# Patient Record
Sex: MALE | Race: ASIAN | NOT HISPANIC OR LATINO
[De-identification: names, ages, dates, MRNs, and addresses within clinical notes are randomized per-mention and may not be internally consistent; named-entity substitution may affect disease eponyms.]

---

## 2017-04-21 ENCOUNTER — APPOINTMENT (OUTPATIENT)
Dept: INTERNAL MEDICINE | Facility: CLINIC | Age: 57
End: 2017-04-21

## 2017-05-09 ENCOUNTER — INPATIENT (INPATIENT)
Facility: HOSPITAL | Age: 57
LOS: 3 days | Discharge: HOME | DRG: 824 | End: 2017-05-13
Attending: INTERNAL MEDICINE | Admitting: INTERNAL MEDICINE
Payer: MEDICARE

## 2017-05-09 VITALS
RESPIRATION RATE: 20 BRPM | HEART RATE: 95 BPM | DIASTOLIC BLOOD PRESSURE: 91 MMHG | SYSTOLIC BLOOD PRESSURE: 148 MMHG | TEMPERATURE: 98 F

## 2017-05-09 DIAGNOSIS — R59.0 LOCALIZED ENLARGED LYMPH NODES: ICD-10-CM

## 2017-05-09 LAB
ALBUMIN SERPL ELPH-MCNC: 4.2 G/DL — SIGNIFICANT CHANGE UP (ref 3.3–5)
ALP SERPL-CCNC: 89 U/L — SIGNIFICANT CHANGE UP (ref 40–120)
ALT FLD-CCNC: 28 U/L RC — SIGNIFICANT CHANGE UP (ref 10–45)
ANION GAP SERPL CALC-SCNC: 14 MMOL/L — SIGNIFICANT CHANGE UP (ref 5–17)
APPEARANCE UR: CLEAR — SIGNIFICANT CHANGE UP
AST SERPL-CCNC: 26 U/L — SIGNIFICANT CHANGE UP (ref 10–40)
BASOPHILS # BLD AUTO: 0 K/UL — SIGNIFICANT CHANGE UP (ref 0–0.2)
BASOPHILS NFR BLD AUTO: 0.5 % — SIGNIFICANT CHANGE UP (ref 0–2)
BILIRUB SERPL-MCNC: 0.5 MG/DL — SIGNIFICANT CHANGE UP (ref 0.2–1.2)
BILIRUB UR-MCNC: NEGATIVE — SIGNIFICANT CHANGE UP
BUN SERPL-MCNC: 13 MG/DL — SIGNIFICANT CHANGE UP (ref 7–23)
CALCIUM SERPL-MCNC: 9.3 MG/DL — SIGNIFICANT CHANGE UP (ref 8.4–10.5)
CHLORIDE SERPL-SCNC: 104 MMOL/L — SIGNIFICANT CHANGE UP (ref 96–108)
CO2 SERPL-SCNC: 24 MMOL/L — SIGNIFICANT CHANGE UP (ref 22–31)
COLOR SPEC: SIGNIFICANT CHANGE UP
CREAT SERPL-MCNC: 0.8 MG/DL — SIGNIFICANT CHANGE UP (ref 0.5–1.3)
DIFF PNL FLD: NEGATIVE — SIGNIFICANT CHANGE UP
EOSINOPHIL # BLD AUTO: 0.1 K/UL — SIGNIFICANT CHANGE UP (ref 0–0.5)
EOSINOPHIL NFR BLD AUTO: 1.5 % — SIGNIFICANT CHANGE UP (ref 0–6)
GLUCOSE SERPL-MCNC: 111 MG/DL — HIGH (ref 70–99)
GLUCOSE UR QL: NEGATIVE — SIGNIFICANT CHANGE UP
HCT VFR BLD CALC: 43.5 % — SIGNIFICANT CHANGE UP (ref 39–50)
HGB BLD-MCNC: 15.3 G/DL — SIGNIFICANT CHANGE UP (ref 13–17)
KETONES UR-MCNC: NEGATIVE — SIGNIFICANT CHANGE UP
LEUKOCYTE ESTERASE UR-ACNC: NEGATIVE — SIGNIFICANT CHANGE UP
LYMPHOCYTES # BLD AUTO: 1.8 K/UL — SIGNIFICANT CHANGE UP (ref 1–3.3)
LYMPHOCYTES # BLD AUTO: 32.1 % — SIGNIFICANT CHANGE UP (ref 13–44)
MCHC RBC-ENTMCNC: 31.1 PG — SIGNIFICANT CHANGE UP (ref 27–34)
MCHC RBC-ENTMCNC: 35.1 GM/DL — SIGNIFICANT CHANGE UP (ref 32–36)
MCV RBC AUTO: 88.5 FL — SIGNIFICANT CHANGE UP (ref 80–100)
MONOCYTES # BLD AUTO: 0.6 K/UL — SIGNIFICANT CHANGE UP (ref 0–0.9)
MONOCYTES NFR BLD AUTO: 10.1 % — SIGNIFICANT CHANGE UP (ref 2–14)
NEUTROPHILS # BLD AUTO: 3.2 K/UL — SIGNIFICANT CHANGE UP (ref 1.8–7.4)
NEUTROPHILS NFR BLD AUTO: 55.9 % — SIGNIFICANT CHANGE UP (ref 43–77)
NITRITE UR-MCNC: NEGATIVE — SIGNIFICANT CHANGE UP
PH UR: 7.5 — SIGNIFICANT CHANGE UP (ref 5–8)
PLATELET # BLD AUTO: 211 K/UL — SIGNIFICANT CHANGE UP (ref 150–400)
POTASSIUM SERPL-MCNC: 4.1 MMOL/L — SIGNIFICANT CHANGE UP (ref 3.5–5.3)
POTASSIUM SERPL-SCNC: 4.1 MMOL/L — SIGNIFICANT CHANGE UP (ref 3.5–5.3)
PROT SERPL-MCNC: 7.7 G/DL — SIGNIFICANT CHANGE UP (ref 6–8.3)
PROT UR-MCNC: NEGATIVE — SIGNIFICANT CHANGE UP
RBC # BLD: 4.91 M/UL — SIGNIFICANT CHANGE UP (ref 4.2–5.8)
RBC # FLD: 11.8 % — SIGNIFICANT CHANGE UP (ref 10.3–14.5)
SODIUM SERPL-SCNC: 142 MMOL/L — SIGNIFICANT CHANGE UP (ref 135–145)
SP GR SPEC: 1.02 — SIGNIFICANT CHANGE UP (ref 1.01–1.02)
UROBILINOGEN FLD QL: NEGATIVE — SIGNIFICANT CHANGE UP
WBC # BLD: 5.8 K/UL — SIGNIFICANT CHANGE UP (ref 3.8–10.5)
WBC # FLD AUTO: 5.8 K/UL — SIGNIFICANT CHANGE UP (ref 3.8–10.5)
WBC UR QL: SIGNIFICANT CHANGE UP /HPF (ref 0–5)

## 2017-05-09 PROCEDURE — 71020: CPT | Mod: 26

## 2017-05-09 PROCEDURE — 70491 CT SOFT TISSUE NECK W/DYE: CPT | Mod: 26

## 2017-05-09 PROCEDURE — 93010 ELECTROCARDIOGRAM REPORT: CPT

## 2017-05-09 PROCEDURE — 99223 1ST HOSP IP/OBS HIGH 75: CPT

## 2017-05-09 PROCEDURE — 99285 EMERGENCY DEPT VISIT HI MDM: CPT | Mod: 25

## 2017-05-09 RX ORDER — VANCOMYCIN HCL 1 G
1000 VIAL (EA) INTRAVENOUS ONCE
Qty: 0 | Refills: 0 | Status: COMPLETED | OUTPATIENT
Start: 2017-05-09 | End: 2017-05-09

## 2017-05-09 RX ORDER — PIPERACILLIN AND TAZOBACTAM 4; .5 G/20ML; G/20ML
3.38 INJECTION, POWDER, LYOPHILIZED, FOR SOLUTION INTRAVENOUS ONCE
Qty: 0 | Refills: 0 | Status: COMPLETED | OUTPATIENT
Start: 2017-05-09 | End: 2017-05-09

## 2017-05-09 RX ADMIN — PIPERACILLIN AND TAZOBACTAM 200 GRAM(S): 4; .5 INJECTION, POWDER, LYOPHILIZED, FOR SOLUTION INTRAVENOUS at 11:46

## 2017-05-09 RX ADMIN — Medication 250 MILLIGRAM(S): at 12:30

## 2017-05-09 NOTE — ED PROVIDER NOTE - ATTENDING CONTRIBUTION TO CARE
patient presenting with several days of progressive swelling to R side of neck - may be lymphadenopathy. No dysphagia, change in phonation shortness of breath.  will further eval with CT, blood work. concern for lymphoma although patient denies systemic symptoms.

## 2017-05-09 NOTE — ED PROVIDER NOTE - OBJECTIVE STATEMENT
56M PMH bipolar disorder p/w neck swelling.  He recently started going to the gym and developed neck pain after playing a basketball game.  he attributed it to muscle pain and went to get a massage, and the masseur noticed significant soft tissue swelling and told him to see is PMD.  He did not but his spouse noticed that he had obvious asymmetry this morning and told him to come to the ED.  He denies difficulty swallowing, fever, vomiting, shortness of breath.  The swelling and pain is on the R side and from the mid neck to upper chest and back, does not radiate to arm or left chest/back.  It has not changed since starting a week ago, is worse with movement, and he has not taken NSAIDs or applied ice.  He has a history in the EMR of afib with mesenteric ischemia however he denies any history of this and does not recall ever being admitted to the hospital except for bipolar decades ago.

## 2017-05-09 NOTE — ED PROVIDER NOTE - PROGRESS NOTE DETAILS
ZR ct scan necrotising rt lymph node probably malignancy will admit to the hospital with surgical cons and iv antibiotics

## 2017-05-09 NOTE — H&P ADULT. - HISTORY OF PRESENT ILLNESS
Patient in excellent health. Two weeks ago with onset of neck swelling and tenderness. Wife noted the mass and brought him to the emergency room. CTT done today with a large tumor mass necrotizing and invasive into the face. Admitted for extent of disease and biopsy to establish diagnosis, prior to the onset of treatment.

## 2017-05-10 ENCOUNTER — TRANSCRIPTION ENCOUNTER (OUTPATIENT)
Age: 57
End: 2017-05-10

## 2017-05-10 LAB
HCT VFR BLD CALC: 43.6 % — SIGNIFICANT CHANGE UP (ref 39–50)
HGB BLD-MCNC: 15.2 G/DL — SIGNIFICANT CHANGE UP (ref 13–17)
MCHC RBC-ENTMCNC: 30.9 PG — SIGNIFICANT CHANGE UP (ref 27–34)
MCHC RBC-ENTMCNC: 34.9 GM/DL — SIGNIFICANT CHANGE UP (ref 32–36)
MCV RBC AUTO: 88.6 FL — SIGNIFICANT CHANGE UP (ref 80–100)
PLATELET # BLD AUTO: 219 K/UL — SIGNIFICANT CHANGE UP (ref 150–400)
RBC # BLD: 4.92 M/UL — SIGNIFICANT CHANGE UP (ref 4.2–5.8)
RBC # FLD: 12.8 % — SIGNIFICANT CHANGE UP (ref 10.3–14.5)
WBC # BLD: 6.85 K/UL — SIGNIFICANT CHANGE UP (ref 3.8–10.5)
WBC # FLD AUTO: 6.85 K/UL — SIGNIFICANT CHANGE UP (ref 3.8–10.5)

## 2017-05-10 PROCEDURE — 74177 CT ABD & PELVIS W/CONTRAST: CPT | Mod: 26

## 2017-05-10 PROCEDURE — 71260 CT THORAX DX C+: CPT | Mod: 26

## 2017-05-10 PROCEDURE — 99232 SBSQ HOSP IP/OBS MODERATE 35: CPT

## 2017-05-10 NOTE — DISCHARGE NOTE ADULT - CARE PROVIDER_API CALL
Mayank Cordon), Internal Medicine  [address] [place] [address]  Phone: [phone]  Fax: [phone]    Josef Stevenson), Internal Medicine; Medical Oncology  [address] [facility] [address]  [place] [address]  Phone: [phone]  Fax: [phone]

## 2017-05-10 NOTE — DISCHARGE NOTE ADULT - HOSPITAL COURSE
***To Be Completed By Attending*** Metastatic Lung cancer to neck obtained on biopsy by Dr Macdonald. Final path pending. Referred to dr Josef Stevenson, oncologist. Chemotherapy and radiation therapy to follow as an outpatient.

## 2017-05-10 NOTE — DISCHARGE NOTE ADULT - CARE PLAN
Principal Discharge DX:	Lymphadenopathy of right cervical region  Goal:	Follow up with your oncologist as an outpatient  Instructions for follow-up, activity and diet:	You will need to follow up with your oncologist, Dr. Josef Stevenson [phone] as an outpatient - please call to make an appointment.  Secondary Diagnosis:	Neck mass  Secondary Diagnosis:	Hypertension  Instructions for follow-up, activity and diet:	You will need to follow up with your primary medical doctor, Dr. Cordon [phone], within 10 days of discharge - please call to make an appointment.  Low salt diet  Activity as tolerated.  Follow up with your medical doctor for routine blood pressure monitoring at your next visit.  Notify your doctor if you have any of the following symptoms:   Dizziness, Lightheadedness, Blurry vision, Headache, Chest pain, Shortness of breath Principal Discharge DX:	Lymphadenopathy of right cervical region  Goal:	Follow up with your oncologist as an outpatient  Instructions for follow-up, activity and diet:	You will need to follow up with your oncologist, Dr. Josef Stevenson [phone] within one week - please call to make an appointment.  At that time, you will discuss your pathology results.  Secondary Diagnosis:	Neck mass  Instructions for follow-up, activity and diet:	You will need to follow up with your oncologist, Dr. Josef Stevenson [phone] within one week - please call to make an appointment.  At that time, you will discuss your pathology results.  Secondary Diagnosis:	Hypertension  Instructions for follow-up, activity and diet:	You will need to follow up with your primary medical doctor, Dr. Cordon [phone], within 10 days of discharge - please call to make an appointment.  Low salt diet  Activity as tolerated.  Follow up with your medical doctor for routine blood pressure monitoring at your next visit.  Notify your doctor if you have any of the following symptoms:   Dizziness, Lightheadedness, Blurry vision, Headache, Chest pain, Shortness of breath Principal Discharge DX:	Lymphadenopathy of right cervical region  Goal:	Follow up with your oncologist as an outpatient  Instructions for follow-up, activity and diet:	You will need to follow up with your oncologist, Dr. Josef Stevenson [phone] within one week - please call to make an appointment.  At that time, you will discuss your pathology results.  Secondary Diagnosis:	Neck mass  Instructions for follow-up, activity and diet:	You will need to follow up with your oncologist, Dr. Josef Stevenson [phone] within one week - please call to make an appointment.  At that time, you will discuss your pathology results.  Secondary Diagnosis:	Hypertension  Instructions for follow-up, activity and diet:	You will need to follow up with your primary medical doctor, Dr. Cordon [phone], within 10 days of discharge - please call to make an appointment.  Low salt diet  Activity as tolerated.  Follow up with your medical doctor for routine blood pressure monitoring at your next visit.  Notify your doctor if you have any of the following symptoms:   Dizziness, Lightheadedness, Blurry vision, Headache, Chest pain, Shortness of breath Principal Discharge DX:	Lymphadenopathy of right cervical region  Goal:	Follow up with your oncologist as an outpatient  Instructions for follow-up, activity and diet:	You will need to follow up with your oncologist, Dr. Josef Stevenson [phone] within one week - please call to make an appointment.  At that time, you will discuss your pathology results.  Secondary Diagnosis:	Neck mass  Instructions for follow-up, activity and diet:	You will need to follow up with your oncologist, Dr. Josef Stevenson [phone] within one week - please call to make an appointment.  At that time, you will discuss your pathology results.  Secondary Diagnosis:	Hypertension  Instructions for follow-up, activity and diet:	You will need to follow up with your primary medical doctor, Dr. Cordon [phone], within 10 days of discharge - please call to make an appointment.  Low salt diet  Activity as tolerated.  Follow up with your medical doctor for routine blood pressure monitoring at your next visit.  Notify your doctor if you have any of the following symptoms:   Dizziness, Lightheadedness, Blurry vision, Headache, Chest pain, Shortness of breath

## 2017-05-10 NOTE — DISCHARGE NOTE ADULT - PLAN OF CARE
Follow up with your oncologist as an outpatient You will need to follow up with your oncologist, Dr. Josef Stevenson [phone] as an outpatient - please call to make an appointment. You will need to follow up with your primary medical doctor, Dr. Cordon [phone], within 10 days of discharge - please call to make an appointment.  Low salt diet  Activity as tolerated.  Follow up with your medical doctor for routine blood pressure monitoring at your next visit.  Notify your doctor if you have any of the following symptoms:   Dizziness, Lightheadedness, Blurry vision, Headache, Chest pain, Shortness of breath You will need to follow up with your oncologist, Dr. Josef Stevenson [phone] within one week - please call to make an appointment.  At that time, you will discuss your pathology results. You will need to follow up with your oncologist, Dr. Josef Stevenson [phone] within one week - please call to make an appointment.  At that time, you will discuss your pathology results.

## 2017-05-10 NOTE — DISCHARGE NOTE ADULT - ADDITIONAL INSTRUCTIONS
You will need to follow up with your primary medical doctor, Dr. Cordon [phone], within 10 days of discharge - please call to make an appointment. You will need to follow up with your oncologist, Dr. Josef Stevenson [phone] within one week - please call to make an appointment.  At that time, you will discuss your pathology results.    You will need to follow up with your primary medical doctor, Dr. Cordon [phone], within 10 days of discharge - please call to make an appointment.

## 2017-05-10 NOTE — DISCHARGE NOTE ADULT - PATIENT PORTAL LINK FT
“You can access the [facility] Patient Portal, offered by [facility], by registering with the following website: [email or web address]”

## 2017-05-11 ENCOUNTER — RESULT REVIEW (OUTPATIENT)
Age: 57
End: 2017-05-11

## 2017-05-11 LAB
ANION GAP SERPL CALC-SCNC: 15 MMOL/L — SIGNIFICANT CHANGE UP (ref 5–17)
APTT BLD: 31.6 SEC — SIGNIFICANT CHANGE UP (ref 27.5–37.4)
BLD GP AB SCN SERPL QL: NEGATIVE — SIGNIFICANT CHANGE UP
BUN SERPL-MCNC: 12 MG/DL — SIGNIFICANT CHANGE UP (ref 7–23)
CALCIUM SERPL-MCNC: 8.9 MG/DL — SIGNIFICANT CHANGE UP (ref 8.4–10.5)
CHLORIDE SERPL-SCNC: 101 MMOL/L — SIGNIFICANT CHANGE UP (ref 96–108)
CO2 SERPL-SCNC: 23 MMOL/L — SIGNIFICANT CHANGE UP (ref 22–31)
CREAT SERPL-MCNC: 0.71 MG/DL — SIGNIFICANT CHANGE UP (ref 0.5–1.3)
GLUCOSE SERPL-MCNC: 108 MG/DL — HIGH (ref 70–99)
GRAM STN FLD: SIGNIFICANT CHANGE UP
HCT VFR BLD CALC: 41.8 % — SIGNIFICANT CHANGE UP (ref 39–50)
HGB BLD-MCNC: 14.6 G/DL — SIGNIFICANT CHANGE UP (ref 13–17)
INR BLD: 1 RATIO — SIGNIFICANT CHANGE UP (ref 0.88–1.16)
MCHC RBC-ENTMCNC: 30.8 PG — SIGNIFICANT CHANGE UP (ref 27–34)
MCHC RBC-ENTMCNC: 34.9 GM/DL — SIGNIFICANT CHANGE UP (ref 32–36)
MCV RBC AUTO: 88.2 FL — SIGNIFICANT CHANGE UP (ref 80–100)
NIGHT BLUE STAIN TISS: SIGNIFICANT CHANGE UP
PLATELET # BLD AUTO: 221 K/UL — SIGNIFICANT CHANGE UP (ref 150–400)
POTASSIUM SERPL-MCNC: 3.9 MMOL/L — SIGNIFICANT CHANGE UP (ref 3.5–5.3)
POTASSIUM SERPL-SCNC: 3.9 MMOL/L — SIGNIFICANT CHANGE UP (ref 3.5–5.3)
PROTHROM AB SERPL-ACNC: 11.3 SEC — SIGNIFICANT CHANGE UP (ref 10–13.1)
RBC # BLD: 4.74 M/UL — SIGNIFICANT CHANGE UP (ref 4.2–5.8)
RBC # FLD: 12.8 % — SIGNIFICANT CHANGE UP (ref 10.3–14.5)
RH IG SCN BLD-IMP: POSITIVE — SIGNIFICANT CHANGE UP
SODIUM SERPL-SCNC: 139 MMOL/L — SIGNIFICANT CHANGE UP (ref 135–145)
SPECIMEN SOURCE: SIGNIFICANT CHANGE UP
SPECIMEN SOURCE: SIGNIFICANT CHANGE UP
WBC # BLD: 6.76 K/UL — SIGNIFICANT CHANGE UP (ref 3.8–10.5)
WBC # FLD AUTO: 6.76 K/UL — SIGNIFICANT CHANGE UP (ref 3.8–10.5)

## 2017-05-11 PROCEDURE — 38510 BIOPSY/REMOVAL LYMPH NODES: CPT

## 2017-05-11 PROCEDURE — 88305 TISSUE EXAM BY PATHOLOGIST: CPT | Mod: 26

## 2017-05-11 PROCEDURE — 76998 US GUIDE INTRAOP: CPT | Mod: 26,59

## 2017-05-11 PROCEDURE — 88342 IMHCHEM/IMCYTCHM 1ST ANTB: CPT | Mod: 26

## 2017-05-11 PROCEDURE — 88341 IMHCHEM/IMCYTCHM EA ADD ANTB: CPT | Mod: 26

## 2017-05-11 PROCEDURE — 88331 PATH CONSLTJ SURG 1 BLK 1SPC: CPT | Mod: 26

## 2017-05-11 PROCEDURE — 70551 MRI BRAIN STEM W/O DYE: CPT | Mod: 26

## 2017-05-11 RX ORDER — METOCLOPRAMIDE HCL 10 MG
10 TABLET ORAL ONCE
Qty: 0 | Refills: 0 | Status: DISCONTINUED | OUTPATIENT
Start: 2017-05-11 | End: 2017-05-11

## 2017-05-11 RX ORDER — HYDROMORPHONE HYDROCHLORIDE 2 MG/ML
0.5 INJECTION INTRAMUSCULAR; INTRAVENOUS; SUBCUTANEOUS
Qty: 0 | Refills: 0 | Status: DISCONTINUED | OUTPATIENT
Start: 2017-05-11 | End: 2017-05-11

## 2017-05-11 RX ORDER — ONDANSETRON 8 MG/1
4 TABLET, FILM COATED ORAL ONCE
Qty: 0 | Refills: 0 | Status: DISCONTINUED | OUTPATIENT
Start: 2017-05-11 | End: 2017-05-11

## 2017-05-11 RX ORDER — ENOXAPARIN SODIUM 100 MG/ML
40 INJECTION SUBCUTANEOUS EVERY 24 HOURS
Qty: 0 | Refills: 0 | Status: DISCONTINUED | OUTPATIENT
Start: 2017-05-11 | End: 2017-05-13

## 2017-05-11 RX ORDER — DEXTROSE MONOHYDRATE, SODIUM CHLORIDE, AND POTASSIUM CHLORIDE 50; .745; 4.5 G/1000ML; G/1000ML; G/1000ML
1000 INJECTION, SOLUTION INTRAVENOUS
Qty: 0 | Refills: 0 | Status: DISCONTINUED | OUTPATIENT
Start: 2017-05-11 | End: 2017-05-11

## 2017-05-11 RX ADMIN — ENOXAPARIN SODIUM 40 MILLIGRAM(S): 100 INJECTION SUBCUTANEOUS at 15:45

## 2017-05-11 RX ADMIN — DEXTROSE MONOHYDRATE, SODIUM CHLORIDE, AND POTASSIUM CHLORIDE 50 MILLILITER(S): 50; .745; 4.5 INJECTION, SOLUTION INTRAVENOUS at 13:25

## 2017-05-11 NOTE — BRIEF OPERATIVE NOTE - OPERATION/FINDINGS
right cervical lymph node, sent for cultures, AFB, pathology and frozen.  Read as poorly differentiated adenocarcinoma

## 2017-05-12 ENCOUNTER — TRANSCRIPTION ENCOUNTER (OUTPATIENT)
Age: 57
End: 2017-05-12

## 2017-05-12 LAB
ANION GAP SERPL CALC-SCNC: 16 MMOL/L — SIGNIFICANT CHANGE UP (ref 5–17)
BASOPHILS # BLD AUTO: 0.02 K/UL — SIGNIFICANT CHANGE UP (ref 0–0.2)
BASOPHILS NFR BLD AUTO: 0.2 % — SIGNIFICANT CHANGE UP (ref 0–2)
BUN SERPL-MCNC: 13 MG/DL — SIGNIFICANT CHANGE UP (ref 7–23)
CALCIUM SERPL-MCNC: 9.2 MG/DL — SIGNIFICANT CHANGE UP (ref 8.4–10.5)
CHLORIDE SERPL-SCNC: 102 MMOL/L — SIGNIFICANT CHANGE UP (ref 96–108)
CO2 SERPL-SCNC: 22 MMOL/L — SIGNIFICANT CHANGE UP (ref 22–31)
CREAT SERPL-MCNC: 0.79 MG/DL — SIGNIFICANT CHANGE UP (ref 0.5–1.3)
EOSINOPHIL # BLD AUTO: 0.01 K/UL — SIGNIFICANT CHANGE UP (ref 0–0.5)
EOSINOPHIL NFR BLD AUTO: 0.1 % — SIGNIFICANT CHANGE UP (ref 0–6)
GLUCOSE SERPL-MCNC: 129 MG/DL — HIGH (ref 70–99)
HCT VFR BLD CALC: 41.6 % — SIGNIFICANT CHANGE UP (ref 39–50)
HGB BLD-MCNC: 13.7 G/DL — SIGNIFICANT CHANGE UP (ref 13–17)
IMM GRANULOCYTES NFR BLD AUTO: 0.2 % — SIGNIFICANT CHANGE UP (ref 0–1.5)
LYMPHOCYTES # BLD AUTO: 1.71 K/UL — SIGNIFICANT CHANGE UP (ref 1–3.3)
LYMPHOCYTES # BLD AUTO: 18.5 % — SIGNIFICANT CHANGE UP (ref 13–44)
MCHC RBC-ENTMCNC: 29.3 PG — SIGNIFICANT CHANGE UP (ref 27–34)
MCHC RBC-ENTMCNC: 32.9 GM/DL — SIGNIFICANT CHANGE UP (ref 32–36)
MCV RBC AUTO: 89.1 FL — SIGNIFICANT CHANGE UP (ref 80–100)
MONOCYTES # BLD AUTO: 0.87 K/UL — SIGNIFICANT CHANGE UP (ref 0–0.9)
MONOCYTES NFR BLD AUTO: 9.4 % — SIGNIFICANT CHANGE UP (ref 2–14)
NEUTROPHILS # BLD AUTO: 6.62 K/UL — SIGNIFICANT CHANGE UP (ref 1.8–7.4)
NEUTROPHILS NFR BLD AUTO: 71.6 % — SIGNIFICANT CHANGE UP (ref 43–77)
PLATELET # BLD AUTO: 227 K/UL — SIGNIFICANT CHANGE UP (ref 150–400)
POTASSIUM SERPL-MCNC: 4.1 MMOL/L — SIGNIFICANT CHANGE UP (ref 3.5–5.3)
POTASSIUM SERPL-SCNC: 4.1 MMOL/L — SIGNIFICANT CHANGE UP (ref 3.5–5.3)
RBC # BLD: 4.67 M/UL — SIGNIFICANT CHANGE UP (ref 4.2–5.8)
RBC # FLD: 12.8 % — SIGNIFICANT CHANGE UP (ref 10.3–14.5)
SODIUM SERPL-SCNC: 140 MMOL/L — SIGNIFICANT CHANGE UP (ref 135–145)
WBC # BLD: 9.25 K/UL — SIGNIFICANT CHANGE UP (ref 3.8–10.5)
WBC # FLD AUTO: 9.25 K/UL — SIGNIFICANT CHANGE UP (ref 3.8–10.5)

## 2017-05-13 VITALS
SYSTOLIC BLOOD PRESSURE: 121 MMHG | RESPIRATION RATE: 18 BRPM | DIASTOLIC BLOOD PRESSURE: 72 MMHG | TEMPERATURE: 99 F | OXYGEN SATURATION: 97 % | HEART RATE: 61 BPM

## 2017-05-13 PROCEDURE — 85610 PROTHROMBIN TIME: CPT

## 2017-05-13 PROCEDURE — 87102 FUNGUS ISOLATION CULTURE: CPT

## 2017-05-13 PROCEDURE — 99285 EMERGENCY DEPT VISIT HI MDM: CPT | Mod: 25

## 2017-05-13 PROCEDURE — 86900 BLOOD TYPING SEROLOGIC ABO: CPT

## 2017-05-13 PROCEDURE — 80053 COMPREHEN METABOLIC PANEL: CPT

## 2017-05-13 PROCEDURE — 96374 THER/PROPH/DIAG INJ IV PUSH: CPT | Mod: XU

## 2017-05-13 PROCEDURE — 88341 IMHCHEM/IMCYTCHM EA ADD ANTB: CPT

## 2017-05-13 PROCEDURE — 81001 URINALYSIS AUTO W/SCOPE: CPT

## 2017-05-13 PROCEDURE — 88305 TISSUE EXAM BY PATHOLOGIST: CPT

## 2017-05-13 PROCEDURE — 85730 THROMBOPLASTIN TIME PARTIAL: CPT

## 2017-05-13 PROCEDURE — 86901 BLOOD TYPING SEROLOGIC RH(D): CPT

## 2017-05-13 PROCEDURE — 71046 X-RAY EXAM CHEST 2 VIEWS: CPT

## 2017-05-13 PROCEDURE — 88331 PATH CONSLTJ SURG 1 BLK 1SPC: CPT

## 2017-05-13 PROCEDURE — C1889: CPT

## 2017-05-13 PROCEDURE — 87116 MYCOBACTERIA CULTURE: CPT

## 2017-05-13 PROCEDURE — 88342 IMHCHEM/IMCYTCHM 1ST ANTB: CPT

## 2017-05-13 PROCEDURE — 87206 SMEAR FLUORESCENT/ACID STAI: CPT

## 2017-05-13 PROCEDURE — 87015 SPECIMEN INFECT AGNT CONCNTJ: CPT

## 2017-05-13 PROCEDURE — 85027 COMPLETE CBC AUTOMATED: CPT

## 2017-05-13 PROCEDURE — 71260 CT THORAX DX C+: CPT

## 2017-05-13 PROCEDURE — 70551 MRI BRAIN STEM W/O DYE: CPT

## 2017-05-13 PROCEDURE — 74177 CT ABD & PELVIS W/CONTRAST: CPT

## 2017-05-13 PROCEDURE — 80048 BASIC METABOLIC PNL TOTAL CA: CPT

## 2017-05-13 PROCEDURE — 85652 RBC SED RATE AUTOMATED: CPT

## 2017-05-13 PROCEDURE — 87070 CULTURE OTHR SPECIMN AEROBIC: CPT

## 2017-05-13 PROCEDURE — 86850 RBC ANTIBODY SCREEN: CPT

## 2017-05-13 PROCEDURE — 96375 TX/PRO/DX INJ NEW DRUG ADDON: CPT | Mod: XU

## 2017-05-13 PROCEDURE — 93005 ELECTROCARDIOGRAM TRACING: CPT

## 2017-05-13 PROCEDURE — 70491 CT SOFT TISSUE NECK W/DYE: CPT

## 2017-05-16 LAB
CULTURE RESULTS: SIGNIFICANT CHANGE UP
SPECIMEN SOURCE: SIGNIFICANT CHANGE UP
SURGICAL PATHOLOGY STUDY: SIGNIFICANT CHANGE UP

## 2017-05-25 ENCOUNTER — APPOINTMENT (OUTPATIENT)
Dept: INTERNAL MEDICINE | Facility: CLINIC | Age: 57
End: 2017-05-25

## 2017-06-09 ENCOUNTER — APPOINTMENT (OUTPATIENT)
Dept: INTERNAL MEDICINE | Facility: CLINIC | Age: 57
End: 2017-06-09

## 2017-06-09 VITALS
HEART RATE: 93 BPM | OXYGEN SATURATION: 95 % | DIASTOLIC BLOOD PRESSURE: 86 MMHG | BODY MASS INDEX: 27.62 KG/M2 | WEIGHT: 176 LBS | TEMPERATURE: 99.4 F | RESPIRATION RATE: 12 BRPM | HEIGHT: 67 IN | SYSTOLIC BLOOD PRESSURE: 122 MMHG

## 2017-06-09 DIAGNOSIS — R21 RASH AND OTHER NONSPECIFIC SKIN ERUPTION: ICD-10-CM

## 2017-06-09 DIAGNOSIS — C34.91 MALIGNANT NEOPLASM OF UNSPECIFIED PART OF RIGHT BRONCHUS OR LUNG: ICD-10-CM

## 2017-06-10 LAB
CULTURE RESULTS: SIGNIFICANT CHANGE UP
SPECIMEN SOURCE: SIGNIFICANT CHANGE UP

## 2017-06-11 PROBLEM — C34.91 PRIMARY MALIGNANT NEOPLASM OF RIGHT LUNG METASTATIC TO OTHER SITE: Status: ACTIVE | Noted: 2017-06-11

## 2017-06-11 PROBLEM — R21 RASH OF NECK: Status: ACTIVE | Noted: 2017-06-11

## 2017-06-24 LAB
CULTURE RESULTS: SIGNIFICANT CHANGE UP
SPECIMEN SOURCE: SIGNIFICANT CHANGE UP

## 2017-12-14 ENCOUNTER — OUTPATIENT (OUTPATIENT)
Dept: OUTPATIENT SERVICES | Facility: HOSPITAL | Age: 57
LOS: 1 days | End: 2017-12-14

## 2017-12-14 ENCOUNTER — APPOINTMENT (OUTPATIENT)
Dept: CT IMAGING | Facility: IMAGING CENTER | Age: 57
End: 2017-12-14

## 2017-12-28 ENCOUNTER — APPOINTMENT (OUTPATIENT)
Dept: CT IMAGING | Facility: IMAGING CENTER | Age: 57
End: 2017-12-28
Payer: MEDICARE

## 2017-12-28 ENCOUNTER — OUTPATIENT (OUTPATIENT)
Dept: OUTPATIENT SERVICES | Facility: HOSPITAL | Age: 57
LOS: 1 days | End: 2017-12-28
Payer: COMMERCIAL

## 2017-12-28 DIAGNOSIS — Z00.8 ENCOUNTER FOR OTHER GENERAL EXAMINATION: ICD-10-CM

## 2017-12-28 PROCEDURE — 71250 CT THORAX DX C-: CPT | Mod: 26

## 2017-12-28 PROCEDURE — 71250 CT THORAX DX C-: CPT

## 2017-12-28 PROCEDURE — 70490 CT SOFT TISSUE NECK W/O DYE: CPT | Mod: 26

## 2017-12-28 PROCEDURE — 70490 CT SOFT TISSUE NECK W/O DYE: CPT

## 2018-01-08 DIAGNOSIS — J35.1 HYPERTROPHY OF TONSILS: ICD-10-CM

## 2018-01-08 DIAGNOSIS — C34.90 MALIGNANT NEOPLASM OF UNSPECIFIED PART OF UNSPECIFIED BRONCHUS OR LUNG: ICD-10-CM

## 2018-01-08 DIAGNOSIS — Z85.118 PERSONAL HISTORY OF OTHER MALIGNANT NEOPLASM OF BRONCHUS AND LUNG: ICD-10-CM

## 2018-01-26 ENCOUNTER — APPOINTMENT (OUTPATIENT)
Dept: INTERNAL MEDICINE | Facility: CLINIC | Age: 58
End: 2018-01-26
Payer: MEDICAID

## 2018-01-26 VITALS
DIASTOLIC BLOOD PRESSURE: 82 MMHG | HEART RATE: 75 BPM | SYSTOLIC BLOOD PRESSURE: 149 MMHG | BODY MASS INDEX: 27.78 KG/M2 | WEIGHT: 177 LBS | TEMPERATURE: 98.1 F | OXYGEN SATURATION: 95 % | RESPIRATION RATE: 12 BRPM | HEIGHT: 67 IN

## 2018-01-26 DIAGNOSIS — M54.5 LOW BACK PAIN: ICD-10-CM

## 2018-01-26 PROCEDURE — 99214 OFFICE O/P EST MOD 30 MIN: CPT

## 2018-05-04 ENCOUNTER — APPOINTMENT (OUTPATIENT)
Dept: INTERNAL MEDICINE | Facility: CLINIC | Age: 58
End: 2018-05-04
Payer: MEDICARE

## 2018-05-04 VITALS
TEMPERATURE: 98.3 F | DIASTOLIC BLOOD PRESSURE: 81 MMHG | RESPIRATION RATE: 12 BRPM | HEIGHT: 67 IN | WEIGHT: 178 LBS | HEART RATE: 65 BPM | SYSTOLIC BLOOD PRESSURE: 127 MMHG | BODY MASS INDEX: 27.94 KG/M2 | OXYGEN SATURATION: 94 %

## 2018-05-04 DIAGNOSIS — M25.511 PAIN IN RIGHT SHOULDER: ICD-10-CM

## 2018-05-04 DIAGNOSIS — L98.9 DISORDER OF THE SKIN AND SUBCUTANEOUS TISSUE, UNSPECIFIED: ICD-10-CM

## 2018-05-04 DIAGNOSIS — B35.1 TINEA UNGUIUM: ICD-10-CM

## 2018-05-04 PROCEDURE — 99214 OFFICE O/P EST MOD 30 MIN: CPT

## 2018-05-04 RX ORDER — ERLOTINIB HYDROCHLORIDE 150 MG/1
TABLET ORAL
Refills: 0 | Status: ACTIVE | COMMUNITY

## 2018-05-26 ENCOUNTER — APPOINTMENT (OUTPATIENT)
Dept: NUCLEAR MEDICINE | Facility: IMAGING CENTER | Age: 58
End: 2018-05-26
Payer: MEDICARE

## 2018-05-26 ENCOUNTER — OUTPATIENT (OUTPATIENT)
Dept: OUTPATIENT SERVICES | Facility: HOSPITAL | Age: 58
LOS: 1 days | End: 2018-05-26
Payer: COMMERCIAL

## 2018-05-26 DIAGNOSIS — C34.11 MALIGNANT NEOPLASM OF UPPER LOBE, RIGHT BRONCHUS OR LUNG: ICD-10-CM

## 2018-05-26 PROCEDURE — 78815 PET IMAGE W/CT SKULL-THIGH: CPT

## 2018-05-26 PROCEDURE — A9552: CPT

## 2018-05-26 PROCEDURE — 78815 PET IMAGE W/CT SKULL-THIGH: CPT | Mod: 26,PS

## 2018-06-05 ENCOUNTER — RESULT REVIEW (OUTPATIENT)
Age: 58
End: 2018-06-05

## 2018-06-05 ENCOUNTER — OUTPATIENT (OUTPATIENT)
Dept: OUTPATIENT SERVICES | Facility: HOSPITAL | Age: 58
LOS: 1 days | End: 2018-06-05
Payer: COMMERCIAL

## 2018-06-05 DIAGNOSIS — C49.0 MALIGNANT NEOPLASM OF CONNECTIVE AND SOFT TISSUE OF HEAD, FACE AND NECK: ICD-10-CM

## 2018-06-05 PROCEDURE — 88363 XM ARCHIVE TISSUE MOLEC ANAL: CPT

## 2018-06-15 LAB — SURGICAL PATHOLOGY STUDY: SIGNIFICANT CHANGE UP

## 2018-06-28 ENCOUNTER — APPOINTMENT (OUTPATIENT)
Dept: SURGERY | Facility: CLINIC | Age: 58
End: 2018-06-28
Payer: COMMERCIAL

## 2018-06-28 VITALS
BODY MASS INDEX: 27.15 KG/M2 | OXYGEN SATURATION: 99 % | TEMPERATURE: 98.3 F | WEIGHT: 173 LBS | HEART RATE: 65 BPM | SYSTOLIC BLOOD PRESSURE: 129 MMHG | HEIGHT: 67 IN | RESPIRATION RATE: 14 BRPM | DIASTOLIC BLOOD PRESSURE: 77 MMHG

## 2018-06-28 DIAGNOSIS — Z87.09 PERSONAL HISTORY OF OTHER DISEASES OF THE RESPIRATORY SYSTEM: ICD-10-CM

## 2018-06-28 DIAGNOSIS — L72.9 FOLLICULAR CYST OF THE SKIN AND SUBCUTANEOUS TISSUE, UNSPECIFIED: ICD-10-CM

## 2018-06-28 DIAGNOSIS — Z80.41 FAMILY HISTORY OF MALIGNANT NEOPLASM OF OVARY: ICD-10-CM

## 2018-06-28 DIAGNOSIS — R22.1 LOCALIZED SWELLING, MASS AND LUMP, NECK: ICD-10-CM

## 2018-06-28 DIAGNOSIS — Z83.3 FAMILY HISTORY OF DIABETES MELLITUS: ICD-10-CM

## 2018-06-28 PROCEDURE — 11100 BX SKIN SUBCUTANEOUS&/MUCOUS MEMBRANE 1 LESION: CPT

## 2018-06-28 PROCEDURE — 99203 OFFICE O/P NEW LOW 30 MIN: CPT | Mod: 25

## 2018-06-28 RX ORDER — DOXYCYCLINE HYCLATE 100 MG/1
100 TABLET ORAL
Qty: 28 | Refills: 0 | Status: DISCONTINUED | COMMUNITY
Start: 2018-06-12

## 2018-06-28 RX ORDER — HYDROCORTISONE 25 MG/G
2.5 CREAM TOPICAL
Qty: 28 | Refills: 0 | Status: ACTIVE | COMMUNITY
Start: 2018-06-12

## 2018-07-07 ENCOUNTER — OUTPATIENT (OUTPATIENT)
Dept: OUTPATIENT SERVICES | Facility: HOSPITAL | Age: 58
LOS: 1 days | End: 2018-07-07
Payer: COMMERCIAL

## 2018-07-07 ENCOUNTER — APPOINTMENT (OUTPATIENT)
Dept: MRI IMAGING | Facility: IMAGING CENTER | Age: 58
End: 2018-07-07
Payer: MEDICARE

## 2018-07-07 DIAGNOSIS — C78.01 SECONDARY MALIGNANT NEOPLASM OF RIGHT LUNG: ICD-10-CM

## 2018-07-07 PROCEDURE — 74183 MRI ABD W/O CNTR FLWD CNTR: CPT

## 2018-07-07 PROCEDURE — 74183 MRI ABD W/O CNTR FLWD CNTR: CPT | Mod: 26

## 2018-07-07 PROCEDURE — A9585: CPT

## 2018-07-13 LAB — CORE LAB BIOPSY: NORMAL

## 2018-11-14 ENCOUNTER — TRANSCRIPTION ENCOUNTER (OUTPATIENT)
Age: 58
End: 2018-11-14

## 2018-12-13 ENCOUNTER — APPOINTMENT (OUTPATIENT)
Dept: INTERNAL MEDICINE | Facility: CLINIC | Age: 58
End: 2018-12-13

## 2019-01-24 ENCOUNTER — APPOINTMENT (OUTPATIENT)
Dept: INTERNAL MEDICINE | Facility: CLINIC | Age: 59
End: 2019-01-24
Payer: COMMERCIAL

## 2019-01-24 VITALS
DIASTOLIC BLOOD PRESSURE: 59 MMHG | WEIGHT: 172 LBS | OXYGEN SATURATION: 98 % | SYSTOLIC BLOOD PRESSURE: 120 MMHG | HEIGHT: 67 IN | RESPIRATION RATE: 14 BRPM | TEMPERATURE: 98.6 F | HEART RATE: 85 BPM | BODY MASS INDEX: 27 KG/M2

## 2019-01-24 DIAGNOSIS — Z00.00 ENCOUNTER FOR GENERAL ADULT MEDICAL EXAMINATION W/OUT ABNORMAL FINDINGS: ICD-10-CM

## 2019-01-24 DIAGNOSIS — C34.90 MALIGNANT NEOPLASM OF UNSPECIFIED PART OF UNSPECIFIED BRONCHUS OR LUNG: ICD-10-CM

## 2019-01-24 PROCEDURE — 99396 PREV VISIT EST AGE 40-64: CPT

## 2019-01-24 NOTE — PLAN
[FreeTextEntry1] : 1. Annual physical\par pt refused blood work today\par 2. Lung cancer\par following with oncologist

## 2019-01-24 NOTE — HISTORY OF PRESENT ILLNESS
[de-identified] : 58 year old male with h/o metastatic  NSCC lung cancer  presents for annual physical exam. \par He is following with oncologist Dr. Stevenson on the regular basis > was started on Tegrisso for metastatic lung cancer ( he failed Tarceva ) \par He is otherwise doing well, denies CP/SOB, dizziness, N, V, abdominal pain. \par Last colonoscopy  20 years ago > refusing colonoscopy at present \par He tried to go to the [facility] at least once a week \par Pt refused blood work today

## 2019-01-24 NOTE — HISTORY OF PRESENT ILLNESS
[de-identified] : 58 year old male with h/o metastatic  NSCC lung cancer  presents for annual physical exam. \par He is following with oncologist Dr. Stevenson on the regular basis > was started on Tegrisso for metastatic lung cancer ( he failed Tarceva ) \par He is otherwise doing well, denies CP/SOB, dizziness, N, V, abdominal pain. \par Last colonoscopy  20 years ago > refusing colonoscopy at present \par He tried to go to the [facility] at least once a week \par Pt refused blood work today

## 2019-01-24 NOTE — PHYSICAL EXAM
[No Acute Distress] : no acute distress [Well Nourished] : well nourished [Well Developed] : well developed [Normal Sclera/Conjunctiva] : normal sclera/conjunctiva [EOMI] : extraocular movements intact [Normal Outer Ear/Nose] : the outer ears and nose were normal in appearance [Normal Oropharynx] : the oropharynx was normal [Normal TMs] : both tympanic membranes were normal [No JVD] : no jugular venous distention [Supple] : supple [No Respiratory Distress] : no respiratory distress  [Clear to Auscultation] : lungs were clear to auscultation bilaterally [No Accessory Muscle Use] : no accessory muscle use [Normal Rate] : normal rate  [Regular Rhythm] : with a regular rhythm [Normal S1, S2] : normal S1 and S2 [No Carotid Bruits] : no carotid bruits [Pedal Pulses Present] : the pedal pulses are present [No Edema] : there was no peripheral edema [Soft] : abdomen soft [Non Tender] : non-tender [Non-distended] : non-distended [No Masses] : no abdominal mass palpated [No HSM] : no HSM [Normal Bowel Sounds] : normal bowel sounds [Normal Posterior Cervical Nodes] : no posterior cervical lymphadenopathy [Normal Anterior Cervical Nodes] : no anterior cervical lymphadenopathy [No CVA Tenderness] : no CVA  tenderness [No Spinal Tenderness] : no spinal tenderness [No Joint Swelling] : no joint swelling [Grossly Normal Strength/Tone] : grossly normal strength/tone [No Rash] : no rash [Normal Gait] : normal gait [No Focal Deficits] : no focal deficits [Deep Tendon Reflexes (DTR)] : deep tendon reflexes were 2+ and symmetric [Normal Affect] : the affect was normal [Normal Insight/Judgement] : insight and judgment were intact [de-identified] : + few nodules Rt anterior neck line

## 2019-01-24 NOTE — PHYSICAL EXAM
[No Acute Distress] : no acute distress [Well Nourished] : well nourished [Well Developed] : well developed [Normal Sclera/Conjunctiva] : normal sclera/conjunctiva [EOMI] : extraocular movements intact [Normal Outer Ear/Nose] : the outer ears and nose were normal in appearance [Normal Oropharynx] : the oropharynx was normal [Normal TMs] : both tympanic membranes were normal [No JVD] : no jugular venous distention [Supple] : supple [No Respiratory Distress] : no respiratory distress  [Clear to Auscultation] : lungs were clear to auscultation bilaterally [No Accessory Muscle Use] : no accessory muscle use [Normal Rate] : normal rate  [Regular Rhythm] : with a regular rhythm [Normal S1, S2] : normal S1 and S2 [No Carotid Bruits] : no carotid bruits [Pedal Pulses Present] : the pedal pulses are present [No Edema] : there was no peripheral edema [Soft] : abdomen soft [Non Tender] : non-tender [Non-distended] : non-distended [No Masses] : no abdominal mass palpated [No HSM] : no HSM [Normal Bowel Sounds] : normal bowel sounds [Normal Posterior Cervical Nodes] : no posterior cervical lymphadenopathy [Normal Anterior Cervical Nodes] : no anterior cervical lymphadenopathy [No CVA Tenderness] : no CVA  tenderness [No Spinal Tenderness] : no spinal tenderness [No Joint Swelling] : no joint swelling [Grossly Normal Strength/Tone] : grossly normal strength/tone [No Rash] : no rash [Normal Gait] : normal gait [No Focal Deficits] : no focal deficits [Deep Tendon Reflexes (DTR)] : deep tendon reflexes were 2+ and symmetric [Normal Affect] : the affect was normal [Normal Insight/Judgement] : insight and judgment were intact [de-identified] : + few nodules Rt anterior neck line

## 2019-10-14 NOTE — H&P ADULT. - VASCULAR
Regular rate and rhythm, Heart sounds S1 S2 present, no murmurs, rubs or gallops Equal and normal pulses (carotid, femoral, dorsalis pedis)

## 2019-10-23 ENCOUNTER — APPOINTMENT (OUTPATIENT)
Dept: INTERNAL MEDICINE | Facility: CLINIC | Age: 59
End: 2019-10-23
Payer: COMMERCIAL

## 2019-10-23 VITALS
WEIGHT: 155 LBS | SYSTOLIC BLOOD PRESSURE: 115 MMHG | HEIGHT: 67 IN | BODY MASS INDEX: 24.33 KG/M2 | TEMPERATURE: 98.9 F | DIASTOLIC BLOOD PRESSURE: 69 MMHG

## 2019-10-23 VITALS — HEART RATE: 121 BPM | OXYGEN SATURATION: 87 %

## 2019-10-23 DIAGNOSIS — J18.9 PNEUMONIA, UNSPECIFIED ORGANISM: ICD-10-CM

## 2019-10-23 DIAGNOSIS — R06.02 SHORTNESS OF BREATH: ICD-10-CM

## 2019-10-23 DIAGNOSIS — R09.02 HYPOXEMIA: ICD-10-CM

## 2019-10-23 PROCEDURE — 99215 OFFICE O/P EST HI 40 MIN: CPT

## 2019-10-25 ENCOUNTER — INPATIENT (INPATIENT)
Facility: HOSPITAL | Age: 59
LOS: 7 days | Discharge: HOME | DRG: 871 | End: 2019-11-02
Attending: INTERNAL MEDICINE | Admitting: HOSPITALIST
Payer: COMMERCIAL

## 2019-10-25 VITALS
SYSTOLIC BLOOD PRESSURE: 128 MMHG | TEMPERATURE: 98 F | OXYGEN SATURATION: 84 % | DIASTOLIC BLOOD PRESSURE: 72 MMHG | HEART RATE: 109 BPM | RESPIRATION RATE: 18 BRPM

## 2019-10-25 LAB
ALBUMIN SERPL ELPH-MCNC: 3.5 G/DL — SIGNIFICANT CHANGE UP (ref 3.3–5)
ALP SERPL-CCNC: 93 U/L — SIGNIFICANT CHANGE UP (ref 40–120)
ALT FLD-CCNC: 12 U/L — SIGNIFICANT CHANGE UP (ref 10–45)
ANION GAP SERPL CALC-SCNC: 14 MMOL/L — SIGNIFICANT CHANGE UP (ref 5–17)
AST SERPL-CCNC: 33 U/L — SIGNIFICANT CHANGE UP (ref 10–40)
BILIRUB SERPL-MCNC: 0.6 MG/DL — SIGNIFICANT CHANGE UP (ref 0.2–1.2)
BUN SERPL-MCNC: 22 MG/DL — SIGNIFICANT CHANGE UP (ref 7–23)
CALCIUM SERPL-MCNC: 8.8 MG/DL — SIGNIFICANT CHANGE UP (ref 8.4–10.5)
CHLORIDE SERPL-SCNC: 103 MMOL/L — SIGNIFICANT CHANGE UP (ref 96–108)
CO2 SERPL-SCNC: 25 MMOL/L — SIGNIFICANT CHANGE UP (ref 22–31)
CREAT SERPL-MCNC: 0.79 MG/DL — SIGNIFICANT CHANGE UP (ref 0.5–1.3)
GAS PNL BLDV: SIGNIFICANT CHANGE UP
GLUCOSE SERPL-MCNC: 101 MG/DL — HIGH (ref 70–99)
HCT VFR BLD CALC: 38.1 % — LOW (ref 39–50)
HGB BLD-MCNC: 12.5 G/DL — LOW (ref 13–17)
MCHC RBC-ENTMCNC: 29.3 PG — SIGNIFICANT CHANGE UP (ref 27–34)
MCHC RBC-ENTMCNC: 32.8 GM/DL — SIGNIFICANT CHANGE UP (ref 32–36)
MCV RBC AUTO: 89.4 FL — SIGNIFICANT CHANGE UP (ref 80–100)
POTASSIUM SERPL-MCNC: 3.9 MMOL/L — SIGNIFICANT CHANGE UP (ref 3.5–5.3)
POTASSIUM SERPL-SCNC: 3.9 MMOL/L — SIGNIFICANT CHANGE UP (ref 3.5–5.3)
PROT SERPL-MCNC: 7.2 G/DL — SIGNIFICANT CHANGE UP (ref 6–8.3)
RBC # BLD: 4.26 M/UL — SIGNIFICANT CHANGE UP (ref 4.2–5.8)
RBC # FLD: 14.4 % — SIGNIFICANT CHANGE UP (ref 10.3–14.5)
SODIUM SERPL-SCNC: 142 MMOL/L — SIGNIFICANT CHANGE UP (ref 135–145)
WBC # BLD: 4.64 K/UL — SIGNIFICANT CHANGE UP (ref 3.8–10.5)
WBC # FLD AUTO: 4.64 K/UL — SIGNIFICANT CHANGE UP (ref 3.8–10.5)

## 2019-10-25 PROCEDURE — 99285 EMERGENCY DEPT VISIT HI MDM: CPT

## 2019-10-25 PROCEDURE — 71275 CT ANGIOGRAPHY CHEST: CPT | Mod: 26

## 2019-10-25 RX ORDER — SODIUM CHLORIDE 9 MG/ML
1000 INJECTION INTRAMUSCULAR; INTRAVENOUS; SUBCUTANEOUS ONCE
Refills: 0 | Status: COMPLETED | OUTPATIENT
Start: 2019-10-25 | End: 2019-10-25

## 2019-10-25 RX ORDER — PIPERACILLIN AND TAZOBACTAM 4; .5 G/20ML; G/20ML
3.38 INJECTION, POWDER, LYOPHILIZED, FOR SOLUTION INTRAVENOUS ONCE
Refills: 0 | Status: COMPLETED | OUTPATIENT
Start: 2019-10-25 | End: 2019-10-25

## 2019-10-25 RX ADMIN — PIPERACILLIN AND TAZOBACTAM 200 GRAM(S): 4; .5 INJECTION, POWDER, LYOPHILIZED, FOR SOLUTION INTRAVENOUS at 21:52

## 2019-10-25 RX ADMIN — SODIUM CHLORIDE 1000 MILLILITER(S): 9 INJECTION INTRAMUSCULAR; INTRAVENOUS; SUBCUTANEOUS at 21:52

## 2019-10-25 NOTE — ED ADULT NURSE NOTE - OBJECTIVE STATEMENT
59y Male A&Ox3 came to ED c/o cough.  PMH of HTN, Non-small cell carcinoma of R lung.  PT states he has been having coughing since August, saw MD in September and was prescribed Levofloxacin and Methylprednisolone, Pt hasn't gotten better, Pt saw MD on Wed, CT was done and dx of Pneumonia.  Pt presents today with SOB, cough, 82% SPO2 on RA, bilateral crackles on lungs, Pt put on 3L O2 NC and continuous pulse O2.  denies chest pain, ha, n/v/d, abdominal pain, f/c, urinary symptoms, hematuria

## 2019-10-25 NOTE — ED PROVIDER NOTE - PROGRESS NOTE DETAILS
Johnna Mancilla, PGY-2: Patient hemodynamically stable on nasal cannula. CT Angio negative for a PE, but positive for progression of disease as well as a post-obstructive pneumonia. Will admit to medicine

## 2019-10-25 NOTE — ED PROVIDER NOTE - PHYSICAL EXAMINATION
PHYSICAL EXAM:  GENERAL: coughing, mild respiratory distress  HEAD:  Atraumatic, Normocephalic  EYES: EOMI, PERRLA, conjunctiva and sclera clear  NECK: Supple, No JVD  CHEST/LUNG: Clear to auscultation bilaterally; No wheeze  HEART: Regular rate and rhythm; No murmurs, rubs, or gallops  ABDOMEN: Soft, Nontender, Nondistended; Bowel sounds present  EXTREMITIES:  2+ Peripheral Pulses, No clubbing, cyanosis, or edema  PSYCH: AAOx3  NEUROLOGY: non-focal  SKIN: No rashes or lesions

## 2019-10-25 NOTE — ED PROVIDER NOTE - ATTENDING CONTRIBUTION TO CARE
60 y/o male with h/o lung ca, followed by [facility] s/o radiotx, on oral chemo but hasn't started with cough since august, sob recently had ct chest showed pna a few weeks ago on levoquin but sts didn't improve, hypoxic, tachy, but no fever, doesn't meet SS crtieria at this time, but sepsis work up started, ct chest, l base diminished.

## 2019-10-25 NOTE — ED PROVIDER NOTE - OBJECTIVE STATEMENT
Johnna Mancilla MD PGY-2: Patient is sa 60 yo male with a hx of non-small cell lung cancer (on radiation therapy and chemo) who presents to the ED with a chronic cough. Pt states cough started in august, has gone to his PMD x2 and was treated with a 7d course of Levofloxacin, Medrol Dose Pack, and Tessalon Pearls. Patient's cough worsened over the past week, which prompted him to come into the ED>

## 2019-10-26 DIAGNOSIS — C34.90 MALIGNANT NEOPLASM OF UNSPECIFIED PART OF UNSPECIFIED BRONCHUS OR LUNG: ICD-10-CM

## 2019-10-26 DIAGNOSIS — J96.01 ACUTE RESPIRATORY FAILURE WITH HYPOXIA: ICD-10-CM

## 2019-10-26 DIAGNOSIS — D69.6 THROMBOCYTOPENIA, UNSPECIFIED: ICD-10-CM

## 2019-10-26 DIAGNOSIS — J90 PLEURAL EFFUSION, NOT ELSEWHERE CLASSIFIED: ICD-10-CM

## 2019-10-26 DIAGNOSIS — J18.9 PNEUMONIA, UNSPECIFIED ORGANISM: ICD-10-CM

## 2019-10-26 DIAGNOSIS — Z29.9 ENCOUNTER FOR PROPHYLACTIC MEASURES, UNSPECIFIED: ICD-10-CM

## 2019-10-26 DIAGNOSIS — B34.1 ENTEROVIRUS INFECTION, UNSPECIFIED: ICD-10-CM

## 2019-10-26 DIAGNOSIS — A41.9 SEPSIS, UNSPECIFIED ORGANISM: ICD-10-CM

## 2019-10-26 LAB
ANION GAP SERPL CALC-SCNC: 11 MMOL/L — SIGNIFICANT CHANGE UP (ref 5–17)
APTT BLD: 44.4 SEC — HIGH (ref 27.5–36.3)
BASOPHILS # BLD AUTO: 0 K/UL — SIGNIFICANT CHANGE UP (ref 0–0.2)
BASOPHILS NFR BLD AUTO: 0 % — SIGNIFICANT CHANGE UP (ref 0–2)
BUN SERPL-MCNC: 18 MG/DL — SIGNIFICANT CHANGE UP (ref 7–23)
CALCIUM SERPL-MCNC: 8.4 MG/DL — SIGNIFICANT CHANGE UP (ref 8.4–10.5)
CHLORIDE SERPL-SCNC: 106 MMOL/L — SIGNIFICANT CHANGE UP (ref 96–108)
CO2 SERPL-SCNC: 25 MMOL/L — SIGNIFICANT CHANGE UP (ref 22–31)
CREAT SERPL-MCNC: 0.74 MG/DL — SIGNIFICANT CHANGE UP (ref 0.5–1.3)
EOSINOPHIL # BLD AUTO: 0 K/UL — SIGNIFICANT CHANGE UP (ref 0–0.5)
EOSINOPHIL NFR BLD AUTO: 0 % — SIGNIFICANT CHANGE UP (ref 0–6)
GLUCOSE SERPL-MCNC: 100 MG/DL — HIGH (ref 70–99)
HCT VFR BLD CALC: 37.2 % — LOW (ref 39–50)
HGB BLD-MCNC: 12 G/DL — LOW (ref 13–17)
INR BLD: 1.13 RATIO — SIGNIFICANT CHANGE UP (ref 0.88–1.16)
LYMPHOCYTES # BLD AUTO: 1.21 K/UL — SIGNIFICANT CHANGE UP (ref 1–3.3)
LYMPHOCYTES # BLD AUTO: 26 % — SIGNIFICANT CHANGE UP (ref 13–44)
MAGNESIUM SERPL-MCNC: 2.2 MG/DL — SIGNIFICANT CHANGE UP (ref 1.6–2.6)
MCHC RBC-ENTMCNC: 28.9 PG — SIGNIFICANT CHANGE UP (ref 27–34)
MCHC RBC-ENTMCNC: 32.3 GM/DL — SIGNIFICANT CHANGE UP (ref 32–36)
MCV RBC AUTO: 89.6 FL — SIGNIFICANT CHANGE UP (ref 80–100)
MONOCYTES # BLD AUTO: 0.65 K/UL — SIGNIFICANT CHANGE UP (ref 0–0.9)
MONOCYTES NFR BLD AUTO: 14 % — SIGNIFICANT CHANGE UP (ref 2–14)
NEUTROPHILS # BLD AUTO: 2.78 K/UL — SIGNIFICANT CHANGE UP (ref 1.8–7.4)
NEUTROPHILS NFR BLD AUTO: 60 % — SIGNIFICANT CHANGE UP (ref 43–77)
NRBC # BLD: 0 /100 WBCS — SIGNIFICANT CHANGE UP (ref 0–0)
PHOSPHATE SERPL-MCNC: 3.3 MG/DL — SIGNIFICANT CHANGE UP (ref 2.5–4.5)
PLATELET # BLD AUTO: 104 K/UL — LOW (ref 150–400)
PLATELET # BLD AUTO: 87 K/UL — LOW (ref 150–400)
POTASSIUM SERPL-MCNC: 3.9 MMOL/L — SIGNIFICANT CHANGE UP (ref 3.5–5.3)
POTASSIUM SERPL-SCNC: 3.9 MMOL/L — SIGNIFICANT CHANGE UP (ref 3.5–5.3)
PROCALCITONIN SERPL-MCNC: 0.06 NG/ML — SIGNIFICANT CHANGE UP (ref 0.02–0.1)
PROTHROM AB SERPL-ACNC: 13.1 SEC — HIGH (ref 10–12.9)
RAPID RVP RESULT: DETECTED
RBC # BLD: 4.15 M/UL — LOW (ref 4.2–5.8)
RBC # FLD: 14.3 % — SIGNIFICANT CHANGE UP (ref 10.3–14.5)
RV+EV RNA SPEC QL NAA+PROBE: DETECTED
SODIUM SERPL-SCNC: 142 MMOL/L — SIGNIFICANT CHANGE UP (ref 135–145)
WBC # BLD: 4.26 K/UL — SIGNIFICANT CHANGE UP (ref 3.8–10.5)
WBC # FLD AUTO: 4.26 K/UL — SIGNIFICANT CHANGE UP (ref 3.8–10.5)

## 2019-10-26 PROCEDURE — 99233 SBSQ HOSP IP/OBS HIGH 50: CPT

## 2019-10-26 PROCEDURE — 12345: CPT | Mod: NC,GC

## 2019-10-26 PROCEDURE — 99233 SBSQ HOSP IP/OBS HIGH 50: CPT | Mod: GC

## 2019-10-26 PROCEDURE — 99223 1ST HOSP IP/OBS HIGH 75: CPT | Mod: GC

## 2019-10-26 RX ORDER — ENOXAPARIN SODIUM 100 MG/ML
40 INJECTION SUBCUTANEOUS DAILY
Refills: 0 | Status: DISCONTINUED | OUTPATIENT
Start: 2019-10-26 | End: 2019-11-02

## 2019-10-26 RX ORDER — ACETAMINOPHEN 500 MG
650 TABLET ORAL EVERY 6 HOURS
Refills: 0 | Status: DISCONTINUED | OUTPATIENT
Start: 2019-10-26 | End: 2019-11-02

## 2019-10-26 RX ORDER — IPRATROPIUM/ALBUTEROL SULFATE 18-103MCG
3 AEROSOL WITH ADAPTER (GRAM) INHALATION ONCE
Refills: 0 | Status: COMPLETED | OUTPATIENT
Start: 2019-10-26 | End: 2019-10-26

## 2019-10-26 RX ORDER — VANCOMYCIN HCL 1 G
1000 VIAL (EA) INTRAVENOUS EVERY 12 HOURS
Refills: 0 | Status: DISCONTINUED | OUTPATIENT
Start: 2019-10-26 | End: 2019-10-28

## 2019-10-26 RX ORDER — FLUTICASONE PROPIONATE 50 MCG
1 SPRAY, SUSPENSION NASAL
Refills: 0 | Status: DISCONTINUED | OUTPATIENT
Start: 2019-10-26 | End: 2019-11-02

## 2019-10-26 RX ORDER — PIPERACILLIN AND TAZOBACTAM 4; .5 G/20ML; G/20ML
3.38 INJECTION, POWDER, LYOPHILIZED, FOR SOLUTION INTRAVENOUS EVERY 8 HOURS
Refills: 0 | Status: DISCONTINUED | OUTPATIENT
Start: 2019-10-26 | End: 2019-11-02

## 2019-10-26 RX ORDER — ACETAMINOPHEN 500 MG
650 TABLET ORAL EVERY 6 HOURS
Refills: 0 | Status: DISCONTINUED | OUTPATIENT
Start: 2019-10-26 | End: 2019-10-26

## 2019-10-26 RX ORDER — OSIMERTINIB 80 1/1
0 TABLET, FILM COATED ORAL
Qty: 0 | Refills: 0 | DISCHARGE

## 2019-10-26 RX ORDER — IPRATROPIUM/ALBUTEROL SULFATE 18-103MCG
3 AEROSOL WITH ADAPTER (GRAM) INHALATION EVERY 4 HOURS
Refills: 0 | Status: DISCONTINUED | OUTPATIENT
Start: 2019-10-26 | End: 2019-11-02

## 2019-10-26 RX ADMIN — ENOXAPARIN SODIUM 40 MILLIGRAM(S): 100 INJECTION SUBCUTANEOUS at 08:47

## 2019-10-26 RX ADMIN — PIPERACILLIN AND TAZOBACTAM 25 GRAM(S): 4; .5 INJECTION, POWDER, LYOPHILIZED, FOR SOLUTION INTRAVENOUS at 12:30

## 2019-10-26 RX ADMIN — Medication 100 MILLIGRAM(S): at 08:46

## 2019-10-26 RX ADMIN — Medication 100 MILLIGRAM(S): at 21:46

## 2019-10-26 RX ADMIN — Medication 40 MILLIGRAM(S): at 16:36

## 2019-10-26 RX ADMIN — Medication 3 MILLILITER(S): at 00:32

## 2019-10-26 RX ADMIN — Medication 100 MILLIGRAM(S): at 12:29

## 2019-10-26 RX ADMIN — PIPERACILLIN AND TAZOBACTAM 25 GRAM(S): 4; .5 INJECTION, POWDER, LYOPHILIZED, FOR SOLUTION INTRAVENOUS at 21:46

## 2019-10-26 RX ADMIN — Medication 3 MILLILITER(S): at 09:51

## 2019-10-26 RX ADMIN — Medication 250 MILLIGRAM(S): at 16:55

## 2019-10-26 RX ADMIN — Medication 1 SPRAY(S): at 16:55

## 2019-10-26 NOTE — H&P ADULT - PROBLEM SELECTOR PLAN 2
CT chest with superimposed post-obstructive RLL pneumonia  - patient requiring supplemental oxygen; wean as tolerated  - continue with zosyn  - trend CBC daily The patient was hypoxic to 88% in the ED. He reports 1.5 months of shortness of breath.  - likely 2/2 pneumonia vs malignancy vs pleural effusions  - patient is requiring 4L nasal cannula currently; if patient still requires this amount of oxygen, should switch to Hi Neil. The patient was hypoxic to 88% in the ED. He reports 1.5 months of shortness of breath. CTA chest negative for pulmonary embolism.  - likely 2/2 pneumonia vs malignancy vs pleural effusions  - patient is requiring 4L nasal cannula currently; if patient still requires this amount of oxygen, should switch to Hi Neil.

## 2019-10-26 NOTE — CONSULT NOTE ADULT - SUBJECTIVE AND OBJECTIVE BOX
CHIEF COMPLAINT:  Chronic cough     HPI:  59 M with history of NSCLC s/p RT (he states to his right neck for metastatic spread) with last session 9 weeks ago) on Osimertinib for approximately 10 months who presents with persistent cough that began August 2019. He did not mention his cough to anyone until early October 2019 during which time a chest CT was performed which demonstrated a consolidation for which he was treated with levofloxacin and steroids for presumed pneumonia. His cough is associated with dyspnea, worse with exertion and yellow phlegm. Has some chest pain with coughing. Denies any fevers, syncope, or GI upset. Chest CT shows pleural effusion and consolidation of RLL. Pulmonary consulted for pneumonia and pleural effusion management.    PAST MEDICAL & SURGICAL HISTORY:  Non-small cell carcinoma of lung  No significant past surgical history    FAMILY HISTORY:  Family history of cervical cancer    SOCIAL HISTORY:  Smoking: Denies  Substance Use: [x] Never Used [ ] Used ____  EtOH Use: Denies  Marital Status: [ ] Single [ ]  [ ]  [ ]   Sexual History:   Occupation:  Recent Travel:  Country of Birth:  Advance Directives:    Allergies    No Known Allergies    Intolerances        HOME MEDICATIONS:    REVIEW OF SYSTEMS:  Constitutional: [ ] negative [-] fevers [-] chills [ ] weight loss [ ] weight gain  HEENT: [ ] negative [ ] dry eyes [ ] eye irritation [ ] postnasal drip [ ] nasal congestion  CV: [ ] negative  [-] chest pain [-] orthopnea [-] palpitations [ ] murmur  Resp: [ ] negative [+] cough [+] shortness of breath [-] wheezing [+] sputum [-] hemoptysis  GI: [ ] negative [-] nausea [-] vomiting [-] diarrhea [-] constipation [-] abd pain [ ] dysphagia   : [ ] negative [-] dysuria [ ] nocturia [ ] hematuria [ ] increased urinary frequency  Musculoskeletal: [ ] negative [ ] back pain [-] myalgias [-] arthralgias [ ] fracture  Skin: [ ] negative [-] rash [ ] itch  Neurological: [ ] negative [-] headache [-] dizziness [ ] syncope [ ] weakness [ ] numbness  Psychiatric: [ ] negative [ ] anxiety [ ] depression  Endocrine: [ ] negative [ ] diabetes [ ] thyroid problem  Hematologic/Lymphatic: [ ] negative [ ] anemia [ ] bleeding problem  Allergic/Immunologic: [ ] negative [ ] itchy eyes [ ] nasal discharge [ ] hives [ ] angioedema  [ ] All other systems negative  [ ] Unable to assess ROS because ________    OBJECTIVE:  ICU Vital Signs Last 24 Hrs  T(C): 37 (26 Oct 2019 11:30), Max: 37.3 (25 Oct 2019 21:33)  T(F): 98.6 (26 Oct 2019 11:30), Max: 99.2 (25 Oct 2019 21:33)  HR: 96 (26 Oct 2019 11:30) (94 - 109)  BP: 118/76 (26 Oct 2019 11:30) (115/77 - 128/72)  BP(mean): --  ABP: --  ABP(mean): --  RR: 18 (26 Oct 2019 11:30) (18 - 24)  SpO2: 92% (26 Oct 2019 11:30) (84% - 94%)        10-25 @ 07:01  -  10-26 @ 07:00  --------------------------------------------------------  IN: 240 mL / OUT: 0 mL / NET: 240 mL    10-26 @ 07:01  -  10-26 @ 14:41  --------------------------------------------------------  IN: 580 mL / OUT: 0 mL / NET: 580 mL      CAPILLARY BLOOD GLUCOSE          PHYSICAL EXAM:  General: No apparent distress  HEENT: NC/AT  Neck: Supple  Respiratory: Decreased breath sounds right lower half of posterior lung field. Otherwise clear.  Cardiovascular: RRR, no murmur, no LE edema  Abdomen: Soft, nontender, nondistended  Extremities: Warm  Skin: Intact  Neurological: A&Ox3, no focal deficits  Psychiatry: Normal mood and affect    HOSPITAL MEDICATIONS:  enoxaparin Injectable 40 milliGRAM(s) SubCutaneous daily    piperacillin/tazobactam IVPB.. 3.375 Gram(s) IV Intermittent every 8 hours  vancomycin  IVPB 1000 milliGRAM(s) IV Intermittent every 12 hours        albuterol/ipratropium for Nebulization. 3 milliLiter(s) Nebulizer every 4 hours PRN  benzonatate 100 milliGRAM(s) Oral every 8 hours  guaiFENesin    Syrup 100 milliGRAM(s) Oral every 6 hours PRN    acetaminophen   Tablet .. 650 milliGRAM(s) Oral every 6 hours PRN              fluticasone propionate 50 MICROgram(s)/spray Nasal Spray 1 Spray(s) Both Nostrils two times a day        LABS:                        12.0   4.26  )-----------( 104      ( 26 Oct 2019 08:13 )             37.2     Hgb Trend: 12.0<--, 12.5<--  10-26    142  |  106  |  18  ----------------------------<  100<H>  3.9   |  25  |  0.74    Ca    8.4      26 Oct 2019 08:13  Phos  3.3     10-26  Mg     2.2     10-26    TPro  7.2  /  Alb  3.5  /  TBili  0.6  /  DBili  x   /  AST  33  /  ALT  12  /  AlkPhos  93  10-25    Creatinine Trend: 0.74<--, 0.79<--  PT/INR - ( 26 Oct 2019 10:51 )   PT: 13.1 sec;   INR: 1.13 ratio         PTT - ( 26 Oct 2019 10:51 )  PTT:44.4 sec      Venous Blood Gas:  10-25 @ 22:02  7.40/46/50/27/83  VBG Lactate: 1.8      MICROBIOLOGY:     RADIOLOGY:  [ ] Reviewed and interpreted by me    CT Chest 10/25/19  No PE. RLL mass. Lymphangitic spread. Moderate right pleural effusion.    Bedside POCUS  Moderate right pleural effusion, appears simple  Dense consolidation right lower lobe  Small / Trace left pleural effusion  Scattered B-lines throughout posterior and anterior lung fields bilaterally    ASSESSMENT AND RECOMMENDATION:    59 M with history of NSCLC s/p RT (he states to his right neck for metastatic spread) with last session 9 weeks ago) on Osimertinib for approximately 10 months who presents with persistent cough got over one month. Pleural effusion appears simple and moderate sized on bedside ultrasound exam. Given lack of white count, fever, and low procalcitonin, suspect symptoms and CT findings are more consistent with worsening malignancy.    Reasonable to treat empirically with antibiotics  His symptoms are more likely related to his malignancy and evidence of lymphangitic disease, rather than the pleural effusion  Discussed possible thoracentesis with patient but he would like to hold off for now  Will re-visit based on clinical response    Kane Bellamy MD  Pulmonary and Critical Care Fellow  [phone] CHIEF COMPLAINT:  Chronic cough     HPI:  59 M with history of NSCLC s/p RT (he states to his right neck for metastatic spread) with last session 9 weeks ago) on Osimertinib for approximately 10 months who presents with persistent cough that began August 2019. He did not mention his cough to anyone until early October 2019 during which time a chest CT was performed which demonstrated a consolidation for which he was treated with levofloxacin and steroids for presumed pneumonia. His cough is associated with dyspnea, worse with exertion and yellow phlegm. Has some chest pain with coughing. Denies any fevers, syncope, or GI upset. Chest CT shows pleural effusion and consolidation of RLL. Pulmonary consulted for pneumonia and pleural effusion management.    PAST MEDICAL & SURGICAL HISTORY:  Non-small cell carcinoma of lung  No significant past surgical history    FAMILY HISTORY:  Family history of cervical cancer    SOCIAL HISTORY:  Smoking: Denies  Substance Use: [x] Never Used [ ] Used ____  EtOH Use: Denies  Marital Status: [ ] Single [ ]  [ ]  [ ]   Sexual History:   Occupation:  Recent Travel:  Country of Birth:  Advance Directives:    Allergies    No Known Allergies    Intolerances        HOME MEDICATIONS:    REVIEW OF SYSTEMS:  Constitutional: [ ] negative [-] fevers [-] chills [ ] weight loss [ ] weight gain  HEENT: [ ] negative [ ] dry eyes [ ] eye irritation [ ] postnasal drip [ ] nasal congestion  CV: [ ] negative  [-] chest pain [-] orthopnea [-] palpitations [ ] murmur  Resp: [ ] negative [+] cough [+] shortness of breath [-] wheezing [+] sputum [-] hemoptysis  GI: [ ] negative [-] nausea [-] vomiting [-] diarrhea [-] constipation [-] abd pain [ ] dysphagia   : [ ] negative [-] dysuria [ ] nocturia [ ] hematuria [ ] increased urinary frequency  Musculoskeletal: [ ] negative [ ] back pain [-] myalgias [-] arthralgias [ ] fracture  Skin: [ ] negative [-] rash [ ] itch  Neurological: [ ] negative [-] headache [-] dizziness [ ] syncope [ ] weakness [ ] numbness  Psychiatric: [ ] negative [ ] anxiety [ ] depression  Endocrine: [ ] negative [ ] diabetes [ ] thyroid problem  Hematologic/Lymphatic: [ ] negative [ ] anemia [ ] bleeding problem  Allergic/Immunologic: [ ] negative [ ] itchy eyes [ ] nasal discharge [ ] hives [ ] angioedema  [ ] All other systems negative  [ ] Unable to assess ROS because ________    OBJECTIVE:  ICU Vital Signs Last 24 Hrs  T(C): 37 (26 Oct 2019 11:30), Max: 37.3 (25 Oct 2019 21:33)  T(F): 98.6 (26 Oct 2019 11:30), Max: 99.2 (25 Oct 2019 21:33)  HR: 96 (26 Oct 2019 11:30) (94 - 109)  BP: 118/76 (26 Oct 2019 11:30) (115/77 - 128/72)  BP(mean): --  ABP: --  ABP(mean): --  RR: 18 (26 Oct 2019 11:30) (18 - 24)  SpO2: 92% (26 Oct 2019 11:30) (84% - 94%)        10-25 @ 07:01  -  10-26 @ 07:00  --------------------------------------------------------  IN: 240 mL / OUT: 0 mL / NET: 240 mL    10-26 @ 07:01  -  10-26 @ 14:41  --------------------------------------------------------  IN: 580 mL / OUT: 0 mL / NET: 580 mL      CAPILLARY BLOOD GLUCOSE          PHYSICAL EXAM:  General: No apparent distress  HEENT: NC/AT  Neck: Supple  Respiratory: Decreased breath sounds right lower half of posterior lung field. Otherwise clear.  Cardiovascular: RRR, no murmur, no LE edema  Abdomen: Soft, nontender, nondistended  Extremities: Warm  Skin: Intact  Neurological: A&Ox3, no focal deficits  Psychiatry: Normal mood and affect    HOSPITAL MEDICATIONS:  enoxaparin Injectable 40 milliGRAM(s) SubCutaneous daily    piperacillin/tazobactam IVPB.. 3.375 Gram(s) IV Intermittent every 8 hours  vancomycin  IVPB 1000 milliGRAM(s) IV Intermittent every 12 hours        albuterol/ipratropium for Nebulization. 3 milliLiter(s) Nebulizer every 4 hours PRN  benzonatate 100 milliGRAM(s) Oral every 8 hours  guaiFENesin    Syrup 100 milliGRAM(s) Oral every 6 hours PRN    acetaminophen   Tablet .. 650 milliGRAM(s) Oral every 6 hours PRN              fluticasone propionate 50 MICROgram(s)/spray Nasal Spray 1 Spray(s) Both Nostrils two times a day        LABS:                        12.0   4.26  )-----------( 104      ( 26 Oct 2019 08:13 )             37.2     Hgb Trend: 12.0<--, 12.5<--  10-26    142  |  106  |  18  ----------------------------<  100<H>  3.9   |  25  |  0.74    Ca    8.4      26 Oct 2019 08:13  Phos  3.3     10-26  Mg     2.2     10-26    TPro  7.2  /  Alb  3.5  /  TBili  0.6  /  DBili  x   /  AST  33  /  ALT  12  /  AlkPhos  93  10-25    Creatinine Trend: 0.74<--, 0.79<--  PT/INR - ( 26 Oct 2019 10:51 )   PT: 13.1 sec;   INR: 1.13 ratio         PTT - ( 26 Oct 2019 10:51 )  PTT:44.4 sec      Venous Blood Gas:  10-25 @ 22:02  7.40/46/50/27/83  VBG Lactate: 1.8      MICROBIOLOGY:     RADIOLOGY:  [ ] Reviewed and interpreted by me    CT Chest 10/25/19  No PE. RLL mass. Lymphangitic spread. Moderate right pleural effusion.    Bedside POCUS  Moderate right pleural effusion, appears simple  Dense consolidation right lower lobe  Small / Trace left pleural effusion  Scattered B-lines throughout posterior and anterior lung fields bilaterally    ASSESSMENT AND RECOMMENDATION:    59 M with history of NSCLC s/p RT (he states to his right neck for metastatic spread) with last session 9 weeks ago) on Osimertinib for approximately 10 months who presents with persistent cough got over one month. Pleural effusion appears simple and moderate sized on bedside ultrasound exam. Given lack of white count, fever, and low procalcitonin, suspect symptoms and CT findings are more consistent with worsening malignancy.    Reasonable to treat empirically with antibiotics  Would start prednisone 40 mg daily for lymphangitic spread  His symptoms are more likely related to his malignancy and evidence of lymphangitic disease, rather than the pleural effusion, discussed possible thoracentesis with patient but he would like to hold off for now  Will re-visit based on clinical response after abx and steroids    Kane Bellamy MD  Pulmonary and Critical Care Fellow  [phone] CHIEF COMPLAINT:  Chronic cough     HPI:  59 M with history of NSCLC s/p RT (he states to his right neck for metastatic spread) with last session 9 weeks ago) on Osimertinib for approximately 10 months who presents with persistent cough that began August 2019. He did not mention his cough to anyone until early October 2019 during which time a chest CT was performed which demonstrated a consolidation for which he was treated with levofloxacin and steroids for presumed pneumonia. His cough is associated with dyspnea, worse with exertion and yellow phlegm. Has some chest pain with coughing. Denies any fevers, syncope, or GI upset. Chest CT shows pleural effusion and consolidation of RLL. Pulmonary consulted for pneumonia and pleural effusion management.    PAST MEDICAL & SURGICAL HISTORY:  Non-small cell carcinoma of lung  No significant past surgical history    FAMILY HISTORY:  Family history of cervical cancer    SOCIAL HISTORY:  Smoking: Denies  Substance Use: [x] Never Used [ ] Used ____  EtOH Use: Denies  Marital Status: [ ] Single [ ]  [ ]  [ ]   Sexual History:   Occupation:  Recent Travel:  Country of Birth:  Advance Directives:    Allergies    No Known Allergies    Intolerances        HOME MEDICATIONS:    REVIEW OF SYSTEMS:  Constitutional: [ ] negative [-] fevers [-] chills [ ] weight loss [ ] weight gain  HEENT: [ ] negative [ ] dry eyes [ ] eye irritation [ ] postnasal drip [ ] nasal congestion  CV: [ ] negative  [-] chest pain [-] orthopnea [-] palpitations [ ] murmur  Resp: [ ] negative [+] cough [+] shortness of breath [-] wheezing [+] sputum [-] hemoptysis  GI: [ ] negative [-] nausea [-] vomiting [-] diarrhea [-] constipation [-] abd pain [ ] dysphagia   : [ ] negative [-] dysuria [ ] nocturia [ ] hematuria [ ] increased urinary frequency  Musculoskeletal: [ ] negative [ ] back pain [-] myalgias [-] arthralgias [ ] fracture  Skin: [ ] negative [-] rash [ ] itch  Neurological: [ ] negative [-] headache [-] dizziness [ ] syncope [ ] weakness [ ] numbness  Psychiatric: [ ] negative [ ] anxiety [ ] depression  Endocrine: [ ] negative [ ] diabetes [ ] thyroid problem  Hematologic/Lymphatic: [ ] negative [ ] anemia [ ] bleeding problem  Allergic/Immunologic: [ ] negative [ ] itchy eyes [ ] nasal discharge [ ] hives [ ] angioedema  [ ] All other systems negative  [ ] Unable to assess ROS because ________    OBJECTIVE:  ICU Vital Signs Last 24 Hrs  T(C): 37 (26 Oct 2019 11:30), Max: 37.3 (25 Oct 2019 21:33)  T(F): 98.6 (26 Oct 2019 11:30), Max: 99.2 (25 Oct 2019 21:33)  HR: 96 (26 Oct 2019 11:30) (94 - 109)  BP: 118/76 (26 Oct 2019 11:30) (115/77 - 128/72)  BP(mean): --  ABP: --  ABP(mean): --  RR: 18 (26 Oct 2019 11:30) (18 - 24)  SpO2: 92% (26 Oct 2019 11:30) (84% - 94%)        10-25 @ 07:01  -  10-26 @ 07:00  --------------------------------------------------------  IN: 240 mL / OUT: 0 mL / NET: 240 mL    10-26 @ 07:01  -  10-26 @ 14:41  --------------------------------------------------------  IN: 580 mL / OUT: 0 mL / NET: 580 mL      CAPILLARY BLOOD GLUCOSE          PHYSICAL EXAM:  General: No apparent distress  HEENT: NC/AT  Neck: Supple  Respiratory: Decreased breath sounds right lower half of posterior lung field. Otherwise clear.  Cardiovascular: RRR, no murmur, no LE edema  Abdomen: Soft, nontender, nondistended  Extremities: Warm  Skin: Intact  Neurological: A&Ox3, no focal deficits  Psychiatry: Normal mood and affect    HOSPITAL MEDICATIONS:  enoxaparin Injectable 40 milliGRAM(s) SubCutaneous daily    piperacillin/tazobactam IVPB.. 3.375 Gram(s) IV Intermittent every 8 hours  vancomycin  IVPB 1000 milliGRAM(s) IV Intermittent every 12 hours        albuterol/ipratropium for Nebulization. 3 milliLiter(s) Nebulizer every 4 hours PRN  benzonatate 100 milliGRAM(s) Oral every 8 hours  guaiFENesin    Syrup 100 milliGRAM(s) Oral every 6 hours PRN    acetaminophen   Tablet .. 650 milliGRAM(s) Oral every 6 hours PRN              fluticasone propionate 50 MICROgram(s)/spray Nasal Spray 1 Spray(s) Both Nostrils two times a day        LABS:                        12.0   4.26  )-----------( 104      ( 26 Oct 2019 08:13 )             37.2     Hgb Trend: 12.0<--, 12.5<--  10-26    142  |  106  |  18  ----------------------------<  100<H>  3.9   |  25  |  0.74    Ca    8.4      26 Oct 2019 08:13  Phos  3.3     10-26  Mg     2.2     10-26    TPro  7.2  /  Alb  3.5  /  TBili  0.6  /  DBili  x   /  AST  33  /  ALT  12  /  AlkPhos  93  10-25    Creatinine Trend: 0.74<--, 0.79<--  PT/INR - ( 26 Oct 2019 10:51 )   PT: 13.1 sec;   INR: 1.13 ratio         PTT - ( 26 Oct 2019 10:51 )  PTT:44.4 sec      Venous Blood Gas:  10-25 @ 22:02  7.40/46/50/27/83  VBG Lactate: 1.8      MICROBIOLOGY:     RADIOLOGY:  [ ] Reviewed and interpreted by me      < from: CT Angio Chest w/ IV Cont (10.25.19 @ 23:51) >  No pulmonary embolism.    Markedly worsening disease as described above with increase in size of   right mass with likely local lymphangitic spread and scattered bilateral   nodules. Marked increase in lymphadenopathy consistent with metastatic   disease.      < end of copied text >    CT Chest 10/25/19  No PE. RLL mass. Lymphangitic spread. Moderate right pleural effusion.    Bedside POCUS  Moderate right pleural effusion, appears simple  Dense consolidation right lower lobe  Small / Trace left pleural effusion  Scattered B-lines throughout posterior and anterior lung fields bilaterally    ASSESSMENT AND RECOMMENDATION:    59 M with history of NSCLC s/p RT (he states to his right neck for metastatic spread) with last session 9 weeks ago) on Osimertinib for approximately 10 months who presents with persistent cough got over one month. Pleural effusion appears simple and moderate sized on bedside ultrasound exam. Given lack of white count, fever, and low procalcitonin, suspect symptoms and CT findings are more consistent with worsening malignancy.    Reasonable to treat empirically with antibiotics  Would start prednisone 40 mg daily for lymphangitic spread  His symptoms are more likely related to his malignancy and evidence of lymphangitic disease, rather than the pleural effusion, discussed possible thoracentesis with patient but he would like to hold off for now  Will re-visit based on clinical response after abx and steroids    Kane Bellamy MD  Pulmonary and Critical Care Fellow  [phone]

## 2019-10-26 NOTE — H&P ADULT - NSHPLABSRESULTS_GEN_ALL_CORE
LABS:                          12.5   4.64  )-----------( 87       ( 25 Oct 2019 22:02 )             38.1     Hb Trend: 12.5<--  WBC Trend: 4.64<--  Plt Trend: 87<--          10-25    142  |  103  |  22  ----------------------------<  101<H>  3.9   |  25  |  0.79    Ca    8.8      25 Oct 2019 22:02    TPro  7.2  /  Alb  3.5  /  TBili  0.6  /  DBili  x   /  AST  33  /  ALT  12  /  AlkPhos  93  10-25          CAPILLARY BLOOD GLUCOSE              IMAGING:   LABS:                          12.5   4.64  )-----------( 87       ( 25 Oct 2019 22:02 )             38.1     Hb Trend: 12.5<--  WBC Trend: 4.64<--  Plt Trend: 87<--          10-25    142  |  103  |  22  ----------------------------<  101<H>  3.9   |  25  |  0.79    Ca    8.8      25 Oct 2019 22:02    TPro  7.2  /  Alb  3.5  /  TBili  0.6  /  DBili  x   /  AST  33  /  ALT  12  /  AlkPhos  93  10-25          CAPILLARY BLOOD GLUCOSE      IMAGING:  < from: CT Angio Chest w/ IV Cont (10.25.19 @ 23:51) >      ******PRELIMINARY REPORT******    ******PRELIMINARY REPORT******          EXAM:  CT ANGIO CHEST (W)AW IC                          PROCEDURE DATE:  10/25/2019      IMPRESSION:   1. Limited evaluation of the distal pulmonary arteries due to adjacent   lung   pathology and respiratory motion artifact. No evidence of central or   proximal   segmental pulmonary embolism.   2. Significant interval increase in size of right lower lobe mass, as   well as   additional diffuse bilateral nodular and groundglass opacities and   consolidations and lobular interseptal thickening, most likely   representing   progression of malignancy with lymphangitic spread.   3. Probable superimposed postobstructive pneumonia in the right lower   lobe.   4. Extensive bulky right axillary, mediastinal, bilateral neck and hilar   and   retroperitoneal lymphadenopathy compatible with metastasis and   progression of   disease.   5. Moderate to large right and small left pleural effusions.   6. Heterogeneous splenic enhancement, unclear whether this is due to   phase of   study versus splenic infarction.

## 2019-10-26 NOTE — H&P ADULT - ATTENDING COMMENTS
Patient seen and examined at bedside with resident. Below are my findings and assessment:     59M with PMHx of NSCLC (currently receives treatment at [facility] [place] which consists of Tagrisso and radiation) presents with several week history of productive cough, shortness of breath at rest and with exertion, pleuritic chest pain with coughing and generalized unwellness. Patient has been treated for presumptive pneumonia with Levofloxacin and Steroids. Patient is presenting today after several weeks of not improving and possibly even worsening. On presentation patient tachycardic, tachypneic and hypoxic (84% O2 saturation) which corrected with nasal cannula. CTA Chest performed showing no large segment PE, but significant right lower lobe lung mass, possible post-obstructive PNA, and moderate to large right sided pleural effusion (in addition bulky LAD). Patient given DuoNebs, 1 L NS, and Zosyn. When seen by me on 6TOW patient coughing incessantly with nasal cannula cranked up at 6 L. I had no pulse ox to objectively determine his O2 saturation.    Vital Signs Last 24 Hrs  T(C): 36.9 (26 Oct 2019 06:21), Max: 37.3 (25 Oct 2019 21:33)  T(F): 98.4 (26 Oct 2019 06:21), Max: 99.2 (25 Oct 2019 21:33)  HR: 97 (26 Oct 2019 06:21) (94 - 109)  BP: 119/77 (26 Oct 2019 06:21) (115/77 - 128/72)  BP(mean): --  RR: 22 (26 Oct 2019 06:21) (18 - 24)  SpO2: 91% (26 Oct 2019 06:21) (84% - 94%)    Labs/Imaging:  WBC: 4.64, LA: 1.8  PLT: 87    CTA Chest reviewed: no PE seen, RLL post-obs PNA?, right lower lobe tumor burden, bulky LAD, right pleural effusion (mod to large)    Assessment/Plan:  59M with PMHx of NSCLC (currently receives treatment at [facility] [place] which consists of Tagrisso and radiation) presents with several week history of productive cough, shortness of breath at rest and with exertion, pleuritic chest pain with coughing and generalized unwellness. Patient suffering from acute hypoxic respiratory failure with CT chest significant for right lung tumor burden & possibly large right sided pleural effusion. Patient currently requiring high FIO2 requirements.     #Hypoxic Respiratory Failure – Possible multifactorial given tumor burden, post-obstructive PNA, and right sided pleural effusion  -Broad spectrum antibiotics with Vancomycin and Zosyn (patient has little reserve to not be aggressive)  -Continue with supplemental O2, but if requires greater than 4 liters would start HFNC   -Pulmonary consult to evaluate right sided pleural effusion, there is concern for parapneumonic effusion and need for source control  -Symptomatic treatment with cough suppressant, DuoNebs PRN, and Flonase for nasal congestion  -Follow up BCx  -Send procalcitonin  -Collect sputum culture if possible   -Incentive spirometry to prevent atelectasis    #Enterovirus – symptomatic treatment as above, droplet precautions     #Thrombocytopenia – likely hypoproliferative, continue to monitor    #NSCLC – need to speak to Oncology whether patient should continue with Tagrisso while acute    Chemical DVT PPx as long as PLT >50. Patient needs GOC discussion, consider palliative consult. Need to obtain records from [facility] to determine if patient is progressing on current therapies. Prognosis is guarded at this time. FULL CODE at this time.

## 2019-10-26 NOTE — H&P ADULT - PROBLEM SELECTOR PLAN 1
Patient admitted with  and RR 24 requiring supplemental oxygen. He was found to be RVP positive with enterorhinovirus.   - etiology likely 2/2 viral infection vs pneumonia  - follow up on BCX and UCX  - continue with zosyn for now  - trend CBC daily  - monitor vitals  - acetaminophen prn for fever Patient admitted with  and RR 24 requiring supplemental oxygen. He was found to be RVP positive with enterorhinovirus.   - etiology likely 2/2 viral infection vs pneumonia  - follow up on BCX and UCX  - continue with vancomycin and zosyn for now  - trend CBC daily  - monitor vitals  - acetaminophen prn for fever

## 2019-10-26 NOTE — H&P ADULT - HISTORY OF PRESENT ILLNESS
59 year old male with a history of non-small cell lung cancer (on radiation therapy and chemo) who presents to the ED with a chronic cough. Pt states cough started in august. He thought it was a cold, but the cough persisted. He mentioned to the people at [facility] where he was receiving treatment for his lung cancer. They performed a CT scan and found a pneumonia. He was treated with levogl has gone to his PMD x2 and was treated with a 7d course of Levofloxacin, Medrol Dose Pack, and Tessalon Pearls. Patient's cough worsened over the past week, which prompted him to come into the ED> 59 year old male with a history of non-small cell lung cancer (on radiation therapy and chemo) who presents to the ED with a chronic cough. Pt states cough started in august. He thought it was a cold, but the cough persisted. He mentioned to the people at [facility] where he was receiving treatment for his lung cancer with Tagrisso and radiation. They performed a CT scan and found a pneumonia. He was treated with levofloxacin, steroids and tessalon perles for seven days with no relief. He went to his PCP on Wednesday who sent him to the ED. He has been coughing up yellow phlegm. His shortness of breath began 1.5 months ago. He is also endorsing chest pain and abdominal pain with cough. He denies fever, nausea, vomiting, diarrhea or constipation.    In the ED, vital signs were significant for HR , RR 24, he was hypoxic to the 80s and required 4L NC  He received zosyn, 1L NS bolus and duonebs x1. 59 year old male with a history of non-small cell lung cancer (on radiation therapy and Tagrisso) who presents to the ED with a chronic cough. Pt states cough started in august. He thought it was a cold, but the cough persisted. He mentioned to the people at [facility] where he was receiving treatment for his lung cancer with Tagrisso and radiation. They performed a CT scan and found a pneumonia. He was treated with levofloxacin, steroids and tessalon perles for seven days with no relief. He went to his PCP on Wednesday who sent him to the ED. He has been coughing up yellow phlegm. His shortness of breath began 1.5 months ago. He is also endorsing chest pain and abdominal pain with cough. He denies fever, nausea, vomiting, diarrhea or constipation.    In the ED, vital signs were significant for HR , RR 24, he was hypoxic to the 88% and required 4L NC  He received zosyn, 1L NS bolus and duonebs x1.

## 2019-10-26 NOTE — H&P ADULT - PROBLEM SELECTOR PLAN 6
DVT: SCDs  Diet: regular Patient diagnosed with lung cancer three years ago. Oncologist is Giovana Shepherd at [facility] [place].  - currently being treated with chemotherapy and radiation  - Oncology consult

## 2019-10-26 NOTE — CONSULT NOTE ADULT - ATTENDING COMMENTS
Pt seen and examined at bedside.We rec. holding his Targresso, as he appears to be progressing and is too compromised at this time for any residual efficacy.

## 2019-10-26 NOTE — PROGRESS NOTE ADULT - SUBJECTIVE AND OBJECTIVE BOX
***************************************************************  Dr. Mira La  Internal Medicine   [facility] Pager: [phone]  [facility] Pager: [phone]   ***************************************************************    MATHIEU WU  59y  MRN: [id number]    Subjective:    Patient is a 59y old  Male who presents with a chief complaint of Chronic cough (26 Oct 2019 06:00)    Interval history/overnight events:      MEDICATIONS  (STANDING):  benzonatate 100 milliGRAM(s) Oral every 8 hours  enoxaparin Injectable 40 milliGRAM(s) SubCutaneous daily  fluticasone propionate 50 MICROgram(s)/spray Nasal Spray 1 Spray(s) Both Nostrils two times a day  piperacillin/tazobactam IVPB.. 3.375 Gram(s) IV Intermittent every 8 hours  vancomycin  IVPB 1000 milliGRAM(s) IV Intermittent every 12 hours    MEDICATIONS  (PRN):  acetaminophen   Tablet .. 650 milliGRAM(s) Oral every 6 hours PRN Temp greater or equal to 38C (100.4F), Mild Pain (1 - 3), Moderate Pain (4 - 6)  albuterol/ipratropium for Nebulization. 3 milliLiter(s) Nebulizer every 4 hours PRN Shortness of Breath  guaiFENesin    Syrup 100 milliGRAM(s) Oral every 6 hours PRN Cough        Objective:    Vitals: Vital Signs Last 24 Hrs  T(C): 36.9 (10-26-19 @ 06:21), Max: 37.3 (10-25-19 @ 21:33)  T(F): 98.4 (10-26-19 @ 06:21), Max: 99.2 (10-25-19 @ 21:33)  HR: 97 (10-26-19 @ 06:21) (94 - 109)  BP: 119/77 (10-26-19 @ 06:21) (115/77 - 128/72)  BP(mean): --  RR: 22 (10-26-19 @ 06:21) (18 - 24)  SpO2: 91% (10-26-19 @ 06:21) (84% - 94%)            I&O's Summary    25 Oct 2019 07:01  -  26 Oct 2019 07:00  --------------------------------------------------------  IN: 240 mL / OUT: 0 mL / NET: 240 mL        PHYSICAL EXAM:  GENERAL: NAD  HEENT: PERRL, no scleral icterus, no head and neck lad   CHEST/LUNG: CTAB, no wheezing, crackles, or ronchi   HEART: RRR, normal S1, S2, no murmurs, gallops, or rubs appreciated   ABDOMEN: soft, nondistended, non-tender, normoactive, no HSM, no rebound, no guarding, no rigidity  SKIN: No rashes or lesions  NERVOUS SYSTEM: Alert & Oriented X3  PSYCH: calm and cooperative     LABS:    10-25    142  |  103  |  22  ----------------------------<  101<H>  3.9   |  25  |  0.79    Ca    8.8      25 Oct 2019 22:02    TPro  7.2  /  Alb  3.5  /  TBili  0.6  /  DBili  x   /  AST  33  /  ALT  12  /  AlkPhos  93  10-25                                            12.5   4.64  )-----------( 87       ( 25 Oct 2019 22:02 )             38.1     CAPILLARY BLOOD GLUCOSE              RADIOLOGY & ADDITIONAL TESTS:            Imaging Personally Reviewed:  [ ] YES  [ ] NO    Consultants involved in case:   Consultant(s) Notes Reviewed:  [ ] YES  [ ] NO:   Care Discussed with Consultants/Other Providers [ ] YES  [ ] NO ***************************************************************  Dr. Mira La  Internal Medicine   [facility] Pager: [phone]  [facility] Pager: [phone]   ***************************************************************    MATHIEU WU  59y  MRN: [id number]    Subjective:    Patient is a 59y old  Male who presents with a chief complaint of Chronic cough (26 Oct 2019 06:00)    Interval history/overnight events:    JANNET events overnight. This AM co some SOB on 6L, denies cp, heart palpitations. +subjective fevers, +chills, no n/v/d.       MEDICATIONS  (STANDING):  benzonatate 100 milliGRAM(s) Oral every 8 hours  enoxaparin Injectable 40 milliGRAM(s) SubCutaneous daily  fluticasone propionate 50 MICROgram(s)/spray Nasal Spray 1 Spray(s) Both Nostrils two times a day  piperacillin/tazobactam IVPB.. 3.375 Gram(s) IV Intermittent every 8 hours  vancomycin  IVPB 1000 milliGRAM(s) IV Intermittent every 12 hours    MEDICATIONS  (PRN):  acetaminophen   Tablet .. 650 milliGRAM(s) Oral every 6 hours PRN Temp greater or equal to 38C (100.4F), Mild Pain (1 - 3), Moderate Pain (4 - 6)  albuterol/ipratropium for Nebulization. 3 milliLiter(s) Nebulizer every 4 hours PRN Shortness of Breath  guaiFENesin    Syrup 100 milliGRAM(s) Oral every 6 hours PRN Cough        Objective:    Vitals: Vital Signs Last 24 Hrs  T(C): 36.9 (10-26-19 @ 06:21), Max: 37.3 (10-25-19 @ 21:33)  T(F): 98.4 (10-26-19 @ 06:21), Max: 99.2 (10-25-19 @ 21:33)  HR: 97 (10-26-19 @ 06:21) (94 - 109)  BP: 119/77 (10-26-19 @ 06:21) (115/77 - 128/72)  BP(mean): --  RR: 22 (10-26-19 @ 06:21) (18 - 24)  SpO2: 91% (10-26-19 @ 06:21) (84% - 94%)            I&O's Summary    25 Oct 2019 07:01  -  26 Oct 2019 07:00  --------------------------------------------------------  IN: 240 mL / OUT: 0 mL / NET: 240 mL        PHYSICAL EXAM:  GENERAL: middle aged male in NAD, feels warm to touch   HEENT: PERRL, no scleral icterus, visible submandibular LAD, submandibular LAD, ~3cm x 3cm on right, ~2x2 cm on left, ~2x2 cm axillary LAD on right   CHEST/LUNG: slight use of respiratory strap and abdominal mm on 6L, bibasilar crackles, right-basilar lung dull to auscultation   HEART: RRR, holosystolic murmur throughout the precordium   ABDOMEN: soft, slightly distended, non-tender, normoactive, no HSM, no rebound, no guarding, no rigidity, no HSM appreciated   SKIN: No rashes or lesions  EXTREMITY: 1+ pitting edema to the mid shins bl  NERVOUS SYSTEM: Alert & Oriented X3  PSYCH: calm and cooperative     LABS:    10-25    142  |  103  |  22  ----------------------------<  101<H>  3.9   |  25  |  0.79    Ca    8.8      25 Oct 2019 22:02    TPro  7.2  /  Alb  3.5  /  TBili  0.6  /  DBili  x   /  AST  33  /  ALT  12  /  AlkPhos  93  10-25                                            12.5   4.64  )-----------( 87       ( 25 Oct 2019 22:02 )             38.1     CAPILLARY BLOOD GLUCOSE              RADIOLOGY & ADDITIONAL TESTS:      EXAM:  CT ANGIO CHEST (W)AW IC                            PROCEDURE DATE:  10/25/2019            INTERPRETATION:  CLINICAL INFORMATION: Lung cancer. Cough. Pneumonia.   Evaluate for pulmonary embolism.    CTA of the chest was performed following a pulmonary embolism protocol.   100 mls of Omnipaque 350 was administered intravenously without   complication and 0 mls were discarded. Sagittal and coronal   reconstructions were obtained. Axial maximum intensity projection images   were obtained.    COMPARISON: PET CT 5/26/2018.    CHEST:    Thyroid: Unremarkable    Heart:  Unremarkable.    No pulmonary embolism.      Lymph nodes: Mediastinal and bilateral hilar lymphadenopathy  Right axillary and subpectoral lymph nodes measuring up to 3.5 cm.      Lungs and Pleura:   Right lower lobe lung mass has increased in size since the previous exam   now measuring at least 5.4 x 5.9 cm. There is likely surrounding   lymphangitic spread of disease and extension into the mediastinum where   there is no enlarged mediastinal and bilateral hilar lymph nodes.   Superimposed pneumonia is not excluded. There is a moderate right pleural   effusion which is new. There are other scattered small bilateral   subcentimeter pulmonary nodules which are new.    Airways: Central airways are patent. Obscuration of right lower lobe   secondary bronchi secondary to mass.    Visualized abdominal structures:   Unremarkable.      Osseous structures:   Degenerative changes.      IMPRESSION:      No pulmonary embolism.    Markedly worsening disease as described above with increase in size of   right mass with likely local lymphangitic spread and scattered bilateral   nodules. Marked increase in lymphadenopathy consistent with metastatic   disease.    BRITTANEY BONILLA M.D., ATTENDING RADIOLOGIST  This document has been electronically signed. Oct 26 2019  8:32AM        Imaging Personally Reviewed:  [ ] YES  [ ] NO    Consultants involved in case:   Consultant(s) Notes Reviewed:  [ ] YES  [ ] NO:   Care Discussed with Consultants/Other Providers [ ] YES  [ ] NO

## 2019-10-26 NOTE — H&P ADULT - ASSESSMENT
59 year old male with a history of non-small cell lung cancer (on radiation therapy and chemo) who presents to the ED with a chronic cough, CT chest with superimposed RLL pneumonia, also found to be RVP positive, admitted for further management.

## 2019-10-26 NOTE — PROGRESS NOTE ADULT - PROBLEM SELECTOR PLAN 5
RVP positive for entero virus  - continue with supportive care  - robitussin for cough  - flonase for nasal congestion  - continue to monitor Patient diagnosed with lung cancer three years ago. Oncologist is Giovana Shepherd at [facility] [place]. On radiation therapy (last 6 weeks ago) and chemotherapy with Tagrisso (~1 year)  - f/u in house oncology consult  - holding Tagrisso in setting of possible pneumonia  - f/u with Dr. Deras

## 2019-10-26 NOTE — PROGRESS NOTE ADULT - PROBLEM SELECTOR PLAN 2
The patient was hypoxic to 88% in the ED. He reports 1.5 months of shortness of breath. Likely 2/2 post obstructive pneumonia, bl pleural effusions which may or may not be due to malignancy. CTA chest negative for pulmonary embolism. LE dopplers negative for DVT.   - patient is requiring 4L nasal cannula currently; if patient still requires this amount of oxygen, should switch to Hi Neil.  - pulmonary consult for diagnostic/therapeutic para The patient was hypoxic to 88% in the ED. He reports 1.5 months of shortness of breath. Likely multifactorial: 2/2 bl pleural effusions (parapneumonic vs. malignant) vs. possible post obs pneumonia vs. inc in size of RLL lung mass vs. 2/2 lymphangitic spread. CTA chest negative for pulmonary embolism. LE dopplers negative for DVT.   - patient is requiring 4L nasal cannula currently  - switch to Hi Neil if requiring >4L NC and inc WOB  - pulmonary consulted, planning for possible diagnostic para today  - f/u pulmonary recs

## 2019-10-26 NOTE — H&P ADULT - PROBLEM SELECTOR PLAN 4
Patient diagnosed with lung cancer three years ago. [place] [facility] [place]  - currently being treated with chemotherapy and radiation  - Oncology consult CT chest with a moderate-to-large right pleural effusion, and small left-sided pleural effusion  - Pulmonary consult for thoracentesis CT chest with a moderate-to-large right pleural effusion, and small left-sided pleural effusion  - Pulmonary consult for consideration for thoracentesis given concern for parapneumonic effusion

## 2019-10-26 NOTE — PROGRESS NOTE ADULT - ASSESSMENT
HPI:   59 year old male with a history of non-small cell lung cancer (on radiation therapy and Tagrisso) who presents to the ED with a chronic cough. Pt states cough started in august. He thought it was a cold, but the cough persisted. He mentioned to the people at [facility] where he was receiving treatment for his lung cancer with Tagrisso and radiation. They performed a CT scan and found a pneumonia. He was treated with levofloxacin, steroids and tessalon perles for seven days with no relief. He went to his PCP on Wednesday who sent him to the ED. He has been coughing up yellow phlegm. His shortness of breath began 1.5 months ago. He is also endorsing chest pain and abdominal pain with cough. He denies fever, nausea, vomiting, diarrhea or constipation.    In the ED, vital signs were significant for HR , RR 24, he was hypoxic to the 88% and required 4L NC  He received zosyn, 1L NS bolus and duonebs x1.      59 year old male with a history of non-small cell lung cancer (on radiation therapy and chemo) who presents to the ED with  chronic cough with CT chest significant for superimposed RLL pneumonia, also found to be enterorinovirus positive, admitted for further management. 59 year old male with a history of non-small cell lung cancer (on radiation therapy and chemo) who presents to the ED with  chronic cough with CT chest significant for superimposed RLL pneumonia, also found to be enterorinovirus positive, admitted for further management. 59 year old male with a history of non-small cell lung cancer (on radiation therapy and chemo) who presents to the ED with productive chronic cough with CT chest significant for inc in RLL mass 5.4x5.9 cm, possible superimposed RLL pneumonia, also found to be enterorinovirus positive, admitted for further management.

## 2019-10-26 NOTE — ED ADULT NURSE REASSESSMENT NOTE - NS ED NURSE REASSESS COMMENT FT1
[facility] Pt calm and cooperative going up with transporter to bed now.  Pt on 4L O2 at 94%.

## 2019-10-26 NOTE — PROGRESS NOTE ADULT - PROBLEM SELECTOR PLAN 6
Patient diagnosed with lung cancer three years ago. [place] [facility] [place] and chemotherapy with Tagrisso   - Oncology consult  - consider holding Tagrisso in setting of pneumonia Admitted with platelets 87, now uptrending   - likely 2/2 malignancy and chemotherapy  - trend CBC daily

## 2019-10-26 NOTE — CONSULT NOTE ADULT - SUBJECTIVE AND OBJECTIVE BOX
Hematology Consult Note    HPI:  59 year old male with a history of non-small cell lung cancer (on radiation therapy and Tagrisso) who presents to the ED with a chronic cough; he was diagnosed approximately 3 years prior, [place] [facility] [place] in August 2019, finished approximately 1-2 weeks prior), and Tagrisso. Pt states cough started in August. He thought it was a cold, but the cough persisted. Reports that he had a CT scan performed as an outpatient, found to have a PNA, started tx with levofloxacin, steroids, and tesselon perles for 7 days with no relief; went to his PMD on 10/23/19, was sent to the ER. He endorses yellow phlegm; denies fevers or chills; endorses shortness of breath that started 1.5 months ago. Also endorses chest pain and abdominal soreness with cough. Denies nausea, vomiting, diarrhea, constipation    In the ED, vital signs were significant for HR , RR 24, he was hypoxic to the 88% and required 4L NC  He received zosyn, 1L NS bolus and duonebs x1.    Oncology service consulted given hx of NSCLC and for management of Tagrisso. Patient reports that he feels improved this morning compared to the time of admission, reports shortness of breath better. RVP was positive; cultures pending. CTA negative for a PE. Denies any leg swelling.       PAST MEDICAL & SURGICAL HISTORY:  Non-small cell carcinoma of lung  No significant past surgical history      FAMILY HISTORY:  Family history of cervical cancer      MEDICATIONS  (STANDING):  benzonatate 100 milliGRAM(s) Oral every 8 hours  enoxaparin Injectable 40 milliGRAM(s) SubCutaneous daily  fluticasone propionate 50 MICROgram(s)/spray Nasal Spray 1 Spray(s) Both Nostrils two times a day  piperacillin/tazobactam IVPB.. 3.375 Gram(s) IV Intermittent every 8 hours  vancomycin  IVPB 1000 milliGRAM(s) IV Intermittent every 12 hours    MEDICATIONS  (PRN):  acetaminophen   Tablet .. 650 milliGRAM(s) Oral every 6 hours PRN Temp greater or equal to 38C (100.4F), Mild Pain (1 - 3), Moderate Pain (4 - 6)  albuterol/ipratropium for Nebulization. 3 milliLiter(s) Nebulizer every 4 hours PRN Shortness of Breath  guaiFENesin    Syrup 100 milliGRAM(s) Oral every 6 hours PRN Cough      Allergies: No Known Allergies / Intolerances        SOCIAL HISTORY: No EtOH, no tobacco; denies drug use. Worked previously at a bank, denies any hx of chemical or radiation exposures.    REVIEW OF SYSTEMS:    CONSTITUTIONAL: Subjectively weakness; no fevers or chills  EYES/ENT: No visual changes;  No vertigo or throat pain  NECK: +R-neck swelling  RESPIRATORY: +cough (productive), wheezing, shortness of breath; no hemoptysis  CARDIOVASCULAR: No chest pain or palpitations  GASTROINTESTINAL: Abdominal soreness ("from coughing"), no epigastric pain. No nausea, vomiting, or hematemesis; No diarrhea or constipation. No melena or hematochezia.  GENITOURINARY: No dysuria, frequency or hematuria  NEUROLOGICAL: No numbness or weakness  SKIN: No itching, burning, rashes, or lesions   All other review of systems is negative unless indicated above.    T(F): 98.6 (10-26-19 @ 11:30), Max: 99.2 (10-25-19 @ 21:33)  HR: 96 (10-26-19 @ 11:30)  BP: 118/76 (10-26-19 @ 11:30)  RR: 18 (10-26-19 @ 11:30)  SpO2: 92% (10-26-19 @ 11:30)    GENERAL: Mild distress from dyspnea, but able to position himself without assistance.  HEAD:  Atraumatic, Normocephalic  EYES: EOMI, PERRLA, conjunctiva and sclera clear  NECK: +R-sided neck fullness, no palpable lymphadenopathy, no tenderness  CHEST/LUNG: decreased lung sounds to auscultation and percussion on the R base; bilateral faint wheezing; crackles at the bases of the lungs  HEART: Regular rate and rhythm; No murmurs, rubs, or gallops  ABDOMEN: Soft, Nontender, Nondistended; Bowel sounds present  EXTREMITIES:  2+ Peripheral Pulses, No clubbing, cyanosis, or edema  NEUROLOGY: non-focal  SKIN: No rashes or lesions                          12.0   4.26  )-----------( 104      ( 26 Oct 2019 08:13 )             37.2       10-26    142  |  106  |  18  ----------------------------<  100<H>  3.9   |  25  |  0.74    Ca    8.4      26 Oct 2019 08:13  Phos  3.3     10-26  Mg     2.2     10-26    TPro  7.2  /  Alb  3.5  /  TBili  0.6  /  DBili  x   /  AST  33  /  ALT  12  /  AlkPhos  93  10-25      Magnesium, Serum: 2.2 mg/dL (10-26 @ 08:13)  Phosphorus Level, Serum: 3.3 mg/dL (10-26 @ 08:13)      < from: CT Angio Chest w/ IV Cont (10.25.19 @ 23:51) >    EXAM:  CT ANGIO CHEST (W)AW IC                            PROCEDURE DATE:  10/25/2019            INTERPRETATION:  CLINICAL INFORMATION: Lung cancer. Cough. Pneumonia.   Evaluate for pulmonary embolism.    CTA of the chest was performed following a pulmonary embolism protocol.   100 mls of Omnipaque 350 was administered intravenously without   complication and 0 mls were discarded. Sagittal and coronal   reconstructions were obtained. Axial maximum intensity projection images   were obtained.    COMPARISON: PET CT 5/26/2018.    CHEST:    Thyroid: Unremarkable    Heart:  Unremarkable.    No pulmonary embolism.      Lymph nodes: Mediastinal and bilateral hilar lymphadenopathy  Right axillary and subpectoral lymph nodes measuring up to 3.5 cm.      Lungs and Pleura:   Right lower lobe lung mass has increased in size since the previous exam   now measuring at least 5.4 x 5.9 cm. There is likely surrounding   lymphangitic spread of disease and extension into the mediastinum where   there is no enlarged mediastinal and bilateral hilar lymph nodes.   Superimposed pneumonia is not excluded. There is a moderate right pleural   effusion which is new. There are other scattered small bilateral   subcentimeter pulmonary nodules which are new.    Airways: Central airways are patent. Obscuration of right lower lobe   secondary bronchi secondary to mass.    Visualized abdominal structures:   Unremarkable.      Osseous structures:   Degenerative changes.      IMPRESSION:    No pulmonary embolism.    Markedly worsening disease as described above with increase in size of   right mass with likely local lymphangitic spread and scattered bilateral   nodules. Marked increase in lymphadenopathy consistent with metastatic   disease.    BRITTANEY BONILLA M.D., ATTENDING RADIOLOGIST  This document has been electronically signed. Oct 26 2019  8:32AM    < end of copied text >

## 2019-10-26 NOTE — CONSULT NOTE ADULT - ASSESSMENT
59 year old male with a history of non-small cell lung cancer (s/p RT and chemo) who presents to the ED with productive chronic cough with CT chest significant for inc in RLL mass 5.4x5.9 cm, possible superimposed RLL pneumonia, also found to be enterorinovirus positive, admitted for further management.    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
ASSESSMENT AND RECOMMENDATION:    59 M with history of NSCLC s/p RT (he states to his right neck for metastatic spread) with last session 9 weeks ago) on Osimertinib for approximately 10 months who presents with persistent cough got over one month. Pleural effusion appears simple and moderate sized on bedside ultrasound exam. Given lack of white count, fever, and low procalcitonin, suspect symptoms and CT findings are more consistent with worsening malignancy.    Reasonable to treat empirically with antibiotics  Would start prednisone 40 mg daily for lymphangitic spread  His symptoms are more likely related to his malignancy and evidence of lymphangitic disease, rather than the pleural effusion, discussed possible thoracentesis with patient but he would like to hold off for now  Will re-visit based on clinical response after abx and steroids    Kane Bellamy MD  Pulmonary and Critical Care Fellow  [phone]

## 2019-10-26 NOTE — H&P ADULT - NSHPPHYSICALEXAM_GEN_ALL_CORE
Vital Signs Last 24 Hrs  T(C): 36.9 (26 Oct 2019 06:21), Max: 37.3 (25 Oct 2019 21:33)  T(F): 98.4 (26 Oct 2019 06:21), Max: 99.2 (25 Oct 2019 21:33)  HR: 97 (26 Oct 2019 06:21) (94 - 109)  BP: 119/77 (26 Oct 2019 06:21) (115/77 - 128/72)  BP(mean): --  RR: 22 (26 Oct 2019 06:21) (18 - 24)  SpO2: 91% (26 Oct 2019 06:21) (84% - 94%)    General: Not in acute distress  Head: Normocephalic, Atraumatic, nasal cannula  Eyes: PERRLA, EOMI, normal sclera  Throat: Moist mucous membranes  Respiratory: CTAB, normal respiratory effort, no wheezes, crackles, rales  CV: RRR, S1/S2, no murmurs, rubs or gallops  Abdominal: Soft, bowel sounds present, NT, ND  Extremities: No edema, 2+ DP pulses  Neurological: A&Ox4, MAEx4, non-focal  Skin: No rashes Vital Signs Last 24 Hrs  T(C): 36.9 (26 Oct 2019 06:21), Max: 37.3 (25 Oct 2019 21:33)  T(F): 98.4 (26 Oct 2019 06:21), Max: 99.2 (25 Oct 2019 21:33)  HR: 97 (26 Oct 2019 06:21) (94 - 109)  BP: 119/77 (26 Oct 2019 06:21) (115/77 - 128/72)  BP(mean): --  RR: 22 (26 Oct 2019 06:21) (18 - 24)  SpO2: 91% (26 Oct 2019 06:21) (84% - 94%)    General: Not in acute distress  Head: Normocephalic, Atraumatic, nasal cannula  Eyes: PERRLA, EOMI, normal sclera  Throat: Moist mucous membranes  Respiratory: Reduced breath sounds on right, normal respiratory effort, no wheezes, crackles, rales  CV: RRR, S1/S2, no murmurs, rubs or gallops  Abdominal: Soft, bowel sounds present, NT, ND  Extremities: No edema, 2+ DP pulses  Neurological: A&Ox4, MAEx4, non-focal  Skin: No rashes Vital Signs Last 24 Hrs  T(C): 36.9 (26 Oct 2019 06:21), Max: 37.3 (25 Oct 2019 21:33)  T(F): 98.4 (26 Oct 2019 06:21), Max: 99.2 (25 Oct 2019 21:33)  HR: 97 (26 Oct 2019 06:21) (94 - 109)  BP: 119/77 (26 Oct 2019 06:21) (115/77 - 128/72)  BP(mean): --  RR: 22 (26 Oct 2019 06:21) (18 - 24)  SpO2: 91% (26 Oct 2019 06:21) (84% - 94%)    General: Not in acute distress, comfortable on nasal cannula  Head: Normocephalic, Atraumatic, nasal cannula  Eyes: PERRLA, EOMI, normal sclera  Throat: Moist mucous membranes  Respiratory: Reduced breath sounds on right, normal respiratory effort, no wheezes, crackles, rales  CV: RRR, S1/S2, no murmurs, rubs or gallops  Abdominal: Soft, bowel sounds present, NT, ND  Extremities: No edema, 2+ DP pulses  Neurological: A&Ox4, MAEx4, non-focal  Skin: No rashes, no ecchymoses/petechiae

## 2019-10-26 NOTE — PROGRESS NOTE ADULT - PROBLEM SELECTOR PLAN 7
Admitted with platelets 87.  - likely 2/2 malignancy and chemotherapy  - trend CBC daily DVT: lovenox as patient w/ active malignancy high risk   Diet: regular

## 2019-10-26 NOTE — H&P ADULT - PROBLEM SELECTOR PROBLEM 3
Enterovirus infection Pneumonia due to infectious organism, unspecified laterality, unspecified part of lung

## 2019-10-26 NOTE — H&P ADULT - PROBLEM SELECTOR PLAN 5
Admitted with platelets 87.  - likely 2/2 malignancy and chemotherapy  - trend CBC daily RVP positive for enterrhinovirus.  - continue with supportive care  - robitussin for cough  - flonase for nasal congestion  - continue to monitor RVP positive for entero virus  - continue with supportive care  - robitussin for cough  - flonase for nasal congestion  - continue to monitor

## 2019-10-26 NOTE — ED ADULT NURSE REASSESSMENT NOTE - NS ED NURSE REASSESS COMMENT FT1
Pt walked to bathroom, Pt states his SOB increased with exertion, Pt O2 increased to 6L O2.  Vicky PURI aware, Pt given 1 Duoneb treatment.

## 2019-10-26 NOTE — H&P ADULT - NSHPSOCIALHISTORY_GEN_ALL_CORE
The patient denies a history of drug, alcohol or tobacco use.  He lives with his wife, son and daughter.

## 2019-10-26 NOTE — ED ADULT NURSE REASSESSMENT NOTE - NS ED NURSE REASSESS COMMENT FT1
Pt states he hasn't had SOB, Pt comfortable with 5L O2, requests for O2 delivery to not increase, Vicky PURI aware.

## 2019-10-26 NOTE — PROGRESS NOTE ADULT - PROBLEM SELECTOR PROBLEM 3
Pneumonia due to infectious organism, unspecified laterality, unspecified part of lung Pleural effusion

## 2019-10-26 NOTE — PROGRESS NOTE ADULT - PROBLEM SELECTOR PLAN 4
CT chest with a moderate-to-large right pleural effusion, and small left-sided pleural effusion  - plan as above RVP positive for entero virus  - continue with supportive care  - robitussin for cough  - flonase for nasal congestion  - continue to monitor

## 2019-10-26 NOTE — PROGRESS NOTE ADULT - PROBLEM SELECTOR PLAN 1
Patient presented with  and RR 24 requiring supplemental oxygen. He was found to be RVP positive with enterorhinovirus.   - etiology likely 2/2 viral infection vs pneumonia  - follow up on Bcx, UA, UCx  - continue with vancomycin and zosyn   - trend CBC daily  - monitor vitals  - acetaminophen prn for fever Patient met SIRS with  and RR 24 requiring supplemental oxygen. He was found to be RVP positive with enterorhinovirus. Etiology likely 2/2 viral infection vs. possible post obstructive pneumonia with parapneumonic effusion. Other possibility is meeting SIRS based off of elevated RR and HR that can be explained by AHRF. Pneumonia less likely as patient as procalcitonin 0.06 (wnl)  - UA negative   - follow up Bcx, sputum cx   - possible diagnostic para to r/o parapneumonic vs malignant pleural effusion   - continue with vancomycin and zosyn   - trend CBC daily  - continue to trend fever curve   - Tylenol prn for fever

## 2019-10-26 NOTE — ED ADULT NURSE REASSESSMENT NOTE - NS ED NURSE REASSESS COMMENT FT1
Pt needed more O2 to maintain SPO2 over 92%.  Pt given Duoneb, states the duoneb is helping him breathe

## 2019-10-26 NOTE — H&P ADULT - NSHPREVIEWOFSYSTEMS_GEN_ALL_CORE
REVIEW OF SYSTEMS:  CONSTITUTIONAL: No fever, chills, night sweats, or fatigue  EYES: No eye pain, visual disturbances, or discharge  ENT:  No difficulty hearing, tinnitus, vertigo; No sinus or throat pain  NECK: No pain or stiffness  RESPIRATORY: SEE HPI  CARDIOVASCULAR: No chest pain, palpitations, dizziness, or leg swelling  GASTROINTESTINAL: No abdominal or epigastric pain.   GENITOURINARY: No dysuria, frequency, hematuria, or incontinence  NEUROLOGICAL: No headaches, memory loss, loss of strength, numbness, or tremors  SKIN: No itching, burning, rashes, or lesions   LYMPH NODES: No enlarged glands  ENDOCRINE: No hot or cold intolerance; No hair loss  MUSCULOSKELETAL: No joint pain or swelling  PSYCHIATRIC: No depression, anxiety, mood swings, or difficulty sleeping  HEME/LYMPH: No easy bruising, or bleeding gums  ALLERGY AND IMMUNOLOGIC: No hives or eczema CONSTITUTIONAL: No fever, chills, night sweats, or fatigue  EYES: No eye pain, visual disturbances, or discharge  ENT:  No difficulty hearing, tinnitus, vertigo; No sinus or throat pain  NECK: No pain or stiffness  RESPIRATORY: SEE HPI  CARDIOVASCULAR: No chest pain, palpitations, dizziness, or leg swelling  GASTROINTESTINAL: No abdominal or epigastric pain.   GENITOURINARY: No dysuria, frequency, hematuria, or incontinence  NEUROLOGICAL: No headaches, memory loss, loss of strength, numbness, or tremors  SKIN: No itching, burning, rashes, or lesions   LYMPH NODES: No enlarged glands or painful glands  ENDOCRINE: No hot or cold intolerance; No hair loss  MUSCULOSKELETAL: No joint pain or swelling  PSYCHIATRIC: No depression, anxiety, mood swings, or difficulty sleeping  HEME/LYMPH: No easy bruising, or bleeding gums  ALLERGY AND IMMUNOLOGIC: No hives or eczema

## 2019-10-26 NOTE — PROGRESS NOTE ADULT - PROBLEM SELECTOR PLAN 3
CT chest with superimposed post-obstructive RLL pneumonia  - patient requiring supplemental oxygen; wean as tolerated  - will obtain sputum sample  - procalcitonin ordered  - continue with vancomycin and zosyn  - trend CBC daily CT chest with a moderate-to-large right pleural effusion, and small left-sided pleural effusion  - plan as above

## 2019-10-26 NOTE — CONSULT NOTE ADULT - ATTENDING COMMENTS
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     Would start prednisone 40 mg daily for lymphangitic spread  His symptoms are more likely related to his malignancy and evidence of lymphangitic disease, rather than the pleural effusion, discussed possible thoracentesis with patient but he would like to hold off for now  Will re-visit based on clinical response after abx and steroids  - f/u   oncogy to see if if can benefit from any other agent 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

## 2019-10-26 NOTE — H&P ADULT - PROBLEM SELECTOR PLAN 3
RVP positive for enterrhinovirus.  - continue with supportive care  - robitussin for cough  - continue to monitor CT chest with superimposed post-obstructive RLL pneumonia  - patient requiring supplemental oxygen; wean as tolerated  - will obtain sputum sample  - procalcitonin ordered  - continue with vancomycin and zosyn  - trend CBC daily

## 2019-10-27 LAB
ALBUMIN SERPL ELPH-MCNC: 3.4 G/DL — SIGNIFICANT CHANGE UP (ref 3.3–5)
ALP SERPL-CCNC: 87 U/L — SIGNIFICANT CHANGE UP (ref 40–120)
ALT FLD-CCNC: 14 U/L — SIGNIFICANT CHANGE UP (ref 10–45)
ANION GAP SERPL CALC-SCNC: 17 MMOL/L — SIGNIFICANT CHANGE UP (ref 5–17)
AST SERPL-CCNC: 25 U/L — SIGNIFICANT CHANGE UP (ref 10–40)
BILIRUB SERPL-MCNC: 0.7 MG/DL — SIGNIFICANT CHANGE UP (ref 0.2–1.2)
BUN SERPL-MCNC: 18 MG/DL — SIGNIFICANT CHANGE UP (ref 7–23)
CALCIUM SERPL-MCNC: 8.4 MG/DL — SIGNIFICANT CHANGE UP (ref 8.4–10.5)
CHLORIDE SERPL-SCNC: 101 MMOL/L — SIGNIFICANT CHANGE UP (ref 96–108)
CO2 SERPL-SCNC: 23 MMOL/L — SIGNIFICANT CHANGE UP (ref 22–31)
CREAT SERPL-MCNC: 0.69 MG/DL — SIGNIFICANT CHANGE UP (ref 0.5–1.3)
GLUCOSE SERPL-MCNC: 130 MG/DL — HIGH (ref 70–99)
GRAM STN FLD: SIGNIFICANT CHANGE UP
HCT VFR BLD CALC: 36.9 % — LOW (ref 39–50)
HCV AB S/CO SERPL IA: 0.32 S/CO — SIGNIFICANT CHANGE UP (ref 0–0.99)
HCV AB SERPL-IMP: SIGNIFICANT CHANGE UP
HGB BLD-MCNC: 11.7 G/DL — LOW (ref 13–17)
MAGNESIUM SERPL-MCNC: 2.3 MG/DL — SIGNIFICANT CHANGE UP (ref 1.6–2.6)
MCHC RBC-ENTMCNC: 28.7 PG — SIGNIFICANT CHANGE UP (ref 27–34)
MCHC RBC-ENTMCNC: 31.7 GM/DL — LOW (ref 32–36)
MCV RBC AUTO: 90.4 FL — SIGNIFICANT CHANGE UP (ref 80–100)
PHOSPHATE SERPL-MCNC: 3.6 MG/DL — SIGNIFICANT CHANGE UP (ref 2.5–4.5)
PLATELET # BLD AUTO: 108 K/UL — LOW (ref 150–400)
POTASSIUM SERPL-MCNC: 4.3 MMOL/L — SIGNIFICANT CHANGE UP (ref 3.5–5.3)
POTASSIUM SERPL-SCNC: 4.3 MMOL/L — SIGNIFICANT CHANGE UP (ref 3.5–5.3)
PROT SERPL-MCNC: 6.9 G/DL — SIGNIFICANT CHANGE UP (ref 6–8.3)
RBC # BLD: 4.08 M/UL — LOW (ref 4.2–5.8)
RBC # FLD: 14 % — SIGNIFICANT CHANGE UP (ref 10.3–14.5)
SODIUM SERPL-SCNC: 141 MMOL/L — SIGNIFICANT CHANGE UP (ref 135–145)
SPECIMEN SOURCE: SIGNIFICANT CHANGE UP
WBC # BLD: 3.39 K/UL — LOW (ref 3.8–10.5)
WBC # FLD AUTO: 3.39 K/UL — LOW (ref 3.8–10.5)

## 2019-10-27 PROCEDURE — 99233 SBSQ HOSP IP/OBS HIGH 50: CPT | Mod: GC

## 2019-10-27 RX ADMIN — Medication 250 MILLIGRAM(S): at 05:58

## 2019-10-27 RX ADMIN — Medication 250 MILLIGRAM(S): at 17:51

## 2019-10-27 RX ADMIN — PIPERACILLIN AND TAZOBACTAM 25 GRAM(S): 4; .5 INJECTION, POWDER, LYOPHILIZED, FOR SOLUTION INTRAVENOUS at 21:26

## 2019-10-27 RX ADMIN — Medication 40 MILLIGRAM(S): at 06:01

## 2019-10-27 RX ADMIN — Medication 100 MILLIGRAM(S): at 21:26

## 2019-10-27 RX ADMIN — Medication 100 MILLIGRAM(S): at 13:11

## 2019-10-27 RX ADMIN — Medication 100 MILLIGRAM(S): at 06:01

## 2019-10-27 RX ADMIN — Medication 100 MILLIGRAM(S): at 11:29

## 2019-10-27 RX ADMIN — PIPERACILLIN AND TAZOBACTAM 25 GRAM(S): 4; .5 INJECTION, POWDER, LYOPHILIZED, FOR SOLUTION INTRAVENOUS at 06:02

## 2019-10-27 RX ADMIN — Medication 1 SPRAY(S): at 06:01

## 2019-10-27 RX ADMIN — Medication 1 SPRAY(S): at 17:50

## 2019-10-27 RX ADMIN — ENOXAPARIN SODIUM 40 MILLIGRAM(S): 100 INJECTION SUBCUTANEOUS at 11:31

## 2019-10-27 RX ADMIN — Medication 100 MILLIGRAM(S): at 17:51

## 2019-10-27 RX ADMIN — PIPERACILLIN AND TAZOBACTAM 25 GRAM(S): 4; .5 INJECTION, POWDER, LYOPHILIZED, FOR SOLUTION INTRAVENOUS at 13:11

## 2019-10-27 NOTE — PROGRESS NOTE ADULT - PROBLEM SELECTOR PLAN 1
Patient met SIRS with  and RR 24 requiring supplemental oxygen. He was found to be RVP positive with enterorhinovirus. Etiology likely 2/2 viral infection vs. possible post obstructive pneumonia with parapneumonic effusion. Other possibility is meeting SIRS based off of elevated RR and HR that can be explained by AHRF. Pneumonia less likely as patient as procalcitonin 0.06 (wnl)  - UA negative   - follow up Bcx, sputum cx   - possible diagnostic para to r/o parapneumonic vs malignant pleural effusion   - continue with vancomycin and zosyn   - trend CBC daily  - continue to trend fever curve   - Tylenol prn for fever Patient met SIRS with  and RR 24 requiring supplemental oxygen. He was found to be RVP positive with enterorhinovirus. Etiology likely 2/2 viral infection vs. possible post obstructive pneumonia with parapneumonic effusion. Other possibility is meeting SIRS based off of elevated RR and HR that can be explained by AHRF. Pneumonia less likely as patient as procalcitonin 0.06 (wnl)  - UA negative   - follow up Bcx, sputum cx   - Patient declined thora for now   - continue with vancomycin and zosyn   - trend CBC daily  - continue to trend fever curve   - Tylenol prn for fever

## 2019-10-27 NOTE — PROGRESS NOTE ADULT - PROBLEM SELECTOR PLAN 2
The patient was hypoxic to 88% in the ED. He reports 1.5 months of shortness of breath. Likely multifactorial: 2/2 bl pleural effusions (parapneumonic vs. malignant) vs. possible post obs pneumonia vs. inc in size of RLL lung mass vs. 2/2 lymphangitic spread. CTA chest negative for pulmonary embolism. LE dopplers negative for DVT.   - patient is requiring 4L nasal cannula currently  - switch to Hi Neil if requiring >4L NC and inc WOB  - pulmonary consulted, planning for possible diagnostic para today  - f/u pulmonary recs The patient was hypoxic to 88% in the ED. He reports 1.5 months of shortness of breath. Likely multifactorial: 2/2 bl pleural effusions (parapneumonic vs. malignant) vs. possible post obs pneumonia vs. inc in size of RLL lung mass vs. 2/2 lymphangitic spread. CTA chest negative for pulmonary embolism. LE dopplers negative for DVT.   - patient is requiring 4L nasal cannula currently, wean down as tolerated  - switch to Hi Neil if requiring >4L NC and inc WOB  - pulmonary consulted, c/w tx for PNA, hold off thoracentesis  - f/u pulmonary recs

## 2019-10-27 NOTE — PROGRESS NOTE ADULT - SUBJECTIVE AND OBJECTIVE BOX
Boni Armstrong  PGY2  [phone] / [phone]  Patient is a 59y old  Male who presents with a chief complaint of Chronic cough (26 Oct 2019 15:29)       INTERVAL HPI/OVERNIGHT EVENTS:    MEDICATIONS  (STANDING):  benzonatate 100 milliGRAM(s) Oral every 8 hours  enoxaparin Injectable 40 milliGRAM(s) SubCutaneous daily  fluticasone propionate 50 MICROgram(s)/spray Nasal Spray 1 Spray(s) Both Nostrils two times a day  piperacillin/tazobactam IVPB.. 3.375 Gram(s) IV Intermittent every 8 hours  predniSONE   Tablet 40 milliGRAM(s) Oral daily  vancomycin  IVPB 1000 milliGRAM(s) IV Intermittent every 12 hours    MEDICATIONS  (PRN):  acetaminophen   Tablet .. 650 milliGRAM(s) Oral every 6 hours PRN Temp greater or equal to 38C (100.4F), Mild Pain (1 - 3), Moderate Pain (4 - 6)  albuterol/ipratropium for Nebulization. 3 milliLiter(s) Nebulizer every 4 hours PRN Shortness of Breath  guaiFENesin    Syrup 100 milliGRAM(s) Oral every 6 hours PRN Cough    benzonatate 100 milliGRAM(s) Oral every 8 hours  enoxaparin Injectable 40 milliGRAM(s) SubCutaneous daily  fluticasone propionate 50 MICROgram(s)/spray Nasal Spray 1 Spray(s) Both Nostrils two times a day  piperacillin/tazobactam IVPB.. 3.375 Gram(s) IV Intermittent every 8 hours  predniSONE   Tablet 40 milliGRAM(s) Oral daily  vancomycin  IVPB 1000 milliGRAM(s) IV Intermittent every 12 hours      Allergies    No Known Allergies    Intolerances        REVIEW OF SYSTEMS:  CONSTITUTIONAL: No fever, weight loss, or fatigue  EYES: No eye pain, visual disturbances, or discharge  ENMT:  No difficulty hearing, tinnitus, vertigo; No sinus or throat pain  NECK: No pain or stiffness  BREASTS: No pain, masses, or nipple discharge  RESPIRATORY: No cough, wheezing, chills or hemoptysis; No shortness of breath  CARDIOVASCULAR: No chest pain, palpitations, dizziness, or leg swelling  GASTROINTESTINAL: No abdominal or epigastric pain. No nausea, vomiting, or hematemesis; No diarrhea or constipation. No melena or hematochezia.  GENITOURINARY: No dysuria, frequency, hematuria, or incontinence  NEUROLOGICAL: No headaches, memory loss, loss of strength, numbness, or tremors  SKIN: No itching, burning, rashes, or lesions   LYMPH NODES: No enlarged glands  ENDOCRINE: No heat or cold intolerance; No hair loss; No polydipsia or polyuria  MUSCULOSKELETAL: No joint pain or swelling; No muscle, back, or extremity pain  PSYCHIATRIC: No depression, anxiety, mood swings, or difficulty sleeping  HEME/LYMPH: No easy bruising, or bleeding gums  ALLERGY AND IMMUNOLOGIC: No hives or eczema    Vital Signs Last 24 Hrs  T(C): 37.1 (27 Oct 2019 04:43), Max: 37.1 (27 Oct 2019 04:43)  T(F): 98.8 (27 Oct 2019 04:43), Max: 98.8 (27 Oct 2019 04:43)  HR: 95 (27 Oct 2019 04:43) (92 - 96)  BP: 110/74 (27 Oct 2019 04:43) (110/74 - 118/76)  BP(mean): --  RR: 18 (27 Oct 2019 04:43) (17 - 18)  SpO2: 93% (27 Oct 2019 04:43) (92% - 94%)    PHYSICAL EXAM:  GENERAL: middle aged male in NAD, feels warm to touch   HEENT: PERRL, no scleral icterus, visible submandibular LAD, submandibular LAD, ~3cm x 3cm on right, ~2x2 cm on left, ~2x2 cm axillary LAD on right   CHEST/LUNG: slight use of respiratory strap and abdominal mm on 6L, bibasilar crackles, right-basilar lung dull to auscultation   HEART: RRR, holosystolic murmur throughout the precordium   ABDOMEN: soft, slightly distended, non-tender, normoactive, no HSM, no rebound, no guarding, no rigidity, no HSM appreciated   SKIN: No rashes or lesions  EXTREMITY: 1+ pitting edema to the mid shins bl  NERVOUS SYSTEM: Alert & Oriented X3  PSYCH: calm and cooperative     LABS:                        12.0   4.26  )-----------( 104      ( 26 Oct 2019 08:13 )             37.2     27 Oct 2019 06:20    141    |  101    |  18     ----------------------------<  130    4.3     |  23     |  0.69     Ca    8.4        27 Oct 2019 06:20  Phos  3.6       27 Oct 2019 06:20  Mg     2.3       27 Oct 2019 06:20    TPro  6.9    /  Alb  3.4    /  TBili  0.7    /  DBili  x      /  AST  25     /  ALT  14     /  AlkPhos  87     27 Oct 2019 06:20    PT/INR - ( 26 Oct 2019 10:51 )   PT: 13.1 sec;   INR: 1.13 ratio         PTT - ( 26 Oct 2019 10:51 )  PTT:44.4 sec  CAPILLARY BLOOD GLUCOSE        BLOOD CULTURE  10-26 @ 00:55   No growth to date.  --  --    RADIOLOGY & ADDITIONAL TESTS:    Imaging Personally Reviewed:  [ ] YES     Consultant(s) Notes Reviewed:      Care Discussed with Consultants/Other Providers: Boni Armstrong  PGY2  [phone] / [phone]  Patient is a 59y old  Male who presents with a chief complaint of Chronic cough (26 Oct 2019 15:29)       INTERVAL HPI/OVERNIGHT EVENTS: NSR 90s overnight. Reports attempted to take off O2 this AM, followed by bad dry cough, denies SOB. Otherwise denies CP, n/v/c/d    MEDICATIONS  (STANDING):  benzonatate 100 milliGRAM(s) Oral every 8 hours  enoxaparin Injectable 40 milliGRAM(s) SubCutaneous daily  fluticasone propionate 50 MICROgram(s)/spray Nasal Spray 1 Spray(s) Both Nostrils two times a day  piperacillin/tazobactam IVPB.. 3.375 Gram(s) IV Intermittent every 8 hours  predniSONE   Tablet 40 milliGRAM(s) Oral daily  vancomycin  IVPB 1000 milliGRAM(s) IV Intermittent every 12 hours    MEDICATIONS  (PRN):  acetaminophen   Tablet .. 650 milliGRAM(s) Oral every 6 hours PRN Temp greater or equal to 38C (100.4F), Mild Pain (1 - 3), Moderate Pain (4 - 6)  albuterol/ipratropium for Nebulization. 3 milliLiter(s) Nebulizer every 4 hours PRN Shortness of Breath  guaiFENesin    Syrup 100 milliGRAM(s) Oral every 6 hours PRN Cough    benzonatate 100 milliGRAM(s) Oral every 8 hours  enoxaparin Injectable 40 milliGRAM(s) SubCutaneous daily  fluticasone propionate 50 MICROgram(s)/spray Nasal Spray 1 Spray(s) Both Nostrils two times a day  piperacillin/tazobactam IVPB.. 3.375 Gram(s) IV Intermittent every 8 hours  predniSONE   Tablet 40 milliGRAM(s) Oral daily  vancomycin  IVPB 1000 milliGRAM(s) IV Intermittent every 12 hours      Allergies    No Known Allergies    Intolerances    Vital Signs Last 24 Hrs  T(C): 37.1 (27 Oct 2019 04:43), Max: 37.1 (27 Oct 2019 04:43)  T(F): 98.8 (27 Oct 2019 04:43), Max: 98.8 (27 Oct 2019 04:43)  HR: 95 (27 Oct 2019 04:43) (92 - 96)  BP: 110/74 (27 Oct 2019 04:43) (110/74 - 118/76)  BP(mean): --  RR: 18 (27 Oct 2019 04:43) (17 - 18)  SpO2: 93% (27 Oct 2019 04:43) (92% - 94%)    PHYSICAL EXAM:  GENERAL: middle aged male in NAD, feels warm to touch   HEENT: PERRL, no scleral icterus, visible submandibular LAD, submandibular LAD, ~3cm x 3cm on right, ~2x2 cm on left, ~2x2 cm axillary LAD on right   CHEST/LUNG: RLL BS diminished, no increased WOB  HEART: RRR, holosystolic murmur throughout the precordium   ABDOMEN: soft, slightly distended, non-tender, normoactive, no HSM, no rebound, no guarding, no rigidity, no HSM appreciated   SKIN: No rashes or lesions  EXTREMITY: 1+ pitting edema to the mid shins bl  NERVOUS SYSTEM: Alert & Oriented X3  PSYCH: calm and cooperative     LABS:                        12.0   4.26  )-----------( 104      ( 26 Oct 2019 08:13 )             37.2     27 Oct 2019 06:20    141    |  101    |  18     ----------------------------<  130    4.3     |  23     |  0.69     Ca    8.4        27 Oct 2019 06:20  Phos  3.6       27 Oct 2019 06:20  Mg     2.3       27 Oct 2019 06:20    TPro  6.9    /  Alb  3.4    /  TBili  0.7    /  DBili  x      /  AST  25     /  ALT  14     /  AlkPhos  87     27 Oct 2019 06:20    PT/INR - ( 26 Oct 2019 10:51 )   PT: 13.1 sec;   INR: 1.13 ratio         PTT - ( 26 Oct 2019 10:51 )  PTT:44.4 sec  CAPILLARY BLOOD GLUCOSE        BLOOD CULTURE  10-26 @ 00:55   No growth to date.  --  --    RADIOLOGY & ADDITIONAL TESTS:    Imaging Personally Reviewed:  [ ] YES     Consultant(s) Notes Reviewed:      Care Discussed with Consultants/Other Providers:

## 2019-10-27 NOTE — PROGRESS NOTE ADULT - PROBLEM SELECTOR PLAN 6
Admitted with platelets 87, now uptrending   - likely 2/2 malignancy and chemotherapy  - trend CBC daily

## 2019-10-27 NOTE — PROGRESS NOTE ADULT - PROBLEM SELECTOR PLAN 5
Patient diagnosed with lung cancer three years ago. [place] Giovana Shepherd [place] [facility]. [place] therapy (last 6 weeks ago) and chemotherapy with Tagrisso (~1 year)  - f/u in house oncology consult  - holding Tagrisso in setting of possible pneumonia  - f/u with Dr. Deras

## 2019-10-27 NOTE — PROGRESS NOTE ADULT - ASSESSMENT
59 year old male with a history of non-small cell lung cancer (on radiation therapy and chemo) who presents to the ED with productive chronic cough with CT chest significant for inc in RLL mass 5.4x5.9 cm, possible superimposed RLL pneumonia, also found to be enterorinovirus positive, admitted for further management.

## 2019-10-27 NOTE — PROGRESS NOTE ADULT - PROBLEM SELECTOR PLAN 4
RVP positive for entero virus  - continue with supportive care  - robitussin for cough  - flonase for nasal congestion  - continue to monitor

## 2019-10-27 NOTE — PROGRESS NOTE ADULT - PROBLEM SELECTOR PLAN 3
CT chest with a moderate-to-large right pleural effusion, and small left-sided pleural effusion  - plan as above

## 2019-10-28 ENCOUNTER — RESULT REVIEW (OUTPATIENT)
Age: 59
End: 2019-10-28

## 2019-10-28 PROBLEM — R06.02 SOB (SHORTNESS OF BREATH): Status: ACTIVE | Noted: 2019-10-28

## 2019-10-28 PROBLEM — R09.02 HYPOXIA: Status: ACTIVE | Noted: 2019-10-28

## 2019-10-28 PROBLEM — J18.9 PNEUMONIA: Status: ACTIVE | Noted: 2019-10-23

## 2019-10-28 LAB
ALBUMIN FLD-MCNC: 2.7 G/DL — SIGNIFICANT CHANGE UP
ALBUMIN SERPL ELPH-MCNC: 3.5 G/DL — SIGNIFICANT CHANGE UP (ref 3.3–5)
ALP SERPL-CCNC: 90 U/L — SIGNIFICANT CHANGE UP (ref 40–120)
ALT FLD-CCNC: 18 U/L — SIGNIFICANT CHANGE UP (ref 10–45)
ANION GAP SERPL CALC-SCNC: 12 MMOL/L — SIGNIFICANT CHANGE UP (ref 5–17)
AST SERPL-CCNC: 30 U/L — SIGNIFICANT CHANGE UP (ref 10–40)
B PERT IGG+IGM PNL SER: ABNORMAL
BILIRUB SERPL-MCNC: 0.6 MG/DL — SIGNIFICANT CHANGE UP (ref 0.2–1.2)
BUN SERPL-MCNC: 17 MG/DL — SIGNIFICANT CHANGE UP (ref 7–23)
CALCIUM SERPL-MCNC: 8.5 MG/DL — SIGNIFICANT CHANGE UP (ref 8.4–10.5)
CHLORIDE SERPL-SCNC: 102 MMOL/L — SIGNIFICANT CHANGE UP (ref 96–108)
CO2 SERPL-SCNC: 25 MMOL/L — SIGNIFICANT CHANGE UP (ref 22–31)
COLOR FLD: SIGNIFICANT CHANGE UP
CREAT SERPL-MCNC: 0.79 MG/DL — SIGNIFICANT CHANGE UP (ref 0.5–1.3)
FLUID INTAKE SUBSTANCE CLASS: SIGNIFICANT CHANGE UP
GLUCOSE FLD-MCNC: 151 MG/DL — SIGNIFICANT CHANGE UP
GLUCOSE SERPL-MCNC: 95 MG/DL — SIGNIFICANT CHANGE UP (ref 70–99)
HCT VFR BLD CALC: 38.4 % — LOW (ref 39–50)
HGB BLD-MCNC: 12.1 G/DL — LOW (ref 13–17)
LDH SERPL L TO P-CCNC: 422 U/L — SIGNIFICANT CHANGE UP
MAGNESIUM SERPL-MCNC: 2.4 MG/DL — SIGNIFICANT CHANGE UP (ref 1.6–2.6)
MCHC RBC-ENTMCNC: 28.3 PG — SIGNIFICANT CHANGE UP (ref 27–34)
MCHC RBC-ENTMCNC: 31.5 GM/DL — LOW (ref 32–36)
MCV RBC AUTO: 89.9 FL — SIGNIFICANT CHANGE UP (ref 80–100)
PH FLD: 7.5 — SIGNIFICANT CHANGE UP
PHOSPHATE SERPL-MCNC: 3.2 MG/DL — SIGNIFICANT CHANGE UP (ref 2.5–4.5)
PLATELET # BLD AUTO: 122 K/UL — LOW (ref 150–400)
POTASSIUM SERPL-MCNC: 3.8 MMOL/L — SIGNIFICANT CHANGE UP (ref 3.5–5.3)
POTASSIUM SERPL-SCNC: 3.8 MMOL/L — SIGNIFICANT CHANGE UP (ref 3.5–5.3)
PROT FLD-MCNC: 4.3 G/DL — SIGNIFICANT CHANGE UP
PROT SERPL-MCNC: 7.1 G/DL — SIGNIFICANT CHANGE UP (ref 6–8.3)
RBC # BLD: 4.27 M/UL — SIGNIFICANT CHANGE UP (ref 4.2–5.8)
RBC # FLD: 14.3 % — SIGNIFICANT CHANGE UP (ref 10.3–14.5)
RCV VOL RI: HIGH /UL (ref 0–5)
SODIUM SERPL-SCNC: 139 MMOL/L — SIGNIFICANT CHANGE UP (ref 135–145)
SPECIMEN SOURCE FLD: SIGNIFICANT CHANGE UP
TOTAL NUCLEATED CELL COUNT, BODY FLUID: 61 /UL — HIGH (ref 0–5)
TUBE TYPE: SIGNIFICANT CHANGE UP
VANCOMYCIN TROUGH SERPL-MCNC: 11 UG/ML — SIGNIFICANT CHANGE UP (ref 10–20)
WBC # BLD: 6.79 K/UL — SIGNIFICANT CHANGE UP (ref 3.8–10.5)
WBC # FLD AUTO: 6.79 K/UL — SIGNIFICANT CHANGE UP (ref 3.8–10.5)

## 2019-10-28 PROCEDURE — 99233 SBSQ HOSP IP/OBS HIGH 50: CPT | Mod: GC

## 2019-10-28 PROCEDURE — 88108 CYTOPATH CONCENTRATE TECH: CPT | Mod: 26,59

## 2019-10-28 PROCEDURE — 88112 CYTOPATH CELL ENHANCE TECH: CPT | Mod: 26

## 2019-10-28 PROCEDURE — 71045 X-RAY EXAM CHEST 1 VIEW: CPT | Mod: 26

## 2019-10-28 PROCEDURE — 88342 IMHCHEM/IMCYTCHM 1ST ANTB: CPT | Mod: 26

## 2019-10-28 PROCEDURE — 32557 INSERT CATH PLEURA W/ IMAGE: CPT | Mod: GC

## 2019-10-28 PROCEDURE — 88341 IMHCHEM/IMCYTCHM EA ADD ANTB: CPT | Mod: 26

## 2019-10-28 PROCEDURE — 99233 SBSQ HOSP IP/OBS HIGH 50: CPT | Mod: GC,25

## 2019-10-28 PROCEDURE — 88305 TISSUE EXAM BY PATHOLOGIST: CPT | Mod: 26

## 2019-10-28 RX ORDER — VANCOMYCIN HCL 1 G
1250 VIAL (EA) INTRAVENOUS ONCE
Refills: 0 | Status: COMPLETED | OUTPATIENT
Start: 2019-10-28 | End: 2019-10-28

## 2019-10-28 RX ORDER — VANCOMYCIN HCL 1 G
1250 VIAL (EA) INTRAVENOUS EVERY 12 HOURS
Refills: 0 | Status: DISCONTINUED | OUTPATIENT
Start: 2019-10-28 | End: 2019-10-30

## 2019-10-28 RX ORDER — IPRATROPIUM/ALBUTEROL SULFATE 18-103MCG
3 AEROSOL WITH ADAPTER (GRAM) INHALATION ONCE
Refills: 0 | Status: COMPLETED | OUTPATIENT
Start: 2019-10-28 | End: 2019-10-28

## 2019-10-28 RX ADMIN — Medication 3 MILLILITER(S): at 18:42

## 2019-10-28 RX ADMIN — PIPERACILLIN AND TAZOBACTAM 25 GRAM(S): 4; .5 INJECTION, POWDER, LYOPHILIZED, FOR SOLUTION INTRAVENOUS at 05:26

## 2019-10-28 RX ADMIN — ENOXAPARIN SODIUM 40 MILLIGRAM(S): 100 INJECTION SUBCUTANEOUS at 11:24

## 2019-10-28 RX ADMIN — PIPERACILLIN AND TAZOBACTAM 25 GRAM(S): 4; .5 INJECTION, POWDER, LYOPHILIZED, FOR SOLUTION INTRAVENOUS at 13:12

## 2019-10-28 RX ADMIN — PIPERACILLIN AND TAZOBACTAM 25 GRAM(S): 4; .5 INJECTION, POWDER, LYOPHILIZED, FOR SOLUTION INTRAVENOUS at 22:55

## 2019-10-28 RX ADMIN — Medication 100 MILLIGRAM(S): at 13:12

## 2019-10-28 RX ADMIN — Medication 166.67 MILLIGRAM(S): at 21:36

## 2019-10-28 RX ADMIN — Medication 166.67 MILLIGRAM(S): at 10:51

## 2019-10-28 RX ADMIN — Medication 1 SPRAY(S): at 17:05

## 2019-10-28 RX ADMIN — Medication 1 SPRAY(S): at 05:25

## 2019-10-28 RX ADMIN — Medication 3 MILLILITER(S): at 11:25

## 2019-10-28 RX ADMIN — Medication 40 MILLIGRAM(S): at 05:25

## 2019-10-28 RX ADMIN — Medication 100 MILLIGRAM(S): at 05:25

## 2019-10-28 NOTE — PROGRESS NOTE ADULT - PROBLEM SELECTOR PLAN 5
Patient diagnosed with lung cancer three years ago. Oncologist is Giovana Shepherd at [facility] [place]. On radiation therapy (last 6 weeks ago) and chemotherapy with Tagrisso (~1 year)  - f/u in house oncology consult  - holding Tagrisso in setting of possible pneumonia  - f/u with Dr. Deras

## 2019-10-28 NOTE — PROGRESS NOTE ADULT - SUBJECTIVE AND OBJECTIVE BOX
Interval Events:      14 point ROS negative except as noted above      OBJECTIVE:  ICU Vital Signs Last 24 Hrs  T(C): 36.9 (28 Oct 2019 04:41), Max: 37 (27 Oct 2019 11:41)  T(F): 98.4 (28 Oct 2019 04:41), Max: 98.6 (27 Oct 2019 11:41)  HR: 87 (28 Oct 2019 04:41) (87 - 99)  BP: 102/69 (28 Oct 2019 04:41) (102/69 - 118/69)  BP(mean): --  ABP: --  ABP(mean): --  RR: 18 (28 Oct 2019 04:41) (18 - 18)  SpO2: 90% (28 Oct 2019 04:41) (90% - 93%)        10-27 @ 07:01  -  10-28 @ 07:00  --------------------------------------------------------  IN: 1070 mL / OUT: 0 mL / NET: 1070 mL      CAPILLARY BLOOD GLUCOSE      PHYSICAL EXAM:  General:   HEENT:   Lymph Nodes:  Neck:   Respiratory:   Cardiovascular:   Abdomen:   Extremities:   Skin:   Neurological:  Psychiatry:    LINES:     HOSPITAL MEDICATIONS:  Standing Meds:  benzonatate 100 milliGRAM(s) Oral every 8 hours  enoxaparin Injectable 40 milliGRAM(s) SubCutaneous daily  fluticasone propionate 50 MICROgram(s)/spray Nasal Spray 1 Spray(s) Both Nostrils two times a day  guaiFENesin    Syrup 100 milliGRAM(s) Oral every 6 hours  piperacillin/tazobactam IVPB.. 3.375 Gram(s) IV Intermittent every 8 hours  predniSONE   Tablet 40 milliGRAM(s) Oral daily  vancomycin  IVPB 1000 milliGRAM(s) IV Intermittent every 12 hours      PRN Meds:  acetaminophen   Tablet .. 650 milliGRAM(s) Oral every 6 hours PRN  albuterol/ipratropium for Nebulization. 3 milliLiter(s) Nebulizer every 4 hours PRN      LABS:                        11.7   3.39  )-----------( 108      ( 27 Oct 2019 08:42 )             36.9     Hgb Trend: 11.7<--, 12.0<--, 12.5<--  10-28    139  |  102  |  17  ----------------------------<  95  3.8   |  25  |  0.79    Ca    8.5      28 Oct 2019 05:58  Phos  3.2     10-28  Mg     2.4     10-28    TPro  7.1  /  Alb  3.5  /  TBili  0.6  /  DBili  x   /  AST  30  /  ALT  18  /  AlkPhos  90  10-28    Creatinine Trend: 0.79<--, 0.69<--, 0.74<--, 0.79<--  PT/INR - ( 26 Oct 2019 10:51 )   PT: 13.1 sec;   INR: 1.13 ratio         PTT - ( 26 Oct 2019 10:51 )  PTT:44.4 sec      MICROBIOLOGY:     Culture - Sputum (collected 27 Oct 2019 04:11)  Source: .Sputum  Gram Stain (27 Oct 2019 05:57):    Numerous polymorphonuclear leukocytes per low power field    Moderate Squamous epithelial cells per low power field    Few Gram Variable Rods seen per oil power field    Few Gram Variable Cocci seen per oil power field    Few Yeast like cells seen per oil power field    Culture - Blood (collected 26 Oct 2019 00:55)  Source: .Blood  Preliminary Report (27 Oct 2019 01:01):    No growth to date.    Culture - Blood (collected 26 Oct 2019 00:55)  Source: .Blood  Preliminary Report (27 Oct 2019 01:01):    No growth to date.        RADIOLOGY:  < from: CT Angio Chest w/ IV Cont (10.25.19 @ 23:51) >  CHEST:    Thyroid: Unremarkable    Heart:  Unremarkable.    No pulmonary embolism.      Lymph nodes: Mediastinal and bilateral hilar lymphadenopathy  Right axillary and subpectoral lymph nodes measuring up to 3.5 cm.      Lungs and Pleura:   Right lower lobe lung mass has increased in size since the previous exam   now measuring at least 5.4 x 5.9 cm. There is likely surrounding   lymphangitic spread of disease and extension into the mediastinum where   there is no enlarged mediastinal and bilateral hilar lymph nodes.   Superimposed pneumonia is not excluded. There is a moderate right pleural   effusion which is new. There are other scattered small bilateral   subcentimeter pulmonary nodules which are new.    Airways: Central airways are patent. Obscuration of right lower lobe   secondary bronchi secondary to mass.    Visualized abdominal structures:   Unremarkable.      Osseous structures:   Degenerative changes.      IMPRESSION:      No pulmonary embolism.    Markedly worsening disease as described above with increase in size of   right mass with likely local lymphangitic spread and scattered bilateral   nodules. Marked increase in lymphadenopathy consistent with metastatic   disease.    < end of copied text >      PULMONARY FUNCTION TESTS: none on file Interval Events:  No overnight events  Complains of persistent cough productive of yellow sputum  LIRA essentially unchanged since admission  Denies f/c, chest pain, pleuritic chest pain    14 point ROS negative except as noted above      OBJECTIVE:  ICU Vital Signs Last 24 Hrs  T(C): 36.9 (28 Oct 2019 04:41), Max: 37 (27 Oct 2019 11:41)  T(F): 98.4 (28 Oct 2019 04:41), Max: 98.6 (27 Oct 2019 11:41)  HR: 87 (28 Oct 2019 04:41) (87 - 99)  BP: 102/69 (28 Oct 2019 04:41) (102/69 - 118/69)  BP(mean): --  ABP: --  ABP(mean): --  RR: 18 (28 Oct 2019 04:41) (18 - 18)  SpO2: 90% (28 Oct 2019 04:41) (90% - 93%)        10-27 @ 07:01  -  10-28 @ 07:00  --------------------------------------------------------  IN: 1070 mL / OUT: 0 mL / NET: 1070 mL      CAPILLARY BLOOD GLUCOSE      PHYSICAL EXAM:  General: awake and alert, nontoxic appearing male sitting up in bed  HEENT: NC/AT, EOMI b/l, conjunctiva normal, MMM  Lymph Nodes: no cervical LAD  Neck: supple  Respiratory: slightly decreased breath sounds at R lung based, no w/r/c, appears comfortable on room air, no accessory muscle use or conversational dyspnea  Cardiovascular: S1 S2 present, RRR, no m/r/g  Abdomen: soft, NT/ND  Extremities: no c/c/e  Skin: no rashes or lesions noted  Neurological: AAOx3, no focal deficits  Psychiatry: anxious, cooperative    LINES:     HOSPITAL MEDICATIONS:  Standing Meds:  benzonatate 100 milliGRAM(s) Oral every 8 hours  enoxaparin Injectable 40 milliGRAM(s) SubCutaneous daily  fluticasone propionate 50 MICROgram(s)/spray Nasal Spray 1 Spray(s) Both Nostrils two times a day  guaiFENesin    Syrup 100 milliGRAM(s) Oral every 6 hours  piperacillin/tazobactam IVPB.. 3.375 Gram(s) IV Intermittent every 8 hours  predniSONE   Tablet 40 milliGRAM(s) Oral daily  vancomycin  IVPB 1000 milliGRAM(s) IV Intermittent every 12 hours      PRN Meds:  acetaminophen   Tablet .. 650 milliGRAM(s) Oral every 6 hours PRN  albuterol/ipratropium for Nebulization. 3 milliLiter(s) Nebulizer every 4 hours PRN      LABS:                        11.7   3.39  )-----------( 108      ( 27 Oct 2019 08:42 )             36.9     Hgb Trend: 11.7<--, 12.0<--, 12.5<--  10-28    139  |  102  |  17  ----------------------------<  95  3.8   |  25  |  0.79    Ca    8.5      28 Oct 2019 05:58  Phos  3.2     10-28  Mg     2.4     10-28    TPro  7.1  /  Alb  3.5  /  TBili  0.6  /  DBili  x   /  AST  30  /  ALT  18  /  AlkPhos  90  10-28    Creatinine Trend: 0.79<--, 0.69<--, 0.74<--, 0.79<--  PT/INR - ( 26 Oct 2019 10:51 )   PT: 13.1 sec;   INR: 1.13 ratio         PTT - ( 26 Oct 2019 10:51 )  PTT:44.4 sec      MICROBIOLOGY:     Culture - Sputum (collected 27 Oct 2019 04:11)  Source: .Sputum  Gram Stain (27 Oct 2019 05:57):    Numerous polymorphonuclear leukocytes per low power field    Moderate Squamous epithelial cells per low power field    Few Gram Variable Rods seen per oil power field    Few Gram Variable Cocci seen per oil power field    Few Yeast like cells seen per oil power field    Culture - Blood (collected 26 Oct 2019 00:55)  Source: .Blood  Preliminary Report (27 Oct 2019 01:01):    No growth to date.    Culture - Blood (collected 26 Oct 2019 00:55)  Source: .Blood  Preliminary Report (27 Oct 2019 01:01):    No growth to date.      RADIOLOGY:  < from: CT Angio Chest w/ IV Cont (10.25.19 @ 23:51) >  CHEST:    Thyroid: Unremarkable    Heart:  Unremarkable.    No pulmonary embolism.      Lymph nodes: Mediastinal and bilateral hilar lymphadenopathy  Right axillary and subpectoral lymph nodes measuring up to 3.5 cm.      Lungs and Pleura:   Right lower lobe lung mass has increased in size since the previous exam   now measuring at least 5.4 x 5.9 cm. There is likely surrounding   lymphangitic spread of disease and extension into the mediastinum where   there is no enlarged mediastinal and bilateral hilar lymph nodes.   Superimposed pneumonia is not excluded. There is a moderate right pleural   effusion which is new. There are other scattered small bilateral   subcentimeter pulmonary nodules which are new.    Airways: Central airways are patent. Obscuration of right lower lobe   secondary bronchi secondary to mass.    Visualized abdominal structures:   Unremarkable.      Osseous structures:   Degenerative changes.      IMPRESSION:      No pulmonary embolism.    Markedly worsening disease as described above with increase in size of   right mass with likely local lymphangitic spread and scattered bilateral   nodules. Marked increase in lymphadenopathy consistent with metastatic   disease.    < end of copied text >      PULMONARY FUNCTION TESTS: none on file

## 2019-10-28 NOTE — PROGRESS NOTE ADULT - PROBLEM SELECTOR PLAN 1
Patient met SIRS with  and RR 24 requiring supplemental oxygen. He was found to be RVP positive with enterorhinovirus. Etiology likely 2/2 viral infection vs. possible post obstructive pneumonia with parapneumonic effusion. Other possibility is meeting SIRS based off of elevated RR and HR that can be explained by AHRF. Pneumonia less likely as patient as procalcitonin 0.06 (wnl)  - UA negative   - follow up Bcx, sputum cx   - Patient declined thora for now   - continue with vancomycin and zosyn   - trend CBC daily  - continue to trend fever curve   - Tylenol prn for fever Patient met SIRS with  and RR 24 requiring supplemental oxygen. He was found to be RVP positive with enterorhinovirus. Etiology likely 2/2 viral infection vs. possible post obstructive pneumonia with parapneumonic effusion. Other possibility is meeting SIRS based off of elevated RR and HR that can be explained by AHRF. Pneumonia less likely as patient as procalcitonin 0.06 (wnl)  - UA negative   - follow up Bcx NGTD, sputum cx   - Patient declined thoracenteis for now   - continue with vancomycin and zosyn   - trend CBC daily  - continue to trend fever curve   - Tylenol prn for fever Patient met SIRS with  and RR 24 requiring supplemental oxygen. He was found to be RVP positive with enterorhinovirus. Etiology likely 2/2 post obstructive bacterial pneumonia with ?parapneumonic effusion. Other possibility is meeting SIRS based off of elevated RR and HR that can be explained by AHRF. Sepsis less likely as pro-calcitonin low at 0.06  - UA negative   - follow up Bcx NGTD  - sputum cx growing gram variable rods and GPC, with few yeast    - continue with vancomycin and zosyn (day 3/7)  - trend CBC daily  - continue to trend fever curve   - Tylenol prn for fever

## 2019-10-28 NOTE — PROGRESS NOTE ADULT - PROBLEM SELECTOR PLAN 2
The patient was hypoxic to 88% in the ED. He reports 1.5 months of shortness of breath. Likely multifactorial: 2/2 bl pleural effusions (parapneumonic vs. malignant) vs. possible post obs pneumonia vs. inc in size of RLL lung mass vs. 2/2 lymphangitic spread. CTA chest negative for pulmonary embolism. LE dopplers negative for DVT.   - patient is requiring 4L nasal cannula currently, wean down as tolerated  - switch to Hi Neil if requiring >4L NC and inc WOB  - pulmonary consulted, c/w tx for PNA, hold off thoracentesis  - f/u pulmonary recs The patient was hypoxic to 88% in the ED. He reports 1.5 months of shortness of breath. Likely multifactorial: 2/2 bl pleural effusions (parapneumonic vs. malignant) vs. possible post obs pneumonia vs. inc in size of RLL lung mass vs. 2/2 lymphangitic spread. CTA chest negative for pulmonary embolism. LE dopplers negative for DVT.   - patient is requiring 4L nasal cannula currently, wean down as tolerated  - switch to Hi Neil if requiring >4L NC and inc WOB  - c/w tx for PNA, with plan for possible throacentesis 10/28-29  - pulmonary recs appreciated

## 2019-10-28 NOTE — PROCEDURE NOTE - NSPROCDETAILS_GEN_ALL_CORE
Seldinger technique/location identified, draped/prepped, sterile technique used, needle inserted/introduced/catheter inserted over needle/connection to syringe

## 2019-10-28 NOTE — PHYSICAL EXAM
[No Acute Distress] : no acute distress [Well Developed] : well developed [Ill-Appearing] : ill-appearing [Normal Sclera/Conjunctiva] : normal sclera/conjunctiva [EOMI] : extraocular movements intact [Normal Outer Ear/Nose] : the outer ears and nose were normal in appearance [Normal TMs] : both tympanic membranes were normal [No JVD] : no jugular venous distention [No Lymphadenopathy] : no lymphadenopathy [Supple] : supple [Thyroid Normal, No Nodules] : the thyroid was normal and there were no nodules present [No Accessory Muscle Use] : no accessory muscle use [Normal Rate] : normal rate  [Regular Rhythm] : with a regular rhythm [Normal S1, S2] : normal S1 and S2 [No Murmur] : no murmur heard [No Carotid Bruits] : no carotid bruits [No Edema] : there was no peripheral edema [Soft] : abdomen soft [Non Tender] : non-tender [Non-distended] : non-distended [No HSM] : no HSM [Normal Bowel Sounds] : normal bowel sounds [Normal Posterior Cervical Nodes] : no posterior cervical lymphadenopathy [Normal Anterior Cervical Nodes] : no anterior cervical lymphadenopathy [No CVA Tenderness] : no CVA  tenderness [No Spinal Tenderness] : no spinal tenderness [No Joint Swelling] : no joint swelling [Grossly Normal Strength/Tone] : grossly normal strength/tone [No Rash] : no rash [Coordination Grossly Intact] : coordination grossly intact [No Focal Deficits] : no focal deficits [Normal Gait] : normal gait [Normal Affect] : the affect was normal [Normal Insight/Judgement] : insight and judgment were intact [de-identified] : + throat erythema  [de-identified] : + scattered rhonchi/ wheezing/ decrease B.S at bases

## 2019-10-28 NOTE — PROGRESS NOTE ADULT - SUBJECTIVE AND OBJECTIVE BOX
***************************************************************  Dr. Mira La  Internal Medicine   [facility] Pager: [phone]  [facility] Pager: [phone]   ***************************************************************    MATHIEU WU  59y  MRN: [id number]    Subjective:    Patient is a 59y old  Male who presents with a chief complaint of Chronic cough (27 Oct 2019 07:17)      Interval history/overnight events:    JANNET ON.       MEDICATIONS  (STANDING):  benzonatate 100 milliGRAM(s) Oral every 8 hours  enoxaparin Injectable 40 milliGRAM(s) SubCutaneous daily  fluticasone propionate 50 MICROgram(s)/spray Nasal Spray 1 Spray(s) Both Nostrils two times a day  guaiFENesin    Syrup 100 milliGRAM(s) Oral every 6 hours  piperacillin/tazobactam IVPB.. 3.375 Gram(s) IV Intermittent every 8 hours  predniSONE   Tablet 40 milliGRAM(s) Oral daily  vancomycin  IVPB 1000 milliGRAM(s) IV Intermittent every 12 hours    MEDICATIONS  (PRN):  acetaminophen   Tablet .. 650 milliGRAM(s) Oral every 6 hours PRN Temp greater or equal to 38C (100.4F), Mild Pain (1 - 3), Moderate Pain (4 - 6)  albuterol/ipratropium for Nebulization. 3 milliLiter(s) Nebulizer every 4 hours PRN Shortness of Breath        Objective:    Vitals: Vital Signs Last 24 Hrs  T(C): 36.9 (10-28-19 @ 04:41), Max: 37 (10-27-19 @ 11:41)  T(F): 98.4 (10-28-19 @ 04:41), Max: 98.6 (10-27-19 @ 11:41)  HR: 87 (10-28-19 @ 04:41) (87 - 99)  BP: 102/69 (10-28-19 @ 04:41) (102/69 - 118/69)  BP(mean): --  RR: 18 (10-28-19 @ 04:41) (18 - 18)  SpO2: 90% (10-28-19 @ 04:41) (90% - 93%)            I&O's Summary    27 Oct 2019 07:01  -  28 Oct 2019 07:00  --------------------------------------------------------  IN: 1070 mL / OUT: 0 mL / NET: 1070 mL        PHYSICAL EXAM:  GENERAL: NAD  HEENT: PERRL, no scleral icterus, no head and neck lad   CHEST/LUNG: CTAB, no wheezing, crackles, or ronchi   HEART: RRR, normal S1, S2, no murmurs, gallops, or rubs appreciated   ABDOMEN: soft, nondistended, non-tender, normoactive, no HSM, no rebound, no guarding, no rigidity  SKIN: No rashes or lesions  NERVOUS SYSTEM: Alert & Oriented X3  PSYCH: calm and cooperative     LABS:    10-28    139  |  102  |  17  ----------------------------<  95  3.8   |  25  |  0.79  10-27    141  |  101  |  18  ----------------------------<  130<H>  4.3   |  23  |  0.69  10-26    142  |  106  |  18  ----------------------------<  100<H>  3.9   |  25  |  0.74    Ca    8.5      28 Oct 2019 05:58  Ca    8.4      27 Oct 2019 06:20  Ca    8.4      26 Oct 2019 08:13  Phos  3.2     10-28  Mg     2.4     10-28    TPro  7.1  /  Alb  3.5  /  TBili  0.6  /  DBili  x   /  AST  30  /  ALT  18  /  AlkPhos  90  10-28  TPro  6.9  /  Alb  3.4  /  TBili  0.7  /  DBili  x   /  AST  25  /  ALT  14  /  AlkPhos  87  10-27  TPro  7.2  /  Alb  3.5  /  TBili  0.6  /  DBili  x   /  AST  33  /  ALT  12  /  AlkPhos  93  10-25    PT/INR - ( 26 Oct 2019 10:51 )   PT: 13.1 sec;   INR: 1.13 ratio         PTT - ( 26 Oct 2019 10:51 )  PTT:44.4 sec                                        11.7   3.39  )-----------( 108      ( 27 Oct 2019 08:42 )             36.9                         12.0   4.26  )-----------( 104      ( 26 Oct 2019 08:13 )             37.2                         12.5   4.64  )-----------( 87       ( 25 Oct 2019 22:02 )             38.1     CAPILLARY BLOOD GLUCOSE              RADIOLOGY & ADDITIONAL TESTS:            Imaging Personally Reviewed:  [ ] YES  [ ] NO    Consultants involved in case:   Consultant(s) Notes Reviewed:  [ ] YES  [ ] NO:   Care Discussed with Consultants/Other Providers [ ] YES  [ ] NO ***************************************************************  Dr. Mira La  Internal Medicine   [facility] Pager: [phone]  [facility] Pager: [phone]   ***************************************************************    MATHIEU WU  59y  MRN: [id number]    Subjective:    Patient is a 59y old  Male who presents with a chief complaint of Chronic cough (27 Oct 2019 07:17)    Interval history/overnight events:    JANNET ON. Endorses SOB at rest, slightly improving since starting steroids. Endorses worsening productive sputum (green-yellow) and pleuritic cp. Denies CP, heart palpitations, fevers, chills, sweats. Amenable to bedside thoracentesis       MEDICATIONS  (STANDING):  benzonatate 100 milliGRAM(s) Oral every 8 hours  enoxaparin Injectable 40 milliGRAM(s) SubCutaneous daily  fluticasone propionate 50 MICROgram(s)/spray Nasal Spray 1 Spray(s) Both Nostrils two times a day  guaiFENesin    Syrup 100 milliGRAM(s) Oral every 6 hours  piperacillin/tazobactam IVPB.. 3.375 Gram(s) IV Intermittent every 8 hours  predniSONE   Tablet 40 milliGRAM(s) Oral daily  vancomycin  IVPB 1000 milliGRAM(s) IV Intermittent every 12 hours    MEDICATIONS  (PRN):  acetaminophen   Tablet .. 650 milliGRAM(s) Oral every 6 hours PRN Temp greater or equal to 38C (100.4F), Mild Pain (1 - 3), Moderate Pain (4 - 6)  albuterol/ipratropium for Nebulization. 3 milliLiter(s) Nebulizer every 4 hours PRN Shortness of Breath        Objective:    Vitals: Vital Signs Last 24 Hrs  T(C): 36.9 (10-28-19 @ 04:41), Max: 37 (10-27-19 @ 11:41)  T(F): 98.4 (10-28-19 @ 04:41), Max: 98.6 (10-27-19 @ 11:41)  HR: 87 (10-28-19 @ 04:41) (87 - 99)  BP: 102/69 (10-28-19 @ 04:41) (102/69 - 118/69)  BP(mean): --  RR: 18 (10-28-19 @ 04:41) (18 - 18)  SpO2: 90% (10-28-19 @ 04:41) (90% - 93%)            I&O's Summary    27 Oct 2019 07:01  -  28 Oct 2019 07:00  --------------------------------------------------------  IN: 1070 mL / OUT: 0 mL / NET: 1070 mL      PHYSICAL EXAM:  GENERAL: middle aged male in NAD on 4L NC   HEENT: PERRL, no scleral icterus, visible submandibular LAD, submandibular LAD, ~3cm x 3cm on right, ~2x2 cm on left, ~2x2 cm axillary LAD on right   CHEST/LUNG: slight use of respiratory strap and abdominal mm on 6L, bibasilar crackles, right-basilar lung dull to auscultation   HEART: RRR, holosystolic murmur throughout the precordium   ABDOMEN: soft, slightly distended, non-tender, normoactive, no HSM, no rebound, no guarding, no rigidity, no HSM appreciated   SKIN: No rashes or lesions  EXTREMITY: 2+ pitting edema to the knees bl  NERVOUS SYSTEM: Alert & Oriented X3  PSYCH: calm and cooperative       LABS:    10-28    139  |  102  |  17  ----------------------------<  95  3.8   |  25  |  0.79  10-27    141  |  101  |  18  ----------------------------<  130<H>  4.3   |  23  |  0.69  10-26    142  |  106  |  18  ----------------------------<  100<H>  3.9   |  25  |  0.74    Ca    8.5      28 Oct 2019 05:58  Ca    8.4      27 Oct 2019 06:20  Ca    8.4      26 Oct 2019 08:13  Phos  3.2     10-28  Mg     2.4     10-28    TPro  7.1  /  Alb  3.5  /  TBili  0.6  /  DBili  x   /  AST  30  /  ALT  18  /  AlkPhos  90  10-28  TPro  6.9  /  Alb  3.4  /  TBili  0.7  /  DBili  x   /  AST  25  /  ALT  14  /  AlkPhos  87  10-27  TPro  7.2  /  Alb  3.5  /  TBili  0.6  /  DBili  x   /  AST  33  /  ALT  12  /  AlkPhos  93  10-25    PT/INR - ( 26 Oct 2019 10:51 )   PT: 13.1 sec;   INR: 1.13 ratio         PTT - ( 26 Oct 2019 10:51 )  PTT:44.4 sec                                        11.7   3.39  )-----------( 108      ( 27 Oct 2019 08:42 )             36.9                         12.0   4.26  )-----------( 104      ( 26 Oct 2019 08:13 )             37.2                         12.5   4.64  )-----------( 87       ( 25 Oct 2019 22:02 )             38.1     CAPILLARY BLOOD GLUCOSE      Culture - Sputum (collected 27 Oct 2019 04:11)  Source: .Sputum  Gram Stain (27 Oct 2019 05:57):    Numerous polymorphonuclear leukocytes per low power field    Moderate Squamous epithelial cells per low power field    Few Gram Variable Rods seen per oil power field    Few Gram Variable Cocci seen per oil power field    Few Yeast like cells seen per oil power field  Preliminary Report (28 Oct 2019 10:03):    Normal Respiratory Kaitlin present    Culture - Blood (collected 26 Oct 2019 00:55)  Source: .Blood  Preliminary Report (27 Oct 2019 01:01):    No growth to date.    Culture - Blood (collected 26 Oct 2019 00:55)  Source: .Blood  Preliminary Report (27 Oct 2019 01:01):    No growth to date.          RADIOLOGY & ADDITIONAL TESTS:    < from: CT Angio Chest w/ IV Cont (10.25.19 @ 23:51) >    EXAM:  CT ANGIO CHEST (W)AW IC                            PROCEDURE DATE:  10/25/2019            INTERPRETATION:  CLINICAL INFORMATION: Lung cancer. Cough. Pneumonia.   Evaluate for pulmonary embolism.    CTA of the chest was performed following a pulmonary embolism protocol.   100 mls of Omnipaque 350 was administered intravenously without   complication and 0 mls were discarded. Sagittal and coronal   reconstructions were obtained. Axial maximum intensity projection images   were obtained.    COMPARISON: PET CT 5/26/2018.    CHEST:    Thyroid: Unremarkable    Heart:  Unremarkable.    No pulmonary embolism.      Lymph nodes: Mediastinal and bilateral hilar lymphadenopathy  Right axillary and subpectoral lymph nodes measuring up to 3.5 cm.      Lungs and Pleura:   Right lower lobe lung mass has increased in size since the previous exam   now measuring at least 5.4 x 5.9 cm. There is likely surrounding   lymphangitic spread of disease and extension into the mediastinum where   there is no enlarged mediastinal and bilateral hilar lymph nodes.   Superimposed pneumonia is not excluded. There is a moderate right pleural   effusion which is new. There are other scattered small bilateral   subcentimeter pulmonary nodules which are new.    Airways: Central airways are patent. Obscuration of right lower lobe   secondary bronchi secondary to mass.    Visualized abdominal structures:   Unremarkable.      Osseous structures:   Degenerative changes.      IMPRESSION:      No pulmonary embolism.    Markedly worsening disease as described above with increase in size of   right mass with likely local lymphangitic spread and scattered bilateral   nodules. Marked increase in lymphadenopathy consistent with metastatic   disease.                          BRITTANEY BONILLA M.D., ATTENDING RADIOLOGIST  This document has been electronically signed. Oct 26 2019  8:32AM                < end of copied text >        Imaging Personally Reviewed:  [x ] YES  [ ] NO    Consultants involved in case:   Consultant(s) Notes Reviewed:  [x ] YES  [ ] NO:   Care Discussed with Consultants/Other Providers [x] YES  [ ] NO

## 2019-10-28 NOTE — PLAN
[FreeTextEntry1] : 1. Pneumonia/ Hypoxia/ SOB\par neb treatment with Xopenex provided\par pt was send to ED for further management\par pt declined to go by ambulance

## 2019-10-28 NOTE — PROGRESS NOTE ADULT - ASSESSMENT
59 M with history of NSCLC s/p RT (he states to his right neck for metastatic spread) with last session 9 weeks ago) on Osimertinib for approximately 10 months who presents with persistent cough got over one month. Pleural effusion appears simple and moderate sized on bedside ultrasound exam. Given lack of white count, fever, and low procalcitonin, suspect symptoms and CT findings are more consistent with worsening malignancy.    Recommendations:  -     ---------------------------------------  Dante Palomino PGY-4  Pulmonary/Critical Care Fellow  Pager: [phone] (NILA) [phone] (NS)  Pulmonary Spectra #[id number] 59 M with history of NSCLC s/p RT (he states to his right neck for metastatic spread) with last session 9 weeks ago) on Osimertinib for approximately 10 months who presents with persistent cough got over one month. Pleural effusion appears simple and moderate sized on bedside ultrasound exam. Given lack of white count, fever, and low procalcitonin, suspect symptoms and CT findings are more consistent with worsening malignancy, however not unreasonable to treat with broad spectrum antibiotics given increased sputum production. Symptoms of SOB and cough may also be related to moderate sized R pleural effusion, which appears new from CT scan in May. Plan to assess for pocket via ultrasound and perform thoracentesis today or tomorrow.    Recommendations:  - on vancomycin and zosyn for broad spectrum coverage  - CT findings very concerning for lymphangitic spread of primary tumors - would obtain records from patient's oncologist regarding course and effectiveness of current treatment regimen  - supplemental O2 prn, maintain SpO2>90%  - duonebs prn  - robitussin prn  - will assess effusion with ultrasound today, possible thoracentesis later today or tomorrow    Pulmonary will continue to follow.  ---------------------------------------  Dante Palomino PGY-4  Pulmonary/Critical Care Fellow  Pager: [phone] (NILA) [phone] (NS)  Pulmonary Spectra #[id number]

## 2019-10-28 NOTE — REVIEW OF SYSTEMS
[Fever] : no fever [Chills] : chills [Fatigue] : fatigue [Nasal Discharge] : nasal discharge [Shortness Of Breath] : shortness of breath [Wheezing] : wheezing [Cough] : cough [Dyspnea on Exertion] : dyspnea on exertion [Negative] : Heme/Lymph

## 2019-10-28 NOTE — HISTORY OF PRESENT ILLNESS
[de-identified] : 59 year old male with h/o NSCC lung cancer on Tegrisso  presents for follow up after recent treatment for pneumonia  > had CT chest 3 weeks ago >was diagnosed with pneumonia >treated with  Levaquin 750 . Pt still has productive cough associated with SOB, chest tightness, fatigue , generalized weakness . Symptoms getting worse at night \par \par

## 2019-10-29 LAB
ALBUMIN SERPL ELPH-MCNC: 3.1 G/DL — LOW (ref 3.3–5)
ALP SERPL-CCNC: 81 U/L — SIGNIFICANT CHANGE UP (ref 40–120)
ALT FLD-CCNC: 22 U/L — SIGNIFICANT CHANGE UP (ref 10–45)
ANION GAP SERPL CALC-SCNC: 11 MMOL/L — SIGNIFICANT CHANGE UP (ref 5–17)
AST SERPL-CCNC: 33 U/L — SIGNIFICANT CHANGE UP (ref 10–40)
BILIRUB SERPL-MCNC: 0.5 MG/DL — SIGNIFICANT CHANGE UP (ref 0.2–1.2)
BUN SERPL-MCNC: 18 MG/DL — SIGNIFICANT CHANGE UP (ref 7–23)
CALCIUM SERPL-MCNC: 8.6 MG/DL — SIGNIFICANT CHANGE UP (ref 8.4–10.5)
CHLORIDE SERPL-SCNC: 103 MMOL/L — SIGNIFICANT CHANGE UP (ref 96–108)
CO2 SERPL-SCNC: 26 MMOL/L — SIGNIFICANT CHANGE UP (ref 22–31)
COMMENT - FLUIDS: SIGNIFICANT CHANGE UP
CREAT SERPL-MCNC: 0.81 MG/DL — SIGNIFICANT CHANGE UP (ref 0.5–1.3)
FLUID SEGMENTED GRANULOCYTES: 1 % — SIGNIFICANT CHANGE UP
GLUCOSE SERPL-MCNC: 91 MG/DL — SIGNIFICANT CHANGE UP (ref 70–99)
GRAM STN FLD: SIGNIFICANT CHANGE UP
HCT VFR BLD CALC: 35.4 % — LOW (ref 39–50)
HGB BLD-MCNC: 11.3 G/DL — LOW (ref 13–17)
LYMPHOCYTES # FLD: 13 % — SIGNIFICANT CHANGE UP
MCHC RBC-ENTMCNC: 29 PG — SIGNIFICANT CHANGE UP (ref 27–34)
MCHC RBC-ENTMCNC: 31.9 GM/DL — LOW (ref 32–36)
MCV RBC AUTO: 90.8 FL — SIGNIFICANT CHANGE UP (ref 80–100)
MESOTHL CELL # FLD: 66 % — SIGNIFICANT CHANGE UP
MONOS+MACROS # FLD: 11 % — SIGNIFICANT CHANGE UP
OTHER CELLS FLD MANUAL: 9 % — SIGNIFICANT CHANGE UP
PLATELET # BLD AUTO: 106 K/UL — LOW (ref 150–400)
POTASSIUM SERPL-MCNC: 3.4 MMOL/L — LOW (ref 3.5–5.3)
POTASSIUM SERPL-SCNC: 3.4 MMOL/L — LOW (ref 3.5–5.3)
PROT SERPL-MCNC: 6.4 G/DL — SIGNIFICANT CHANGE UP (ref 6–8.3)
RBC # BLD: 3.9 M/UL — LOW (ref 4.2–5.8)
RBC # FLD: 14.2 % — SIGNIFICANT CHANGE UP (ref 10.3–14.5)
SODIUM SERPL-SCNC: 140 MMOL/L — SIGNIFICANT CHANGE UP (ref 135–145)
SPECIMEN SOURCE: SIGNIFICANT CHANGE UP
TRIGL FLD-MCNC: 19 MG/DL — SIGNIFICANT CHANGE UP
VANCOMYCIN TROUGH SERPL-MCNC: 13.5 UG/ML — SIGNIFICANT CHANGE UP (ref 10–20)
WBC # BLD: 6.58 K/UL — SIGNIFICANT CHANGE UP (ref 3.8–10.5)
WBC # FLD AUTO: 6.58 K/UL — SIGNIFICANT CHANGE UP (ref 3.8–10.5)

## 2019-10-29 PROCEDURE — 99233 SBSQ HOSP IP/OBS HIGH 50: CPT | Mod: GC

## 2019-10-29 RX ORDER — POTASSIUM CHLORIDE 20 MEQ
40 PACKET (EA) ORAL ONCE
Refills: 0 | Status: COMPLETED | OUTPATIENT
Start: 2019-10-29 | End: 2019-10-29

## 2019-10-29 RX ADMIN — Medication 166.67 MILLIGRAM(S): at 10:21

## 2019-10-29 RX ADMIN — Medication 40 MILLIGRAM(S): at 06:24

## 2019-10-29 RX ADMIN — Medication 100 MILLIGRAM(S): at 23:25

## 2019-10-29 RX ADMIN — Medication 40 MILLIEQUIVALENT(S): at 09:10

## 2019-10-29 RX ADMIN — PIPERACILLIN AND TAZOBACTAM 25 GRAM(S): 4; .5 INJECTION, POWDER, LYOPHILIZED, FOR SOLUTION INTRAVENOUS at 06:24

## 2019-10-29 RX ADMIN — PIPERACILLIN AND TAZOBACTAM 25 GRAM(S): 4; .5 INJECTION, POWDER, LYOPHILIZED, FOR SOLUTION INTRAVENOUS at 22:55

## 2019-10-29 RX ADMIN — Medication 1 SPRAY(S): at 07:21

## 2019-10-29 RX ADMIN — PIPERACILLIN AND TAZOBACTAM 25 GRAM(S): 4; .5 INJECTION, POWDER, LYOPHILIZED, FOR SOLUTION INTRAVENOUS at 14:06

## 2019-10-29 RX ADMIN — Medication 166.67 MILLIGRAM(S): at 21:43

## 2019-10-29 NOTE — PROGRESS NOTE ADULT - PROBLEM SELECTOR PLAN 3
CT chest with a moderate-to-large right pleural effusion, and small left-sided pleural effusion  - plan as above CT chest with a moderate-to-large right pleural effusion, and small left-sided pleural effusion. s/p thoracentesis 10/28 with removal of 1.5 L of serosanguinous fluid.   - plan as above CT chest with a moderate-to-large right pleural effusion, and small left-sided pleural effusion. s/p thoracentesis 10/28 with removal of 1.5 L of serosanguinous fluid.   - plan as above  - exudative effusion likely due to malignancy, fluid is not infected, TGL are low so no chylothorax  - follow up pleural cytology  - wean off of 02

## 2019-10-29 NOTE — PROGRESS NOTE ADULT - ASSESSMENT
59 M with history of NSCLC s/p RT (he states to his right neck for metastatic spread) with last session 9 weeks ago) on Osimertinib for approximately 10 months who presents with persistent cough got over one month. Pleural effusion appears simple and moderate sized on bedside ultrasound exam. Given lack of white count, fever, and low procalcitonin, suspect symptoms and CT findings are more consistent with worsening malignancy, however not unreasonable to treat with broad spectrum antibiotics given increased sputum production. Symptoms of SOB and cough may also be related to moderate sized R pleural effusion, which appears new from CT scan in May. S/p thoracentesis 10/28 with removal of 1.5L exudative effusion, not consistent with empyema, suspect malignant effusion.    #R pleural effusion  - on vancomycin and zosyn for broad spectrum coverage  - CT findings very concerning for lymphangitic spread of primary tumors - would obtain records from patient's oncologist regarding course and effectiveness of current treatment regimen  - s/p thoracentesis -> exudative effusion, not consistent with empyema, likely malignant  - f/u final cultures and cyto  - supplemental O2 prn, maintain SpO2>90%  - duonebs prn  - robitussin prn    Pulmonary will continue to follow.  ---------------------------------------  Dante Palomino PGY-4  Pulmonary/Critical Care Fellow  Pager: [phone] (NILA) [phone] (NS)  Pulmonary Spectra #[id number] 59 M with history of NSCLC s/p RT (he states to his right neck for metastatic spread) with last session 9 weeks ago) on Osimertinib for approximately 10 months who presents with persistent cough got over one month. Pleural effusion appears simple and moderate sized on bedside ultrasound exam. Given lack of white count, fever, and low procalcitonin, suspect symptoms and CT findings are more consistent with worsening malignancy, however not unreasonable to treat with broad spectrum antibiotics given increased sputum production. Symptoms of SOB and cough may also be related to moderate sized R pleural effusion, which appears new from CT scan in May. S/p thoracentesis 10/28 with removal of 1.5L exudative effusion, not consistent with empyema, suspect malignant effusion.    #R pleural effusion  - on vancomycin and zosyn for broad spectrum coverage  - CT findings very concerning for lymphangitic spread of primary tumors - would obtain records from patient's oncologist regarding course and effectiveness of current treatment regimen  - s/p thoracentesis -> exudative effusion, not consistent with empyema, likely malignant  - f/u final cultures and cyto  - supplemental O2 prn, maintain SpO2>90%  - duonebs prn  - robitussin vs hycodan prn    Pulmonary will continue to follow.  ---------------------------------------  Dante Palomino PGY-4  Pulmonary/Critical Care Fellow  Pager: [phone] (NILA) [phone] (NS)  Pulmonary Spectra #[id number]

## 2019-10-29 NOTE — PROGRESS NOTE ADULT - PROBLEM SELECTOR PLAN 2
The patient was hypoxic to 88% in the ED. He reports 1.5 months of shortness of breath. Likely multifactorial: 2/2 bl pleural effusions (parapneumonic vs. malignant) vs. possible post obs pneumonia vs. inc in size of RLL lung mass vs. 2/2 lymphangitic spread. CTA chest negative for pulmonary embolism. LE dopplers negative for DVT.   - s/p thoracentesis 10/28 with removal of 1.5L of serosanguinous fluid   - pleural studies: pH 7.5, PP/SP = 67%, Pleural  >2/3 ULN, likely exudative process   - treatment of PNA as above  - pulmonary recs appreciated  - patient is requiring 4L nasal cannula currently, wean down as tolerated  - switch to Hi Neil if requiring >4L NC and inc WOB The patient was hypoxic to 88% in the ED. He reports 1.5 months of shortness of breath. Likely multifactorial: 2/2 bl pleural effusions (parapneumonic vs. malignant) vs. possible post obs pneumonia vs. inc in size of RLL lung mass vs. 2/2 lymphangitic spread. CTA chest negative for pulmonary embolism. LE dopplers negative for DVT.   - s/p thoracentesis 10/28 with removal of 1.5L of serosanguinous fluid   - pleural studies: pH 7.5, PP/SP = 67%, Pleural  >2/3 ULN, in line with exudative process; ddx include parapneumonic effusion vs. malignant effusion, empyema less likely as pH >7.2  - treatment of bacterial PNA as above  - pulmonary recs appreciated  - patient is requiring 5L nasal cannula currently, wean down as tolerated  - switch to Hi Neil if requiring >5L NC and inc WOB The patient was hypoxic to 88% in the ED. He reports 1.5 months of shortness of breath  Likely multifactorial: 2/2 bl pleural effusions, bacterial pneumonia and  lymphangitic spread of malignancy   CTA chest negative for pulmonary embolism. LE dopplers negative for DVT.   - s/p thoracentesis 10/28 with removal of 1.5L of serosanguinous fluid (exudative)  - pleural studies: pH 7.5, PP/SP = 67%, Pleural  >2/3 ULN, consistent with exudative process  - culture thus far negative of the pleural fluid  empyema less likely as pH >7.2  - treatment of bacterial PNA as above  - pulmonary recs appreciated  - patient is requiring 5L nasal cannula currently, wean down as tolerated  - switch to Hi Neil if requiring >5L NC and inc WOB  - prednisone 40mg (10/26- x 5 days

## 2019-10-29 NOTE — PROGRESS NOTE ADULT - SUBJECTIVE AND OBJECTIVE BOX
Interval Events:  No overnight events  S/p thoracentesis yesterday afternoon -> exudative, not consistent with empyema      14 point ROS negative except as noted above      OBJECTIVE:  ICU Vital Signs Last 24 Hrs  T(C): 36.8 (29 Oct 2019 04:05), Max: 36.8 (29 Oct 2019 04:05)  T(F): 98.2 (29 Oct 2019 04:05), Max: 98.2 (29 Oct 2019 04:05)  HR: 89 (29 Oct 2019 04:05) (89 - 110)  BP: 107/67 (29 Oct 2019 04:05) (107/67 - 122/78)  BP(mean): --  ABP: --  ABP(mean): --  RR: 20 (29 Oct 2019 07:30) (18 - 21)  SpO2: 96% (29 Oct 2019 07:30) (81% - 96%)        10-28 @ 07:01  -  10-29 @ 07:00  --------------------------------------------------------  IN: 1520 mL / OUT: 0 mL / NET: 1520 mL      CAPILLARY BLOOD GLUCOSE      PHYSICAL EXAM:  General: awake and alert, nontoxic appearing male sitting up in bed  HEENT: NC/AT, EOMI b/l, conjunctiva normal, MMM  Lymph Nodes: no cervical LAD  Neck: supple  Respiratory: slightly decreased breath sounds at R lung based, no w/r/c, appears comfortable on room air, no accessory muscle use or conversational dyspnea  Cardiovascular: S1 S2 present, RRR, no m/r/g  Abdomen: soft, NT/ND  Extremities: no c/c/e  Skin: no rashes or lesions noted  Neurological: AAOx3, no focal deficits  Psychiatry: anxious, cooperative    LINES:     HOSPITAL MEDICATIONS:  Standing Meds:  benzonatate 100 milliGRAM(s) Oral every 8 hours  enoxaparin Injectable 40 milliGRAM(s) SubCutaneous daily  fluticasone propionate 50 MICROgram(s)/spray Nasal Spray 1 Spray(s) Both Nostrils two times a day  guaiFENesin    Syrup 100 milliGRAM(s) Oral every 6 hours  piperacillin/tazobactam IVPB.. 3.375 Gram(s) IV Intermittent every 8 hours  predniSONE   Tablet 40 milliGRAM(s) Oral daily  vancomycin  IVPB 1000 milliGRAM(s) IV Intermittent every 12 hours      PRN Meds:  acetaminophen   Tablet .. 650 milliGRAM(s) Oral every 6 hours PRN  albuterol/ipratropium for Nebulization. 3 milliLiter(s) Nebulizer every 4 hours PRN      LABS:                        12.1   6.79  )-----------( 122      ( 28 Oct 2019 07:51 )             38.4     Hgb Trend: 12.1<--, 11.7<--, 12.0<--, 12.5<--  10-29    140  |  103  |  18  ----------------------------<  91  3.4<L>   |  26  |  0.81    Ca    8.6      29 Oct 2019 06:25  Phos  3.2     10-28  Mg     2.4     10-28    TPro  6.4  /  Alb  3.1<L>  /  TBili  0.5  /  DBili  x   /  AST  33  /  ALT  22  /  AlkPhos  81  10-29    Creatinine Trend: 0.81<--, 0.79<--, 0.69<--, 0.74<--, 0.79<--      MICROBIOLOGY:     Culture - Sputum (collected 27 Oct 2019 04:11)  Source: .Sputum  Gram Stain (27 Oct 2019 05:57):    Numerous polymorphonuclear leukocytes per low power field    Moderate Squamous epithelial cells per low power field    Few Gram Variable Rods seen per oil power field    Few Gram Variable Cocci seen per oil power field    Few Yeast like cells seen per oil power field    Culture - Blood (collected 26 Oct 2019 00:55)  Source: .Blood  Preliminary Report (27 Oct 2019 01:01):    No growth to date.    Culture - Blood (collected 26 Oct 2019 00:55)  Source: .Blood  Preliminary Report (27 Oct 2019 01:01):    No growth to date.      RADIOLOGY:  < from: CT Angio Chest w/ IV Cont (10.25.19 @ 23:51) >  CHEST:    Thyroid: Unremarkable    Heart:  Unremarkable.    No pulmonary embolism.      Lymph nodes: Mediastinal and bilateral hilar lymphadenopathy  Right axillary and subpectoral lymph nodes measuring up to 3.5 cm.      Lungs and Pleura:   Right lower lobe lung mass has increased in size since the previous exam   now measuring at least 5.4 x 5.9 cm. There is likely surrounding   lymphangitic spread of disease and extension into the mediastinum where   there is no enlarged mediastinal and bilateral hilar lymph nodes.   Superimposed pneumonia is not excluded. There is a moderate right pleural   effusion which is new. There are other scattered small bilateral   subcentimeter pulmonary nodules which are new.    Airways: Central airways are patent. Obscuration of right lower lobe   secondary bronchi secondary to mass.    Visualized abdominal structures:   Unremarkable.      Osseous structures:   Degenerative changes.      IMPRESSION:      No pulmonary embolism.    Markedly worsening disease as described above with increase in size of   right mass with likely local lymphangitic spread and scattered bilateral   nodules. Marked increase in lymphadenopathy consistent with metastatic   disease.    < end of copied text >      PULMONARY FUNCTION TESTS: none on file Interval Events:  No overnight events  S/p thoracentesis yesterday afternoon -> exudative, not consistent with empyema  SOB improved, cough has been persistent, productive of yellow sputum  Denies f/c, chest pain, pleuritic chest pain    14 point ROS negative except as noted above      OBJECTIVE:  ICU Vital Signs Last 24 Hrs  T(C): 36.8 (29 Oct 2019 04:05), Max: 36.8 (29 Oct 2019 04:05)  T(F): 98.2 (29 Oct 2019 04:05), Max: 98.2 (29 Oct 2019 04:05)  HR: 89 (29 Oct 2019 04:05) (89 - 110)  BP: 107/67 (29 Oct 2019 04:05) (107/67 - 122/78)  BP(mean): --  ABP: --  ABP(mean): --  RR: 20 (29 Oct 2019 07:30) (18 - 21)  SpO2: 96% (29 Oct 2019 07:30) (81% - 96%)      10-28 @ 07:01  -  10-29 @ 07:00  --------------------------------------------------------  IN: 1520 mL / OUT: 0 mL / NET: 1520 mL      CAPILLARY BLOOD GLUCOSE      PHYSICAL EXAM:  General: awake and alert, nontoxic appearing male sitting up in bed  HEENT: NC/AT, EOMI b/l, conjunctiva normal, MMM  Lymph Nodes: no cervical LAD  Neck: supple  Respiratory: improved aeration in R lung, no w/r/c, appears comfortable on room air, no accessory muscle use or conversational dyspnea  Cardiovascular: S1 S2 present, RRR, no m/r/g  Abdomen: soft, NT/ND  Extremities: no c/c/e  Skin: no rashes or lesions noted  Neurological: AAOx3, no focal deficits  Psychiatry: anxious, cooperative    LINES:     HOSPITAL MEDICATIONS:  Standing Meds:  benzonatate 100 milliGRAM(s) Oral every 8 hours  enoxaparin Injectable 40 milliGRAM(s) SubCutaneous daily  fluticasone propionate 50 MICROgram(s)/spray Nasal Spray 1 Spray(s) Both Nostrils two times a day  guaiFENesin    Syrup 100 milliGRAM(s) Oral every 6 hours  piperacillin/tazobactam IVPB.. 3.375 Gram(s) IV Intermittent every 8 hours  predniSONE   Tablet 40 milliGRAM(s) Oral daily  vancomycin  IVPB 1000 milliGRAM(s) IV Intermittent every 12 hours      PRN Meds:  acetaminophen   Tablet .. 650 milliGRAM(s) Oral every 6 hours PRN  albuterol/ipratropium for Nebulization. 3 milliLiter(s) Nebulizer every 4 hours PRN      LABS:                        12.1   6.79  )-----------( 122      ( 28 Oct 2019 07:51 )             38.4     Hgb Trend: 12.1<--, 11.7<--, 12.0<--, 12.5<--  10-29    140  |  103  |  18  ----------------------------<  91  3.4<L>   |  26  |  0.81    Ca    8.6      29 Oct 2019 06:25  Phos  3.2     10-28  Mg     2.4     10-28    TPro  6.4  /  Alb  3.1<L>  /  TBili  0.5  /  DBili  x   /  AST  33  /  ALT  22  /  AlkPhos  81  10-29    Creatinine Trend: 0.81<--, 0.79<--, 0.69<--, 0.74<--, 0.79<--      MICROBIOLOGY:     Culture - Sputum (collected 27 Oct 2019 04:11)  Source: .Sputum  Gram Stain (27 Oct 2019 05:57):    Numerous polymorphonuclear leukocytes per low power field    Moderate Squamous epithelial cells per low power field    Few Gram Variable Rods seen per oil power field    Few Gram Variable Cocci seen per oil power field    Few Yeast like cells seen per oil power field    Culture - Blood (collected 26 Oct 2019 00:55)  Source: .Blood  Preliminary Report (27 Oct 2019 01:01):    No growth to date.    Culture - Blood (collected 26 Oct 2019 00:55)  Source: .Blood  Preliminary Report (27 Oct 2019 01:01):    No growth to date.      RADIOLOGY:  < from: CT Angio Chest w/ IV Cont (10.25.19 @ 23:51) >  CHEST:    Thyroid: Unremarkable    Heart:  Unremarkable.    No pulmonary embolism.      Lymph nodes: Mediastinal and bilateral hilar lymphadenopathy  Right axillary and subpectoral lymph nodes measuring up to 3.5 cm.      Lungs and Pleura:   Right lower lobe lung mass has increased in size since the previous exam   now measuring at least 5.4 x 5.9 cm. There is likely surrounding   lymphangitic spread of disease and extension into the mediastinum where   there is no enlarged mediastinal and bilateral hilar lymph nodes.   Superimposed pneumonia is not excluded. There is a moderate right pleural   effusion which is new. There are other scattered small bilateral   subcentimeter pulmonary nodules which are new.    Airways: Central airways are patent. Obscuration of right lower lobe   secondary bronchi secondary to mass.    Visualized abdominal structures:   Unremarkable.      Osseous structures:   Degenerative changes.      IMPRESSION:      No pulmonary embolism.    Markedly worsening disease as described above with increase in size of   right mass with likely local lymphangitic spread and scattered bilateral   nodules. Marked increase in lymphadenopathy consistent with metastatic   disease.    < end of copied text >      PULMONARY FUNCTION TESTS: none on file

## 2019-10-29 NOTE — PROGRESS NOTE ADULT - PROBLEM SELECTOR PLAN 5
Patient diagnosed with lung cancer three years ago. Oncologist is Giovana Shepherd at [facility] [place]. On radiation therapy (last 6 weeks ago) and chemotherapy with Tagrisso (~1 year)  - f/u in house oncology consult  - holding Tagrisso in setting of possible pneumonia  - f/u with Dr. Deras Patient diagnosed with lung cancer three years ago. Oncologist is Giovana Shepherd at [facility] [place]. On radiation therapy (last 6 weeks ago) and chemotherapy with Tagrisso (~1 year)  - f/u in house oncology consult  - holding Tagrisso in setting of pneumonia  - f/u with Dr. Deras

## 2019-10-29 NOTE — PROGRESS NOTE ADULT - PROBLEM SELECTOR PLAN 1
Patient met SIRS with  and RR 24 requiring supplemental oxygen. He was found to be RVP positive with enterorhinovirus. Etiology likely 2/2 post obstructive bacterial pneumonia with ?parapneumonic effusion. Other possibility is meeting SIRS based off of elevated RR and HR that can be explained by AHRF. Sepsis less likely as pro-calcitonin low at 0.06  - UA negative   - follow up Bcx NGTD  - sputum cx growing gram variable rods and GPC, with few yeast    - continue with vancomycin and zosyn (day 4/7)  - trend CBC daily  - continue to trend fever curve   - Tylenol prn for fever Patient met SIRS with  and RR 24 requiring supplemental oxygen. He was found to be RVP positive with enterorhinovirus. Etiology likely 2/2 post obstructive bacterial pneumonia with bl effusions. Other possibility is meeting SIRS based off of elevated RR and HR that can be explained by AHRF. Sepsis unlikely as pro-calcitonin low at 0.06  - UA negative   - Bcx NGTD  - sputum cx growing gram variable rods and GPC, with few yeast    - continue with vancomycin and zosyn (day 4/7)  - trend CBC daily  - continue to trend fever curve   - Tylenol prn for fever Patient met SIRS with  and RR 24 requiring supplemental oxygen. He was found to be RVP positive with enterorhinovirus and found to have bacterial pna  - Bcx NGTD  - sputum cx growing gram variable rods and GPC, with few yeast    - continue with vancomycin and zosyn (day 4/7)  - continue to trend fever curve   - Tylenol prn for fever

## 2019-10-30 LAB
ANION GAP SERPL CALC-SCNC: 9 MMOL/L — SIGNIFICANT CHANGE UP (ref 5–17)
BUN SERPL-MCNC: 16 MG/DL — SIGNIFICANT CHANGE UP (ref 7–23)
CALCIUM SERPL-MCNC: 8.7 MG/DL — SIGNIFICANT CHANGE UP (ref 8.4–10.5)
CHLORIDE SERPL-SCNC: 104 MMOL/L — SIGNIFICANT CHANGE UP (ref 96–108)
CO2 SERPL-SCNC: 25 MMOL/L — SIGNIFICANT CHANGE UP (ref 22–31)
CREAT SERPL-MCNC: 0.81 MG/DL — SIGNIFICANT CHANGE UP (ref 0.5–1.3)
CULTURE RESULTS: SIGNIFICANT CHANGE UP
GLUCOSE SERPL-MCNC: 93 MG/DL — SIGNIFICANT CHANGE UP (ref 70–99)
HCT VFR BLD CALC: 38 % — LOW (ref 39–50)
HGB BLD-MCNC: 11.9 G/DL — LOW (ref 13–17)
MCHC RBC-ENTMCNC: 28.6 PG — SIGNIFICANT CHANGE UP (ref 27–34)
MCHC RBC-ENTMCNC: 31.3 GM/DL — LOW (ref 32–36)
MCV RBC AUTO: 91.3 FL — SIGNIFICANT CHANGE UP (ref 80–100)
NON-GYNECOLOGICAL CYTOLOGY STUDY: SIGNIFICANT CHANGE UP
PLATELET # BLD AUTO: 105 K/UL — LOW (ref 150–400)
POTASSIUM SERPL-MCNC: 4 MMOL/L — SIGNIFICANT CHANGE UP (ref 3.5–5.3)
POTASSIUM SERPL-SCNC: 4 MMOL/L — SIGNIFICANT CHANGE UP (ref 3.5–5.3)
RBC # BLD: 4.16 M/UL — LOW (ref 4.2–5.8)
RBC # FLD: 14.1 % — SIGNIFICANT CHANGE UP (ref 10.3–14.5)
SODIUM SERPL-SCNC: 138 MMOL/L — SIGNIFICANT CHANGE UP (ref 135–145)
SPECIMEN SOURCE: SIGNIFICANT CHANGE UP
WBC # BLD: 7.16 K/UL — SIGNIFICANT CHANGE UP (ref 3.8–10.5)
WBC # FLD AUTO: 7.16 K/UL — SIGNIFICANT CHANGE UP (ref 3.8–10.5)

## 2019-10-30 PROCEDURE — 71045 X-RAY EXAM CHEST 1 VIEW: CPT | Mod: 26

## 2019-10-30 PROCEDURE — 99233 SBSQ HOSP IP/OBS HIGH 50: CPT | Mod: GC

## 2019-10-30 RX ORDER — VANCOMYCIN HCL 1 G
1500 VIAL (EA) INTRAVENOUS EVERY 12 HOURS
Refills: 0 | Status: DISCONTINUED | OUTPATIENT
Start: 2019-10-30 | End: 2019-11-02

## 2019-10-30 RX ADMIN — PIPERACILLIN AND TAZOBACTAM 25 GRAM(S): 4; .5 INJECTION, POWDER, LYOPHILIZED, FOR SOLUTION INTRAVENOUS at 06:48

## 2019-10-30 RX ADMIN — Medication 300 MILLIGRAM(S): at 18:03

## 2019-10-30 RX ADMIN — PIPERACILLIN AND TAZOBACTAM 25 GRAM(S): 4; .5 INJECTION, POWDER, LYOPHILIZED, FOR SOLUTION INTRAVENOUS at 22:49

## 2019-10-30 RX ADMIN — Medication 40 MILLIGRAM(S): at 06:49

## 2019-10-30 RX ADMIN — PIPERACILLIN AND TAZOBACTAM 25 GRAM(S): 4; .5 INJECTION, POWDER, LYOPHILIZED, FOR SOLUTION INTRAVENOUS at 13:54

## 2019-10-30 NOTE — PROGRESS NOTE ADULT - PROBLEM SELECTOR PLAN 3
CT chest with a moderate-to-large right pleural effusion, and small left-sided pleural effusion. s/p thoracentesis 10/28 with removal of 1.5 L of serosanguinous fluid.   - plan as above  - exudative effusion likely due to malignancy, fluid is not infected, TGL are low so no chylothorax  - follow up pleural cytology  - wean off of 02 CT chest with a moderate-to-large right pleural effusion, and small left-sided pleural effusion. s/p thoracentesis 10/28 with removal of 1.5 L of serosanguinous fluid.   - plan as above  - exudative effusion likely due to malignancy, fluid is not infected, TGL are low so no chylothorax  - follow up pleural cytology  - wean off of O2

## 2019-10-30 NOTE — PROGRESS NOTE ADULT - PROBLEM SELECTOR PLAN 1
Patient met SIRS with  and RR 24 requiring supplemental oxygen. He was found to be RVP positive with enterorhinovirus and found to have bacterial pna  - Bcx NGTD  - sputum cx growing gram variable rods and GPC, with few yeast    - continue with vancomycin and zosyn (day 5/7)  - continue to trend fever curve   - Tylenol prn for fever Patient met SIRS with  and RR 24 requiring supplemental oxygen. He was found to be RVP positive with enterorhinovirus and found to have bacterial pna  - Bcx NGTD  - sputum cx growing gram variable rods and GPC, with few yeast    - continue with vancomycin and zosyn (day 5/7)  - vancomycin dose adjusted as trough low   - continue to trend fever curve   - Tylenol prn for fever Bacterial pneumonia   Patient met SIRS with  and RR 24 requiring supplemental oxygen. He was found to be RVP positive with enterorhinovirus and found to have bacterial pna  - Bcx NGTD  - sputum cx growing gram variable rods and GPC, with few yeast    - continue with vancomycin and zosyn (day 5/7)  - vancomycin dose adjusted as trough low   - continue to trend fever curve   - Tylenol prn for fever

## 2019-10-30 NOTE — PROGRESS NOTE ADULT - PROBLEM SELECTOR PLAN 2
The patient was hypoxic to 88% in the ED. He reports 1.5 months of shortness of breath. Likely multifactorial: 2/2 bl pleural effusions, bacterial pneumonia and  lymphangitic spread of malignancy. CTA chest negative for pulmonary embolism. LE dopplers negative for DVT.   - s/p thoracentesis 10/28 with removal of 1.5L of serosanguinous fluid (exudative)  - pleural studies: pH 7.5, PP/SP = 67%, Pleural  >2/3 ULN, consistent with exudative process  - culture thus far negative of the pleural fluid  empyema less likely as pH >7.2  - treatment of bacterial PNA as above  - pulmonary recs appreciated  - switch to Hi Neil if requiring >5L NC and inc WOB  - prednisone 40mg (10/26- x 5 days The patient was hypoxic to 88% in the ED. He reports 1.5 months of shortness of breath. Likely multifactorial: 2/2 bl pleural effusions, bacterial pneumonia and  lymphangitic spread of malignancy. CTA chest negative for pulmonary embolism. LE dopplers negative for DVT.   - s/p thoracentesis 10/28 with removal of 1.5L of serosanguinous fluid (exudative)  - pleural studies: pH 7.5, PP/SP = 67%, Pleural  >2/3 ULN, consistent with exudative process  - culture thus far negative of the pleural fluid  empyema less likely as pH >7.2  - treatment of bacterial PNA as above  - pulmonary recs appreciated  - switch to Hi Neil if requiring >5L NC and inc WOB  - prednisone 40mg (10/26- x 5 days) The patient was hypoxic to 88% in the ED. He reports 1.5 months of shortness of breath. Likely multifactorial: 2/2 bl pleural effusions, bacterial pneumonia and  lymphangitic spread of malignancy. CTA chest negative for pulmonary embolism. LE dopplers negative for DVT.   - s/p thoracentesis 10/28 with removal of 1.5L of serosanguinous fluid (exudative)  - pleural studies: pH 7.5, PP/SP = 67%, Pleural  >2/3 ULN, consistent with exudative process; low concern for empyema as pH >7.2   - culture thus far negative of the pleural fluid  - treatment of bacterial PNA as above  - pulmonary recs appreciated  - switch to Hi Neil if requiring >5L NC and inc WOB  - prednisone 40mg day 5 (10/26-)

## 2019-10-30 NOTE — PROGRESS NOTE ADULT - PROBLEM SELECTOR PLAN 5
Patient diagnosed with lung cancer three years ago. Oncologist is Giovana Shepherd at [facility]. On radiation therapy (last 6 weeks ago) and chemotherapy with Tagrisso (~1 year)  - f/u in house oncology consult  - holding Tagrisso in setting of pneumonia  - f/u with Dr. Deras Patient diagnosed with lung cancer three years ago. Oncologist is Giovana Shepherd at [facility] [place]. On radiation therapy (last 6 weeks ago) and chemotherapy with Tagrisso (~1 year)  - f/u in house oncology consult  - holding Tagrisso in setting of pneumonia  - f/u with Dr. Luisa Deras

## 2019-10-30 NOTE — PROGRESS NOTE ADULT - PROBLEM SELECTOR PLAN 4
RVP positive for enterovirus  - continue with supportive care  - robitussin for cough  - flonase for nasal congestion  - continue to monitor

## 2019-10-30 NOTE — PROGRESS NOTE ADULT - SUBJECTIVE AND OBJECTIVE BOX
***************************************************************  Dr. Mira La  Internal Medicine   [facility] Pager: [phone]  [facility] Pager: [phone]   ***************************************************************    MATHIEU WU  59y  MRN: [id number]    Subjective:    Patient is a 59y old  Male who presents with a chief complaint of Chronic cough (29 Oct 2019 07:57)      Interval history/overnight events:    JANNET ON.       MEDICATIONS  (STANDING):  benzonatate 100 milliGRAM(s) Oral every 8 hours  enoxaparin Injectable 40 milliGRAM(s) SubCutaneous daily  fluticasone propionate 50 MICROgram(s)/spray Nasal Spray 1 Spray(s) Both Nostrils two times a day  guaiFENesin    Syrup 100 milliGRAM(s) Oral every 6 hours  piperacillin/tazobactam IVPB.. 3.375 Gram(s) IV Intermittent every 8 hours  predniSONE   Tablet 40 milliGRAM(s) Oral daily  vancomycin  IVPB 1250 milliGRAM(s) IV Intermittent every 12 hours    MEDICATIONS  (PRN):  acetaminophen   Tablet .. 650 milliGRAM(s) Oral every 6 hours PRN Temp greater or equal to 38C (100.4F), Mild Pain (1 - 3), Moderate Pain (4 - 6)  albuterol/ipratropium for Nebulization. 3 milliLiter(s) Nebulizer every 4 hours PRN Shortness of Breath        Objective:    Vitals: Vital Signs Last 24 Hrs  T(C): 36.7 (10-30-19 @ 04:03), Max: 36.8 (10-29-19 @ 20:04)  T(F): 98.1 (10-30-19 @ 04:03), Max: 98.3 (10-29-19 @ 20:04)  HR: 94 (10-30-19 @ 04:03) (93 - 94)  BP: 110/70 (10-30-19 @ 04:03) (104/60 - 111/74)  BP(mean): --  RR: 20 (10-30-19 @ 04:03) (20 - 22)  SpO2: 93% (10-30-19 @ 05:30) (91% - 97%)            I&O's Summary    29 Oct 2019 07:01  -  30 Oct 2019 07:00  --------------------------------------------------------  IN: 1607 mL / OUT: 0 mL / NET: 1607 mL        PHYSICAL EXAM:  GENERAL: NAD  HEENT: PERRL, no scleral icterus, no head and neck lad   CHEST/LUNG: CTAB, no wheezing, crackles, or ronchi   HEART: RRR, normal S1, S2, no murmurs, gallops, or rubs appreciated   ABDOMEN: soft, nondistended, non-tender, normoactive, no HSM, no rebound, no guarding, no rigidity  SKIN: No rashes or lesions  NERVOUS SYSTEM: Alert & Oriented X3  PSYCH: calm and cooperative     LABS:    10-30    138  |  104  |  16  ----------------------------<  93  4.0   |  25  |  0.81  10-29    140  |  103  |  18  ----------------------------<  91  3.4<L>   |  26  |  0.81  10-28    139  |  102  |  17  ----------------------------<  95  3.8   |  25  |  0.79    Ca    8.7      30 Oct 2019 05:56  Ca    8.6      29 Oct 2019 06:25  Ca    8.5      28 Oct 2019 05:58    TPro  6.4  /  Alb  3.1<L>  /  TBili  0.5  /  DBili  x   /  AST  33  /  ALT  22  /  AlkPhos  81  10-29  TPro  7.1  /  Alb  3.5  /  TBili  0.6  /  DBili  x   /  AST  30  /  ALT  18  /  AlkPhos  90  10-28                                            11.3   6.58  )-----------( 106      ( 29 Oct 2019 08:09 )             35.4                         12.1   6.79  )-----------( 122      ( 28 Oct 2019 07:51 )             38.4                         11.7   3.39  )-----------( 108      ( 27 Oct 2019 08:42 )             36.9     CAPILLARY BLOOD GLUCOSE              RADIOLOGY & ADDITIONAL TESTS:            Imaging Personally Reviewed:  [ ] YES  [ ] NO    Consultants involved in case:   Consultant(s) Notes Reviewed:  [ ] YES  [ ] NO:   Care Discussed with Consultants/Other Providers [ ] YES  [ ] NO ***************************************************************  Dr. Mira La  Internal Medicine   [facility] Pager: [phone]  [facility] Pager: [phone]   ***************************************************************    MATHIEU WU  59y  MRN: [id number]    Subjective:    Patient is a 59y old  Male who presents with a chief complaint of Chronic cough (29 Oct 2019 07:57)      Interval history/overnight events:    JANNET ON. SOB is improving, no cp, no fevers, chills, sweats. Endorses continued productive cough.      MEDICATIONS  (STANDING):  benzonatate 100 milliGRAM(s) Oral every 8 hours  enoxaparin Injectable 40 milliGRAM(s) SubCutaneous daily  fluticasone propionate 50 MICROgram(s)/spray Nasal Spray 1 Spray(s) Both Nostrils two times a day  guaiFENesin    Syrup 100 milliGRAM(s) Oral every 6 hours  piperacillin/tazobactam IVPB.. 3.375 Gram(s) IV Intermittent every 8 hours  predniSONE   Tablet 40 milliGRAM(s) Oral daily  vancomycin  IVPB 1250 milliGRAM(s) IV Intermittent every 12 hours    MEDICATIONS  (PRN):  acetaminophen   Tablet .. 650 milliGRAM(s) Oral every 6 hours PRN Temp greater or equal to 38C (100.4F), Mild Pain (1 - 3), Moderate Pain (4 - 6)  albuterol/ipratropium for Nebulization. 3 milliLiter(s) Nebulizer every 4 hours PRN Shortness of Breath        Objective:    Vitals: Vital Signs Last 24 Hrs  T(C): 36.7 (10-30-19 @ 04:03), Max: 36.8 (10-29-19 @ 20:04)  T(F): 98.1 (10-30-19 @ 04:03), Max: 98.3 (10-29-19 @ 20:04)  HR: 94 (10-30-19 @ 04:03) (93 - 94)  BP: 110/70 (10-30-19 @ 04:03) (104/60 - 111/74)  BP(mean): --  RR: 20 (10-30-19 @ 04:03) (20 - 22)  SpO2: 93% (10-30-19 @ 05:30) (91% - 97%)            I&O's Summary    29 Oct 2019 07:01  -  30 Oct 2019 07:00  --------------------------------------------------------  IN: 1607 mL / OUT: 0 mL / NET: 1607 mL    PHYSICAL EXAM:  GENERAL: middle aged male in NAD on 4L NC  HEENT: PERRL, no scleral icterus, visible submandibular LAD, submandibular LAD, ~3cm x 3cm on right, ~2x2 cm on left, ~2x2 cm axillary LAD on right   CHEST/LUNG: right sided crackles and dullness improving from prior   HEART: RRR, holosystolic murmur throughout the precordium   ABDOMEN: soft, slightly distended, non-tender, normoactive, no HSM, no rebound, no guarding, no rigidity, no HSM appreciated   SKIN: No rashes or lesions  EXTREMITY: trace pitting edema to the knees bl, improving   NERVOUS SYSTEM: Alert & Oriented X3  PSYCH: calm and cooperative       LABS:    10-30    138  |  104  |  16  ----------------------------<  93  4.0   |  25  |  0.81  10-29    140  |  103  |  18  ----------------------------<  91  3.4<L>   |  26  |  0.81  10-28    139  |  102  |  17  ----------------------------<  95  3.8   |  25  |  0.79    Ca    8.7      30 Oct 2019 05:56  Ca    8.6      29 Oct 2019 06:25  Ca    8.5      28 Oct 2019 05:58    TPro  6.4  /  Alb  3.1<L>  /  TBili  0.5  /  DBili  x   /  AST  33  /  ALT  22  /  AlkPhos  81  10-29  TPro  7.1  /  Alb  3.5  /  TBili  0.6  /  DBili  x   /  AST  30  /  ALT  18  /  AlkPhos  90  10-28                                            11.3   6.58  )-----------( 106      ( 29 Oct 2019 08:09 )             35.4                         12.1   6.79  )-----------( 122      ( 28 Oct 2019 07:51 )             38.4                         11.7   3.39  )-----------( 108      ( 27 Oct 2019 08:42 )             36.9     CAPILLARY BLOOD GLUCOSE              RADIOLOGY & ADDITIONAL TESTS:            Imaging Personally Reviewed:  [ ] YES  [ ] NO    Consultants involved in case:   Consultant(s) Notes Reviewed:  [ ] YES  [ ] NO:   Care Discussed with Consultants/Other Providers [ ] YES  [ ] NO

## 2019-10-31 ENCOUNTER — TRANSCRIPTION ENCOUNTER (OUTPATIENT)
Age: 59
End: 2019-10-31

## 2019-10-31 LAB
ANION GAP SERPL CALC-SCNC: 9 MMOL/L — SIGNIFICANT CHANGE UP (ref 5–17)
BUN SERPL-MCNC: 14 MG/DL — SIGNIFICANT CHANGE UP (ref 7–23)
CALCIUM SERPL-MCNC: 8.7 MG/DL — SIGNIFICANT CHANGE UP (ref 8.4–10.5)
CHLORIDE SERPL-SCNC: 100 MMOL/L — SIGNIFICANT CHANGE UP (ref 96–108)
CO2 SERPL-SCNC: 27 MMOL/L — SIGNIFICANT CHANGE UP (ref 22–31)
CREAT SERPL-MCNC: 0.77 MG/DL — SIGNIFICANT CHANGE UP (ref 0.5–1.3)
CULTURE RESULTS: SIGNIFICANT CHANGE UP
CULTURE RESULTS: SIGNIFICANT CHANGE UP
GLUCOSE SERPL-MCNC: 142 MG/DL — HIGH (ref 70–99)
HCT VFR BLD CALC: 37.5 % — LOW (ref 39–50)
HGB BLD-MCNC: 12.1 G/DL — LOW (ref 13–17)
MCHC RBC-ENTMCNC: 29.4 PG — SIGNIFICANT CHANGE UP (ref 27–34)
MCHC RBC-ENTMCNC: 32.3 GM/DL — SIGNIFICANT CHANGE UP (ref 32–36)
MCV RBC AUTO: 91.2 FL — SIGNIFICANT CHANGE UP (ref 80–100)
PLATELET # BLD AUTO: 101 K/UL — LOW (ref 150–400)
POTASSIUM SERPL-MCNC: 3.5 MMOL/L — SIGNIFICANT CHANGE UP (ref 3.5–5.3)
POTASSIUM SERPL-SCNC: 3.5 MMOL/L — SIGNIFICANT CHANGE UP (ref 3.5–5.3)
RBC # BLD: 4.11 M/UL — LOW (ref 4.2–5.8)
RBC # FLD: 14.1 % — SIGNIFICANT CHANGE UP (ref 10.3–14.5)
SODIUM SERPL-SCNC: 136 MMOL/L — SIGNIFICANT CHANGE UP (ref 135–145)
SPECIMEN SOURCE: SIGNIFICANT CHANGE UP
SPECIMEN SOURCE: SIGNIFICANT CHANGE UP
WBC # BLD: 7.02 K/UL — SIGNIFICANT CHANGE UP (ref 3.8–10.5)
WBC # FLD AUTO: 7.02 K/UL — SIGNIFICANT CHANGE UP (ref 3.8–10.5)

## 2019-10-31 PROCEDURE — 76604 US EXAM CHEST: CPT | Mod: 26,GC

## 2019-10-31 PROCEDURE — 99233 SBSQ HOSP IP/OBS HIGH 50: CPT | Mod: GC,25

## 2019-10-31 PROCEDURE — 71045 X-RAY EXAM CHEST 1 VIEW: CPT | Mod: 26

## 2019-10-31 PROCEDURE — 99233 SBSQ HOSP IP/OBS HIGH 50: CPT | Mod: GC

## 2019-10-31 RX ORDER — FUROSEMIDE 40 MG
40 TABLET ORAL ONCE
Refills: 0 | Status: COMPLETED | OUTPATIENT
Start: 2019-10-31 | End: 2019-10-31

## 2019-10-31 RX ADMIN — Medication 40 MILLIGRAM(S): at 05:08

## 2019-10-31 RX ADMIN — PIPERACILLIN AND TAZOBACTAM 25 GRAM(S): 4; .5 INJECTION, POWDER, LYOPHILIZED, FOR SOLUTION INTRAVENOUS at 06:58

## 2019-10-31 RX ADMIN — Medication 300 MILLIGRAM(S): at 05:07

## 2019-10-31 RX ADMIN — Medication 40 MILLIGRAM(S): at 10:32

## 2019-10-31 RX ADMIN — PIPERACILLIN AND TAZOBACTAM 25 GRAM(S): 4; .5 INJECTION, POWDER, LYOPHILIZED, FOR SOLUTION INTRAVENOUS at 13:17

## 2019-10-31 RX ADMIN — Medication 300 MILLIGRAM(S): at 17:48

## 2019-10-31 RX ADMIN — PIPERACILLIN AND TAZOBACTAM 25 GRAM(S): 4; .5 INJECTION, POWDER, LYOPHILIZED, FOR SOLUTION INTRAVENOUS at 21:49

## 2019-10-31 NOTE — DISCHARGE NOTE PROVIDER - CARE PROVIDER_API CALL
Lisker, Gita N (MD)  Critical Care Medicine; Pulmonary Disease  [address], [address]  [place] [address]  Phone: [phone]  Fax: [phone]  Follow Up Time: 2 weeks    Luisa Deras  Phone: [phone]  Fax: (   )    -  Follow Up Time: 1 week

## 2019-10-31 NOTE — PROGRESS NOTE ADULT - SUBJECTIVE AND OBJECTIVE BOX
Cassie Ortega MD  [facility] Medicine team #3  Pager: [phone]/ [phone]  After 7 pm on weekdays and 12 pm on weekends,  please page night MATHIEU Duarte  59y  Male      Patient is a 59y old  Male who presents with a chief complaint of Chronic cough (31 Oct 2019 08:19)      INTERVAL HPI/OVERNIGHT EVENTS: Patient satting well on 4L O2, but appears uncomfortable.    Vital Signs Last 24 Hrs  T(C): 36.5 (31 Oct 2019 11:43), Max: 36.7 (30 Oct 2019 20:43)  T(F): 97.7 (31 Oct 2019 11:43), Max: 98 (30 Oct 2019 20:43)  HR: 92 (31 Oct 2019 11:43) (77 - 92)  BP: 105/71 (31 Oct 2019 11:43) (104/69 - 120/72)  BP(mean): --  RR: 22 (31 Oct 2019 11:43) (19 - 22)  SpO2: 94% (31 Oct 2019 11:43) (87% - 94%)    No Known Allergies    PHYSICAL EXAM:  GENERAL: Ill and uncomfortable-appearing, middle-aged man  HEAD:  Atraumatic, Normocephalic  EYES: EOMI, conjunctiva and sclera clear  NECK: Supple, No JVD  CHEST/LUNG:  No wheeze, ronchi, mild crackles on b/l bases  HEART: Regular rate and rhythm; No murmurs, rubs, or gallops  ABDOMEN: Soft, Nontender, Nondistended; Bowel sounds present  EXTREMITIES:  2+ Peripheral Pulses, No clubbing, cyanosis, 1+ edema in LEs  NEUROLOGY: non-focal  SKIN: No rashes or lesions    LABS:  CBC Full  -  ( 31 Oct 2019 09:00 )  WBC Count : 7.02 K/uL  RBC Count : 4.11 M/uL  Hemoglobin : 12.1 g/dL  Hematocrit : 37.5 %  Platelet Count - Automated : 101 K/uL  Mean Cell Volume : 91.2 fl  Mean Cell Hemoglobin : 29.4 pg  Mean Cell Hemoglobin Concentration : 32.3 gm/dL  Auto Neutrophil # : x  Auto Lymphocyte # : x  Auto Monocyte # : x  Auto Eosinophil # : x  Auto Basophil # : x  Auto Neutrophil % : x  Auto Lymphocyte % : x  Auto Monocyte % : x  Auto Eosinophil % : x  Auto Basophil % : x    10-31    136  |  100  |  14  ----------------------------<  142<H>  3.5   |  27  |  0.77    Ca    8.7      31 Oct 2019 06:43    Consultant(s) Notes Reviewed:  [x ] YES  [ ] NO  Care Discussed with Consultants/Other Providers [ x] YES  [ ] NO      RADIOLOGY & ADDITIONAL TESTS:    Imaging Personally Reviewed:  [x ] YES  [ ] NO Cassie Ortega MD  [facility] Medicine team #3  Pager: [phone]/ [phone]  After 7 pm on weekdays and 12 pm on weekends,  please page night irenaat      MATHIEU UW  59y  Male      Patient is a 59y old  Male who presents with a chief complaint of Chronic cough (31 Oct 2019 08:19)      INTERVAL HPI/OVERNIGHT EVENTS: Patient satting well on 4L O2    Vital Signs Last 24 Hrs  T(C): 36.5 (31 Oct 2019 11:43), Max: 36.7 (30 Oct 2019 20:43)  T(F): 97.7 (31 Oct 2019 11:43), Max: 98 (30 Oct 2019 20:43)  HR: 92 (31 Oct 2019 11:43) (77 - 92)  BP: 105/71 (31 Oct 2019 11:43) (104/69 - 120/72)  BP(mean): --  RR: 22 (31 Oct 2019 11:43) (19 - 22)  SpO2: 94% (31 Oct 2019 11:43) (87% - 94%)    No Known Allergies    PHYSICAL EXAM:  GENERAL: Ill and uncomfortable-appearing, middle-aged man  HEAD:  Atraumatic, Normocephalic  EYES: EOMI, conjunctiva and sclera clear  NECK: Supple, No JVD  CHEST/LUNG:  No wheeze, ronchi, mild crackles on b/l bases  HEART: Regular rate and rhythm; No murmurs, rubs, or gallops  ABDOMEN: Soft, Nontender, Nondistended; Bowel sounds present  EXTREMITIES:  2+ Peripheral Pulses, No clubbing, cyanosis, 1+ edema in LEs  NEUROLOGY: non-focal  SKIN: No rashes or lesions    LABS:  CBC Full  -  ( 31 Oct 2019 09:00 )  WBC Count : 7.02 K/uL  RBC Count : 4.11 M/uL  Hemoglobin : 12.1 g/dL  Hematocrit : 37.5 %  Platelet Count - Automated : 101 K/uL  Mean Cell Volume : 91.2 fl  Mean Cell Hemoglobin : 29.4 pg  Mean Cell Hemoglobin Concentration : 32.3 gm/dL  Auto Neutrophil # : x  Auto Lymphocyte # : x  Auto Monocyte # : x  Auto Eosinophil # : x  Auto Basophil # : x  Auto Neutrophil % : x  Auto Lymphocyte % : x  Auto Monocyte % : x  Auto Eosinophil % : x  Auto Basophil % : x    10-31    136  |  100  |  14  ----------------------------<  142<H>  3.5   |  27  |  0.77    Ca    8.7      31 Oct 2019 06:43    Consultant(s) Notes Reviewed:  [x ] YES  [ ] NO  Care Discussed with Consultants/Other Providers [ x] YES  [ ] NO      RADIOLOGY & ADDITIONAL TESTS:    Imaging Personally Reviewed:  [x ] YES  [ ] NO

## 2019-10-31 NOTE — PROGRESS NOTE ADULT - PROBLEM SELECTOR PLAN 1
The patient was hypoxic to 88% in the ED. He reports 1.5 months of shortness of breath. Likely multifactorial: 2/2 bl pleural effusions, bacterial pneumonia and  lymphangitic spread of malignancy. CTA chest negative for pulmonary embolism. LE dopplers negative for DVT.   - s/p thoracentesis 10/28 with removal of 1.5L of serosanguinous fluid (exudative)  - pleural studies: pH 7.5, PP/SP = 67%, Pleural  >2/3 ULN, consistent with exudative process; low concern for empyema as pH >7.2   - culture thus far negative of the pleural fluid  - treatment of bacterial PNA  - pulmonary recs appreciated  - discontinued prednisone 40mg qd after 6 day course. The patient was hypoxic to 88% in the ED. He reports 1.5 months of shortness of breath. Likely multifactorial: 2/2 bl pleural effusions, bacterial pneumonia and  lymphangitic spread of malignancy. CTA chest negative for pulmonary embolism. LE dopplers negative for DVT.   - s/p thoracentesis 10/28 with removal of 1.5L of serosanguinous fluid (exudative)  - pleural studies: pH 7.5, PP/SP = 67%, Pleural  >2/3 ULN, consistent with exudative process; low concern for empyema as pH >7.2   - culture thus far negative of the pleural fluid  - treatment of bacterial PNA  - pulmonary recs appreciated  - can give lasix 40mg IV x 1 for pulmonary edema  - discontinued prednisone 40mg qd after 6 day course.

## 2019-10-31 NOTE — PROGRESS NOTE ADULT - PROBLEM SELECTOR PLAN 2
Bacterial pneumonia   Patient met SIRS with  and RR 24 requiring supplemental oxygen. He was found to be RVP positive with enterorhinovirus and found to have bacterial pna  - Bcx NGTD  - sputum cx growing gram variable rods and GPC, with few yeast    - continue with vancomycin and zosyn (day 6/7)

## 2019-10-31 NOTE — PROGRESS NOTE ADULT - ASSESSMENT
59 M with history of NSCLC s/p RT (he states to his right neck for metastatic spread) with last session 9 weeks ago) on Osimertinib for approximately 10 months who presents with persistent cough got over one month. Pleural effusion appears simple and moderate sized on bedside ultrasound exam. Given lack of white count, fever, and low procalcitonin, suspect symptoms and CT findings are more consistent with worsening malignancy, however not unreasonable to treat with broad spectrum antibiotics given increased sputum production. Symptoms of SOB and cough may also be related to moderate sized R pleural effusion, which appears new from CT scan in May. S/p thoracentesis 10/28 with removal of 1.5L exudative effusion, not consistent with empyema, suspect malignant effusion.    #R pleural effusion  - on vancomycin and zosyn for broad spectrum coverage  - CT findings very concerning for lymphangitic spread of primary tumors  - s/p thoracentesis -> exudative effusion, not consistent with empyema  - cultures negative  - cyto +adenocarcinoma, confirmed malignant effusion  - supplemental O2 prn, maintain SpO2>90%, likely will need home O2 upon discharge  - duonebs prn  - robitussin vs hycodan prn    Pulmonary will continue to follow.  ---------------------------------------  Dante Palomino PGY-4  Pulmonary/Critical Care Fellow  Pager: [phone] ([facility]) [phone] ([place])  Pulmonary Spectra #[id number] 59 M with history of NSCLC s/p RT (he states to his right neck for metastatic spread) with last session 9 weeks ago) on Osimertinib for approximately 10 months who presents with persistent cough got over one month. Pleural effusion appears simple and moderate sized on bedside ultrasound exam. Given lack of white count, fever, and low procalcitonin, suspect symptoms and CT findings are more consistent with worsening malignancy, however not unreasonable to treat with broad spectrum antibiotics given increased sputum production. Symptoms of SOB and cough may also be related to moderate sized R pleural effusion, which appears new from CT scan in May. S/p thoracentesis 10/28 with removal of 1.5L exudative effusion, not consistent with empyema, suspect malignant effusion.    #R pleural effusion  - on vancomycin and zosyn for broad spectrum coverage  - CT findings very concerning for lymphangitic spread of primary tumors  - s/p thoracentesis -> exudative effusion, not consistent with empyema  - cultures negative  - cyto +adenocarcinoma, confirmed malignant effusion  - supplemental O2 prn, maintain SpO2>88%, noted to be saturating 87% on room air this morning, qualifies for home O2  - can try additional lasix for superimposed pulmonary edema  - duonebs prn  - robitussin vs hycodan prn    Pulmonary will continue to follow.  ---------------------------------------  Dante Palomino PGY-4  Pulmonary/Critical Care Fellow  Pager: [phone] ([facility]) [phone] ([place])  Pulmonary Spectra #[id number]

## 2019-10-31 NOTE — CHART NOTE - NSCHARTNOTEFT_GEN_A_CORE
: Dr. Palomino    INDICATION: R pleural effusion    PROCEDURE:  [ ] LIMITED ECHO  [x] LIMITED CHEST  [ ] LIMITED RETROPERITONEAL  [ ] LIMITED ABDOMINAL  [ ] LIMITED DVT  [ ] NEEDLE GUIDANCE VASCULAR  [ ] NEEDLE GUIDANCE THORACENTESIS  [ ] NEEDLE GUIDANCE PARACENTESIS  [ ] NEEDLE GUIDANCE PERICARDIOCENTESIS  [ ] OTHER    FINDINGS:  Trace R sided pleural effusion    INTERPRETATION:  Trace R sided pleural effusion, much improved since R sided thoracentesis, not enough volume to place indwelling pleural catheter at this time    Dante Palomino PGY-4  Pulmonary/Critical Care Fellow  Pager: [phone] ([facility]) [phone] ([facility])  Pulmonary Spectra #[id number] : Dr. Palomino    INDICATION: R pleural effusion    PROCEDURE:  [ ] LIMITED ECHO  [x] LIMITED CHEST  [ ] LIMITED RETROPERITONEAL  [ ] LIMITED ABDOMINAL  [ ] LIMITED DVT  [ ] NEEDLE GUIDANCE VASCULAR  [ ] NEEDLE GUIDANCE THORACENTESIS  [ ] NEEDLE GUIDANCE PARACENTESIS  [ ] NEEDLE GUIDANCE PERICARDIOCENTESIS  [ ] OTHER    FINDINGS:  Trace R sided pleural effusion    INTERPRETATION:  Trace R sided pleural effusion, much improved since R sided thoracentesis, not enough volume to place indwelling pleural catheter at this time    Dante Palomino PGY-4  Pulmonary/Critical Care Fellow  Pager: [phone] ([facility]) [phone] (NS)  Pulmonary Spectra #[id number]    Pulmonary attending    I was present throughout the entire procedure and agree with findings and interpretation as outlined above.

## 2019-10-31 NOTE — PROGRESS NOTE ADULT - PROBLEM SELECTOR PLAN 5
Patient diagnosed with lung cancer three years ago. [place] [facility] [place] therapy (last 6 weeks ago) and chemotherapy with Tagrisso (~1 year)  - holding Tagrisso in setting of pneumonia  - f/u with Dr. Luisa Deras

## 2019-10-31 NOTE — DISCHARGE NOTE PROVIDER - PROVIDER TOKENS
PROVIDER:[TOKEN:[[id number]],FOLLOWUP:[2 weeks]],FREE:[LAST:[[place]],FIRST:[Luisa],PHONE:[phone]],FAX:[(   )    -],FOLLOWUP:[1 week]]

## 2019-10-31 NOTE — DISCHARGE NOTE PROVIDER - NSDCFUSCHEDAPPT_GEN_ALL_CORE_FT
MATHIEU WU ; 11/12/2019 ; NPP Med Pulm [address] [facility] MATHIEU WU ; 11/12/2019 ; NPP Med Pulm [address] [facility] MATHIEU WU ; 11/12/2019 ; NPP Med Pulm [address] [facility] MATHIEU WU ; 11/25/2019 ; NPP Med Pulm [facility]

## 2019-10-31 NOTE — DISCHARGE NOTE PROVIDER - NSDCFUADDAPPT_GEN_ALL_CORE_FT
Please follow up with the following doctors within 1-2 week of discharge   1) pulmonology: you have an appointment with Dr. Lisker at 2:15 PM on 11/12/2019  2) oncology: please make an appt to follow up with Dr. Deras within 1 week of discharge to restart your Tagrisso

## 2019-10-31 NOTE — DISCHARGE NOTE PROVIDER - HOSPITAL COURSE
59 year old male with a history of non-small cell lung cancer (on radiation therapy and Tagrisso) who presents to the ED with a chronic cough. Pt states cough started in august. He thought it was a cold, but the cough persisted. He mentioned to the people at [facility] where he was receiving treatment for his lung cancer with Tagrisso and radiation. They performed a CT scan and found a pneumonia. He was treated with levofloxacin, steroids and tessalon perles for seven days with no relief. He went to his PCP on Wednesday who sent him to the ED. He has been coughing up productive phlegm. His shortness of breath began 1.5 months ago. He is also endorsing chest pain and abdominal pain with cough. He denies fever, nausea, vomiting, diarrhea or constipation. In the ED, vital signs were significant for HR , RR 24, he was hypoxic to the 88% and required 4L NC. He received zosyn, 1L NS bolus and duonebs x1. CTA was obtained to r/o PE. CTA was negative for PE but significant for inc in RLL mass 5.4x5.9 (previously 2.7 cm in 5/2017), lymphangitic spread of cancer, and moderate RLL pleural effusion, with possible right sided post obstructive pneumonia. Patient was admitted for management of AHRF and bacterial pneumonia. RVP was positive for enterorhinovirus.         Patient remained afebrile and without leukocytosis during his hospitalization. His pneumonia was treated with vancomycin and zosyn x 7 days. Tagrisso was held during the hospital stay given active infection. He was requiring 4-5L of O2 and continued to complain of shortness of breath. Pulmonary recommended  starting prednisone 40 mg to improve SOB in the presence of lymphangitic spread which was continued for 6 days total.         Patient underwent right-sided thoracentesis on 10/28 with removal of 1.5 L of serosanguinous fluid with pleural studies indicative of an exudative process, negative for bacteria, positive for malignant cells.         Patient continued to require 4-5L of O2 following thoracentesis. CXR was obtained to r/o rapid reaccumulation and was negative for pleural effusion and positive for pulmonary edema. Patient received IV lasix 40 mg x 1 for diuresis.         POCUS of lungs was repeated on 10/31 and was negative for reaccumulation of  right-sided pleural effusion and placement of pleurx catheter was deferred.        Patient was dced home on new home O2. Patient will require close outpatient follow up with pulmonary. 59 year old male with a history of non-small cell lung cancer (on radiation therapy and Tagrisso) who presents to the ED with a chronic cough. Pt states cough started in august. He thought it was a cold, but the cough persisted. He mentioned to the people at [facility] where he was receiving treatment for his lung cancer with Tagrisso and radiation. They performed a CT scan and found a pneumonia. He was treated with levofloxacin, steroids and tessalon perles for seven days with no relief. He went to his PCP on Wednesday who sent him to the ED. He has been coughing up productive phlegm. His shortness of breath began 1.5 months ago. He is also endorsing chest pain and abdominal pain with cough. He denies fever, nausea, vomiting, diarrhea or constipation. In the ED, vital signs were significant for HR , RR 24, he was hypoxic to the 88% and required 4L NC. He received zosyn, 1L NS bolus and duonebs x1. CTA was obtained to r/o PE. CTA was negative for PE but significant for inc in RLL mass 5.4x5.9 (previously 2.7 cm in 5/2017), lymphangitic spread of cancer, and moderate RLL pleural effusion, with possible right sided post obstructive pneumonia. Patient was admitted for management of AHRF and bacterial pneumonia. RVP was positive for enterorhinovirus.         Patient remained afebrile and without leukocytosis during his hospitalization. His pneumonia was treated with vancomycin and zosyn x 7 days. Tagrisso was held during the hospital stay given active infection. He was requiring 4-5L of O2 and continued to complain of shortness of breath. Pulmonary recommended  starting prednisone 40 mg to improve SOB in the presence of lymphangitic spread which was continued for 6 days total.         Patient underwent right-sided thoracentesis on 10/28 with removal of 1.5 L of serosanguinous fluid with pleural studies indicative of an exudative process, negative for bacteria, positive for malignant cells.         Patient continued to require 4-5L of O2 following thoracentesis. CXR was obtained to r/o rapid reaccumulation and was negative for pleural effusion and positive for pulmonary edema. Patient received IV lasix 40 mg x 1 for diuresis.         POCUS of lungs was repeated on 10/31 and was negative for reaccumulation of  right-sided pleural effusion and placement of pleurx catheter was deferred.        Patient was dced home on new home O2 and 1 dose of levofloxacin 750 mg. Patient will require close outpatient follow up with pulmonary. 59 year old male with a history of non-small cell lung cancer (on radiation therapy and Tagrisso) who presents to the ED with a chronic cough. Pt states cough started in august. He thought it was a cold, but the cough persisted. He mentioned to the people at [facility] where he was receiving treatment for his lung cancer with Tagrisso and radiation. They performed a CT scan and found a pneumonia. He was treated with levofloxacin, steroids and tessalon perles for seven days with no relief. He went to his PCP on Wednesday who sent him to the ED. He has been coughing up productive phlegm. His shortness of breath began 1.5 months ago. He is also endorsing chest pain and abdominal pain with cough. He denies fever, nausea, vomiting, diarrhea or constipation. In the ED, vital signs were significant for HR , RR 24, he was hypoxic to the 88% and required 4L NC. He received zosyn, 1L NS bolus and duonebs x1. CTA was obtained to r/o PE. CTA was negative for PE but significant for inc in RLL mass 5.4x5.9 (previously 2.7 cm in 5/2017), lymphangitic spread of cancer, and moderate RLL pleural effusion, with possible right sided post obstructive pneumonia. Patient was admitted for management of AHRF and bacterial pneumonia. RVP was positive for enterorhinovirus.         Patient remained afebrile and without leukocytosis during his hospitalization. His pneumonia was treated with vancomycin and zosyn x 7 days. Tagrisso was held during the hospital stay given active infection. He was requiring 4-5L of O2 and continued to complain of shortness of breath. Pulmonary recommended  starting prednisone 40 mg to improve SOB in the presence of lymphangitic spread which was continued for 6 days total.         Patient underwent right-sided thoracentesis on 10/28 with removal of 1.5 L of serosanguinous fluid with pleural studies indicative of an exudative process, negative for bacteria, positive for malignant cells.         Patient continued to require 4-5L of O2 following thoracentesis. CXR was obtained to r/o rapid reaccumulation and was negative for pleural effusion and positive for pulmonary edema. Patient received IV lasix 40 mg x 1 for diuresis.         POCUS of lungs was repeated on 10/31 and was negative for reaccumulation of  right-sided pleural effusion and placement of pleurx catheter was deferred.        Patient was dced home on new home O2 and 1 dose of levofloxacin 750 mg. Patient to follow up in the [facility] pulmonology clinic. 59 year old male with a history of non-small cell lung cancer (on radiation therapy and Tagrisso) who presents to the ED with a chronic cough. Pt states cough started in august. He thought it was a cold, but the cough persisted. He mentioned to the people at [facility] where he was receiving treatment for his lung cancer with Tagrisso and radiation. They performed a CT scan and found a pneumonia. He was treated with levofloxacin, steroids and tessalon perles for seven days with no relief. He went to his PCP on Wednesday who sent him to the ED. He has been coughing up productive phlegm. His shortness of breath began 1.5 months ago. He is also endorsing chest pain and abdominal pain with cough. He denies fever, nausea, vomiting, diarrhea or constipation. In the ED, vital signs were significant for HR , RR 24, he was hypoxic to the 88% and required 4L NC. He received zosyn, 1L NS bolus and duonebs x1. CTA was obtained to r/o PE. CTA was negative for PE but significant for inc in RLL mass 5.4x5.9 (previously 2.7 cm in 5/2017), lymphangitic spread of cancer, and moderate RLL pleural effusion, with possible right sided post obstructive pneumonia. Patient was admitted for management of AHRF and bacterial pneumonia. RVP was positive for enterorhinovirus.         Patient remained afebrile and without leukocytosis during his hospitalization. His pneumonia was treated with vancomycin and zosyn x 7 days. Tagrisso was held during the hospital stay given active infection. He was requiring 4-5L of O2 and continued to complain of shortness of breath. Pulmonary recommended  starting prednisone 40 mg to improve SOB in the presence of lymphangitic spread which was continued for 6 days total.         Patient underwent right-sided thoracentesis on 10/28 with removal of 1.5 L of serosanguinous fluid with pleural studies indicative of an exudative process, negative for bacteria, positive for malignant cells.         Patient continued to require 4-5L of O2 following thoracentesis. CXR was obtained to r/o rapid reaccumulation and was negative for pleural effusion and positive for pulmonary edema. Patient received IV lasix 40 mg x 1 for diuresis.         POCUS of lungs was repeated on 10/31 and was negative for reaccumulation of  right-sided pleural effusion and placement of pleurx catheter was deferred.        Patient was dced home on new home O2 and 1 dose of levofloxacin 750 mg. Patient to follow up in the [facility] pulmonology clinic.         sepsis 2.2 to bacterial pna        chronic cough 2/2 to cancer

## 2019-10-31 NOTE — CHART NOTE - NSCHARTNOTEFT_GEN_A_CORE
Chart Note documenting need for Home Oxygen    Patient will require oxygen based on diagnosis of bilateral pleural effusions (ICD 10 J91.8). Acute conditions and exacerbations have been treated, are currently resolved and patient is in a chronic stable state.    Room air saturation at rest is 81%.      Cassie Ortega MD  Internal Medicine PGY2

## 2019-10-31 NOTE — PROGRESS NOTE ADULT - PROBLEM SELECTOR PLAN 3
CT chest with a moderate-to-large right pleural effusion, and small left-sided pleural effusion. s/p thoracentesis 10/28 with removal of 1.5 L of serosanguinous fluid.   - plan as above  - exudative effusion was positive for malignant cells. Fluid is not infected, TGL are low so no chylothorax  - Repeat CXR CT chest with a moderate-to-large right pleural effusion, and small left-sided pleural effusion. s/p thoracentesis 10/28 with removal of 1.5 L of serosanguinous fluid.   - plan as above  - exudative effusion was positive for malignant cells. Fluid is not infected, TGL are low so no chylothorax  - Repeat CXR  - Gave 40 IV lasix x1 this AM

## 2019-10-31 NOTE — PROGRESS NOTE ADULT - SUBJECTIVE AND OBJECTIVE BOX
Interval Events:  No overnight events      14 point ROS negative except as noted above      OBJECTIVE:  ICU Vital Signs Last 24 Hrs  T(C): 36.5 (31 Oct 2019 04:34), Max: 36.7 (30 Oct 2019 20:43)  T(F): 97.7 (31 Oct 2019 04:34), Max: 98 (30 Oct 2019 20:43)  HR: 77 (31 Oct 2019 04:34) (77 - 88)  BP: 120/72 (31 Oct 2019 04:34) (106/68 - 120/72)  BP(mean): --  ABP: --  ABP(mean): --  RR: 19 (31 Oct 2019 04:34) (19 - 22)  SpO2: 92% (31 Oct 2019 04:34) (90% - 95%)        10-30 @ 07:01  -  10-31 @ 07:00  --------------------------------------------------------  IN: 1480 mL / OUT: 0 mL / NET: 1480 mL      CAPILLARY BLOOD GLUCOSE      PHYSICAL EXAM:  General: awake and alert, nontoxic appearing male sitting up in bed  HEENT: NC/AT, EOMI b/l, conjunctiva normal, MMM  Lymph Nodes: no cervical LAD  Neck: supple  Respiratory: improved aeration in R lung, no w/r/c, appears comfortable on room air, no accessory muscle use or conversational dyspnea  Cardiovascular: S1 S2 present, RRR, no m/r/g  Abdomen: soft, NT/ND  Extremities: no c/c/e  Skin: no rashes or lesions noted  Neurological: AAOx3, no focal deficits  Psychiatry: anxious, cooperative    LINES:     HOSPITAL MEDICATIONS:  Standing Meds:  benzonatate 100 milliGRAM(s) Oral every 8 hours  enoxaparin Injectable 40 milliGRAM(s) SubCutaneous daily  fluticasone propionate 50 MICROgram(s)/spray Nasal Spray 1 Spray(s) Both Nostrils two times a day  guaiFENesin    Syrup 100 milliGRAM(s) Oral every 6 hours  piperacillin/tazobactam IVPB.. 3.375 Gram(s) IV Intermittent every 8 hours  predniSONE   Tablet 40 milliGRAM(s) Oral daily  vancomycin  IVPB 1000 milliGRAM(s) IV Intermittent every 12 hours      PRN Meds:  acetaminophen   Tablet .. 650 milliGRAM(s) Oral every 6 hours PRN  albuterol/ipratropium for Nebulization. 3 milliLiter(s) Nebulizer every 4 hours PRN      LABS:                        11.9   7.16  )-----------( 105      ( 30 Oct 2019 09:12 )             38.0     Hgb Trend: 11.9<--, 11.3<--, 12.1<--, 11.7<--, 12.0<--  10-31    136  |  100  |  14  ----------------------------<  142<H>  3.5   |  27  |  0.77    Ca    8.7      31 Oct 2019 06:43      Creatinine Trend: 0.77<--, 0.81<--, 0.81<--, 0.79<--, 0.69<--, 0.74<--      MICROBIOLOGY:     Culture - Sputum (collected 27 Oct 2019 04:11)  Source: .Sputum  Gram Stain (27 Oct 2019 05:57):    Numerous polymorphonuclear leukocytes per low power field    Moderate Squamous epithelial cells per low power field    Few Gram Variable Rods seen per oil power field    Few Gram Variable Cocci seen per oil power field    Few Yeast like cells seen per oil power field    Culture - Blood (collected 26 Oct 2019 00:55)  Source: .Blood  Preliminary Report (27 Oct 2019 01:01):    No growth to date.    Culture - Blood (collected 26 Oct 2019 00:55)  Source: .Blood  Preliminary Report (27 Oct 2019 01:01):    No growth to date.    Culture - Body Fluid with Gram Stain (10.29.19 @ 03:21)    Gram Stain:   polymorphonuclear leukocytes seen  No organisms seen  by cytocentrifuge    Specimen Source: .Body Fluid    Culture Results:   No growth    RADIOLOGY:  < from: CT Angio Chest w/ IV Cont (10.25.19 @ 23:51) >  CHEST:    Thyroid: Unremarkable    Heart:  Unremarkable.    No pulmonary embolism.      Lymph nodes: Mediastinal and bilateral hilar lymphadenopathy  Right axillary and subpectoral lymph nodes measuring up to 3.5 cm.      Lungs and Pleura:   Right lower lobe lung mass has increased in size since the previous exam   now measuring at least 5.4 x 5.9 cm. There is likely surrounding   lymphangitic spread of disease and extension into the mediastinum where   there is no enlarged mediastinal and bilateral hilar lymph nodes.   Superimposed pneumonia is not excluded. There is a moderate right pleural   effusion which is new. There are other scattered small bilateral   subcentimeter pulmonary nodules which are new.    Airways: Central airways are patent. Obscuration of right lower lobe   secondary bronchi secondary to mass.    Visualized abdominal structures:   Unremarkable.      Osseous structures:   Degenerative changes.      IMPRESSION:      No pulmonary embolism.    Markedly worsening disease as described above with increase in size of   right mass with likely local lymphangitic spread and scattered bilateral   nodules. Marked increase in lymphadenopathy consistent with metastatic   disease.    < end of copied text >    Cytopathology - Non Gyn Report (10.28.19 @ 20:43)    Cytopathology - Non Gyn Report:   ACCESSION No:  [id number]    MATHIEU WU                           1        Cytopathology Report            Specimen(s) Submitted  PLEURAL FLUID, RIGHT      Clinical History  NSCLC with right pleural effusion.      Gross Description  Received: 70 ml of bloody fluid received in CytoLyt  Prepared: 1 Thin Prep slide, 1 cell block , 1 smear      Final Diagnosis  PLEURAL FLUID, RIGHT  POSITIVE FOR MALIGNANT CELLS.  Metastatic adenocarcinoma.cytomorphologically compatible with  patient's known primary lung adenocarcinoma. See Note  Hypercellular specimen composed of crowded 3-dimensional groups  and single lying malignant cells with enlarged nuclei containing  prominent nucleoli and vacuolated cytoplasm.  Note  A brief panel of immunostains and unstained slides for molecular  studies are in progress. Results will be reported in  an addendum. History of prior lung adenocarcinoma is noted.    Screened by: Bere Barahona Stomber CT(ASCP)  Verified by: Celina Swanson MD  (Electronic Signature)  Reported on: 10/30/19 19:11 EDT, [address] [facility] [facility],  [place] [address]  Cytology technical processing performed at [address] [place] [id number]  _________________________________________________________________      PULMONARY FUNCTION TESTS: none on file Interval Events:  No overnight events  Coughing decreased slightly  SOB improved but remains hypoxic  Denies f/c, chest pain, pleuritic chest pain, hemoptysis    14 point ROS negative except as noted above      OBJECTIVE:  ICU Vital Signs Last 24 Hrs  T(C): 36.5 (31 Oct 2019 04:34), Max: 36.7 (30 Oct 2019 20:43)  T(F): 97.7 (31 Oct 2019 04:34), Max: 98 (30 Oct 2019 20:43)  HR: 77 (31 Oct 2019 04:34) (77 - 88)  BP: 120/72 (31 Oct 2019 04:34) (106/68 - 120/72)  BP(mean): --  ABP: --  ABP(mean): --  RR: 19 (31 Oct 2019 04:34) (19 - 22)  SpO2: 92% (31 Oct 2019 04:34) (90% - 95%)      10-30 @ 07:01  -  10-31 @ 07:00  --------------------------------------------------------  IN: 1480 mL / OUT: 0 mL / NET: 1480 mL      CAPILLARY BLOOD GLUCOSE      PHYSICAL EXAM:  General: awake and alert, nontoxic appearing male sitting up in bed  HEENT: NC/AT, EOMI b/l, conjunctiva normal, MMM  Lymph Nodes: no cervical LAD  Neck: supple  Respiratory: crackles present at lung bases b/l, R>L, no wheezes or rales, appears comfortable on NC, no accessory muscle use or conversational dyspnea  Cardiovascular: S1 S2 present, RRR, no m/r/g  Abdomen: soft, NT/ND  Extremities: no c/c, 2+ LE pitting edema b/l  Skin: no rashes or lesions noted  Neurological: AAOx3, no focal deficits  Psychiatry: anxious, cooperative    LINES:     HOSPITAL MEDICATIONS:  Standing Meds:  benzonatate 100 milliGRAM(s) Oral every 8 hours  enoxaparin Injectable 40 milliGRAM(s) SubCutaneous daily  fluticasone propionate 50 MICROgram(s)/spray Nasal Spray 1 Spray(s) Both Nostrils two times a day  guaiFENesin    Syrup 100 milliGRAM(s) Oral every 6 hours  piperacillin/tazobactam IVPB.. 3.375 Gram(s) IV Intermittent every 8 hours  predniSONE   Tablet 40 milliGRAM(s) Oral daily  vancomycin  IVPB 1000 milliGRAM(s) IV Intermittent every 12 hours      PRN Meds:  acetaminophen   Tablet .. 650 milliGRAM(s) Oral every 6 hours PRN  albuterol/ipratropium for Nebulization. 3 milliLiter(s) Nebulizer every 4 hours PRN      LABS:                        11.9   7.16  )-----------( 105      ( 30 Oct 2019 09:12 )             38.0     Hgb Trend: 11.9<--, 11.3<--, 12.1<--, 11.7<--, 12.0<--  10-31    136  |  100  |  14  ----------------------------<  142<H>  3.5   |  27  |  0.77    Ca    8.7      31 Oct 2019 06:43      Creatinine Trend: 0.77<--, 0.81<--, 0.81<--, 0.79<--, 0.69<--, 0.74<--      MICROBIOLOGY:     Culture - Sputum (collected 27 Oct 2019 04:11)  Source: .Sputum  Gram Stain (27 Oct 2019 05:57):    Numerous polymorphonuclear leukocytes per low power field    Moderate Squamous epithelial cells per low power field    Few Gram Variable Rods seen per oil power field    Few Gram Variable Cocci seen per oil power field    Few Yeast like cells seen per oil power field    Culture - Blood (collected 26 Oct 2019 00:55)  Source: .Blood  Preliminary Report (27 Oct 2019 01:01):    No growth to date.    Culture - Blood (collected 26 Oct 2019 00:55)  Source: .Blood  Preliminary Report (27 Oct 2019 01:01):    No growth to date.    Culture - Body Fluid with Gram Stain (10.29.19 @ 03:21)    Gram Stain:   polymorphonuclear leukocytes seen  No organisms seen  by cytocentrifuge    Specimen Source: .Body Fluid    Culture Results:   No growth    RADIOLOGY:  < from: CT Angio Chest w/ IV Cont (10.25.19 @ 23:51) >  CHEST:    Thyroid: Unremarkable    Heart:  Unremarkable.    No pulmonary embolism.      Lymph nodes: Mediastinal and bilateral hilar lymphadenopathy  Right axillary and subpectoral lymph nodes measuring up to 3.5 cm.      Lungs and Pleura:   Right lower lobe lung mass has increased in size since the previous exam   now measuring at least 5.4 x 5.9 cm. There is likely surrounding   lymphangitic spread of disease and extension into the mediastinum where   there is no enlarged mediastinal and bilateral hilar lymph nodes.   Superimposed pneumonia is not excluded. There is a moderate right pleural   effusion which is new. There are other scattered small bilateral   subcentimeter pulmonary nodules which are new.    Airways: Central airways are patent. Obscuration of right lower lobe   secondary bronchi secondary to mass.    Visualized abdominal structures:   Unremarkable.      Osseous structures:   Degenerative changes.      IMPRESSION:      No pulmonary embolism.    Markedly worsening disease as described above with increase in size of   right mass with likely local lymphangitic spread and scattered bilateral   nodules. Marked increase in lymphadenopathy consistent with metastatic   disease.    < end of copied text >    Cytopathology - Non Gyn Report (10.28.19 @ 20:43)    Cytopathology - Non Gyn Report:   ACCESSION No:  [id number]    MATHIEU WU                           1      Cytopathology Report      Specimen(s) Submitted  PLEURAL FLUID, RIGHT      Clinical History  NSCLC with right pleural effusion.      Gross Description  Received: 70 ml of bloody fluid received in CytoLyt  Prepared: 1 Thin Prep slide, 1 cell block , 1 smear      Final Diagnosis  PLEURAL FLUID, RIGHT  POSITIVE FOR MALIGNANT CELLS.  Metastatic adenocarcinoma.cytomorphologically compatible with  patient's known primary lung adenocarcinoma. See Note  Hypercellular specimen composed of crowded 3-dimensional groups  and single lying malignant cells with enlarged nuclei containing  prominent nucleoli and vacuolated cytoplasm.  Note  A brief panel of immunostains and unstained slides for molecular  studies are in progress. Results will be reported in  an addendum. History of prior lung adenocarcinoma is noted.    Screened by: Bere Barahona Stomber CT(ASCP)  Verified by: Celina Swanson MD  (Electronic Signature)  Reported on: 10/30/19 19:11 EDT, [address] [facility] [address] [address]  Cytology technical processing performed at [address] [place] [id number]  _________________________________________________________________      PULMONARY FUNCTION TESTS: none on file

## 2019-10-31 NOTE — DISCHARGE NOTE PROVIDER - NSDCCPCAREPLAN_GEN_ALL_CORE_FT
PRINCIPAL DISCHARGE DIAGNOSIS  Diagnosis: Pneumonia  Assessment and Plan of Treatment: You presented to the hospital with      SECONDARY DISCHARGE DIAGNOSES  Diagnosis: Acute respiratory failure with hypoxia  Assessment and Plan of Treatment:     Diagnosis: Pleural effusion  Assessment and Plan of Treatment: Pleural effusion PRINCIPAL DISCHARGE DIAGNOSIS  Diagnosis: Pneumonia  Assessment and Plan of Treatment: You presented to the hospital with inc productive cough since August and shortness of breath. Your oxygen was found to be low with high breathing rate on presentation.      SECONDARY DISCHARGE DIAGNOSES  Diagnosis: Acute respiratory failure with hypoxia  Assessment and Plan of Treatment:     Diagnosis: Pleural effusion  Assessment and Plan of Treatment: Pleural effusion PRINCIPAL DISCHARGE DIAGNOSIS  Diagnosis: Pneumonia  Assessment and Plan of Treatment: You presented to the hospital with inc productive cough since August and shortness of breath. Your oxygen was found to be low with high breathing rate on presentation. A CT scan of your chest showed an increase in the mass on your right lower lung from 3 cm to 5.9 cm and a right sided pneumonia as well as spread of your cancer into the small vessels of the lung. You were requiring 4-5 leters of oxygen to help keep your oxygen in the normal range. CT scan also showed that you had a water collection on both sides of your lung. You were treated with 7 days of antibiotics for your pneumonia with improvement in your breathing. You also recieved 6 days of steroid therapy with prednisone 40 mg every day to help you breath better. Steroids were stopped after 6 days. You will be going home on home oxygen to help you breathe better.   Please follow up with our Pulmonologist Dr. Lisker. You have an appointment on November 11th at 2:15. Please return to the hospital if you have worsening fevers, chills, increasing sputum production, or difficulty breathing.         SECONDARY DISCHARGE DIAGNOSES  Diagnosis: Pleural effusion  Assessment and Plan of Treatment: You were found to have water accumulation on both sides of your lungs, with right side much larger than the left (small).  You underwent a procedure called a thoracentesis where 1.5 Leters of fluid was drained from your right chest. The fluid showed signs of cancer cells without sign of infection.   You will require close pulmonary follow up with our pulmonologists to see if the fluid in your chest reaccumulates overtime requiring a cathether placement or further draininge. You have an appt with our pulmonologist Dr. Lisker at 2:15 on November 12. Please follow up with Dr. Lisker in the office.    Diagnosis: Non-small cell carcinoma of lung  Assessment and Plan of Treatment: You have nonsmall cell carcinoma of the lung that has spread. Your Tagrisso was held during your hospitalization. Please follow up with your oncologist, Dr. Deras within 1 week of discharge to restart your Tagrisso.

## 2019-11-01 ENCOUNTER — TRANSCRIPTION ENCOUNTER (OUTPATIENT)
Age: 59
End: 2019-11-01

## 2019-11-01 VITALS
SYSTOLIC BLOOD PRESSURE: 105 MMHG | RESPIRATION RATE: 18 BRPM | DIASTOLIC BLOOD PRESSURE: 65 MMHG | TEMPERATURE: 98 F | HEART RATE: 90 BPM | OXYGEN SATURATION: 96 %

## 2019-11-01 PROBLEM — C34.90 MALIGNANT NEOPLASM OF UNSPECIFIED PART OF UNSPECIFIED BRONCHUS OR LUNG: Chronic | Status: ACTIVE | Noted: 2019-10-25

## 2019-11-01 LAB — VANCOMYCIN TROUGH SERPL-MCNC: 18.4 UG/ML — SIGNIFICANT CHANGE UP (ref 10–20)

## 2019-11-01 PROCEDURE — 80053 COMPREHEN METABOLIC PANEL: CPT

## 2019-11-01 PROCEDURE — 88305 TISSUE EXAM BY PATHOLOGIST: CPT

## 2019-11-01 PROCEDURE — 87486 CHLMYD PNEUM DNA AMP PROBE: CPT

## 2019-11-01 PROCEDURE — 84478 ASSAY OF TRIGLYCERIDES: CPT

## 2019-11-01 PROCEDURE — 82042 OTHER SOURCE ALBUMIN QUAN EA: CPT

## 2019-11-01 PROCEDURE — 83615 LACTATE (LD) (LDH) ENZYME: CPT

## 2019-11-01 PROCEDURE — 83986 ASSAY PH BODY FLUID NOS: CPT

## 2019-11-01 PROCEDURE — 82803 BLOOD GASES ANY COMBINATION: CPT

## 2019-11-01 PROCEDURE — 85610 PROTHROMBIN TIME: CPT

## 2019-11-01 PROCEDURE — 84157 ASSAY OF PROTEIN OTHER: CPT

## 2019-11-01 PROCEDURE — 84145 PROCALCITONIN (PCT): CPT

## 2019-11-01 PROCEDURE — 82945 GLUCOSE OTHER FLUID: CPT

## 2019-11-01 PROCEDURE — 96374 THER/PROPH/DIAG INJ IV PUSH: CPT | Mod: XU

## 2019-11-01 PROCEDURE — 94640 AIRWAY INHALATION TREATMENT: CPT

## 2019-11-01 PROCEDURE — 88342 IMHCHEM/IMCYTCHM 1ST ANTB: CPT

## 2019-11-01 PROCEDURE — 87040 BLOOD CULTURE FOR BACTERIA: CPT

## 2019-11-01 PROCEDURE — 80202 ASSAY OF VANCOMYCIN: CPT

## 2019-11-01 PROCEDURE — 85027 COMPLETE CBC AUTOMATED: CPT

## 2019-11-01 PROCEDURE — 88341 IMHCHEM/IMCYTCHM EA ADD ANTB: CPT

## 2019-11-01 PROCEDURE — 87633 RESP VIRUS 12-25 TARGETS: CPT

## 2019-11-01 PROCEDURE — 87102 FUNGUS ISOLATION CULTURE: CPT

## 2019-11-01 PROCEDURE — 87581 M.PNEUMON DNA AMP PROBE: CPT

## 2019-11-01 PROCEDURE — 84295 ASSAY OF SERUM SODIUM: CPT

## 2019-11-01 PROCEDURE — 87070 CULTURE OTHR SPECIMN AEROBIC: CPT

## 2019-11-01 PROCEDURE — 82330 ASSAY OF CALCIUM: CPT

## 2019-11-01 PROCEDURE — 99239 HOSP IP/OBS DSCHRG MGMT >30: CPT | Mod: GC

## 2019-11-01 PROCEDURE — 71045 X-RAY EXAM CHEST 1 VIEW: CPT

## 2019-11-01 PROCEDURE — 83735 ASSAY OF MAGNESIUM: CPT

## 2019-11-01 PROCEDURE — 80048 BASIC METABOLIC PNL TOTAL CA: CPT

## 2019-11-01 PROCEDURE — 85014 HEMATOCRIT: CPT

## 2019-11-01 PROCEDURE — 89051 BODY FLUID CELL COUNT: CPT

## 2019-11-01 PROCEDURE — 71275 CT ANGIOGRAPHY CHEST: CPT

## 2019-11-01 PROCEDURE — 85730 THROMBOPLASTIN TIME PARTIAL: CPT

## 2019-11-01 PROCEDURE — 82947 ASSAY GLUCOSE BLOOD QUANT: CPT

## 2019-11-01 PROCEDURE — 88112 CYTOPATH CELL ENHANCE TECH: CPT

## 2019-11-01 PROCEDURE — 84132 ASSAY OF SERUM POTASSIUM: CPT

## 2019-11-01 PROCEDURE — 82435 ASSAY OF BLOOD CHLORIDE: CPT

## 2019-11-01 PROCEDURE — 84100 ASSAY OF PHOSPHORUS: CPT

## 2019-11-01 PROCEDURE — 99285 EMERGENCY DEPT VISIT HI MDM: CPT | Mod: 25

## 2019-11-01 PROCEDURE — 87798 DETECT AGENT NOS DNA AMP: CPT

## 2019-11-01 PROCEDURE — 87075 CULTR BACTERIA EXCEPT BLOOD: CPT

## 2019-11-01 PROCEDURE — 83605 ASSAY OF LACTIC ACID: CPT

## 2019-11-01 PROCEDURE — 87205 SMEAR GRAM STAIN: CPT

## 2019-11-01 PROCEDURE — 86803 HEPATITIS C AB TEST: CPT

## 2019-11-01 RX ADMIN — PIPERACILLIN AND TAZOBACTAM 25 GRAM(S): 4; .5 INJECTION, POWDER, LYOPHILIZED, FOR SOLUTION INTRAVENOUS at 08:52

## 2019-11-01 RX ADMIN — Medication 300 MILLIGRAM(S): at 06:35

## 2019-11-01 RX ADMIN — PIPERACILLIN AND TAZOBACTAM 25 GRAM(S): 4; .5 INJECTION, POWDER, LYOPHILIZED, FOR SOLUTION INTRAVENOUS at 18:31

## 2019-11-01 RX ADMIN — Medication 300 MILLIGRAM(S): at 16:32

## 2019-11-01 NOTE — DISCHARGE NOTE NURSING/CASE MANAGEMENT/SOCIAL WORK - PATIENT PORTAL LINK FT
You can access the FollowMyHealth Patient Portal offered by [facility] by registering at the following website: [email or web address]. By joining [employer]’s FollowMyHealth portal, you will also be able to view your health information using other applications (apps) compatible with our system.

## 2019-11-01 NOTE — PROGRESS NOTE ADULT - ATTENDING COMMENTS
Patient seen and examined, data and imaging personally reviewed by me.  He is dyspneic and has a large right pleural effusion by ultrasound.  Will perform thoracentesis for clinical improvement and diagnosis.
Patient seen and examined, interval history noted, data and results of thoracentesis reviewed.  Ultrasound exam at bedside today shows minimal reaccumulation of fluid.  [place] will follow up with me in the office in 2 weeks and I will re ultrasound at that time.  Agree with current management.
Patient seen and examined.  Pleural fluid results noted- exudate.  Cytology is still pending.  He is still coughing, but feels less dyspneic.  Agree with current management.
acute hypoxemic resp failure 2.2 to baceterial pna c/b pleural effusions and enterovirus   c/w abx  wean off of 02  patient amenable to thoracentesis today if still feasible
Pt seen and examined. Reports improvement in sob. Saturating 98% on 4L ; attempts to downtitrate O2 unsuccesful. CT chest shows increase in lung mass. Pulmo reccs to start Prednisone for lymphangitic spread. Offered thoracentesis for b/l pleural effusions (parapneumonic vs. malignant) ; pt initially refused but now willing to consider it. c/w zosyn, vancomycin.
eval for home w home 02  c/w abx for 7 day course  d/c planning
d/c home today if home 02 delivered  time spent coordinating d/c 45 min
monior 02 saturations, if 02 saturations not improving by tomorrow, may need home w home 02

## 2019-11-01 NOTE — PROGRESS NOTE ADULT - PROBLEM SELECTOR PLAN 2
Bacterial pneumonia   Patient met SIRS with  and RR 24 requiring supplemental oxygen. He was found to be RVP positive with enterorhinovirus and found to have bacterial pna  - Bcx NGTD  - sputum cx growing gram variable rods and GPC, with few yeast    - continue with vancomycin and zosyn (day 7/7) Bacterial pneumonia   Patient met SIRS with  and RR 24 requiring supplemental oxygen. He was found to be RVP positive with enterorhinovirus and found to have bacterial pna  - Bcx NGTD  - sputum cx growing gram variable rods and GPC, with few yeast    - continue with vancomycin and zosyn (day 7/7); patient will be d/kevyn on 750 mg levofloxacin x 1 dose in place of last dose of zosyn 2/2 Bacterial pneumonia   Patient met SIRS with  and RR 24 requiring supplemental oxygen. He was found to be RVP positive with enterorhinovirus and found to have bacterial pna  - Bcx NGTD  - sputum cx growing gram variable rods and GPC, with few yeast    - continue with vancomycin and zosyn (day 7/7); patient will be d/kevyn on 750 mg levofloxacin x 1 dose in place of last dose of zosyn

## 2019-11-01 NOTE — PROGRESS NOTE ADULT - SUBJECTIVE AND OBJECTIVE BOX
***************************************************************  Dr. Mira La  Internal Medicine   [facility] Pager: [phone]  [facility] Pager: [phone]   ***************************************************************    MATHIEU WU  59y  MRN: [id number]    Subjective:    Patient is a 59y old  Male who presents with a chief complaint of Chronic cough (31 Oct 2019 22:46)      Interval history/overnight events:    JNANET ON.       MEDICATIONS  (STANDING):  enoxaparin Injectable 40 milliGRAM(s) SubCutaneous daily  fluticasone propionate 50 MICROgram(s)/spray Nasal Spray 1 Spray(s) Both Nostrils two times a day  guaiFENesin    Syrup 100 milliGRAM(s) Oral every 6 hours  piperacillin/tazobactam IVPB.. 3.375 Gram(s) IV Intermittent every 8 hours  vancomycin  IVPB 1500 milliGRAM(s) IV Intermittent every 12 hours    MEDICATIONS  (PRN):  acetaminophen   Tablet .. 650 milliGRAM(s) Oral every 6 hours PRN Temp greater or equal to 38C (100.4F), Mild Pain (1 - 3), Moderate Pain (4 - 6)  albuterol/ipratropium for Nebulization. 3 milliLiter(s) Nebulizer every 4 hours PRN Shortness of Breath        Objective:    Vitals: Vital Signs Last 24 Hrs  T(C): 36.9 (11-01-19 @ 04:21), Max: 37 (10-31-19 @ 20:33)  T(F): 98.5 (11-01-19 @ 04:21), Max: 98.6 (10-31-19 @ 20:33)  HR: 87 (11-01-19 @ 04:21) (86 - 92)  BP: 104/67 (11-01-19 @ 04:21) (104/67 - 108/68)  BP(mean): --  RR: 19 (11-01-19 @ 04:21) (19 - 22)  SpO2: 92% (11-01-19 @ 04:21) (87% - 94%)            I&O's Summary    31 Oct 2019 07:01  -  01 Nov 2019 07:00  --------------------------------------------------------  IN: 1200 mL / OUT: 0 mL / NET: 1200 mL        LABS:    10-31    136  |  100  |  14  ----------------------------<  142<H>  3.5   |  27  |  0.77  10-30    138  |  104  |  16  ----------------------------<  93  4.0   |  25  |  0.81    Ca    8.7      31 Oct 2019 06:43  Ca    8.7      30 Oct 2019 05:56                                              12.1   7.02  )-----------( 101      ( 31 Oct 2019 09:00 )             37.5                         11.9   7.16  )-----------( 105      ( 30 Oct 2019 09:12 )             38.0                         11.3   6.58  )-----------( 106      ( 29 Oct 2019 08:09 )             35.4     CAPILLARY BLOOD GLUCOSE              RADIOLOGY & ADDITIONAL TESTS:            Imaging Personally Reviewed:  [ ] YES  [ ] NO    Consultants involved in case:   Consultant(s) Notes Reviewed:  [ ] YES  [ ] NO:   Care Discussed with Consultants/Other Providers [ ] YES  [ ] NO ***************************************************************  Dr. Mira La  Internal Medicine   [facility] Pager: [phone]  [facility] Pager: [phone]   ***************************************************************    MATHIEU WU  59y  MRN: [id number]    Subjective:    Patient is a 59y old  Male who presents with a chief complaint of Chronic cough (31 Oct 2019 22:46)      Interval history/overnight events:    JANNET ON. This AM, denies cp, heart palpitations, fevers, chills, sweats. SOB is no longer present. Productive cough is still present but with decreasing amount of sputum.      MEDICATIONS  (STANDING):  enoxaparin Injectable 40 milliGRAM(s) SubCutaneous daily  fluticasone propionate 50 MICROgram(s)/spray Nasal Spray 1 Spray(s) Both Nostrils two times a day  guaiFENesin    Syrup 100 milliGRAM(s) Oral every 6 hours  piperacillin/tazobactam IVPB.. 3.375 Gram(s) IV Intermittent every 8 hours  vancomycin  IVPB 1500 milliGRAM(s) IV Intermittent every 12 hours    MEDICATIONS  (PRN):  acetaminophen   Tablet .. 650 milliGRAM(s) Oral every 6 hours PRN Temp greater or equal to 38C (100.4F), Mild Pain (1 - 3), Moderate Pain (4 - 6)  albuterol/ipratropium for Nebulization. 3 milliLiter(s) Nebulizer every 4 hours PRN Shortness of Breath        Objective:    Vitals: Vital Signs Last 24 Hrs  T(C): 36.9 (11-01-19 @ 04:21), Max: 37 (10-31-19 @ 20:33)  T(F): 98.5 (11-01-19 @ 04:21), Max: 98.6 (10-31-19 @ 20:33)  HR: 87 (11-01-19 @ 04:21) (86 - 92)  BP: 104/67 (11-01-19 @ 04:21) (104/67 - 108/68)  BP(mean): --  RR: 19 (11-01-19 @ 04:21) (19 - 22)  SpO2: 92% (11-01-19 @ 04:21) (87% - 94%)            I&O's Summary  Saturating 87-90% on RA     31 Oct 2019 07:01  -  01 Nov 2019 07:00  --------------------------------------------------------  IN: 1200 mL / OUT: 0 mL / NET: 1200 mL    PHYSICAL EXAM:  GENERAL: middle aged male in NAD respiring on RA   HEENT: PERRL, no scleral icterus, visible submandibular LAD, submandibular LAD, ~3cm x 3cm on right, ~2x2 cm on left, ~2x2 cm axillary LAD on right   CHEST/LUNG: right basilar crackles and dullness improving from prior, no use of accessory mm   HEART: RRR, holosystolic murmur throughout the precordium, no JVD   ABDOMEN: soft, slightly distended, non-tender, normoactive, no HSM, no rebound, no guarding, no rigidity, no HSM appreciated   SKIN: No rashes or lesions  EXTREMITY: trace pitting edema to the knees bl, improving   NERVOUS SYSTEM: Alert & Oriented X3  PSYCH: calm and cooperative         LABS:    10-31    136  |  100  |  14  ----------------------------<  142<H>  3.5   |  27  |  0.77  10-30    138  |  104  |  16  ----------------------------<  93  4.0   |  25  |  0.81    Ca    8.7      31 Oct 2019 06:43  Ca    8.7      30 Oct 2019 05:56                                              12.1   7.02  )-----------( 101      ( 31 Oct 2019 09:00 )             37.5                         11.9   7.16  )-----------( 105      ( 30 Oct 2019 09:12 )             38.0                         11.3   6.58  )-----------( 106      ( 29 Oct 2019 08:09 )             35.4     CAPILLARY BLOOD GLUCOSE              RADIOLOGY & ADDITIONAL TESTS:            Imaging Personally Reviewed:  [ ] YES  [ ] NO    Consultants involved in case:   Consultant(s) Notes Reviewed:  [ ] YES  [ ] NO:   Care Discussed with Consultants/Other Providers [ ] YES  [ ] NO

## 2019-11-01 NOTE — PROGRESS NOTE ADULT - PROBLEM SELECTOR PLAN 3
CT chest with a moderate-to-large right pleural effusion, and small left-sided pleural effusion. s/p thoracentesis 10/28 with removal of 1.5 L of serosanguinous fluid.   - exudative effusion was positive for malignant cells. Fluid is not infected, TGL are low so no chylothorax  - s/p lasix IV 40 mg for pleural edema on CXR from 10/30  - repeat POCUS 10/31 w/out rapidly accumulating right pleural effusion, pleurx catheter deferred CT chest with a moderate-to-large right pleural effusion, and small left-sided pleural effusion. s/p thoracentesis 10/28 with removal of 1.5 L of serosanguinous fluid.   - exudative effusion was positive for malignant cells. Fluid is not infected, TGL are low so no chylothorax  - s/p lasix IV 40 mg for pleural edema on CXR   - repeat POCUS 10/31 w/out rapidly accumulating right pleural effusion, pleurx catheter deferred  - patient to f/u w/ [facility] pulmonology Dr. Lisker

## 2019-11-01 NOTE — PROGRESS NOTE ADULT - PROBLEM SELECTOR PLAN 1
The patient was hypoxic to 88% in the ED. He reports 1.5 months of shortness of breath. Likely multifactorial: 2/2 bl pleural effusions, bacterial pneumonia and  lymphangitic spread of malignancy. CTA chest negative for pulmonary embolism. LE dopplers negative for DVT.   - s/p thoracentesis 10/28 with removal of 1.5L of serosanguinous fluid (exudative)  - pleural studies: pH 7.5, PP/SP = 67%, Pleural  >2/3 ULN, consistent with exudative process; low concern for empyema as pH >7.2   - culture thus far negative of the pleural fluid  - treatment of bacterial PNA  - pulmonary recs appreciated  - can give lasix 40mg IV x 1 for pulmonary edema  - discontinued prednisone 40mg qd after 6 day course. The patient was hypoxic to 88% in the ED. He reports 1.5 months of shortness of breath. Likely multifactorial: 2/2 bl pleural effusions, bacterial pneumonia and  lymphangitic spread of malignancy. CTA chest negative for pulmonary embolism. LE dopplers negative for DVT.   - s/p thoracentesis 10/28 with removal of 1.5L of serosanguinous fluid (exudative)  - pleural studies: pH 7.5, PP/SP = 67%, Pleural  >2/3 ULN, consistent with exudative process; low concern for empyema as pH >7.2   - culture thus far negative of the pleural fluid  - treatment of bacterial PNA  - pulmonary recs appreciated  - discontinued prednisone 40mg qd after 6 day course. The patient was hypoxic to 88% in the ED. He reports 1.5 months of shortness of breath. Likely multifactorial: 2/2 bl pleural effusions, bacterial pneumonia and  lymphangitic spread of malignancy. CTA chest negative for pulmonary embolism. LE dopplers negative for DVT.   - s/p thoracentesis 10/28 with removal of 1.5L of serosanguinous fluid (exudative)  - pleural studies: pH 7.5, PP/SP = 67%, Pleural  >2/3 ULN, consistent with exudative process; low concern for empyema as pH >7.2   - culture thus far negative of the pleural fluid  - treatment of bacterial PNA  - pulmonary recs appreciated  - discontinued prednisone 40mg qd after 6 day course.  - d/c home on new 4L O2

## 2019-11-01 NOTE — CHART NOTE - NSCHARTNOTEFT_GEN_A_CORE
ONCOLOGY FELLOW NOTE    spoke with primary team, patient to be discharged today. patient will follow up at [facility] with his oncologist. we will sign off, please reconsult us with questions or if we can be of further help.    Alix Mccann,   Hematology/Oncology Fellow  Pager [phone]  Weekdays after 5PM or weekends page hematology/oncology fellow on call

## 2019-11-01 NOTE — PROGRESS NOTE ADULT - PROBLEM SELECTOR PLAN 5
Patient diagnosed with lung cancer three years ago. Oncologist is Giovana Shepherd at [facility] [place] [facility]. On radiation therapy (last 6 weeks ago) and chemotherapy with Tagrisso (~1 year)  - holding Tagrisso in setting of pneumonia  - f/u with Dr. Luisa Deras

## 2019-11-02 NOTE — PROGRESS NOTE ADULT - PROBLEM SELECTOR PLAN 5
Patient diagnosed with lung cancer three years ago. Oncologist is Giovana Shepherd at [facility]. On radiation therapy (last 6 weeks ago) and chemotherapy with Tagrisso (~1 year)  - holding Tagrisso in setting of pneumonia  - f/u with Dr. Luisa Deras

## 2019-11-02 NOTE — PROGRESS NOTE ADULT - PROBLEM SELECTOR PLAN 3
CT chest with a moderate-to-large right pleural effusion, and small left-sided pleural effusion. s/p thoracentesis 10/28 with removal of 1.5 L of serosanguinous fluid.   - exudative effusion was positive for malignant cells. Fluid is not infected, TGL are low so no chylothorax  - s/p lasix IV 40 mg for pleural edema on CXR   - repeat POCUS 10/31 w/out rapidly accumulating right pleural effusion, pleurx catheter deferred  - patient to f/u w/ [facility] pulmonology Dr. Lisker

## 2019-11-02 NOTE — PROGRESS NOTE ADULT - PROVIDER SPECIALTY LIST ADULT
Internal Medicine
Pulmonology
Internal Medicine

## 2019-11-02 NOTE — PROGRESS NOTE ADULT - PROBLEM SELECTOR PLAN 1
The patient was hypoxic to 88% in the ED. He reports 1.5 months of shortness of breath. Likely multifactorial: 2/2 bl pleural effusions, bacterial pneumonia and  lymphangitic spread of malignancy. CTA chest negative for pulmonary embolism. LE dopplers negative for DVT.   - s/p thoracentesis 10/28 with removal of 1.5L of serosanguinous fluid (exudative)  - pleural studies: pH 7.5, PP/SP = 67%, Pleural  >2/3 ULN, consistent with exudative process; low concern for empyema as pH >7.2   - culture thus far negative of the pleural fluid  - treatment of bacterial PNA  - pulmonary recs appreciated  - discontinued prednisone 40mg qd after 6 day course.  - d/c home on new 4L O2

## 2019-11-02 NOTE — PROGRESS NOTE ADULT - PROBLEM SELECTOR PROBLEM 2
Acute respiratory failure with hypoxia
Sepsis, due to unspecified organism, unspecified whether acute organ dysfunction present
Acute respiratory failure with hypoxia
Acute respiratory failure with hypoxia

## 2019-11-02 NOTE — PROGRESS NOTE ADULT - REASON FOR ADMISSION
Chronic cough

## 2019-11-02 NOTE — PROGRESS NOTE ADULT - PROBLEM SELECTOR PROBLEM 1
Sepsis, due to unspecified organism, unspecified whether acute organ dysfunction present
Acute respiratory failure with hypoxia
Acute respiratory failure with hypoxia
Sepsis, due to unspecified organism, unspecified whether acute organ dysfunction present
Sepsis, due to unspecified organism, unspecified whether acute organ dysfunction present
Acute respiratory failure with hypoxia
Sepsis, due to unspecified organism, unspecified whether acute organ dysfunction present
Sepsis, due to unspecified organism, unspecified whether acute organ dysfunction present

## 2019-11-02 NOTE — PROGRESS NOTE ADULT - PROBLEM SELECTOR PLAN 2
2/2 Bacterial pneumonia   Patient met SIRS with  and RR 24 requiring supplemental oxygen. He was found to be RVP positive with enterorhinovirus and found to have bacterial pna  - Bcx NGTD  - sputum cx growing gram variable rods and GPC, with few yeast    - continue with vancomycin and zosyn (day 7/7); patient will be d/kevyn on 750 mg levofloxacin x 1 dose in place of last dose of zosyn

## 2019-11-02 NOTE — PROGRESS NOTE ADULT - SUBJECTIVE AND OBJECTIVE BOX
***************************************************************  Dr. Mira La  Internal Medicine   [facility] Pager: [phone]  [facility] Pager: [phone]   ***************************************************************    MATHIEU WU  59y  MRN: [id number]    Subjective:    Patient is a 59y old  Male who presents with a chief complaint of Chronic cough (01 Nov 2019 07:44)      Interval history/overnight events:      MEDICATIONS  (STANDING):  enoxaparin Injectable 40 milliGRAM(s) SubCutaneous daily  fluticasone propionate 50 MICROgram(s)/spray Nasal Spray 1 Spray(s) Both Nostrils two times a day  guaiFENesin    Syrup 100 milliGRAM(s) Oral every 6 hours  levoFLOXacin  Tablet 750 milliGRAM(s) Oral every 24 hours  piperacillin/tazobactam IVPB.. 3.375 Gram(s) IV Intermittent every 8 hours  vancomycin  IVPB 1500 milliGRAM(s) IV Intermittent every 12 hours    MEDICATIONS  (PRN):  acetaminophen   Tablet .. 650 milliGRAM(s) Oral every 6 hours PRN Temp greater or equal to 38C (100.4F), Mild Pain (1 - 3), Moderate Pain (4 - 6)  albuterol/ipratropium for Nebulization. 3 milliLiter(s) Nebulizer every 4 hours PRN Shortness of Breath        Objective:    Vitals: Vital Signs Last 24 Hrs  T(C): 36.7 (11-01-19 @ 20:06), Max: 36.7 (11-01-19 @ 12:05)  T(F): 98.1 (11-01-19 @ 20:06), Max: 98.1 (11-01-19 @ 12:05)  HR: 90 (11-01-19 @ 20:06) (89 - 90)  BP: 105/65 (11-01-19 @ 20:06) (105/65 - 106/73)  BP(mean): --  RR: 18 (11-01-19 @ 20:06) (18 - 18)  SpO2: 96% (11-01-19 @ 20:06) (93% - 96%)            I&O's Summary    01 Nov 2019 07:01  -  02 Nov 2019 07:00  --------------------------------------------------------  IN: 1320 mL / OUT: 0 mL / NET: 1320 mL        PHYSICAL EXAM:  GENERAL: NAD  HEENT: PERRL, no scleral icterus, no head and neck lad   CHEST/LUNG: CTAB, no wheezing, crackles, or ronchi   HEART: RRR, normal S1, S2, no murmurs, gallops, or rubs appreciated   ABDOMEN: soft, nondistended, non-tender, normoactive, no HSM, no rebound, no guarding, no rigidity  SKIN: No rashes or lesions  NERVOUS SYSTEM: Alert & Oriented X3  PSYCH: calm and cooperative     LABS:    10-31    136  |  100  |  14  ----------------------------<  142<H>  3.5   |  27  |  0.77    Ca    8.7      31 Oct 2019 06:43                                              12.1   7.02  )-----------( 101      ( 31 Oct 2019 09:00 )             37.5                         11.9   7.16  )-----------( 105      ( 30 Oct 2019 09:12 )             38.0     CAPILLARY BLOOD GLUCOSE              RADIOLOGY & ADDITIONAL TESTS:            Imaging Personally Reviewed:  [ ] YES  [ ] NO    Consultants involved in case:   Consultant(s) Notes Reviewed:  [ ] YES  [ ] NO:   Care Discussed with Consultants/Other Providers [ ] YES  [ ] NO

## 2019-11-03 LAB
CULTURE RESULTS: SIGNIFICANT CHANGE UP
SPECIMEN SOURCE: SIGNIFICANT CHANGE UP

## 2019-11-11 ENCOUNTER — INPATIENT (INPATIENT)
Facility: HOSPITAL | Age: 59
LOS: 9 days | Discharge: HOME | DRG: 180 | End: 2019-11-21
Attending: HOSPITALIST | Admitting: INTERNAL MEDICINE
Payer: COMMERCIAL

## 2019-11-11 VITALS
SYSTOLIC BLOOD PRESSURE: 112 MMHG | DIASTOLIC BLOOD PRESSURE: 68 MMHG | RESPIRATION RATE: 20 BRPM | HEART RATE: 118 BPM | OXYGEN SATURATION: 94 %

## 2019-11-11 DIAGNOSIS — R09.02 HYPOXEMIA: ICD-10-CM

## 2019-11-11 DIAGNOSIS — J90 PLEURAL EFFUSION, NOT ELSEWHERE CLASSIFIED: ICD-10-CM

## 2019-11-11 DIAGNOSIS — Z29.9 ENCOUNTER FOR PROPHYLACTIC MEASURES, UNSPECIFIED: ICD-10-CM

## 2019-11-11 DIAGNOSIS — Z02.9 ENCOUNTER FOR ADMINISTRATIVE EXAMINATIONS, UNSPECIFIED: ICD-10-CM

## 2019-11-11 DIAGNOSIS — J96.01 ACUTE RESPIRATORY FAILURE WITH HYPOXIA: ICD-10-CM

## 2019-11-11 DIAGNOSIS — C34.90 MALIGNANT NEOPLASM OF UNSPECIFIED PART OF UNSPECIFIED BRONCHUS OR LUNG: ICD-10-CM

## 2019-11-11 LAB
ALBUMIN SERPL ELPH-MCNC: 3.6 G/DL — SIGNIFICANT CHANGE UP (ref 3.3–5)
ALP SERPL-CCNC: 166 U/L — HIGH (ref 40–120)
ALT FLD-CCNC: 26 U/L — SIGNIFICANT CHANGE UP (ref 10–45)
ANION GAP SERPL CALC-SCNC: 14 MMOL/L — SIGNIFICANT CHANGE UP (ref 5–17)
APTT BLD: 32.8 SEC — SIGNIFICANT CHANGE UP (ref 27.5–36.3)
AST SERPL-CCNC: 68 U/L — HIGH (ref 10–40)
B PERT DNA SPEC QL NAA+PROBE: SIGNIFICANT CHANGE UP
BASOPHILS # BLD AUTO: 0.02 K/UL — SIGNIFICANT CHANGE UP (ref 0–0.2)
BASOPHILS NFR BLD AUTO: 0.3 % — SIGNIFICANT CHANGE UP (ref 0–2)
BILIRUB SERPL-MCNC: 0.6 MG/DL — SIGNIFICANT CHANGE UP (ref 0.2–1.2)
BUN SERPL-MCNC: 23 MG/DL — SIGNIFICANT CHANGE UP (ref 7–23)
C PNEUM DNA SPEC QL NAA+PROBE: SIGNIFICANT CHANGE UP
CALCIUM SERPL-MCNC: 9.1 MG/DL — SIGNIFICANT CHANGE UP (ref 8.4–10.5)
CHLORIDE SERPL-SCNC: 102 MMOL/L — SIGNIFICANT CHANGE UP (ref 96–108)
CO2 SERPL-SCNC: 24 MMOL/L — SIGNIFICANT CHANGE UP (ref 22–31)
CREAT SERPL-MCNC: 1.16 MG/DL — SIGNIFICANT CHANGE UP (ref 0.5–1.3)
EOSINOPHIL # BLD AUTO: 0.05 K/UL — SIGNIFICANT CHANGE UP (ref 0–0.5)
EOSINOPHIL NFR BLD AUTO: 0.6 % — SIGNIFICANT CHANGE UP (ref 0–6)
FLUAV H1 2009 PAND RNA SPEC QL NAA+PROBE: SIGNIFICANT CHANGE UP
FLUAV H1 RNA SPEC QL NAA+PROBE: SIGNIFICANT CHANGE UP
FLUAV H3 RNA SPEC QL NAA+PROBE: SIGNIFICANT CHANGE UP
FLUAV SUBTYP SPEC NAA+PROBE: SIGNIFICANT CHANGE UP
FLUBV RNA SPEC QL NAA+PROBE: SIGNIFICANT CHANGE UP
GAS PNL BLDA: SIGNIFICANT CHANGE UP
GAS PNL BLDV: SIGNIFICANT CHANGE UP
GLUCOSE SERPL-MCNC: 125 MG/DL — HIGH (ref 70–99)
HADV DNA SPEC QL NAA+PROBE: SIGNIFICANT CHANGE UP
HCOV PNL SPEC NAA+PROBE: SIGNIFICANT CHANGE UP
HCT VFR BLD CALC: 45.2 % — SIGNIFICANT CHANGE UP (ref 39–50)
HGB BLD-MCNC: 14.3 G/DL — SIGNIFICANT CHANGE UP (ref 13–17)
HMPV RNA SPEC QL NAA+PROBE: SIGNIFICANT CHANGE UP
HPIV1 RNA SPEC QL NAA+PROBE: SIGNIFICANT CHANGE UP
HPIV2 RNA SPEC QL NAA+PROBE: SIGNIFICANT CHANGE UP
HPIV3 RNA SPEC QL NAA+PROBE: SIGNIFICANT CHANGE UP
HPIV4 RNA SPEC QL NAA+PROBE: SIGNIFICANT CHANGE UP
IMM GRANULOCYTES NFR BLD AUTO: 0.3 % — SIGNIFICANT CHANGE UP (ref 0–1.5)
INR BLD: 1.06 RATIO — SIGNIFICANT CHANGE UP (ref 0.88–1.16)
LYMPHOCYTES # BLD AUTO: 1.22 K/UL — SIGNIFICANT CHANGE UP (ref 1–3.3)
LYMPHOCYTES # BLD AUTO: 15.8 % — SIGNIFICANT CHANGE UP (ref 13–44)
MCHC RBC-ENTMCNC: 28.7 PG — SIGNIFICANT CHANGE UP (ref 27–34)
MCHC RBC-ENTMCNC: 31.6 GM/DL — LOW (ref 32–36)
MCV RBC AUTO: 90.6 FL — SIGNIFICANT CHANGE UP (ref 80–100)
MONOCYTES # BLD AUTO: 0.67 K/UL — SIGNIFICANT CHANGE UP (ref 0–0.9)
MONOCYTES NFR BLD AUTO: 8.7 % — SIGNIFICANT CHANGE UP (ref 2–14)
NEUTROPHILS # BLD AUTO: 5.75 K/UL — SIGNIFICANT CHANGE UP (ref 1.8–7.4)
NEUTROPHILS NFR BLD AUTO: 74.3 % — SIGNIFICANT CHANGE UP (ref 43–77)
NRBC # BLD: 0 /100 WBCS — SIGNIFICANT CHANGE UP (ref 0–0)
PLATELET # BLD AUTO: 126 K/UL — LOW (ref 150–400)
POTASSIUM SERPL-MCNC: 4 MMOL/L — SIGNIFICANT CHANGE UP (ref 3.5–5.3)
POTASSIUM SERPL-SCNC: 4 MMOL/L — SIGNIFICANT CHANGE UP (ref 3.5–5.3)
PROT SERPL-MCNC: 7.3 G/DL — SIGNIFICANT CHANGE UP (ref 6–8.3)
PROTHROM AB SERPL-ACNC: 12.1 SEC — SIGNIFICANT CHANGE UP (ref 10–12.9)
RAPID RVP RESULT: SIGNIFICANT CHANGE UP
RBC # BLD: 4.99 M/UL — SIGNIFICANT CHANGE UP (ref 4.2–5.8)
RBC # FLD: 14.7 % — HIGH (ref 10.3–14.5)
RSV RNA SPEC QL NAA+PROBE: SIGNIFICANT CHANGE UP
RV+EV RNA SPEC QL NAA+PROBE: SIGNIFICANT CHANGE UP
SODIUM SERPL-SCNC: 140 MMOL/L — SIGNIFICANT CHANGE UP (ref 135–145)
TROPONIN T, HIGH SENSITIVITY RESULT: 35 NG/L — SIGNIFICANT CHANGE UP (ref 0–51)
WBC # BLD: 7.73 K/UL — SIGNIFICANT CHANGE UP (ref 3.8–10.5)
WBC # FLD AUTO: 7.73 K/UL — SIGNIFICANT CHANGE UP (ref 3.8–10.5)

## 2019-11-11 PROCEDURE — 99223 1ST HOSP IP/OBS HIGH 75: CPT | Mod: GC

## 2019-11-11 PROCEDURE — 99285 EMERGENCY DEPT VISIT HI MDM: CPT

## 2019-11-11 PROCEDURE — 93010 ELECTROCARDIOGRAM REPORT: CPT

## 2019-11-11 PROCEDURE — 71045 X-RAY EXAM CHEST 1 VIEW: CPT | Mod: 26

## 2019-11-11 PROCEDURE — 71275 CT ANGIOGRAPHY CHEST: CPT | Mod: 26

## 2019-11-11 RX ORDER — IPRATROPIUM/ALBUTEROL SULFATE 18-103MCG
3 AEROSOL WITH ADAPTER (GRAM) INHALATION EVERY 6 HOURS
Refills: 0 | Status: DISCONTINUED | OUTPATIENT
Start: 2019-11-11 | End: 2019-11-11

## 2019-11-11 RX ORDER — IPRATROPIUM/ALBUTEROL SULFATE 18-103MCG
3 AEROSOL WITH ADAPTER (GRAM) INHALATION EVERY 6 HOURS
Refills: 0 | Status: DISCONTINUED | OUTPATIENT
Start: 2019-11-11 | End: 2019-11-21

## 2019-11-11 RX ORDER — FUROSEMIDE 40 MG
40 TABLET ORAL ONCE
Refills: 0 | Status: COMPLETED | OUTPATIENT
Start: 2019-11-11 | End: 2019-11-11

## 2019-11-11 RX ORDER — HEPARIN SODIUM 5000 [USP'U]/ML
5000 INJECTION INTRAVENOUS; SUBCUTANEOUS EVERY 8 HOURS
Refills: 0 | Status: DISCONTINUED | OUTPATIENT
Start: 2019-11-11 | End: 2019-11-11

## 2019-11-11 RX ORDER — HEPARIN SODIUM 5000 [USP'U]/ML
5000 INJECTION INTRAVENOUS; SUBCUTANEOUS EVERY 8 HOURS
Refills: 0 | Status: DISCONTINUED | OUTPATIENT
Start: 2019-11-11 | End: 2019-11-21

## 2019-11-11 RX ADMIN — Medication 3 MILLILITER(S): at 17:36

## 2019-11-11 RX ADMIN — Medication 40 MILLIGRAM(S): at 18:59

## 2019-11-11 RX ADMIN — HEPARIN SODIUM 5000 UNIT(S): 5000 INJECTION INTRAVENOUS; SUBCUTANEOUS at 21:57

## 2019-11-11 RX ADMIN — Medication 40 MILLIGRAM(S): at 17:43

## 2019-11-11 NOTE — MEDICAL STUDENT ADULT H&P (EDUCATION) - NS MD HP STUD RESULTS LAB FT
140  |  102  |  23  ----------------------------<  125<H>  4.0   |  24  |  1.16    Ca    9.1      11 Nov 2019 10:21    TPro  7.3  /  Alb  3.6  /  TBili  0.6  /  DBili  x   /  AST  68<H>  /  ALT  26  /  AlkPhos  166<H>  11-11    PT/INR - ( 11 Nov 2019 10:21 )   PT: 12.1 sec;   INR: 1.06 ratio    PTT - ( 11 Nov 2019 10:21 )  PTT:32.8 sec                ABG - ( 11 Nov 2019 16:11 )  pH, Arterial: 7.41  pH, Blood: x     /  pCO2: 42    /  pO2: 58    / HCO3: 26    / Base Excess: 2.1   /  SaO2: 90                            14.3   7.73  )-----------( 126      ( 11 Nov 2019 10:21 )             45.2    Rapid Respiratory Viral Panel (11.11.19 @ 11:20)    Rapid RVP Result: Claribel

## 2019-11-11 NOTE — MEDICAL STUDENT ADULT H&P (EDUCATION) - NS MD HP STUD ASPLAN ASSES FT
59M with NSCLC (currently treated with Tagrisso and radiation) and prior admission 10/26-11/1 for AHRF in the setting of PNA, respiratory virus and pleural effusion s/p therapeutic thoracentesis, presenting with sudden onset SOB as of this AM.     Pt c/o chronic cough productive of yellow sputum and orthopnea for 1-2 months prior to his last admission 2 weeks ago, from which he was discharged on 4L home O2. He presents today with b/l pleural effusions. CT also showing opacities in RUL and LLL with hilar extension, slightly increased, as well as interlobular septal thickening. There was no evidence fo PE. EKG showing possible LAE with an age-indeterminate septal infarct and RAD.    Most likely 59M with NSCLC (currently treated with Tagrisso and radiation) and prior admission 10/26-11/1 for AHRF in the setting of PNA, respiratory virus and pleural effusion s/p therapeutic thoracentesis, presenting with sudden onset SOB as of this AM.     Pt c/o chronic cough productive of yellow sputum and orthopnea for 1-2 months prior to his last admission 2 weeks ago, from which he was discharged on 4L home O2. He presents today with b/l pleural effusions. CT also showing opacities in RUL and LLL with hilar extension, slightly increased, as well as interlobular septal thickening. There was no evidence fo PE. EKG showing possible LAE with an age-indeterminate septal infarct and RAD.    Most likely differential is 2/2 pleural effusions, however acute decompensated HF is also possible given b/l JAZMIN (might be 2/2 cor pulmonale from pulmonary disease). PE was ruled out with CTA.

## 2019-11-11 NOTE — ED PROVIDER NOTE - PROGRESS NOTE DETAILS
AG Pgy3: patient breathing comfortably on 15L nonrebreather. Not retraction. satting 100%. slightly tachycardic likely 2/2 fluids. will a/m to floor.

## 2019-11-11 NOTE — MEDICAL STUDENT ADULT H&P (EDUCATION) - NS MD HP STUD HX OF PRESENT ILLNESS FT
59M with PMH of NSCLC (dx May 2017, currently being treated at [facility] with Tagrisso and radiation) presenting with sudden-onset SOB. Pt was in his usual state of health until awaking this morning, when he awoke feeling very short of breath and noted that his heart was beating very fast, at which time he called EMS and was brought to the hospital. Pt denies any chest pain accompanying the episode. He has had a chronic cough for the last 1-2 months productive of yellow sputum, however this has been unchanged of late - with no change in frequency of cough or sputum purulence. He notes that he has been sleeping in a chair during this time, because he coughs when lying down. He is on 4L of oxygen at home, and this has not changed recently. He denies recent illness, fevers or chills, sick contacts, abdominal pain, n/v, or change in urinary or bowel habits. He has not traveled anywhere recently, however does note that he has been relatively immobile while at home, mainly sitting in a chair without much physical activity.   Of note, pt was admitted to [facility] from 10/26-11/1 for chronic cough and SOB of 1.5 months duration, when he was found to have acute respiratory failure in the setting of bacterial PNA, enterorhinovirus infection, as well as RLL pleural effusion. He was also found to have progression of his cancer, including lymphangitic spread during this time. He had a thoracentesis on 10/28 with removal of 1.5L, found to be exudate with malignant cells. He was dc'ed home on oxygen at this time, as well as the completion of his levofloxacin dose for PNA.  In ED, pt was found to be hypoxic to the 60s and put on 15L NRB, which improved his SpO2 to 100%; he was brought down to 5L, which resulted in SpO2 92% with RR 22 and pt comfortable. He was afebrile at 99.2F, tachycardic to 120s, with BPs 110s-120s/70s-80s.

## 2019-11-11 NOTE — H&P ADULT - PROBLEM SELECTOR PLAN 3
DVT: heparin subq -holding Tagresso  -questionable role for steroids  --40 mg prednisone x1 on 11/11

## 2019-11-11 NOTE — MEDICAL STUDENT ADULT H&P (EDUCATION) - NS MD HP STUD RESULTS EKG FT
< from: 12 Lead ECG (11.11.19 @ 10:08) >    SINUS TACHYCARDIA  POSSIBLE LEFT ATRIAL ENLARGEMENT  RIGHTWARD AXIS  SEPTAL INFARCT , AGE UNDETERMINED  ABNORMAL ECG    < end of copied text >

## 2019-11-11 NOTE — MEDICAL STUDENT ADULT H&P (EDUCATION) - NS MD HP STUD PE CONSITUTIONAL FT
GENERAL: Pt sitting up in bed apparently in distress, coughing intermittently  HEAD:  Atraumatic, Normocephalic  EYES: EOMI, PERRLA, conjunctiva and sclera clear  ENT: No tonsillar erythema, exudates, or enlargement; Moist mucous membranes, Good dentition, No lesions  NECK: Supple, No JVD, Normal thyroid  CHEST/LUNG: Diminished breath sounds in RLL with crackles; L side normal  HEART: Regular rate and rhythm; No murmurs, rubs, or gallops  ABDOMEN: Soft, Nontender, Nondistended; Bowel sounds present  EXTREMITIES:  2+ LE edema b/l; 2+ pulses b/l, no cyanosis  LYMPH: No lymphadenopathy noted  SKIN: No rashes or lesions  NERVOUS SYSTEM:  Alert & Oriented X3; Motor Strength 5/5 B/L upper and lower extremities; DTRs 2+ intact and symmetric

## 2019-11-11 NOTE — ED PROVIDER NOTE - ATTENDING CONTRIBUTION TO CARE
I performed a history and physical exam of the patient and discussed their management with the resident and /or advanced care provider. I reviewed the resident and /or ACP's note and agree with the documented findings and plan of care. My medical decison making and observations are found above.  Lungs increased rate no rales. some decreased BS, abd soft

## 2019-11-11 NOTE — H&P ADULT - NSHPLABSRESULTS_GEN_ALL_CORE
LABS:  cret                        14.3   7.73  )-----------( 126      ( 11 Nov 2019 10:21 )             45.2     11-11    140  |  102  |  23  ----------------------------<  125<H>  4.0   |  24  |  1.16    Ca    9.1      11 Nov 2019 10:21    TPro  7.3  /  Alb  3.6  /  TBili  0.6  /  DBili  x   /  AST  68<H>  /  ALT  26  /  AlkPhos  166<H>  11-11    PT/INR - ( 11 Nov 2019 10:21 )   PT: 12.1 sec;   INR: 1.06 ratio         PTT - ( 11 Nov 2019 10:21 )  PTT:32.8 sec        < from: CT Angio Chest w/ IV Cont (11.11.19 @ 13:49) >    No pulmonary embolus.    Masslike opacities within the right upper and lower lower lobe with   extension into the hilum are slightly increased. 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    Small bilateral pleural effusions.    Soft tissue infiltrates of the mediastinum and bilateral alen, unchanged.   Enlarged right axillary lymph nodes are unchanged.    < end of copied text >    < from: Xray Chest 1 View- PORTABLE-Routine (11.11.19 @ 10:15) >    Impression:    The heart is normalin size. Diffuse bilateral hazy opacity which may   represent pulmonary edema and or lymphangitic carcinomatosis. Correlate   with CT scan performed on October 25, 2019. Right pleural effusion is   present. The overall appearance of the chest remain unchanged when   compared to previous study done October 31, 2019.    < end of copied text >    < from: 12 Lead ECG (11.11.19 @ 10:08) >    Ventricular Rate 120 BPM    Atrial Rate 120 BPM    P-R Interval 156 ms    QRS Duration 82 ms    Q-T Interval 334 ms    QTC Calculation(Bezet) 472 ms    P Axis 67 degrees    R Axis 101 degrees    T Axis 45 degrees    Diagnosis Line SINUS TACHYCARDIA  POSSIBLE LEFT ATRIAL ENLARGEMENT  RIGHTWARD AXIS  SEPTAL INFARCT , AGE UNDETERMINED  ABNORMAL ECG    < end of copied text >

## 2019-11-11 NOTE — ED ADULT NURSE NOTE - OBJECTIVE STATEMENT
60 yo male presents in the ed for SOB by EMS. pt is alert and oriented x4 speaking coherently- pt states that 10 days ago he has a pleuracentesis on right side due to flu from history of lung CA. pt states that this morning it has been increasingly harder for him to breathe. Pt denies fever, chills, nausea, vomiting. MD at bedside, will continue to reassess.

## 2019-11-11 NOTE — H&P ADULT - NSHPPHYSICALEXAM_GEN_ALL_CORE
T(C): 36.7 (11-11-19 @ 17:19), Max: 37.3 (11-11-19 @ 10:17)  HR: 99 (11-11-19 @ 17:19) (99 - 123)  BP: 120/56 (11-11-19 @ 17:19) (111/75 - 130/88)  RR: 22 (11-11-19 @ 17:19) (20 - 23)  SpO2: 94% (11-11-19 @ 17:19) (92% - 100%)    CONSTITUTIONAL: intermittently coughing, on 5L NC  HEAD: Head atraumatic, normal cephalic shape.  EYES: sclera clear bilaterally,  CARDIAC: regular rate, normal S1/S2, no murmurs  RESPIRATORY: mild respiratory distress, normal breath sounds  CHEST: no erythema, warmth, tenderness or crepitus  GASTROINTESTINAL: soft, nontender, bowel sounds present  MUSCULOSKELETAL: normal strength and ROM, no muscle or joint tenderness  NEUROLOGICAL: AAOx3, no focal deficits, full strength   SKIN: normal skin color, no apparent rashes  PSYCH: normal mood/affect

## 2019-11-11 NOTE — H&P ADULT - HISTORY OF PRESENT ILLNESS
59 year old M PMHx NSCLC (on radiation and Tagrisso) presents to the ED for acute onset dyspnea. Pt states he was in his usual state of health and suddenly became dyspneic, tachypneic, and tachycardic. No inciting factors. Endorses chronic cough with yellow sputum production, and subacute LE edema and orthopnea at night. Also endorses unintentional weight loss for the past year or so. Pt was recently hospitalized for PNA in Oct and received vanco and zosyn. He had a 1.5 L thoracentesis which showed malignant cells. He is on home oxygen 4L NC.     In the ED, he was hypoxic to SpO2 60%, started on nonrebreather 15 LPM and weaned to ~5L NC with O2 sat at ~90%. Tmax was 99.2, HR was 123, /88, RR 22, 97% at 15 LPM nonrebreather. CTA was negative for PE but showed small b/l pleural effusions, some increase in hilar mass, interlobular septal thickening. Given Lasix 40 mg IV, started on duonebs. TTE ordered, pulm consulted.

## 2019-11-11 NOTE — H&P ADULT - NSHPOUTPATIENTPROVIDERS_GEN_ALL_CORE
Giovana Deras at [facility] - oncologist  Mary Ogden - PCP  Kaveh Abraham - [facility]

## 2019-11-11 NOTE — H&P ADULT - PROBLEM SELECTOR PLAN 5
Transitions of Care Status:  1.  Name of PCP: Mary Ogden  2.  PCP Contacted on Admission: [ ] Y    [x ] N    3.  PCP contacted at Discharge: [ ] Y    [ ] N    [ ] N/A  4.  Post-Discharge Appointment Date and Location:  5.  Summary of Handoff given to PCP:

## 2019-11-11 NOTE — MEDICAL STUDENT ADULT H&P (EDUCATION) - NS MD HP STUD PE VITALS FT
Vital Signs Last 24 Hrs  T(C): 36.7 (11 Nov 2019 17:19), Max: 37.3 (11 Nov 2019 10:17)  T(F): 98 (11 Nov 2019 17:19), Max: 99.2 (11 Nov 2019 10:17)  HR: 99 (11 Nov 2019 17:19) (99 - 123)  BP: 120/56 (11 Nov 2019 17:19) (111/75 - 130/88)  BP(mean): --  RR: 22 (11 Nov 2019 17:19) (20 - 23)  SpO2: 94% (11 Nov 2019 17:19) (92% - 100%)

## 2019-11-11 NOTE — H&P ADULT - PROBLEM SELECTOR PLAN 2
-holding Tagresso  -questionable role for steroids -Lasix 40 mg x1 in ED for pleural effusion  -f/u pulm re: thoracentesis

## 2019-11-11 NOTE — H&P ADULT - ASSESSMENT
59 year old M presents with acute respiratory failure due to pleural effusions likely secondary to progression of NSCLC. 59 year old M presents with acute respiratory failure due to pleural effusions possibly secondary to progression of NSCLC.

## 2019-11-11 NOTE — H&P ADULT - PROBLEM SELECTOR PLAN 4
Transitions of Care Status:  1.  Name of PCP: Mary Ogden  2.  PCP Contacted on Admission: [ ] Y    [x ] N    3.  PCP contacted at Discharge: [ ] Y    [ ] N    [ ] N/A  4.  Post-Discharge Appointment Date and Location:  5.  Summary of Handoff given to PCP: DVT: heparin subq  GOC: currently full code, will discuss w/onc and pt further

## 2019-11-11 NOTE — MEDICAL STUDENT ADULT H&P (EDUCATION) - NS MD HP STUD SOCIAL HX FT
Never smoker  No hx of alcohol  No occupational exposure  Pt worked as a banker  Lives at home with wife and son

## 2019-11-11 NOTE — H&P ADULT - PROBLEM SELECTOR PLAN 1
-HFNC  -Lasix 40 mg x1 in ED for pleural effusion  -TTE to eval cardiac function  -Troponins and BNP   -BCx to r/o PNA, holding abx for now  -f/u pulm re: thoracentesis, further management -HFNC  -TTE to eval cardiac function  -Troponins and BNP   -BCx to r/o PNA, holding abx for now  -f/u pulm re: thoracentesis, further management

## 2019-11-11 NOTE — MEDICAL STUDENT ADULT H&P (EDUCATION) - NS MD HP STUD ROS GENERAL FT
CONSTITUTIONAL: No weakness, fevers or chills  EYES: No visual changes; no blurry vision  ENT:  No pain or stiffness; no vertigo or throat pain  RESPIRATORY: +Cough with yellow sputum, +SOB  CARDIOVASCULAR: No chest pain, +palpitations  GASTROINTESTINAL: No abdominal or epigastric pain. No nausea, vomiting, or hematemesis; No diarrhea or constipation. No melena or hematochezia.  GENITOURINARY: No dysuria, frequency or hematuria  NEUROLOGICAL: No numbness, No HA  SKIN: No itching, rashes  MSK: no joint pain, no muscle pain

## 2019-11-11 NOTE — H&P ADULT - ATTENDING COMMENTS
This is a 58 y/o M with PMHx of NSCLC (currently receives treatment which consists of Tagrisso and radiation)  with recent admission for hypoxic respiratory failure thought to be 2/2 PNA and worsening metastatic disease, discharge dhome on 4L NC who presents with acute SOb this am. CTA in the ED negative for PE, with recurrent small right sided pleural effusion and slight interval increase in tumors since previous CT on prior admission. Placed on NRB and transitioned to HFNC currently comfortable    Acute on chronic hypoxic respiratory failure  - Suspect multifactorial likely 2/2 NSCLC and lymphangitic spread of ca. Nontoxic appearing, doubt PNA. With 2+ LE edema on exam.   - Will c/w HFNC for now, consult pulm regarding utility of potential repeat thoracentesis vs pleurex placement  - Start Steroids for lymphangitic spread of ca  - Hold Abx for now given no focal infiltrate and prior pleural studies c/w malignant effusion. Check cultures, procalcitonin    - Given fluid overload would dose Lasix and assess response. Check BNP, TTE  - Duonebs, Hycodan, Robitussin PRN

## 2019-11-11 NOTE — H&P ADULT - NSHPREVIEWOFSYSTEMS_GEN_ALL_CORE
REVIEW OF SYSTEMS:    CONSTITUTIONAL: No weakness, fevers, +chills  EYES/ENT: No visual changes;  No vertigo or throat pain   NECK: No pain or stiffness  RESPIRATORY: +cough w/yellow sputum, wheezing, hemoptysis; No shortness of breath  CARDIOVASCULAR: No chest pain, +palpitations  GASTROINTESTINAL: No abdominal or epigastric pain. No nausea, vomiting, or hematemesis;  GENITOURINARY: No dysuria, frequency or hematuria  NEUROLOGICAL: No numbness or weakness  SKIN: No itching, rashes

## 2019-11-11 NOTE — ED PROVIDER NOTE - OBJECTIVE STATEMENT
59M hx non small cell CA on chemo at [facility] on 4L home O2 p/w sob. Patient called EMS today for SOB. Was found to be hypoxic to 60's on arrival and was immediately put on 15L nonrebreather and his O2 corrected to mid to high 90's. Of note, patient was recently admitted 2 weeks ago and have 1.5L drained from R thoracentesis. He was also treated for possible superimposed pna at that time. Denies chest pain, leg swelling, hx of clots, fevers, cough, sick contacts, congestion, no other symptoms.

## 2019-11-11 NOTE — MEDICAL STUDENT ADULT H&P (EDUCATION) - NS MD HP STUD ASPLAN PLAN FT
1. Acute hypoxic respiratory failure  - 1. Acute hypoxic respiratory failure  - pt presenting with sudden onset SOB, found to be hypoxic to SpO2 60s in ED. Placed on NRB at 15L with improvement to 100%, then downgraded to 5L NC with SpO2 92% with pt comfortable  - pt found to have b/l pleural effusions on CT, as well as pulmonary septal thickening (shown on previous imaging as well)  - Pt switched to high flow, will monitor for now  - RVP negative, no evidence of PNA on CT. Hold off on cultures and abx for now; will consider if pt spikes fever overnight  - Pulm consulted  2. Bilateral pleural effusions  - Pt with bilateral pleural effusions on CTA  - Previous admission notable for thoaracentesis for RLL effusion  - Pulm consulted for possible drainage and pleurex catheter evaluation  3. Bilateral pitting edema  - Pt with 2+ pitting edema b/l on exam, increased from 1+ on prior hospitalization (not addressed)  - Differential includes cor pulmonale in setting of extensive pulmonary disease vs drug-induced HF from Tagrisso (4% incidence of decreased LVEF, 3% incidence of cardiomyopathy. Pt has evidence of possible LAE on EKG.   - Ordered troponins and BNP, TTE to assess for signs of cardiac function, as well as HF or cor pulmonale  - Pt given IV lasix 40mg in ED  4. NSCLC  - Pt followed outpatient; currently on Tagrisso and in between courses of radiation  - Hold Tagrisso for now  5. Prophylactic measure  - DVT ppx: Lovenox preferable in setting of malignancy-associated hypercoagulability; however given possible thoracentesis, will start UFH for now  - Dispo: pending clinical improvement 1. Acute hypoxic respiratory failure  - pt presenting with sudden onset SOB, found to be hypoxic to SpO2 60s in ED. Placed on NRB at 15L with improvement to 100%, then downgraded to 5L NC with SpO2 92% with pt comfortable  - pt found to have b/l pleural effusions on CT, as well as pulmonary septal thickening (shown on previous imaging as well)  - Pt switched to high flow, will monitor for now  - RVP negative, no evidence of PNA on CT. Hold off on cultures and abx for now; will consider if pt spikes fever overnight  - Pulm consulted  2. Bilateral pleural effusions  - Pt with bilateral pleural effusions on CTA  - Previous admission notable for thoaracentesis for RLL effusion  - Pulm consulted for possible drainage and pleurex catheter evaluation  3. Bilateral pitting edema  - Pt with 2+ pitting edema b/l on exam, increased from 1+ on prior hospitalization (not addressed)  - Differential includes cor pulmonale in setting of extensive pulmonary disease vs drug-induced HF from Tagrisso (4% incidence of decreased LVEF, 3% incidence of cardiomyopathy. Pt has evidence of possible LAE on EKG.   - Ordered troponins and BNP, TTE to assess for signs of cardiac function, as well as HF or cor pulmonale  - Pt given IV lasix 40mg in ED  4. NSCLC  - Pt followed outpatient with Dr. Dears at [facility]; currently on Tagrisso 80mg PO QD and in between courses of radiation  - Hold Tagrisso for now  5. Prophylactic measure  - DVT ppx: Lovenox preferable in setting of malignancy-associated hypercoagulability; however given possible thoracentesis, will start UFH for now  - Dispo: pending clinical improvement 1. Acute hypoxic respiratory failure  - pt presenting with sudden onset SOB, found to be hypoxic to SpO2 60s in ED. Placed on NRB at 15L with improvement to 100%, then downgraded to 5L NC with SpO2 92% with pt comfortable  - pt found to have b/l pleural effusions on CT, as well as pulmonary septal thickening (shown on previous imaging as well)  - Pt switched to high flow, will monitor for now  - RVP negative, no evidence of PNA on CT. Hold off on cultures and abx for now; will consider if pt spikes fever overnight  - Pulm consulted  - Pt given solumedrol 40mg IV; will follow pulm recs for further management  2. Bilateral pleural effusions  - Pt with bilateral pleural effusions on CTA  - Previous admission notable for thoaracentesis for RLL effusion  - Pulm consulted for possible drainage and pleurex catheter evaluation  3. Bilateral pitting edema  - Pt with 2+ pitting edema b/l on exam, increased from 1+ on prior hospitalization (not addressed)  - Differential includes cor pulmonale in setting of extensive pulmonary disease vs drug-induced HF from Tagrisso (4% incidence of decreased LVEF, 3% incidence of cardiomyopathy. Pt has evidence of possible LAE on EKG.   - Ordered troponins and BNP, TTE to assess for signs of cardiac function, as well as HF or cor pulmonale  - Pt given IV lasix 40mg in ED  4. NSCLC  - Pt followed outpatient with Dr. Deras at [facility]; currently on Tagrisso 80mg PO QD and in between courses of radiation  - Hold Tagrisso for now  5. Prophylactic measure  - DVT ppx: Lovenox preferable in setting of malignancy-associated hypercoagulability; however given possible thoracentesis, will start UFH for now; SCDs as well  - Dispo: pending clinical improvement

## 2019-11-12 ENCOUNTER — APPOINTMENT (OUTPATIENT)
Dept: PULMONOLOGY | Facility: CLINIC | Age: 59
End: 2019-11-12

## 2019-11-12 DIAGNOSIS — R09.02 HYPOXEMIA: ICD-10-CM

## 2019-11-12 DIAGNOSIS — R60.0 LOCALIZED EDEMA: ICD-10-CM

## 2019-11-12 LAB
ALBUMIN SERPL ELPH-MCNC: 3.3 G/DL — SIGNIFICANT CHANGE UP (ref 3.3–5)
ALP SERPL-CCNC: 128 U/L — HIGH (ref 40–120)
ALT FLD-CCNC: 22 U/L — SIGNIFICANT CHANGE UP (ref 10–45)
ANION GAP SERPL CALC-SCNC: 12 MMOL/L — SIGNIFICANT CHANGE UP (ref 5–17)
AST SERPL-CCNC: 42 U/L — HIGH (ref 10–40)
BASOPHILS # BLD AUTO: 0 K/UL — SIGNIFICANT CHANGE UP (ref 0–0.2)
BASOPHILS NFR BLD AUTO: 0 % — SIGNIFICANT CHANGE UP (ref 0–2)
BILIRUB SERPL-MCNC: 0.4 MG/DL — SIGNIFICANT CHANGE UP (ref 0.2–1.2)
BUN SERPL-MCNC: 24 MG/DL — HIGH (ref 7–23)
CALCIUM SERPL-MCNC: 8.7 MG/DL — SIGNIFICANT CHANGE UP (ref 8.4–10.5)
CHLORIDE SERPL-SCNC: 101 MMOL/L — SIGNIFICANT CHANGE UP (ref 96–108)
CO2 SERPL-SCNC: 26 MMOL/L — SIGNIFICANT CHANGE UP (ref 22–31)
CREAT SERPL-MCNC: 1.13 MG/DL — SIGNIFICANT CHANGE UP (ref 0.5–1.3)
EOSINOPHIL # BLD AUTO: 0 K/UL — SIGNIFICANT CHANGE UP (ref 0–0.5)
EOSINOPHIL NFR BLD AUTO: 0 % — SIGNIFICANT CHANGE UP (ref 0–6)
GLUCOSE SERPL-MCNC: 125 MG/DL — HIGH (ref 70–99)
HCT VFR BLD CALC: 37.1 % — LOW (ref 39–50)
HGB BLD-MCNC: 11.7 G/DL — LOW (ref 13–17)
IMM GRANULOCYTES NFR BLD AUTO: 0.3 % — SIGNIFICANT CHANGE UP (ref 0–1.5)
LYMPHOCYTES # BLD AUTO: 0.51 K/UL — LOW (ref 1–3.3)
LYMPHOCYTES # BLD AUTO: 14.5 % — SIGNIFICANT CHANGE UP (ref 13–44)
MAGNESIUM SERPL-MCNC: 2.3 MG/DL — SIGNIFICANT CHANGE UP (ref 1.6–2.6)
MCHC RBC-ENTMCNC: 28.5 PG — SIGNIFICANT CHANGE UP (ref 27–34)
MCHC RBC-ENTMCNC: 31.5 GM/DL — LOW (ref 32–36)
MCV RBC AUTO: 90.3 FL — SIGNIFICANT CHANGE UP (ref 80–100)
MONOCYTES # BLD AUTO: 0.13 K/UL — SIGNIFICANT CHANGE UP (ref 0–0.9)
MONOCYTES NFR BLD AUTO: 3.7 % — SIGNIFICANT CHANGE UP (ref 2–14)
NEUTROPHILS # BLD AUTO: 2.86 K/UL — SIGNIFICANT CHANGE UP (ref 1.8–7.4)
NEUTROPHILS NFR BLD AUTO: 81.5 % — HIGH (ref 43–77)
PHOSPHATE SERPL-MCNC: 4.1 MG/DL — SIGNIFICANT CHANGE UP (ref 2.5–4.5)
PLATELET # BLD AUTO: 110 K/UL — LOW (ref 150–400)
POTASSIUM SERPL-MCNC: 4.4 MMOL/L — SIGNIFICANT CHANGE UP (ref 3.5–5.3)
POTASSIUM SERPL-SCNC: 4.4 MMOL/L — SIGNIFICANT CHANGE UP (ref 3.5–5.3)
PROT SERPL-MCNC: 6.4 G/DL — SIGNIFICANT CHANGE UP (ref 6–8.3)
RBC # BLD: 4.11 M/UL — LOW (ref 4.2–5.8)
RBC # FLD: 14.4 % — SIGNIFICANT CHANGE UP (ref 10.3–14.5)
SODIUM SERPL-SCNC: 139 MMOL/L — SIGNIFICANT CHANGE UP (ref 135–145)
WBC # BLD: 3.51 K/UL — LOW (ref 3.8–10.5)
WBC # FLD AUTO: 3.51 K/UL — LOW (ref 3.8–10.5)

## 2019-11-12 PROCEDURE — 99233 SBSQ HOSP IP/OBS HIGH 50: CPT | Mod: GC

## 2019-11-12 PROCEDURE — 99222 1ST HOSP IP/OBS MODERATE 55: CPT | Mod: GC,25

## 2019-11-12 PROCEDURE — 76604 US EXAM CHEST: CPT | Mod: 26,GC

## 2019-11-12 PROCEDURE — 93306 TTE W/DOPPLER COMPLETE: CPT | Mod: 26

## 2019-11-12 RX ORDER — FUROSEMIDE 40 MG
40 TABLET ORAL ONCE
Refills: 0 | Status: COMPLETED | OUTPATIENT
Start: 2019-11-12 | End: 2019-11-12

## 2019-11-12 RX ADMIN — Medication 40 MILLIGRAM(S): at 14:06

## 2019-11-12 RX ADMIN — Medication 600 MILLIGRAM(S): at 21:45

## 2019-11-12 RX ADMIN — HEPARIN SODIUM 5000 UNIT(S): 5000 INJECTION INTRAVENOUS; SUBCUTANEOUS at 21:47

## 2019-11-12 RX ADMIN — HEPARIN SODIUM 5000 UNIT(S): 5000 INJECTION INTRAVENOUS; SUBCUTANEOUS at 06:08

## 2019-11-12 RX ADMIN — Medication 40 MILLIGRAM(S): at 14:04

## 2019-11-12 RX ADMIN — HEPARIN SODIUM 5000 UNIT(S): 5000 INJECTION INTRAVENOUS; SUBCUTANEOUS at 14:01

## 2019-11-12 NOTE — PROGRESS NOTE ADULT - PROBLEM SELECTOR PLAN 2
- Pt with bilateral pleural effusions on CTA  - Previous admission notable for thoaracentesis for RLL effusion consistent with malignancy  - Pulm consulted for possible drainage and pleurex catheter evaluation  - s/p lasix 40mg IV in ED - Pt with bilateral pleural effusions on CTA  - Previous admission notable for thoaracentesis for RLL effusion consistent with malignancy  - Pulm consulted for possible drainage and pleurex catheter evaluation  - s/p lasix 40mg IV in ED; will continue daily

## 2019-11-12 NOTE — CONSULT NOTE ADULT - SUBJECTIVE AND OBJECTIVE BOX
CHIEF COMPLAINT:    HPI: Patient is a 58 yo M w/ NSCLC (dx'ed 3 years ago, currently on Osimertinib/Tagrisso) w/ extension to R neck (s/p RT) presents to the ED for acute onset dyspnea. Of note, patient was recently hospitalized for PNA (10/26-11/1), treated empirically w/ Vanc/Zosyn and also had a 1.5 L thoracentesis which showed malignant cells. He was discharged on oxygen 4L NC and was stable until he became acutely dyspneic, tachypneic, and tachycardic on morning of admission. He also endorses chronic cough with yellow sputum production, and subacute LE edema and orthopnea at night (sleeps sitting in a chair). Denies any fevers, chills, night sweats, sick contacts, recent travel.     In the ED, he was hypoxic to SpO2 60%, started on nonrebreather 15 LPM and weaned to nasal cannula, now currently on HiFlo 35L/50%. CTA was negative for PE but showed small b/l pleural effusions, some increase in hilar mass, interlobular septal thickening. Patient was given Lasix 40 mg IV x1 and Solumedrol 40mg IV x1.     PAST MEDICAL & SURGICAL HISTORY:  Non-small cell carcinoma of lung  No significant past surgical history      FAMILY HISTORY:  Family history of cervical cancer      SOCIAL HISTORY:  Smoking: [X Never Smoked [ ] Former Smoker (__ packs x ___ years) [ ] Current Smoker  (__ packs x ___ years)  Substance Use: [X] Never Used [ ] Used ____  EtOH Use: None  Marital Status: [ ] Single [ ]  [ ]  [ ]   Sexual History:   Occupation: Retired financial banker  Recent Travel: None  Country of Birth:  Advance Directives:    Allergies    No Known Allergies    Intolerances        HOME MEDICATIONS:  Home Medications:  Tagrisso 80 mg oral tablet: 1 tab(s) orally once a day (26 Oct 2019 06:57)      REVIEW OF SYSTEMS:  Constitutional: [ ] negative [ ] fevers [ ] chills [ ] weight loss [ ] weight gain  HEENT: [ ] negative [ ] dry eyes [ ] eye irritation [ ] postnasal drip [ ] nasal congestion  CV: [ ] negative  [ ] chest pain [X] orthopnea [X] palpitations [ ] murmur  Resp: [ ] negative [X] cough [X] shortness of breath [X] dyspnea [ ] wheezing [ ] sputum [ ] hemoptysis  GI: [ ] negative [ ] nausea [ ] vomiting [ ] diarrhea [ ] constipation [ ] abd pain [ ] dysphagia   : [ ] negative [ ] dysuria [ ] nocturia [ ] hematuria [ ] increased urinary frequency  Musculoskeletal: [ ] negative [ ] back pain [ ] myalgias [ ] arthralgias [ ] fracture  Skin: [ ] negative [ ] rash [ ] itch  Neurological: [ ] negative [ ] headache [ ] dizziness [ ] syncope [ ] weakness [ ] numbness  Psychiatric: [ ] negative [ ] anxiety [ ] depression  Endocrine: [ ] negative [ ] diabetes [ ] thyroid problem  Hematologic/Lymphatic: [ ] negative [ ] anemia [ ] bleeding problem  Allergic/Immunologic: [ ] negative [ ] itchy eyes [ ] nasal discharge [ ] hives [ ] angioedema  [X] All other systems negative  [ ] Unable to assess ROS because ________    OBJECTIVE:  ICU Vital Signs Last 24 Hrs  T(C): 36.7 (12 Nov 2019 11:24), Max: 37.2 (11 Nov 2019 11:47)  T(F): 98 (12 Nov 2019 11:24), Max: 98.9 (11 Nov 2019 11:47)  HR: 92 (12 Nov 2019 11:24) (92 - 122)  BP: 107/69 (12 Nov 2019 11:24) (107/69 - 124/88)  BP(mean): --  ABP: --  ABP(mean): --  RR: 18 (12 Nov 2019 11:24) (18 - 23)  SpO2: 98% (12 Nov 2019 11:24) (92% - 100%)        11-11 @ 07:01 - 11-12 @ 07:00  --------------------------------------------------------  IN: 120 mL / OUT: 1 mL / NET: 119 mL    11-12 @ 07:01  - 11-12 @ 11:31  --------------------------------------------------------  IN: 240 mL / OUT: 0 mL / NET: 240 mL      CAPILLARY BLOOD GLUCOSE          PHYSICAL EXAM:  General: NAD, resting comfortably on HiFlo  HEENT: EOMI, PERRLA, R chin/neck fullness/swelling  Respiratory: CTAB, decreased breath sounds at bases  Cardiovascular: S1,S2 RRR  Abdomen: Soft, NTND, BS+  Extremities: Trace pitting peripheral edema up to shins    LINES:     HOSPITAL MEDICATIONS:  Standing Meds:  heparin  Injectable 5000 Unit(s) SubCutaneous every 8 hours  hydrocodone/homatropine Syrup 5 milliLiter(s) Oral every 6 hours      PRN Meds:  albuterol/ipratropium for Nebulization 3 milliLiter(s) Nebulizer every 6 hours PRN  guaiFENesin    Syrup 200 milliGRAM(s) Oral every 6 hours PRN      LABS:                        11.7   3.51  )-----------( 110      ( 12 Nov 2019 09:06 )             37.1     Hgb Trend: 11.7<--, 14.3<--  11-12    139  |  101  |  24<H>  ----------------------------<  125<H>  4.4   |  26  |  1.13    Ca    8.7      12 Nov 2019 06:51  Phos  4.1     11-12  Mg     2.3     11-12    TPro  6.4  /  Alb  3.3  /  TBili  0.4  /  DBili  x   /  AST  42<H>  /  ALT  22  /  AlkPhos  128<H>  11-12    Creatinine Trend: 1.13<--, 1.16<--, 0.77<--, 0.81<--, 0.81<--, 0.79<--  PT/INR - ( 11 Nov 2019 10:21 )   PT: 12.1 sec;   INR: 1.06 ratio         PTT - ( 11 Nov 2019 10:21 )  PTT:32.8 sec    Arterial Blood Gas:  11-11 @ 16:11  7.41/42/58/26/90/2.1  ABG lactate: --        MICROBIOLOGY:       RADIOLOGY:  [ ] Reviewed and interpreted by me    PULMONARY FUNCTION TESTS:    EKG:

## 2019-11-12 NOTE — PROGRESS NOTE ADULT - PROBLEM SELECTOR PLAN 3
- Pt with 2+ pitting edema b/l on exam, increased from 1+ on prior hospitalization (not addressed)  - Differential includes cor pulmonale in setting of extensive pulmonary disease vs drug-induced HF from Tagrisso (4% incidence of decreased LVEF, 3% incidence of cardiomyopathy. Pt has evidence of possible LAE on EKG.   - Troponins wnl and EKG showed no evidence of ischemia  - Ordered BNP and TTE to assess for signs of cardiac function, as well as HF or cor pulmonale  - s/p IV lasix 40mg in ED - Pt with 2+ pitting edema b/l on exam, increased from 1+ on prior hospitalization (not addressed)  - Differential includes cor pulmonale in setting of extensive pulmonary disease vs drug-induced HF from Tagrisso (4% incidence of decreased LVEF, 3% incidence of cardiomyopathy. Pt has evidence of possible LAE on EKG.   - Troponins wnl and EKG showed no evidence of ischemia  - Ordered BNP and TTE to assess for signs of cardiac function, as well as HF or cor pulmonale  - s/p IV lasix 40mg in ED; will continue daily

## 2019-11-12 NOTE — PROGRESS NOTE ADULT - PROBLEM SELECTOR PLAN 4
- Pt followed outpatient with Dr. Deras at [facility]; currently on Tagrisso 80mg PO QD and in between courses of radiation  - Hold Tagrisso for now

## 2019-11-12 NOTE — PROGRESS NOTE ADULT - PROBLEM SELECTOR PLAN 1
-HFNC  -TTE to eval cardiac function  -Troponins and BNP   -BCx to r/o PNA, holding abx for now  -f/u pulm re: thoracentesis, further management -HFNC, plan to wean down as tolerated   -TTE to eval cardiac function  -Troponins and BNP   -BCx to r/o PNA, holding abx for now, f/u BCx  -pulm c/s: no thoracentesis, -HFNC, plan to wean down as tolerated   -TTE to eval cardiac function  -Troponins and BNP   -BCx to r/o PNA, holding abx for now, f/u BCx  -pulm c/s: no thoracentesis, steroids, titrate down O2, likely 2/2 progression of cancer

## 2019-11-12 NOTE — CONSULT NOTE ADULT - ATTENDING COMMENTS
Agree with above. Patient with progressive metastatic lung cancer with large lung mass and malignant pleural effusion. He presents with acute shortness of breath and hypoxia. A CTPA is negative for PE but does appear to show slight progression of disease and lymphangitic spread of cancer. He does have a small pleural effusion which looks similar to last admission. At this time will hold off thoracentesis and if effusion increases in size or created diaphragmatic inversion will consider thoracentesis or PleurX catheter placement. At this time the effusion is small and his diaphragm has a normal contour.     Continue supplemental O2 to maintain O2 sats > 90%.  Unfortunately I do think we are seeing a progression of his underlying disease. It is reasonable to give a trial of steroids for the lymphangitic spread. Would consider Oncology evaluation to see if there are other treatment options as patient is clearly progressing on Tagrisso. If no further treatment options would consider Palliative care evaluation    Patient also describes aspiration symptoms. He has recently had radiation to his neck for a mass and now has some coughing/choking when eating. Would suggest speech/swallow evaluation.    He has bilateral lower extremity pitting edema. He does not have any known heart failure but would suggest gentle diuresis with goal negative 1-2L and checking a 2D echo.    Acute on Chronic Hypoxemic Respiratory Failure - continue supplemental O2 to maintain O2 sat > 90%. Titrate down FiO2 as able. Incentive spirometer

## 2019-11-12 NOTE — PROGRESS NOTE ADULT - SUBJECTIVE AND OBJECTIVE BOX
Chief Complaint: Patient is a 59y old  Male who presents with a chief complaint of SOB (11 Nov 2019 17:01)      INTERVAL HPI/OVERNIGHT EVENTS: No events overnight; remained afebrile. Pt breathing more easily on high flow (currently on 31 L/min), with sats between 92-95%. Still feels like he cannot take a deep breath. Still intermittently coughing, productive of yellow sputum. Denies chest pain. Able to sleep well with head of bed elevated. Urinated frequently in evening after lasix was given. Able to go off NC intermittently to use bathroom without issue. Eating and drinking ok.    REVIEW OF SYSTEMS:   CONSTITUTIONAL:  Denies fever/chills, fatigue  Eyes:  Denies visual loss, blurred vision, double vision or scleral icterus.  Ears, Nose, Throat:  Denies hearing loss, sneezing, congestion, runny nose or sore throat.  SKIN:  Denies rash or itching.  CARDIOVASCULAR:  Denies chest pain.  RESPIRATORY:  +SOB, unable to take a deep breath. Intermittent coughing with yellow sputum  GASTROINTESTINAL:  Denies anorexia, nausea, vomiting or diarrhea. No abdominal pain or blood.  GENITOURINARY:  Denies any hematuria, dysuria  NEUROLOGICAL:  Denies headache, dizziness, syncope, paralysis, ataxia, numbness or tingling in the extremities. No change in bowel or bladder control.  MUSCULOSKELETAL:  Denies muscle, back pain, joint pain or stiffness.      MEDICATIONS  (STANDING):  heparin  Injectable 5000 Unit(s) SubCutaneous every 8 hours  hydrocodone/homatropine Syrup 5 milliLiter(s) Oral every 6 hours    MEDICATIONS  (PRN):  albuterol/ipratropium for Nebulization 3 milliLiter(s) Nebulizer every 6 hours PRN Shortness of Breath and/or Wheezing  guaiFENesin    Syrup 200 milliGRAM(s) Oral every 6 hours PRN Cough      Vital Signs Last 24 Hrs  T(C): 36.7 (12 Nov 2019 06:07), Max: 37.3 (11 Nov 2019 10:17)  T(F): 98.1 (12 Nov 2019 06:07), Max: 99.2 (11 Nov 2019 10:17)  HR: 96 (12 Nov 2019 06:07) (96 - 123)  BP: 110/65 (12 Nov 2019 06:07) (110/65 - 130/88)  BP(mean): --  RR: 18 (12 Nov 2019 06:07) (18 - 23)  SpO2: 92% (12 Nov 2019 06:07) (92% - 100%)  Supplemental O2: [ ] No, on Room Air [ ] Yes,     PHYSICAL EXAM:    GENERAL: NAD  HEAD:  NC/AT  EYES: EOMI, white sclerae  ENMT: MMM, no oropharyngeal lesions or erythema appreciated, dentition well appearing  Neck: trachea midline, no appreciable masses  Pulm: on high flow NC without distress. Crackles appreciated at R lung base. L lung CTA.  CV: S1&S2+, RRR, no murmurs, rubs or gallops  ABDOMEN: soft, nontender, nondistended  : no cva tenderness, no aranda placed  EXTREMITIES:  2+ JAZMIN b/l, 2+ pulses b/l  Neuro: A&Ox3, no focal deficits  SKIN: warm and dry, no visible rash    LABS:                 Microbiology:    RADIOLOGY & ADDITIONAL TESTS: Chief Complaint: Patient is a 59y old  Male who presents with a chief complaint of SOB (11 Nov 2019 17:01)      INTERVAL HPI/OVERNIGHT EVENTS: No events overnight; remained afebrile. Pt breathing more easily on high flow (currently on 31 L/min), with sats between 92-95%. Still feels like he cannot take a deep breath. Still intermittently coughing, productive of yellow sputum. Denies chest pain. Able to sleep well with head of bed elevated. Urinated frequently in evening after lasix was given. Able to go off NC intermittently to use bathroom without issue. Eating and drinking ok.    REVIEW OF SYSTEMS:   CONSTITUTIONAL:  Denies fever/chills, fatigue  Eyes:  Denies visual loss, blurred vision, double vision or scleral icterus.  Ears, Nose, Throat:  Denies hearing loss, sneezing, congestion, runny nose or sore throat.  SKIN:  Denies rash or itching.  CARDIOVASCULAR:  Denies chest pain.  RESPIRATORY:  +SOB, unable to take a deep breath. Intermittent coughing with yellow sputum  GASTROINTESTINAL:  Denies anorexia, nausea, vomiting or diarrhea. No abdominal pain or blood.  GENITOURINARY:  Denies any hematuria, dysuria  NEUROLOGICAL:  Denies headache, dizziness, syncope, paralysis, ataxia, numbness or tingling in the extremities. No change in bowel or bladder control.  MUSCULOSKELETAL:  Denies muscle, back pain, joint pain or stiffness.      MEDICATIONS  (STANDING):  heparin  Injectable 5000 Unit(s) SubCutaneous every 8 hours  hydrocodone/homatropine Syrup 5 milliLiter(s) Oral every 6 hours    MEDICATIONS  (PRN):  albuterol/ipratropium for Nebulization 3 milliLiter(s) Nebulizer every 6 hours PRN Shortness of Breath and/or Wheezing  guaiFENesin    Syrup 200 milliGRAM(s) Oral every 6 hours PRN Cough      Vital Signs Last 24 Hrs  T(C): 36.7 (12 Nov 2019 06:07), Max: 37.3 (11 Nov 2019 10:17)  T(F): 98.1 (12 Nov 2019 06:07), Max: 99.2 (11 Nov 2019 10:17)  HR: 96 (12 Nov 2019 06:07) (96 - 123)  BP: 110/65 (12 Nov 2019 06:07) (110/65 - 130/88)  BP(mean): --  RR: 18 (12 Nov 2019 06:07) (18 - 23)  SpO2: 92% (12 Nov 2019 06:07) (92% - 100%)  Supplemental O2: [ ] No, on Room Air [ ] Yes,     PHYSICAL EXAM:    GENERAL: NAD  HEAD:  NC/AT  EYES: EOMI, white sclerae  ENMT: MMM, no oropharyngeal lesions or erythema appreciated, dentition well appearing  Neck: trachea midline, no appreciable masses  Pulm: on high flow NC without distress. Crackles appreciated at R lung base. L lung CTA.  CV: S1&S2+, RRR, no murmurs, rubs or gallops  ABDOMEN: soft, nontender, nondistended  : no cva tenderness, no aranda placed  EXTREMITIES:  2+ JAZMIN b/l, 2+ pulses b/l  Neuro: A&Ox3, no focal deficits  SKIN: warm and dry, no visible rash    LABS:    11-12    139  |  101  |  24<H>  ----------------------------<  125<H>  4.4   |  26  |  1.13  11-11    140  |  102  |  23  ----------------------------<  125<H>  4.0   |  24  |  1.16    Ca    8.7      12 Nov 2019 06:51  Ca    9.1      11 Nov 2019 10:21  Phos  4.1     11-12  Mg     2.3     11-12    TPro  6.4  /  Alb  3.3  /  TBili  0.4  /  DBili  x   /  AST  42<H>  /  ALT  22  /  AlkPhos  128<H>  11-12  TPro  7.3  /  Alb  3.6  /  TBili  0.6  /  DBili  x   /  AST  68<H>  /  ALT  26  /  AlkPhos  166<H>  11-11      PT/INR - ( 11 Nov 2019 10:21 )   PT: 12.1 sec;   INR: 1.06 ratio    PTT - ( 11 Nov 2019 10:21 )  PTT:32.8 sec                ABG - ( 11 Nov 2019 16:11 )  pH, Arterial: 7.41  pH, Blood: x     /  pCO2: 42    /  pO2: 58    / HCO3: 26    / Base Excess: 2.1   /  SaO2: 90                   14.3   7.73  )-----------( 126      ( 11 Nov 2019 10:21 )             45.2     Microbiology:    Rapid Respiratory Viral Panel (11.11.19 @ 11:20)    Rapid RVP Result: [facility]      RADIOLOGY & ADDITIONAL TESTS:    < from: Xray Chest 1 View- PORTABLE-Routine (11.11.19 @ 10:15) >  Impression:    The heart is normalin size. Diffuse bilateral hazy opacity which may   represent pulmonary edema and or lymphangitic carcinomatosis. Correlate   with CT scan performed on October 25, 2019. Right pleural effusion is   present. The overall appearance of the chest remain unchanged when   compared to previous study done October 31, 2019.      < from: CT Angio Chest w/ IV Cont (11.11.19 @ 13:49) >  IMPRESSION:     No pulmonary embolus.    Masslike opacities within the right upper and lower lower lobe with   extension into the hilum are slightly increased. 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    Small bilateral pleural effusions.    Soft tissue infiltrates of the mediastinum and bilateral alen, unchanged.   Enlarged right axillary lymph nodes are unchanged.    < from: 12 Lead ECG (11.11.19 @ 10:08) >  SINUS TACHYCARDIA  POSSIBLE LEFT ATRIAL ENLARGEMENT  RIGHTWARD AXIS  SEPTAL INFARCT , AGE UNDETERMINED  ABNORMAL ECG Chief Complaint: Patient is a 59y old  Male who presents with a chief complaint of SOB (11 Nov 2019 17:01)      INTERVAL HPI/OVERNIGHT EVENTS: No events overnight; remained afebrile. Pt breathing more easily on high flow (currently on 31 L/min), with sats between 92-95%. Still feels like he cannot take a deep breath. Still intermittently coughing, productive of yellow sputum. Denies chest pain. Able to sleep well with head of bed elevated. Urinated frequently in evening after lasix was given. Able to go off NC intermittently to use bathroom without issue. Eating and drinking ok.    REVIEW OF SYSTEMS:   CONSTITUTIONAL:  Denies fever/chills, fatigue  Eyes:  Denies visual loss, blurred vision, double vision or scleral icterus.  Ears, Nose, Throat:  Denies hearing loss, sneezing, congestion, runny nose or sore throat.  SKIN:  Denies rash or itching.  CARDIOVASCULAR:  Denies chest pain.  RESPIRATORY:  +SOB, unable to take a deep breath. Intermittent coughing with yellow sputum  GASTROINTESTINAL:  Denies anorexia, nausea, vomiting or diarrhea. No abdominal pain or blood.  GENITOURINARY:  Denies any hematuria, dysuria  NEUROLOGICAL:  Denies headache, dizziness, syncope, paralysis, ataxia, numbness or tingling in the extremities. No change in bowel or bladder control.  MUSCULOSKELETAL:  Denies muscle, back pain, joint pain or stiffness.      MEDICATIONS  (STANDING):  heparin  Injectable 5000 Unit(s) SubCutaneous every 8 hours  hydrocodone/homatropine Syrup 5 milliLiter(s) Oral every 6 hours    MEDICATIONS  (PRN):  albuterol/ipratropium for Nebulization 3 milliLiter(s) Nebulizer every 6 hours PRN Shortness of Breath and/or Wheezing  guaiFENesin    Syrup 200 milliGRAM(s) Oral every 6 hours PRN Cough      Vital Signs Last 24 Hrs  T(C): 36.7 (12 Nov 2019 06:07), Max: 37.3 (11 Nov 2019 10:17)  T(F): 98.1 (12 Nov 2019 06:07), Max: 99.2 (11 Nov 2019 10:17)  HR: 96 (12 Nov 2019 06:07) (96 - 123)  BP: 110/65 (12 Nov 2019 06:07) (110/65 - 130/88)  BP(mean): --  RR: 18 (12 Nov 2019 06:07) (18 - 23)  SpO2: 92% (12 Nov 2019 06:07) (92% - 100%)  Supplemental O2: [ ] No, on Room Air [ ] Yes,     PHYSICAL EXAM:    GENERAL: NAD  HEAD:  NC/AT  EYES: EOMI, white sclerae  ENMT: MMM, no oropharyngeal lesions or erythema appreciated, dentition well appearing  Neck: trachea midline, no appreciable masses  Pulm: on high flow NC without distress. Crackles appreciated at R lung base. L lung CTA.  CV: S1&S2+, RRR, no murmurs, rubs or gallops  ABDOMEN: soft, nontender, nondistended  : no cva tenderness, no aranda placed  EXTREMITIES:  2+ JAZMIN b/l, 2+ pulses b/l  Neuro: A&Ox3, no focal deficits  SKIN: warm and dry, no visible rash    LABS:    11-12    139  |  101  |  24<H>  ----------------------------<  125<H>  4.4   |  26  |  1.13  11-11    140  |  102  |  23  ----------------------------<  125<H>  4.0   |  24  |  1.16    Ca    8.7      12 Nov 2019 06:51  Ca    9.1      11 Nov 2019 10:21  Phos  4.1     11-12  Mg     2.3     11-12    TPro  6.4  /  Alb  3.3  /  TBili  0.4  /  DBili  x   /  AST  42<H>  /  ALT  22  /  AlkPhos  128<H>  11-12  TPro  7.3  /  Alb  3.6  /  TBili  0.6  /  DBili  x   /  AST  68<H>  /  ALT  26  /  AlkPhos  166<H>  11-11      PT/INR - ( 11 Nov 2019 10:21 )   PT: 12.1 sec;   INR: 1.06 ratio         PTT - ( 11 Nov 2019 10:21 )  PTT:32.8 sec                ABG - ( 11 Nov 2019 16:11 )  pH, Arterial: 7.41  pH, Blood: x     /  pCO2: 42    /  pO2: 58    / HCO3: 26    / Base Excess: 2.1   /  SaO2: 90                     11.7   3.51  )-----------( 110      ( 12 Nov 2019 09:06 )             37.1                         14.3   7.73  )-----------( 126      ( 11 Nov 2019 10:21 )             45.2     Microbiology:    Rapid Respiratory Viral Panel (11.11.19 @ 11:20)    Rapid RVP Result: [facility]      RADIOLOGY & ADDITIONAL TESTS:    < from: Xray Chest 1 View- PORTABLE-Routine (11.11.19 @ 10:15) >  Impression:    The heart is normalin size. Diffuse bilateral hazy opacity which may   represent pulmonary edema and or lymphangitic carcinomatosis. Correlate   with CT scan performed on October 25, 2019. Right pleural effusion is   present. The overall appearance of the chest remain unchanged when   compared to previous study done October 31, 2019.      < from: CT Angio Chest w/ IV Cont (11.11.19 @ 13:49) >  IMPRESSION:     No pulmonary embolus.    Masslike opacities within the right upper and lower lower lobe with   extension into the hilum are slightly increased. 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    Small bilateral pleural effusions.    Soft tissue infiltrates of the mediastinum and bilateral alen, unchanged.   Enlarged right axillary lymph nodes are unchanged.    < from: 12 Lead ECG (11.11.19 @ 10:08) >  SINUS TACHYCARDIA  POSSIBLE LEFT ATRIAL ENLARGEMENT  RIGHTWARD AXIS  SEPTAL INFARCT , AGE UNDETERMINED  ABNORMAL ECG

## 2019-11-12 NOTE — PROGRESS NOTE ADULT - ASSESSMENT
59 year old M presents with acute respiratory failure due to pleural effusions possibly secondary to progression of NSCLC. 59 year old M presents with acute respiratory failure due to pleural effusions possibly secondary to progression of NSCLC. On HFNC.

## 2019-11-12 NOTE — PROGRESS NOTE ADULT - PROBLEM SELECTOR PLAN 2
-Lasix 40 mg x1 in ED for pleural effusion  -f/u pulm re: thoracentesis -Lasix 40 mg x1 in ED for pleural effusion  -pulm: as above

## 2019-11-12 NOTE — PROGRESS NOTE ADULT - ASSESSMENT
59M with NSCLC (currently treated with Tagrisso and radiation) and prior admission 10/26-11/1 for AHRF in the setting of PNA, respiratory virus and pleural effusion s/p thoracentesis, TN'ed on 4L home O2, presenting with sudden onset SOB as of this AM. Also c/o chronic cough productive of yellow sputum as well as orthopnea and leg swelling for 1-2 months. 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 There is no evidence of PE. Findings also possibly consistent with HF 2/2 cor pulmonale.

## 2019-11-12 NOTE — CONSULT NOTE ADULT - ASSESSMENT
Patient is a 60 yo M w/ NSCLC (dx'ed 3 years ago, currently on Osimertinib/Tagrisso) w/ extension to R neck (s/p RT) presents to the ED for acute onset dyspnea. Of note, patient was recently hospitalized for PNA (10/26-11/1), treated empirically w/ Vanc/Zosyn and also had a 1.5 L thoracentesis which showed malignant cells. He was discharged on oxygen 4L NC and was stable until he became acutely dyspneic, tachypneic, and tachycardic on morning of admission. He also endorses chronic cough with yellow sputum production, and subacute LE edema and orthopnea at night (sleeps sitting in a chair). Denies any fevers, chills, night sweats, sick contacts, recent travel.     In the ED, he was hypoxic to SpO2 60%, started on nonrebreather 15 LPM and weaned to nasal cannula, now currently on HiFlo 35L/50%. CTA was negative for PE but showed small b/l pleural effusions, some increase in hilar mass, interlobular septal thickening. Patient was given Lasix 40 mg IV x1 and Solumedrol 40mg IV x1.

## 2019-11-12 NOTE — CHART NOTE - NSCHARTNOTEFT_GEN_A_CORE
: [facility]    INDICATION: Pleural Effusions    PROCEDURE:  [ ] LIMITED ECHO  [X] LIMITED CHEST  [ ] LIMITED RETROPERITONEAL  [ ] LIMITED ABDOMINAL  [ ] LIMITED DVT  [ ] NEEDLE GUIDANCE VASCULAR  [ ] NEEDLE GUIDANCE THORACENTESIS  [ ] NEEDLE GUIDANCE PARACENTESIS  [ ] NEEDLE GUIDANCE PERICARDIOCENTESIS  [ ] OTHER    FINDINGS: Small R pleural effusion. Trace L pleural effusion. Trace pericardial effusion.       INTERPRETATION: Small R pleural effusion. Trace L pleural effusion. Trace pericardial effusion.     Images stored on [facility].    Shane Galarza MD  Pulmonary & Critical Care Fellow  [phone]

## 2019-11-12 NOTE — PROGRESS NOTE ADULT - PROBLEM SELECTOR PLAN 5
- DVT ppx: Lovenox preferable in setting of malignancy-associated hypercoagulability; however given possible thoracentesis, will start UFH for now; SCDs as well  - Dispo: pending clinical improvement

## 2019-11-12 NOTE — PROGRESS NOTE ADULT - PROBLEM SELECTOR PLAN 1
- pt presenting with sudden onset SOB, found to be hypoxic to SpO2 60s in ED. Placed on NRB at 15L with improvement to 100%, then downgraded to 5L NC with SpO2 92%. 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 Will send cultures and procalcitonin to rule out bacterial PNA. Hold off on abx for now given low suspicion for infectious etiology  - Pulm consulted  - Pt given solumedrol 40mg IV; will follow pulm recs for further management - pt presenting with sudden onset SOB, found to be hypoxic to SpO2 60s in ED. Placed on NRB at 15L with improvement to 100%, then downgraded to 5L NC with SpO2 92%. 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 Pt continues to be afebrile, however pt now meets sepsis criteria per AM labs with WBC 3.51, HR >90, RR >20 and possible infectious source in lungs. Sending blood cultures.   - Pulm consulted  - Pt given solumedrol 40mg IV, will continue per pulm recs

## 2019-11-12 NOTE — PROGRESS NOTE ADULT - PROBLEM SELECTOR PLAN 3
-holding Tagresso  -questionable role for steroids  --40 mg prednisone x1 on 11/11 -holding Tagresso  -steroids per pulm rec

## 2019-11-12 NOTE — CONSULT NOTE ADULT - PROBLEM SELECTOR RECOMMENDATION 9
Baseline chronic hypoxic respiratory failure from underlying NSCLC w/ lymphangitic spread. Unclear etiology of acute decompensation as CTA shows no PE and slightly increased masses but not to explain degree of decompensation or acuity. Worsening peripheral edema over last 3 weeks.  - Agree with TTE  - Would observe off abx for now given no fever or other infectious signs/symptoms  - ILD/pneumonitis has occurred in approximately 2 to 3 percent of patients on Osimertinib/Tagrisso, would hold for now and f/u oncology  - Would start soludmedrol 40mg IV for now for lymphangitic spread/?pneumonitis  - LE duplex  - Titrate down supplemental O2 to maintain SO2 > 90%  - Will POCUS today to evaluate pleural effusion and possibility of thoracentesis, although small size on CT suggests likely non contributory to sudden decompensation    Shane Galarza MD  Pulmonary & Critical Care Fellow  [phone]

## 2019-11-13 LAB
ALBUMIN SERPL ELPH-MCNC: 3.4 G/DL — SIGNIFICANT CHANGE UP (ref 3.3–5)
ALP SERPL-CCNC: 129 U/L — HIGH (ref 40–120)
ALT FLD-CCNC: 20 U/L — SIGNIFICANT CHANGE UP (ref 10–45)
ANION GAP SERPL CALC-SCNC: 12 MMOL/L — SIGNIFICANT CHANGE UP (ref 5–17)
AST SERPL-CCNC: 38 U/L — SIGNIFICANT CHANGE UP (ref 10–40)
BASOPHILS # BLD AUTO: 0.01 K/UL — SIGNIFICANT CHANGE UP (ref 0–0.2)
BASOPHILS NFR BLD AUTO: 0.1 % — SIGNIFICANT CHANGE UP (ref 0–2)
BILIRUB SERPL-MCNC: 0.4 MG/DL — SIGNIFICANT CHANGE UP (ref 0.2–1.2)
BUN SERPL-MCNC: 31 MG/DL — HIGH (ref 7–23)
CALCIUM SERPL-MCNC: 9.1 MG/DL — SIGNIFICANT CHANGE UP (ref 8.4–10.5)
CHLORIDE SERPL-SCNC: 101 MMOL/L — SIGNIFICANT CHANGE UP (ref 96–108)
CO2 SERPL-SCNC: 27 MMOL/L — SIGNIFICANT CHANGE UP (ref 22–31)
CREAT SERPL-MCNC: 1.2 MG/DL — SIGNIFICANT CHANGE UP (ref 0.5–1.3)
EOSINOPHIL # BLD AUTO: 0.12 K/UL — SIGNIFICANT CHANGE UP (ref 0–0.5)
EOSINOPHIL NFR BLD AUTO: 1.7 % — SIGNIFICANT CHANGE UP (ref 0–6)
GLUCOSE SERPL-MCNC: 120 MG/DL — HIGH (ref 70–99)
HCT VFR BLD CALC: 36.6 % — LOW (ref 39–50)
HGB BLD-MCNC: 11.9 G/DL — LOW (ref 13–17)
IMM GRANULOCYTES NFR BLD AUTO: 0.3 % — SIGNIFICANT CHANGE UP (ref 0–1.5)
LYMPHOCYTES # BLD AUTO: 0.75 K/UL — LOW (ref 1–3.3)
LYMPHOCYTES # BLD AUTO: 10.9 % — LOW (ref 13–44)
MAGNESIUM SERPL-MCNC: 2.5 MG/DL — SIGNIFICANT CHANGE UP (ref 1.6–2.6)
MANUAL SMEAR VERIFICATION: SIGNIFICANT CHANGE UP
MCHC RBC-ENTMCNC: 29 PG — SIGNIFICANT CHANGE UP (ref 27–34)
MCHC RBC-ENTMCNC: 32.5 GM/DL — SIGNIFICANT CHANGE UP (ref 32–36)
MCV RBC AUTO: 89.1 FL — SIGNIFICANT CHANGE UP (ref 80–100)
MONOCYTES # BLD AUTO: 0.54 K/UL — SIGNIFICANT CHANGE UP (ref 0–0.9)
MONOCYTES NFR BLD AUTO: 7.8 % — SIGNIFICANT CHANGE UP (ref 2–14)
NEUTROPHILS # BLD AUTO: 5.45 K/UL — SIGNIFICANT CHANGE UP (ref 1.8–7.4)
NEUTROPHILS NFR BLD AUTO: 79.2 % — HIGH (ref 43–77)
NT-PROBNP SERPL-SCNC: 523 PG/ML — HIGH (ref 0–300)
PHOSPHATE SERPL-MCNC: 3.8 MG/DL — SIGNIFICANT CHANGE UP (ref 2.5–4.5)
PLAT MORPH BLD: NORMAL — SIGNIFICANT CHANGE UP
PLATELET # BLD AUTO: 120 K/UL — LOW (ref 150–400)
POTASSIUM SERPL-MCNC: 4.2 MMOL/L — SIGNIFICANT CHANGE UP (ref 3.5–5.3)
POTASSIUM SERPL-SCNC: 4.2 MMOL/L — SIGNIFICANT CHANGE UP (ref 3.5–5.3)
PROCALCITONIN SERPL-MCNC: 0.2 NG/ML — HIGH (ref 0.02–0.1)
PROT SERPL-MCNC: 6.8 G/DL — SIGNIFICANT CHANGE UP (ref 6–8.3)
RBC # BLD: 4.11 M/UL — LOW (ref 4.2–5.8)
RBC # FLD: 14.5 % — SIGNIFICANT CHANGE UP (ref 10.3–14.5)
RBC BLD AUTO: NORMAL — SIGNIFICANT CHANGE UP
SODIUM SERPL-SCNC: 140 MMOL/L — SIGNIFICANT CHANGE UP (ref 135–145)
WBC # BLD: 6.89 K/UL — SIGNIFICANT CHANGE UP (ref 3.8–10.5)
WBC # FLD AUTO: 6.89 K/UL — SIGNIFICANT CHANGE UP (ref 3.8–10.5)

## 2019-11-13 PROCEDURE — 99233 SBSQ HOSP IP/OBS HIGH 50: CPT | Mod: GC

## 2019-11-13 RX ORDER — FUROSEMIDE 40 MG
40 TABLET ORAL ONCE
Refills: 0 | Status: COMPLETED | OUTPATIENT
Start: 2019-11-13 | End: 2019-11-13

## 2019-11-13 RX ADMIN — Medication 600 MILLIGRAM(S): at 12:52

## 2019-11-13 RX ADMIN — Medication 600 MILLIGRAM(S): at 17:14

## 2019-11-13 RX ADMIN — HEPARIN SODIUM 5000 UNIT(S): 5000 INJECTION INTRAVENOUS; SUBCUTANEOUS at 14:13

## 2019-11-13 RX ADMIN — Medication 40 MILLIGRAM(S): at 14:13

## 2019-11-13 RX ADMIN — HEPARIN SODIUM 5000 UNIT(S): 5000 INJECTION INTRAVENOUS; SUBCUTANEOUS at 05:47

## 2019-11-13 RX ADMIN — Medication 40 MILLIGRAM(S): at 12:52

## 2019-11-13 NOTE — PROGRESS NOTE ADULT - PROBLEM SELECTOR PLAN 1
Baseline chronic hypoxic respiratory failure from underlying NSCLC w/ lymphangitic spread. Unclear etiology of acute decompensation as CTA shows no PE and slightly increased masses but not to explain degree of decompensation or acuity. TTE with EF 65%, thick pericardium, trace pericardial effusion and some features consistent with constrictive pericarditis? POCUS with small R pleural effusion, trace L pleural effusion with no safe window for thoracentesis.  - Would observe off abx for now given no fever or other infectious signs/symptoms  - ILD/pneumonitis has occurred in approximately 2 to 3 percent of patients on Osimertinib/Tagrisso, would hold for now and f/u oncology  - c/w soludmedrol 40mg IV for now for lymphangitic spread/?pneumonitis  - Titrate down supplemental O2 to maintain SO2 > 90% Baseline chronic hypoxic respiratory failure from underlying NSCLC w/ lymphangitic spread. Unclear etiology of acute decompensation as CTA shows no PE and slightly increased masses but not to explain degree of decompensation or acuity. TTE with EF 65%, thick pericardium, trace pericardial effusion and some features consistent with constrictive pericarditis? POCUS with small R pleural effusion, trace L pleural effusion with no safe window for thoracentesis.  - Would observe off abx for now given no fever or other infectious signs/symptoms  - ILD/pneumonitis has occurred in approximately 2 to 3 percent of patients on Osimertinib/Tagrisso, would hold for now and f/u oncology  - c/w soludmedrol 40mg IV for now for lymphangitic spread/?pneumonitis  - Consider cardiology evaluation give TTE findings  - Titrate down supplemental O2 to maintain SO2 > 90%    Shane Galarza MD  Pulmonary & Critical Care Fellow  [phone]

## 2019-11-13 NOTE — PROGRESS NOTE ADULT - PROBLEM SELECTOR PLAN 3
- Pt with 2+ pitting edema b/l on exam, increased from 1+ on prior hospitalization (not addressed). Slightly improved on exam today  - Troponins wnl and EKG showed no evidence of ischemia  - TTE showing     - Normal LV systolic function (EF 66%) with normal LA and LV sizes     - Ventricular interdependence suggestive of constrictive physiology, however transmitral flow pattern not consistent with this. Also evidence of thickened pericardium and trace pericardial effusion.     - Mild pulmonary HTN  - Given TTE results, this pt has substantial evidence of RHF. Mild pulmonary HTN in the setting of normal LV systolic function could represent primary lung process; differential includes progression of cancer (including lymphangitic carcinomatosis) vs drug-induced ILD/pneumonitis from Tagrisso. 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   - Note that Tagrisso has 4% incidence of decreased LVEF, 3% incidence of cardiomyopathy  -   - s/p IV lasix 40mg in ED; will continue daily  - Consider cards consult - Pt with 2+ pitting edema b/l on exam, increased from 1+ on prior hospitalization (not addressed). Slightly improved on exam today  - Troponins wnl and EKG showed no evidence of ischemia  - TTE showing     - Normal LV systolic function (EF 66%) with normal LA and LV sizes     - Ventricular interdependence suggestive of constrictive physiology, however transmitral flow pattern not consistent with this. Also evidence of thickened pericardium and trace pericardial effusion.     - Mild pulmonary HTN  - Given TTE results, this pt has substantial evidence of RHF. Mild pulmonary HTN in the setting of normal LV systolic function could represent primary lung process; differential includes progression of cancer (including lymphangitic carcinomatosis) vs drug-induced ILD/pneumonitis from Tagrisso. 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   - Note that Tagrisso has 4% incidence of decreased LVEF, 3% incidence of cardiomyopathy  -   - s/p IV lasix 40mg in ED; will continue - Pt with 2+ pitting edema b/l on exam, increased from 1+ on prior hospitalization (not addressed). Slightly improved on exam today  - Troponins wnl and EKG showed no evidence of ischemia  - TTE showing     - Normal LV systolic function (EF 66%) with normal LA and LV sizes     - Ventricular interdependence suggestive of constrictive physiology, however transmitral flow pattern not consistent with this. Also evidence of thickened pericardium and trace pericardial effusion.     - Mild pulmonary HTN  - Given TTE results, this pt has substantial evidence of RHF. Mild pulmonary HTN in the setting of normal LV systolic function could represent primary lung process; differential includes progression of cancer (including lymphangitic carcinomatosis) vs drug-induced ILD/pneumonitis from Tagrisso. 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 Etiologies of constrictive pericarditis in this patient include radiation-induced vs. malignancy vs idiopathic/viral.  - Note that Tagrisso has 4% incidence of decreased LVEF, 3% incidence of cardiomyopathy  -   - s/p IV lasix 40mg in ED; will continue

## 2019-11-13 NOTE — PROGRESS NOTE ADULT - PROBLEM SELECTOR PLAN 5
- DVT ppx: Lovenox preferable in setting of malignancy-associated hypercoagulability; however given possible thoracentesis, will start UFH for now; SCDs as well  - Dispo: pending clinical improvement - DVT ppx: Lovenox preferable in setting of malignancy-associated hypercoagulability; however given possible thoracentesis, will start UFH for now; SCDs as well  - Pending speech and swallow eval, given possible aspiration event at home  - Dispo: pending clinical improvement - DVT ppx: Lovenox preferable in setting of malignancy-associated hypercoagulability; however given possible thoracentesis, continue on UFH; SCDs as well  - Pending speech and swallow eval, given possible aspiration event at home  - Dispo: pending clinical improvement

## 2019-11-13 NOTE — PROGRESS NOTE ADULT - PROBLEM SELECTOR PLAN 3
-holding Tagresso, possible ILD   -steroids per pulm rec -holding Tagresso, possible ILD   -steroids per pulm rec  -onc consulted  -possible palliative consult if no treatments - pt is progressing on Tagresso -holding Tagresso, possible ILD   -steroids per pulm rec  -onc consulted  --called private oncologist Dr. Giovana Deras - no further treatment recommendations, pt refused chemotherapy, was referred to clinical trial at [facility] (Dr. Giovana Wright)  -possible palliative consult if no treatments - pt is progressing on Tagresso

## 2019-11-13 NOTE — PROGRESS NOTE ADULT - PROBLEM SELECTOR PLAN 1
-HFNC, plan to wean down as tolerated   -TTE: unclear if any constrictive pathology, mild pHTN, thickened pericardium w/trace effusions  -Troponins and BNP   -BCx to r/o PNA, holding abx for now, f/u BCx  -pulm c/s: no thoracentesis, steroids, titrate down O2, likely 2/2 progression of cancer

## 2019-11-13 NOTE — PROGRESS NOTE ADULT - PROBLEM SELECTOR PLAN 2
- Pt with bilateral pleural effusions on CTA  - Previous admission notable for thoaracentesis for RLL effusion consistent with malignancy  - See above; per pulm recs, not a candidate for thoracentesis/pleurx  - c/w lasix 40mg IV QD - Pt with bilateral pleural effusions on CTA  - Previous admission notable for thoaracentesis for RLL effusion consistent with malignancy  - See above; per pulm recs, not a candidate for thoracentesis/pleurx  - c/w lasix 40mg IV QD; Cr ok for now

## 2019-11-13 NOTE — PROGRESS NOTE ADULT - SUBJECTIVE AND OBJECTIVE BOX
Chief Complaint: Patient is a 59y old  Male who presents with a chief complaint of SOB (13 Nov 2019 07:25)      INTERVAL HPI/OVERNIGHT EVENTS: No overnight events. Pt remains on HFNC at 35 L/min, with O2 sat between 92-97%. He did complain of an episode of shallow breathing this AM, when he had some difficulty taking a good breath. He is still coughing, productive of yellow sputum, but says it has not been as painful. Did not sleep well. Eating and drinking fine. Urinated frequently after lasix.     REVIEW OF SYSTEMS:   CONSTITUTIONAL:  Denies fever/chills, fatigue  Eyes:  Denies visual loss, blurred vision, double vision or scleral icterus.  Ears, Nose, Throat:  Denies hearing loss, sneezing, congestion, runny nose or sore throat.  SKIN:  Denies rash or itching.  CARDIOVASCULAR:  Denies chest pain, palpitations  RESPIRATORY: +SOB, cough productive of yellow sputum  GASTROINTESTINAL:  Denies anorexia, nausea, vomiting or diarrhea. No abdominal pain or blood.  GENITOURINARY:  Denies any hematuria, dysuria  NEUROLOGICAL:  Denies headache, dizziness, syncope, paralysis, ataxia, numbness or tingling in the extremities. No change in bowel or bladder control.  MUSCULOSKELETAL:  Denies muscle, back pain, joint pain or stiffness.  ENDOCRINOLOGIC:  Denies reports of sweating, cold or heat intolerance. No polyuria or polydipsia.      MEDICATIONS  (STANDING):  guaiFENesin  milliGRAM(s) Oral every 12 hours  heparin  Injectable 5000 Unit(s) SubCutaneous every 8 hours  hydrocodone/homatropine Syrup 5 milliLiter(s) Oral every 6 hours  methylPREDNISolone sodium succinate Injectable 40 milliGRAM(s) IV Push daily    MEDICATIONS  (PRN):  albuterol/ipratropium for Nebulization 3 milliLiter(s) Nebulizer every 6 hours PRN Shortness of Breath and/or Wheezing      Vital Signs Last 24 Hrs  T(C): 36.6 (13 Nov 2019 05:45), Max: 36.9 (12 Nov 2019 18:35)  T(F): 97.9 (13 Nov 2019 05:45), Max: 98.4 (12 Nov 2019 18:35)  HR: 91 (13 Nov 2019 06:35) (90 - 108)  BP: 117/79 (13 Nov 2019 05:45) (107/69 - 125/83)  BP(mean): --  RR: 17 (13 Nov 2019 06:35) (17 - 18)  SpO2: 93% (13 Nov 2019 06:35) (92% - 98%)  Supplemental O2: [ ] No, on Room Air [ ] Yes,     I&O's Detail    12 Nov 2019 07:01  -  13 Nov 2019 07:00  --------------------------------------------------------  IN:    Oral Fluid: 480 mL  Total IN: 480 mL    OUT:  Total OUT: 0 mL    Total NET: 480 mL      PHYSICAL EXAM:    GENERAL: NAD  HEAD:  NC/AT  EYES: EOMI, white sclerae  ENMT: MMM, no oropharyngeal lesions or erythema appreciated  Neck: trachea midline, no appreciable masses  Pulm: on HFNC at 35L/min; decreased breath sounds on R side throughout, crackles at base not as prominent. Dry crackles noted in L middle lung field with good aeration on L side.   CV: S1&S2+,tachycardic, no murmurs, rubs or gallops  ABDOMEN: soft, nontender, nondistended  : no cva tenderness, no aranda placed  EXTREMITIES: mild 2+ JAZMIN b/l  Neuro: A&Ox3, no focal deficits  SKIN: warm and dry, no visible rash    LABS:      Microbiology:    Rapid Respiratory Viral Panel (11.11.19 @ 11:20)    Rapid RVP Result: [facility]    RADIOLOGY & ADDITIONAL TESTS:    < from: Xray Chest 1 View- PORTABLE-Routine (11.11.19 @ 10:15) >  Impression:    The heart is normalin size. Diffuse bilateral hazy opacity which may   represent pulmonary edema and or lymphangitic carcinomatosis. Correlate   with CT scan performed on October 25, 2019. Right pleural effusion is   present. The overall appearance of the chest remain unchanged when   compared to previous study done October 31, 2019.      < from: CT Angio Chest w/ IV Cont (11.11.19 @ 13:49) >  IMPRESSION:     No pulmonary embolus.    Masslike opacities within the right upper and lower lower lobe with   extension into the hilum are slightly increased. 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    Small bilateral pleural effusions.    Soft tissue infiltrates of the mediastinum and bilateral alen, unchanged.   Enlarged right axillary lymph nodes are unchanged.    < from: 12 Lead ECG (11.11.19 @ 10:08) >  SINUS TACHYCARDIA  POSSIBLE LEFT ATRIAL ENLARGEMENT  RIGHTWARD AXIS  SEPTAL INFARCT , AGE UNDETERMINED  ABNORMAL ECG      < from: Transthoracic Echocardiogram (11.12.19 @ 14:42) >  Conclusions:  1. Normal left ventricular systolic function.  Septal  bounce with interventrcular dependance and elevated medial  E"  suggests constrictive physiology, howevere transmitral  flow pattern not consistent with constriction.  The ivc is  small and colapses normally.  Hepatic vein doppler flow not  obtained.  2. Estimated pulmonary artery systolic pressure equals 49  mm Hg, assuming right atrial pressure equals 8 mm Hg,  consistent with mild pulmonary pressures.  3. Thickened pericardium with trace pericardial effusion.    < end of copied text > Chief Complaint: Patient is a 59y old  Male who presents with a chief complaint of SOB (13 Nov 2019 07:25)      INTERVAL HPI/OVERNIGHT EVENTS: No overnight events. Pt remains on HFNC at 35 L/min, with O2 sat between 92-97%. He did complain of an episode of shallow breathing this AM, when he had some difficulty taking a good breath. He is still coughing, productive of yellow sputum, but says it has not been as painful. Did not sleep well. Eating and drinking fine. Urinated frequently after lasix.     REVIEW OF SYSTEMS:   CONSTITUTIONAL:  Denies fever/chills, fatigue  Eyes:  Denies visual loss, blurred vision, double vision or scleral icterus.  Ears, Nose, Throat:  Denies hearing loss, sneezing, congestion, runny nose or sore throat.  SKIN:  Denies rash or itching.  CARDIOVASCULAR:  Denies chest pain, palpitations  RESPIRATORY: +SOB, cough productive of yellow sputum  GASTROINTESTINAL:  Denies anorexia, nausea, vomiting or diarrhea. No abdominal pain or blood.  GENITOURINARY:  Denies any hematuria, dysuria  NEUROLOGICAL:  Denies headache, dizziness, syncope, paralysis, ataxia, numbness or tingling in the extremities. No change in bowel or bladder control.  MUSCULOSKELETAL:  Denies muscle, back pain, joint pain or stiffness.  ENDOCRINOLOGIC:  Denies reports of sweating, cold or heat intolerance. No polyuria or polydipsia.      MEDICATIONS  (STANDING):  guaiFENesin  milliGRAM(s) Oral every 12 hours  heparin  Injectable 5000 Unit(s) SubCutaneous every 8 hours  hydrocodone/homatropine Syrup 5 milliLiter(s) Oral every 6 hours  methylPREDNISolone sodium succinate Injectable 40 milliGRAM(s) IV Push daily    MEDICATIONS  (PRN):  albuterol/ipratropium for Nebulization 3 milliLiter(s) Nebulizer every 6 hours PRN Shortness of Breath and/or Wheezing      Vital Signs Last 24 Hrs  T(C): 36.6 (13 Nov 2019 05:45), Max: 36.9 (12 Nov 2019 18:35)  T(F): 97.9 (13 Nov 2019 05:45), Max: 98.4 (12 Nov 2019 18:35)  HR: 91 (13 Nov 2019 06:35) (90 - 108)  BP: 117/79 (13 Nov 2019 05:45) (107/69 - 125/83)  BP(mean): --  RR: 17 (13 Nov 2019 06:35) (17 - 18)  SpO2: 93% (13 Nov 2019 06:35) (92% - 98%)  Supplemental O2: [ ] No, on Room Air [ ] Yes,     I&O's Detail    12 Nov 2019 07:01  -  13 Nov 2019 07:00  --------------------------------------------------------  IN:    Oral Fluid: 480 mL  Total IN: 480 mL    OUT:  Total OUT: 0 mL    Total NET: 480 mL      PHYSICAL EXAM:    GENERAL: NAD  HEAD:  NC/AT  EYES: EOMI, white sclerae  ENMT: MMM, no oropharyngeal lesions or erythema appreciated  Neck: trachea midline, no appreciable masses  Pulm: on HFNC at 35L/min; decreased breath sounds on R side throughout, crackles at base not as prominent. Dry crackles noted in L middle lung field with good aeration on L side.   CV: S1&S2+,tachycardic, no murmurs, rubs or gallops  ABDOMEN: soft, nontender, nondistended  : no cva tenderness, no aranda placed  EXTREMITIES: mild 2+ JAZMIN b/l  Neuro: A&Ox3, no focal deficits  SKIN: warm and dry, no visible rash    LABS:  11-13    140  |  101  |  31<H>  ----------------------------<  120<H>  4.2   |  27  |  1.20  11-12    139  |  101  |  24<H>  ----------------------------<  125<H>  4.4   |  26  |  1.13  11-11    140  |  102  |  23  ----------------------------<  125<H>  4.0   |  24  |  1.16    Ca    9.1      13 Nov 2019 07:14  Ca    8.7      12 Nov 2019 06:51  Ca    9.1      11 Nov 2019 10:21  Phos  3.8     11-13  Mg     2.5     11-13    TPro  6.8  /  Alb  3.4  /  TBili  0.4  /  DBili  x   /  AST  38  /  ALT  20  /  AlkPhos  129<H>  11-13  TPro  6.4  /  Alb  3.3  /  TBili  0.4  /  DBili  x   /  AST  42<H>  /  ALT  22  /  AlkPhos  128<H>  11-12  TPro  7.3  /  Alb  3.6  /  TBili  0.6  /  DBili  x   /  AST  68<H>  /  ALT  26  /  AlkPhos  166<H>  11-11                      ABG - ( 11 Nov 2019 16:11 )  pH, Arterial: 7.41  pH, Blood: x     /  pCO2: 42    /  pO2: 58    / HCO3: 26    / Base Excess: 2.1   /  SaO2: 90                     11.9   6.89  )-----------( 120      ( 13 Nov 2019 10:24 )             36.6                         11.7   3.51  )-----------( 110      ( 12 Nov 2019 09:06 )             37.1                         14.3   7.73  )-----------( 126      ( 11 Nov 2019 10:21 )             45.2     Microbiology:    Rapid Respiratory Viral Panel (11.11.19 @ 11:20)    Rapid RVP Result: [facility]    RADIOLOGY & ADDITIONAL TESTS:    < from: Xray Chest 1 View- PORTABLE-Routine (11.11.19 @ 10:15) >  Impression:    The heart is normalin size. Diffuse bilateral hazy opacity which may   represent pulmonary edema and or lymphangitic carcinomatosis. Correlate   with CT scan performed on October 25, 2019. Right pleural effusion is   present. The overall appearance of the chest remain unchanged when   compared to previous study done October 31, 2019.      < from: CT Angio Chest w/ IV Cont (11.11.19 @ 13:49) >  IMPRESSION:     No pulmonary embolus.    Masslike opacities within the right upper and lower lower lobe with   extension into the hilum are slightly increased. 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    Small bilateral pleural effusions.    Soft tissue infiltrates of the mediastinum and bilateral alen, unchanged.   Enlarged right axillary lymph nodes are unchanged.    < from: 12 Lead ECG (11.11.19 @ 10:08) >  SINUS TACHYCARDIA  POSSIBLE LEFT ATRIAL ENLARGEMENT  RIGHTWARD AXIS  SEPTAL INFARCT , AGE UNDETERMINED  ABNORMAL ECG      < from: Transthoracic Echocardiogram (11.12.19 @ 14:42) >  Conclusions:  1. Normal left ventricular systolic function.  Septal  bounce with interventrcular dependance and elevated medial  E"  suggests constrictive physiology, howevere transmitral  flow pattern not consistent with constriction.  The ivc is  small and colapses normally.  Hepatic vein doppler flow not  obtained.  2. Estimated pulmonary artery systolic pressure equals 49  mm Hg, assuming right atrial pressure equals 8 mm Hg,  consistent with mild pulmonary pressures.  3. Thickened pericardium with trace pericardial effusion.    < end of copied text >

## 2019-11-13 NOTE — PROGRESS NOTE ADULT - SUBJECTIVE AND OBJECTIVE BOX
CHIEF COMPLAINT:    Interval Events: Feels overall improved. Still endorsed episode of sudden SOB for several minutes while at rest.     REVIEW OF SYSTEMS:  Constitutional: [ ] negative [ ] fevers [ ] chills [ ] weight loss [ ] weight gain  HEENT: [ ] negative [ ] dry eyes [ ] eye irritation [ ] postnasal drip [ ] nasal congestion  CV: [ ] negative  [ ] chest pain [ ] orthopnea [ ] palpitations [ ] murmur  Resp: [ ] negative [X] cough [X] shortness of breath [X] dyspnea [ ] wheezing [ ] sputum [ ] hemoptysis  GI: [ ] negative [ ] nausea [ ] vomiting [ ] diarrhea [ ] constipation [ ] abd pain [ ] dysphagia   : [ ] negative [ ] dysuria [ ] nocturia [ ] hematuria [ ] increased urinary frequency  Musculoskeletal: [ ] negative [ ] back pain [ ] myalgias [ ] arthralgias [ ] fracture  Skin: [ ] negative [ ] rash [ ] itch  Neurological: [ ] negative [ ] headache [ ] dizziness [ ] syncope [ ] weakness [ ] numbness  Psychiatric: [ ] negative [ ] anxiety [ ] depression  Endocrine: [ ] negative [ ] diabetes [ ] thyroid problem  Hematologic/Lymphatic: [ ] negative [ ] anemia [ ] bleeding problem  Allergic/Immunologic: [ ] negative [ ] itchy eyes [ ] nasal discharge [ ] hives [ ] angioedema  [X] All other systems negative  [ ] Unable to assess ROS because ________    OBJECTIVE:  ICU Vital Signs Last 24 Hrs  T(C): 36.6 (13 Nov 2019 05:45), Max: 36.9 (12 Nov 2019 18:35)  T(F): 97.9 (13 Nov 2019 05:45), Max: 98.4 (12 Nov 2019 18:35)  HR: 91 (13 Nov 2019 06:35) (90 - 108)  BP: 117/79 (13 Nov 2019 05:45) (107/69 - 125/83)  BP(mean): --  ABP: --  ABP(mean): --  RR: 17 (13 Nov 2019 06:35) (17 - 18)  SpO2: 93% (13 Nov 2019 06:35) (92% - 98%)        11-12 @ 07:01  -  11-13 @ 07:00  --------------------------------------------------------  IN: 600 mL / OUT: 0 mL / NET: 600 mL      CAPILLARY BLOOD GLUCOSE          PHYSICAL EXAM:  General: NAD, resting comfortably on HiFlo  HEENT: EOMI, PERRLA, R chin/neck fullness/swelling  Respiratory: CTAB, decreased breath sounds at bases  Cardiovascular: S1,S2 RRR  Abdomen: Soft, NTND, BS+  Extremities: Trace pitting peripheral edema up to [facility] MEDICATIONS:  MEDICATIONS  (STANDING):  guaiFENesin  milliGRAM(s) Oral every 12 hours  heparin  Injectable 5000 Unit(s) SubCutaneous every 8 hours  hydrocodone/homatropine Syrup 5 milliLiter(s) Oral every 6 hours  methylPREDNISolone sodium succinate Injectable 40 milliGRAM(s) IV Push daily    MEDICATIONS  (PRN):  albuterol/ipratropium for Nebulization 3 milliLiter(s) Nebulizer every 6 hours PRN Shortness of Breath and/or Wheezing      LABS:                        11.7   3.51  )-----------( 110      ( 12 Nov 2019 09:06 )             37.1     Hgb Trend: 11.7<--, 14.3<--  11-13    140  |  101  |  31<H>  ----------------------------<  120<H>  4.2   |  27  |  1.20    Ca    9.1      13 Nov 2019 07:14  Phos  3.8     11-13  Mg     2.5     11-13    TPro  6.8  /  Alb  3.4  /  TBili  0.4  /  DBili  x   /  AST  38  /  ALT  20  /  AlkPhos  129<H>  11-13    Creatinine Trend: 1.20<--, 1.13<--, 1.16<--, 0.77<--, 0.81<--, 0.81<--  PT/INR - ( 11 Nov 2019 10:21 )   PT: 12.1 sec;   INR: 1.06 ratio         PTT - ( 11 Nov 2019 10:21 )  PTT:32.8 sec    Arterial Blood Gas:  11-11 @ 16:11  7.41/42/58/26/90/2.1  ABG lactate: --        MICROBIOLOGY:       RADIOLOGY:  [ ] Reviewed and interpreted by me    PULMONARY FUNCTION TESTS:    EKG: CHIEF COMPLAINT:    Interval Events: Feels overall improved. Still endorsed episode of sudden SOB for several minutes while at rest.     REVIEW OF SYSTEMS:  Constitutional: [ ] negative [ ] fevers [ ] chills [ ] weight loss [ ] weight gain  HEENT: [ ] negative [ ] dry eyes [ ] eye irritation [ ] postnasal drip [ ] nasal congestion  CV: [ ] negative  [ ] chest pain [ ] orthopnea [ ] palpitations [ ] murmur  Resp: [ ] negative [X] cough [X] shortness of breath [X] dyspnea [ ] wheezing [ ] sputum [ ] hemoptysis  GI: [ ] negative [ ] nausea [ ] vomiting [ ] diarrhea [ ] constipation [ ] abd pain [ ] dysphagia   : [ ] negative [ ] dysuria [ ] nocturia [ ] hematuria [ ] increased urinary frequency  Musculoskeletal: [ ] negative [ ] back pain [ ] myalgias [ ] arthralgias [ ] fracture  Skin: [ ] negative [ ] rash [ ] itch  Neurological: [ ] negative [ ] headache [ ] dizziness [ ] syncope [ ] weakness [ ] numbness  Psychiatric: [ ] negative [ ] anxiety [ ] depression  Endocrine: [ ] negative [ ] diabetes [ ] thyroid problem  Hematologic/Lymphatic: [ ] negative [ ] anemia [ ] bleeding problem  Allergic/Immunologic: [ ] negative [ ] itchy eyes [ ] nasal discharge [ ] hives [ ] angioedema  [X] All other systems negative  [ ] Unable to assess ROS because ________    OBJECTIVE:  ICU Vital Signs Last 24 Hrs  T(C): 36.6 (13 Nov 2019 05:45), Max: 36.9 (12 Nov 2019 18:35)  T(F): 97.9 (13 Nov 2019 05:45), Max: 98.4 (12 Nov 2019 18:35)  HR: 91 (13 Nov 2019 06:35) (90 - 108)  BP: 117/79 (13 Nov 2019 05:45) (107/69 - 125/83)  BP(mean): --  ABP: --  ABP(mean): --  RR: 17 (13 Nov 2019 06:35) (17 - 18)  SpO2: 93% (13 Nov 2019 06:35) (92% - 98%)        11-12 @ 07:01  -  11-13 @ 07:00  --------------------------------------------------------  IN: 600 mL / OUT: 0 mL / NET: 600 mL      CAPILLARY BLOOD GLUCOSE          PHYSICAL EXAM:  General: NAD, resting comfortably on HiFlo  HEENT: EOMI, PERRLA, R chin/neck fullness/swelling  Respiratory: CTAB, decreased breath sounds at bases  Cardiovascular: S1,S2 RRR  Abdomen: Soft, NTND, BS+  Extremities: Trace pitting peripheral edema up to [facility] MEDICATIONS:  MEDICATIONS  (STANDING):  guaiFENesin  milliGRAM(s) Oral every 12 hours  heparin  Injectable 5000 Unit(s) SubCutaneous every 8 hours  hydrocodone/homatropine Syrup 5 milliLiter(s) Oral every 6 hours  methylPREDNISolone sodium succinate Injectable 40 milliGRAM(s) IV Push daily    MEDICATIONS  (PRN):  albuterol/ipratropium for Nebulization 3 milliLiter(s) Nebulizer every 6 hours PRN Shortness of Breath and/or Wheezing      LABS:                        11.7   3.51  )-----------( 110      ( 12 Nov 2019 09:06 )             37.1     Hgb Trend: 11.7<--, 14.3<--  11-13    140  |  101  |  31<H>  ----------------------------<  120<H>  4.2   |  27  |  1.20    Ca    9.1      13 Nov 2019 07:14  Phos  3.8     11-13  Mg     2.5     11-13    TPro  6.8  /  Alb  3.4  /  TBili  0.4  /  DBili  x   /  AST  38  /  ALT  20  /  AlkPhos  129<H>  11-13    Creatinine Trend: 1.20<--, 1.13<--, 1.16<--, 0.77<--, 0.81<--, 0.81<--  PT/INR - ( 11 Nov 2019 10:21 )   PT: 12.1 sec;   INR: 1.06 ratio         PTT - ( 11 Nov 2019 10:21 )  PTT:32.8 sec    Arterial Blood Gas:  11-11 @ 16:11  7.41/42/58/26/90/2.1  ABG lactate: --        MICROBIOLOGY:       RADIOLOGY:  [ ] Reviewed and interpreted by me    PULMONARY FUNCTION TESTS:    ECHO:< from: Transthoracic Echocardiogram (11.12.19 @ 14:42) >  Conclusions:  1. Normal left ventricular systolic function.  Septal bounce with interventrcular dependance and elevated medial  E"  suggests constrictive physiology, howevere transmitral flow pattern not consistent with constriction.  The ivc is  small and colapses normally.  Hepatic vein doppler flow not obtained.  2. Estimated pulmonary artery systolic pressure equals 49 mm Hg, assuming right atrial pressure equals 8 mm Hg,  consistent with mild pulmonary pressures.  3. Thickened pericardium with trace pericardial effusion.    < end of copied text >

## 2019-11-13 NOTE — PROGRESS NOTE ADULT - PROBLEM SELECTOR PLAN 1
- pt presenting with sudden onset SOB, found to be hypoxic to SpO2 60s in ED. Placed on NRB at 15L with improvement to 100%, then downgraded to 5L NC with SpO2 92%. Given ABG showing hypoxemia on NC, pt was switched to high flow NC  - Pt on 35L/min HFNC overnight, with sats between 92-97% and pt comfortable except for episode of shallow breathing this AM  - RVP negative, no evidence of PNA on CT. Pt continues to be afebrile, however pt now meets sepsis criteria per AM labs with WBC 3.51, HR >90, RR >20 and possible infectious source in lungs. Sending blood cultures.   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 Unlikely that pleural effusions caused this pt's sudden-onset worsening of SOB. Most likely progression of disease. Additionally, 3% chance of ILD/pneumonitis with Tagrisso, which could be contributing.  - Pt given solumedrol 40mg IV, will continue per pulm recs - pt presenting with sudden onset SOB, found to be hypoxic to SpO2 60s in ED. Placed on NRB at 15L with improvement to 100%, then downgraded to 5L NC with SpO2 92%. Given ABG showing hypoxemia on NC, pt was switched to high flow NC  - Pt on 35L/min HFNC overnight, with sats between 92-97% and pt comfortable except for episode of shallow breathing this AM  - RVP negative, no evidence of PNA on CT. Pt continues to be afebrile. WBC now uptrending, no longer meets SIRS criteria. Blood cultures sent this AM. Procalcitonin noncontributory.  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 Unlikely that pleural effusions caused this pt's sudden-onset worsening of SOB. Most likely progression of disease. Additionally, 3% chance of ILD/pneumonitis with Tagrisso, which could be contributing.  - Pt given solumedrol 40mg IV, will continue per pulm recs

## 2019-11-13 NOTE — PROGRESS NOTE ADULT - ASSESSMENT
59 year old M presents with acute respiratory failure due to pleural effusions possibly secondary to progression of NSCLC. On HFNC with improvement in symptoms.

## 2019-11-13 NOTE — PROGRESS NOTE ADULT - ASSESSMENT
Patient is a 60 yo M w/ NSCLC (dx'ed 3 years ago, currently on Osimertinib/Tagrisso) w/ extension to R neck (s/p RT) presents to the ED for acute onset dyspnea. Of note, patient was recently hospitalized for PNA (10/26-11/1), treated empirically w/ Vanc/Zosyn and also had a 1.5 L thoracentesis which showed malignant cells. He was discharged on oxygen 4L NC and was stable until he became acutely dyspneic, tachypneic, and tachycardic on morning of admission. CTA was negative for PE but showed small b/l pleural effusions, some increase in hilar mass, interlobular septal thickening.

## 2019-11-13 NOTE — PROGRESS NOTE ADULT - ASSESSMENT
59M with NSCLC (currently treated with Tagrisso and radiation) and prior admission 10/26-11/1 for AHRF in the setting of PNA, respiratory virus and pleural effusion s/p thoracentesis, LA'ed on 4L home O2, presenting with sudden onset SOB as of this AM. Also c/o chronic cough productive of yellow sputum as well as orthopnea and leg swelling for 1-2 months. 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 There is no evidence of PE. 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

## 2019-11-13 NOTE — PROGRESS NOTE ADULT - SUBJECTIVE AND OBJECTIVE BOX
[facility] Division of Internal Medicine  Romain Zuletaidi, PGY-1  Pager: [facility]: [phone] | [facility]: [phone]    Patient is a 59y old  Male who presents with a chief complaint of SOB (12 Nov 2019 11:30)      SUBJECTIVE / OVERNIGHT EVENTS: No acute events overnight    ADDITIONAL REVIEW OF SYSTEMS: No CP, SOB, fever, chills, nausea, vomiting, diarrhea, abdominal pain. Improved SOB.       MEDICATIONS  (STANDING):  guaiFENesin  milliGRAM(s) Oral every 12 hours  heparin  Injectable 5000 Unit(s) SubCutaneous every 8 hours  hydrocodone/homatropine Syrup 5 milliLiter(s) Oral every 6 hours  methylPREDNISolone sodium succinate Injectable 40 milliGRAM(s) IV Push daily    MEDICATIONS  (PRN):  albuterol/ipratropium for Nebulization 3 milliLiter(s) Nebulizer every 6 hours PRN Shortness of Breath and/or Wheezing      CAPILLARY BLOOD GLUCOSE        I&O's Summary    12 Nov 2019 07:01  -  13 Nov 2019 07:00  --------------------------------------------------------  IN: 480 mL / OUT: 0 mL / NET: 480 mL        PHYSICAL EXAM:  Vital Signs Last 24 Hrs  T(C): 36.6 (13 Nov 2019 05:45), Max: 36.9 (12 Nov 2019 18:35)  T(F): 97.9 (13 Nov 2019 05:45), Max: 98.4 (12 Nov 2019 18:35)  HR: 91 (13 Nov 2019 06:35) (90 - 108)  BP: 117/79 (13 Nov 2019 05:45) (107/69 - 125/83)  BP(mean): --  RR: 17 (13 Nov 2019 06:35) (17 - 18)  SpO2: 93% (13 Nov 2019 06:35) (92% - 98%)  CONSTITUTIONAL: on HFNC, NAD  HEAD: Head atraumatic, normal cephalic shape.  EYES: sclera clear bilaterally,  CARDIAC: regular rate, normal S1/S2, no murmurs  RESPIRATORY: no respiratory distress, R base decreased breath sounds  CHEST: no erythema, warmth, tenderness or crepitus  GASTROINTESTINAL: soft, nontender, bowel sounds present  MUSCULOSKELETAL: normal strength and ROM, no muscle or joint tenderness  NEUROLOGICAL: AAOx3, no focal deficits, full strength   SKIN: normal skin color, no apparent rashes  PSYCH: normal mood/affect    LABS:                        11.7   3.51  )-----------( 110      ( 12 Nov 2019 09:06 )             37.1     11-12    139  |  101  |  24<H>  ----------------------------<  125<H>  4.4   |  26  |  1.13    Ca    8.7      12 Nov 2019 06:51  Phos  4.1     11-12  Mg     2.3     11-12    TPro  6.4  /  Alb  3.3  /  TBili  0.4  /  DBili  x   /  AST  42<H>  /  ALT  22  /  AlkPhos  128<H>  11-12    PT/INR - ( 11 Nov 2019 10:21 )   PT: 12.1 sec;   INR: 1.06 ratio         PTT - ( 11 Nov 2019 10:21 )  PTT:32.8 sec            RADIOLOGY & ADDITIONAL TESTS:  Results Reviewed:   Imaging Personally Reviewed:  Electrocardiogram Personally Reviewed:    COORDINATION OF CARE:  Care Discussed with Consultants/Other Providers [Y/N]:  Prior or Outpatient Records Reviewed [Y/N]: [facility] Division of Internal Medicine  Romain Zuletaidi, PGY-1  Pager: [facility]: [phone] | [facility]: [phone]    Patient is a 59y old  Male who presents with a chief complaint of SOB (12 Nov 2019 11:30)      SUBJECTIVE / OVERNIGHT EVENTS: No acute events overnight    ADDITIONAL REVIEW OF SYSTEMS: No CP, SOB, fever, chills, nausea, vomiting, diarrhea, abdominal pain. Improved SOB.       MEDICATIONS  (STANDING):  guaiFENesin  milliGRAM(s) Oral every 12 hours  heparin  Injectable 5000 Unit(s) SubCutaneous every 8 hours  hydrocodone/homatropine Syrup 5 milliLiter(s) Oral every 6 hours  methylPREDNISolone sodium succinate Injectable 40 milliGRAM(s) IV Push daily    MEDICATIONS  (PRN):  albuterol/ipratropium for Nebulization 3 milliLiter(s) Nebulizer every 6 hours PRN Shortness of Breath and/or Wheezing      CAPILLARY BLOOD GLUCOSE        I&O's Summary    12 Nov 2019 07:01  -  13 Nov 2019 07:00  --------------------------------------------------------  IN: 480 mL / OUT: 0 mL / NET: 480 mL        PHYSICAL EXAM:  Vital Signs Last 24 Hrs  T(C): 36.6 (13 Nov 2019 05:45), Max: 36.9 (12 Nov 2019 18:35)  T(F): 97.9 (13 Nov 2019 05:45), Max: 98.4 (12 Nov 2019 18:35)  HR: 91 (13 Nov 2019 06:35) (90 - 108)  BP: 117/79 (13 Nov 2019 05:45) (107/69 - 125/83)  BP(mean): --  RR: 17 (13 Nov 2019 06:35) (17 - 18)  SpO2: 93% (13 Nov 2019 06:35) (92% - 98%)  CONSTITUTIONAL: on HFNC, NAD  HEAD: Head atraumatic, normal cephalic shape.  EYES: sclera clear bilaterally,  CARDIAC: regular rate, normal S1/S2, no murmurs  RESPIRATORY: no respiratory distress, R base decreased breath sounds  CHEST: no erythema, warmth, tenderness or crepitus  GASTROINTESTINAL: soft, nontender, bowel sounds present  MUSCULOSKELETAL: +2 LE edema b/l, normal strength and ROM, no muscle or joint tenderness  NEUROLOGICAL: AAOx3, no focal deficits, full strength   SKIN: normal skin color, no apparent rashes  PSYCH: normal mood/affect    LABS:                        11.7   3.51  )-----------( 110      ( 12 Nov 2019 09:06 )             37.1     11-12    139  |  101  |  24<H>  ----------------------------<  125<H>  4.4   |  26  |  1.13    Ca    8.7      12 Nov 2019 06:51  Phos  4.1     11-12  Mg     2.3     11-12    TPro  6.4  /  Alb  3.3  /  TBili  0.4  /  DBili  x   /  AST  42<H>  /  ALT  22  /  AlkPhos  128<H>  11-12    PT/INR - ( 11 Nov 2019 10:21 )   PT: 12.1 sec;   INR: 1.06 ratio         PTT - ( 11 Nov 2019 10:21 )  PTT:32.8 sec            RADIOLOGY & ADDITIONAL TESTS:  Results Reviewed:   Imaging Personally Reviewed:  Electrocardiogram Personally Reviewed:    COORDINATION OF CARE:  Care Discussed with Consultants/Other Providers [Y/N]:  Prior or Outpatient Records Reviewed [Y/N]:

## 2019-11-13 NOTE — PROGRESS NOTE ADULT - PROBLEM SELECTOR PLAN 4
- Pt followed outpatient with Dr. Deras at [facility]; currently on Tagrisso 80mg PO QD and in between courses of radiation  - Hold Tagrisso for now  - As above, pt likely experiencing progression of disease on Tagrisso +/- possible toxicity from the drug

## 2019-11-14 DIAGNOSIS — R13.10 DYSPHAGIA, UNSPECIFIED: ICD-10-CM

## 2019-11-14 DIAGNOSIS — R53.81 OTHER MALAISE: ICD-10-CM

## 2019-11-14 DIAGNOSIS — R05 COUGH: ICD-10-CM

## 2019-11-14 DIAGNOSIS — Z51.5 ENCOUNTER FOR PALLIATIVE CARE: ICD-10-CM

## 2019-11-14 DIAGNOSIS — R06.00 DYSPNEA, UNSPECIFIED: ICD-10-CM

## 2019-11-14 LAB
ALBUMIN SERPL ELPH-MCNC: 3.4 G/DL — SIGNIFICANT CHANGE UP (ref 3.3–5)
ALP SERPL-CCNC: 131 U/L — HIGH (ref 40–120)
ALT FLD-CCNC: 22 U/L — SIGNIFICANT CHANGE UP (ref 10–45)
ANION GAP SERPL CALC-SCNC: 10 MMOL/L — SIGNIFICANT CHANGE UP (ref 5–17)
AST SERPL-CCNC: 37 U/L — SIGNIFICANT CHANGE UP (ref 10–40)
BASOPHILS # BLD AUTO: 0 K/UL — SIGNIFICANT CHANGE UP (ref 0–0.2)
BASOPHILS NFR BLD AUTO: 0 % — SIGNIFICANT CHANGE UP (ref 0–2)
BILIRUB SERPL-MCNC: 0.5 MG/DL — SIGNIFICANT CHANGE UP (ref 0.2–1.2)
BUN SERPL-MCNC: 32 MG/DL — HIGH (ref 7–23)
CALCIUM SERPL-MCNC: 9.1 MG/DL — SIGNIFICANT CHANGE UP (ref 8.4–10.5)
CHLORIDE SERPL-SCNC: 98 MMOL/L — SIGNIFICANT CHANGE UP (ref 96–108)
CO2 SERPL-SCNC: 30 MMOL/L — SIGNIFICANT CHANGE UP (ref 22–31)
CREAT SERPL-MCNC: 1.09 MG/DL — SIGNIFICANT CHANGE UP (ref 0.5–1.3)
EOSINOPHIL # BLD AUTO: 0 K/UL — SIGNIFICANT CHANGE UP (ref 0–0.5)
EOSINOPHIL NFR BLD AUTO: 0 % — SIGNIFICANT CHANGE UP (ref 0–6)
FERRITIN SERPL-MCNC: 1216 NG/ML — HIGH (ref 30–400)
GLUCOSE SERPL-MCNC: 123 MG/DL — HIGH (ref 70–99)
HAPTOGLOB SERPL-MCNC: 181 MG/DL — SIGNIFICANT CHANGE UP (ref 34–200)
HCT VFR BLD CALC: 37.5 % — LOW (ref 39–50)
HGB BLD-MCNC: 12.1 G/DL — LOW (ref 13–17)
IMM GRANULOCYTES NFR BLD AUTO: 0.4 % — SIGNIFICANT CHANGE UP (ref 0–1.5)
IRON SATN MFR SERPL: 31 % — SIGNIFICANT CHANGE UP (ref 16–55)
IRON SATN MFR SERPL: 62 UG/DL — SIGNIFICANT CHANGE UP (ref 45–165)
LDH SERPL L TO P-CCNC: 453 U/L — HIGH (ref 50–242)
LYMPHOCYTES # BLD AUTO: 0.53 K/UL — LOW (ref 1–3.3)
LYMPHOCYTES # BLD AUTO: 10.5 % — LOW (ref 13–44)
MAGNESIUM SERPL-MCNC: 2.6 MG/DL — SIGNIFICANT CHANGE UP (ref 1.6–2.6)
MCHC RBC-ENTMCNC: 29.2 PG — SIGNIFICANT CHANGE UP (ref 27–34)
MCHC RBC-ENTMCNC: 32.3 GM/DL — SIGNIFICANT CHANGE UP (ref 32–36)
MCV RBC AUTO: 90.4 FL — SIGNIFICANT CHANGE UP (ref 80–100)
MONOCYTES # BLD AUTO: 0.26 K/UL — SIGNIFICANT CHANGE UP (ref 0–0.9)
MONOCYTES NFR BLD AUTO: 5.1 % — SIGNIFICANT CHANGE UP (ref 2–14)
NEUTROPHILS # BLD AUTO: 4.26 K/UL — SIGNIFICANT CHANGE UP (ref 1.8–7.4)
NEUTROPHILS NFR BLD AUTO: 84 % — HIGH (ref 43–77)
PHOSPHATE SERPL-MCNC: 4.2 MG/DL — SIGNIFICANT CHANGE UP (ref 2.5–4.5)
PLATELET # BLD AUTO: 105 K/UL — LOW (ref 150–400)
POTASSIUM SERPL-MCNC: 4.2 MMOL/L — SIGNIFICANT CHANGE UP (ref 3.5–5.3)
POTASSIUM SERPL-SCNC: 4.2 MMOL/L — SIGNIFICANT CHANGE UP (ref 3.5–5.3)
PROT SERPL-MCNC: 6.9 G/DL — SIGNIFICANT CHANGE UP (ref 6–8.3)
RBC # BLD: 4.15 M/UL — LOW (ref 4.2–5.8)
RBC # BLD: 4.15 M/UL — LOW (ref 4.2–5.8)
RBC # FLD: 14.7 % — HIGH (ref 10.3–14.5)
RETICS #: 48.1 K/UL — SIGNIFICANT CHANGE UP (ref 25–125)
RETICS/RBC NFR: 1.2 % — SIGNIFICANT CHANGE UP (ref 0.5–2.5)
SODIUM SERPL-SCNC: 138 MMOL/L — SIGNIFICANT CHANGE UP (ref 135–145)
TIBC SERPL-MCNC: 201 UG/DL — LOW (ref 220–430)
TRANSFERRIN SERPL-MCNC: 160 MG/DL — LOW (ref 200–360)
UIBC SERPL-MCNC: 139 UG/DL — SIGNIFICANT CHANGE UP (ref 110–370)
WBC # BLD: 5.07 K/UL — SIGNIFICANT CHANGE UP (ref 3.8–10.5)
WBC # FLD AUTO: 5.07 K/UL — SIGNIFICANT CHANGE UP (ref 3.8–10.5)

## 2019-11-14 PROCEDURE — 99498 ADVNCD CARE PLAN ADDL 30 MIN: CPT

## 2019-11-14 PROCEDURE — 99223 1ST HOSP IP/OBS HIGH 75: CPT | Mod: GC

## 2019-11-14 PROCEDURE — 99223 1ST HOSP IP/OBS HIGH 75: CPT

## 2019-11-14 PROCEDURE — 99497 ADVNCD CARE PLAN 30 MIN: CPT | Mod: 25

## 2019-11-14 PROCEDURE — 99233 SBSQ HOSP IP/OBS HIGH 50: CPT | Mod: GC

## 2019-11-14 RX ADMIN — Medication 40 MILLIGRAM(S): at 05:35

## 2019-11-14 RX ADMIN — HEPARIN SODIUM 5000 UNIT(S): 5000 INJECTION INTRAVENOUS; SUBCUTANEOUS at 14:04

## 2019-11-14 RX ADMIN — HEPARIN SODIUM 5000 UNIT(S): 5000 INJECTION INTRAVENOUS; SUBCUTANEOUS at 05:35

## 2019-11-14 RX ADMIN — Medication 600 MILLIGRAM(S): at 05:43

## 2019-11-14 RX ADMIN — Medication 600 MILLIGRAM(S): at 18:08

## 2019-11-14 NOTE — CONSULT NOTE ADULT - ASSESSMENT
59 year old M presents with acute on chronic hypoxic respiratory failure due to pleural effusions possibly secondary to progression of NSCLC vs constrictive pericarditis and pulm HTN (PA pressure 49). On HFNC and diuresis with lasix 40mg IV qD with mild improvement in symptoms. Found to have progression of malignancy. Palliative consult ordered, goals of care under discussion.     RECOMMENDATIONS:  (will discuss with attending) 59 year old M presents with acute on chronic hypoxic respiratory failure due to pleural effusions possibly secondary to progression of NSCLC vs constrictive pericarditis and pulm HTN (PA pressure 49). On HFNC and diuresis with lasix 40mg IV qD with mild improvement in symptoms. Found to have progression of malignancy. Palliative consult ordered, goals of care under discussion.     RECOMMENDATIONS:    -appears well compensated on exam from cardiac standpoint  -from constrictive pericarditis point of view, would only need Lasix 40mg daily  -close monitoring BP if pursue aggressive IV diuresis from pulm side  -no advanced therapies for constrictive pericarditis at this time and is likely innocent bystander in grand scheme of malignancy and pulmonary issues  -would focus on treating underlying malignancy per primary    Above recs are final.  If you have any questions, please give us a call.  Otherwise, we will sign off at this time.    Discussed w/ Dr. Александр Felix MD  Cardiology Fellow - PGY 4  Text or Call: [phone]  For all New Consults and Questions:  [email or web address]   Login: [email or web address]

## 2019-11-14 NOTE — GOALS OF CARE CONVERSATION - ADVANCED CARE PLANNING - CONVERSATION DETAILS
Extensive discussion with the patient  He is roughly 2 years into his diagnosis and he receives oral Tx from his oncologist in [facility]  In the past three months, he went to [facility] for a consultation.  At that time, it was known that the patient's illness was progressing through Tagrisso.  The visited concluded with the offer to treat his cancer with systemic chemo.  According to the patient, his [facility] oncologist was recommending an [facility] based clinical trial.  The patient is eager to attempt systemic therapy, if discharged, at  [facility].  Once this treatment is no longer viable, he then would like to transition to clinical trial if that opportunity exists.  His current goal is to maximize his life as long as he can using a variety of sequential medications, obtaining the maximum successive benefit from each.

## 2019-11-14 NOTE — CONSULT NOTE ADULT - PROBLEM SELECTOR RECOMMENDATION 2
Principle issue  Probably due  Associate issues  Status  Evolution  Current regimen  Recommendations  Action items Principal issue:   Probably due lung cancer, aspiration causing this cough is lower on the differential  Associated issues: No fevers or chills  Status: Persistent and uncontrolled, No hemoptysis  Evolution: No changes but not lessening  Current regimen: Hycodan  Recommendations: Discussed gabapentin initiation with the patient to minimize  cough given lung cancer  Action items

## 2019-11-14 NOTE — CONSULT NOTE ADULT - SUBJECTIVE AND OBJECTIVE BOX
HPI:  59 year old M PMHx NSCLC (on radiation and Tagrisso) presents to the ED for acute onset dyspnea. Pt states he was in his usual state of health and suddenly became dyspneic, tachypneic, and tachycardic. No inciting factors. Endorses chronic cough with yellow sputum production, and subacute LE edema and orthopnea at night. Also endorses unintentional weight loss for the past year or so. Pt was recently hospitalized for PNA in Oct and received vanco and zosyn. He had a 1.5 L thoracentesis which showed malignant cells. He is on home oxygen 4L NC.     In the ED, he was hypoxic to SpO2 60%, started on nonrebreather 15 LPM and weaned to ~5L NC with O2 sat at ~90%. Tmax was 99.2, HR was 123, /88, RR 22, 97% at 15 LPM nonrebreather. CTA was negative for PE but showed small b/l pleural effusions, some increase in hilar mass, interlobular septal thickening. Given Lasix 40 mg IV, started on duonebs. TTE ordered, pulm consulted. (11 Nov 2019 17:01)    PERTINENT PM/SXH:   Non-small cell carcinoma of lung  Hypertension  No pertinent past medical history  No pertinent past medical history    No significant past surgical history    FAMILY HISTORY:  Family history of cervical cancer    ITEMS NOT CHECKED ARE NOT PRESENT    SOCIAL HISTORY:   Significant other/partner:  [x ]  Children:  [ ]  Latter day/Spirituality:  Substance hx:  [ ]   Tobacco hx:  [ ]   Alcohol hx: [ ]   Home Opioid hx:  [ ] I-Stop Reference No:  Living Situation: [x ]Home  [ ]Long term care  [ ]Rehab [ ]Other    ADVANCE DIRECTIVES:    DNR  MOLST  [ ]  Living Will  [ ]   DECISION MAKER(s):  [ ] Health Care Proxy(s)  [ ] Surrogate(s)  [ ] Guardian           Name(s):   Phone Number(s):    BASELINE (I)ADL(s) (prior to admission):  [place]: [ ]Total  [ ] Moderate [ ]Dependent    Allergies    No Known Allergies    Intolerances      MEDICATIONS  (STANDING):  guaiFENesin  milliGRAM(s) Oral every 12 hours  heparin  Injectable 5000 Unit(s) SubCutaneous every 8 hours  hydrocodone/homatropine Syrup 5 milliLiter(s) Oral every 6 hours  methylPREDNISolone sodium succinate Injectable 40 milliGRAM(s) IV Push daily    MEDICATIONS  (PRN):  albuterol/ipratropium for Nebulization 3 milliLiter(s) Nebulizer every 6 hours PRN Shortness of Breath and/or Wheezing    PRESENT SYMPTOMS: [ ]Unable to obtain due to poor mentation   Source if other than patient:  [ ]Family   [ ]Team  [ ] Staff [ ] Inferred    Pain: [ ] yes [x ] no  QOL impact -   Location -                    Aggravating factors -  Quality -  Radiation -  Timing-  Severity (0-10 scale):  Minimal acceptable level (0-10 scale):       Pain Assessment (scores are out of 10)    O  L  D  C  A  R  T  S    Pain Now:  Pain average over 24 hours:  Pain maximum:  Onset of prn (minutes):  Pain minimum:  Time to pain minimum (minutes):  Personalized pain goal:  Duration of prn (hours):  Ascent of pain:  Pain score at which prn is requested:    Does the pain have a negative impact on the following domains  An "X" denotes yes    General activity                        []  Walking ability                          []  Mood                                          []  Sleep                                           []  Normal Work                            []  Relations with other people  []  Enjoyment of life                     []  Cognitive Tasks                        []      PAIN AD Score: 0    http://geriatrictoolkit.[facility]/cog/painad.pdf (press ctrl +  left click to view)          Dyspnea:                           [ ]Mild [ ]Moderate [ ]Severe  Anxiety:                             [ ]Mild [ ]Moderate [ ]Severe  Fatigue:                             [ ]Mild [ ]Moderate [ ]Severe  Nausea:                             [ ]Mild [ ]Moderate [ ]Severe  Loss of appetite:              [ ]Mild [ ]Moderate [ ]Severe  Constipation:                    [ ]Mild [ ]Moderate [ ]Severe    Other Symptoms:  [x ]All other review of systems negative     Karnofsky Performance Score/Palliative Performance Status Version 2:      50   %      http://npcrc.org/files/news/palliative_performance_scale_ppsv2.pdf      PHYSICAL EXAM:  Vital Signs Last 24 Hrs  T(C): 36.7 (14 Nov 2019 05:30), Max: 37 (13 Nov 2019 19:39)  T(F): 98 (14 Nov 2019 05:30), Max: 98.6 (13 Nov 2019 19:39)  HR: 105 (14 Nov 2019 08:50) (83 - 105)  BP: 119/72 (14 Nov 2019 05:30) (119/72 - 137/85)  BP(mean): --  RR: 20 (14 Nov 2019 08:50) (18 - 20)  SpO2: 90% (14 Nov 2019 08:50) (90% - 92%) I&O's Summary    13 Nov 2019 07:01  -  14 Nov 2019 07:00  --------------------------------------------------------  IN: 1200 mL / OUT: 0 mL / NET: 1200 mL      GENERAL:  [ x]Alert  [ ]Oriented x   [ ]Lethargic  [ ]Cachexia  [ ]Unarousable  [ ]Verbal  [ ]Non-Verbal [  x ] No distress   [   ] Gaunt  Behavioral:   [ ] Anxiety  [ ] Delirium [ ] Agitation  [ x  ]  Calm [ ] Other  HEENT:  [x ]Normal   [ ]Dry mouth   [ ]ET Tube/Trach  [ ]Oral lesions  [ ] Temporal Wasting  [ ]  Mucositis [ ]  Grade   PULMONARY:   [x ]Clear [ ]Tachypnea  [ ]Audible excessive secretions   [ ]Rhonchi        [ ]Right [ ]Left [ ]Bilateral  [ ]Crackles        [ ]Right [ ]Left [ ]Bilateral  [ ]Wheezing     [ ]Right [ ]Left [ ]Bilateral  [ ] Diminished  [ ] Right  [  ] Left   [ ] Bilaterally  CARDIOVASCULAR:    [x ]Regular [ ]Irregular [ ]Tachy  [ ]Kirk [ ]Murmur [  ]   Edema  [ ]Other  GASTROINTESTINAL:  [ x]Soft  [ ]Distended   [ ]+BS  [ x]Non tender [ ]Tender  [ ]PEG [ ]OGT/ NGT    Last BM:       GENITOURINARY:  [x ]Normal [ ] Incontinent   [ ]Oliguria/Anuria   [ ]Novak [   ] Suprapubic tenderness  MUSCULOSKELETAL:   [x ]Normal   [ ]Weakness  [ ]Bed/Wheelchair bound [ ]Edema [  ] amputation  [  ] contraction  NEUROLOGIC:   [ x]No focal deficits  [ ] Cognitive impairment  [ ] Dysphagia [ ]Dysarthria [ ] Paresis [  ] Aphasia  [ ]Other   SKIN:   [ x]Normal   [ ]Pressure ulcer(s)  [ ]Rash [   ]  Wound    [  ] hyperpigmentation    CRITICAL CARE:  [ ] Shock Present  [ ]Septic [ ]Cardiogenic [ ]Neurologic [ ]Hypovolemic  [ ]  Vasopressors [ ]  Inotropes   [ ] Respiratory failure present [ ] mechanical ventilation [ ] non-invasive ventilatory support [ ] High flow  [ ] Acute  [ ] Chronic [ ] Hypoxic  [ ] Hypercarbic [ ] Other  [ ] Other organ failure       LABS:                        11.9   6.89  )-----------( 120      ( 13 Nov 2019 10:24 )             36.6   11-14    138  |  98  |  32<H>  ----------------------------<  123<H>  4.2   |  30  |  1.09    Ca    9.1      14 Nov 2019 06:26  Phos  4.2     11-14  Mg     2.6     11-14    TPro  6.9  /  Alb  3.4  /  TBili  0.5  /  DBili  x   /  AST  37  /  ALT  22  /  AlkPhos  131<H>  11-14                RADIOLOGY & ADDITIONAL STUDIES:    PROTEIN CALORIE MALNUTRITION PRESENT: [ ] Yes [ ] No  [ ] PPSV2 < or = to 30% [ ] significant weight loss  [ ] poor nutritional intake [ ] catabolic state [ ] anasarca     Albumin, Serum: 3.4 g/dL (11-14-19 @ 06:26)  Artificial Nutrition [ ]     REFERRALS:   [ ]Chaplaincy  [ ] Hospice  [ ]Child Life  [ ]Social Work  [ ]Case management [ ]Holistic Therapy     Goals of Care Document:   Care Coordination Assessment [C. Provider] (11-12-19 @ 15:39)   Progress Notes - Care Coordination [C. Provider] (11-12-19 @ 15:43) HPI:  59 year old M PMHx NSCLC (on radiation and Tagrisso) presents to the ED for acute onset dyspnea. Pt states he was in his usual state of health and suddenly became dyspneic, tachypneic, and tachycardic. No inciting factors. Endorses chronic cough with yellow sputum production, and subacute LE edema and orthopnea at night. Also endorses unintentional weight loss for the past year or so. Pt was recently hospitalized for PNA in Oct and received vanco and zosyn. He had a 1.5 L thoracentesis which showed malignant cells. He is on home oxygen 4L NC.     In the ED, he was hypoxic to SpO2 60%, started on nonrebreather 15 LPM and weaned to ~5L NC with O2 sat at ~90%. Tmax was 99.2, HR was 123, /88, RR 22, 97% at 15 LPM nonrebreather. CTA was negative for PE but showed small b/l pleural effusions, some increase in hilar mass, interlobular septal thickening. Given Lasix 40 mg IV, started on duonebs. TTE ordered, pulm consulted. (11 Nov 2019 17:01)      See full narrative below, with embedded content in the plan      PERTINENT PM/SXH:   Non-small cell carcinoma of lung  Hypertension  No pertinent past medical history  No pertinent past medical history    No significant past surgical history    FAMILY HISTORY:  Family history of cervical cancer    ITEMS NOT CHECKED ARE NOT PRESENT    SOCIAL HISTORY:   Significant other/partner:  [x ]  Children:  [ x]  Scientology/Spirituality:  Substance hx:  [ ]   Tobacco hx:  [ ]   Alcohol hx: [ ]   Home Opioid hx:  [ ] I-Stop Reference No:  Living Situation: [x ]Home  [ ]Long term care  [ ]Rehab [ ]Other    ADVANCE DIRECTIVES:    DNR  MOLST  [ ]  Living Will  [ ]   DECISION MAKER(s):  [ ] Health Care Proxy(s)  [ ] Surrogate(s)  [ ] Guardian           Name(s):   Phone Number(s):    BASELINE (I)ADL(s) (prior to admission):  [place]: [ ]Total  [ ] Moderate [ ]Dependent    Allergies    No Known Allergies    Intolerances      MEDICATIONS  (STANDING):  guaiFENesin  milliGRAM(s) Oral every 12 hours  heparin  Injectable 5000 Unit(s) SubCutaneous every 8 hours  hydrocodone/homatropine Syrup 5 milliLiter(s) Oral every 6 hours  methylPREDNISolone sodium succinate Injectable 40 milliGRAM(s) IV Push daily    MEDICATIONS  (PRN):  albuterol/ipratropium for Nebulization 3 milliLiter(s) Nebulizer every 6 hours PRN Shortness of Breath and/or Wheezing    PRESENT SYMPTOMS: [ ]Unable to obtain due to poor mentation   Source if other than patient:  [ ]Family   [ ]Team  [ ] Staff [ ] Inferred    Pain: [ ] yes [x ] no  QOL impact -   Location -                    Aggravating factors -  Quality -  Radiation -  Timing-  Severity (0-10 scale):  Minimal acceptable level (0-10 scale):       Pain Assessment (scores are out of 10)    O  L  D  C  A  R  T  S    Pain Now:  Pain average over 24 hours:  Pain maximum:  Onset of prn (minutes):  Pain minimum:  Time to pain minimum (minutes):  Personalized pain goal:  Duration of prn (hours):  Ascent of pain:  Pain score at which prn is requested:    Does the pain have a negative impact on the following domains  An "X" denotes yes    General activity                        []  Walking ability                          []  Mood                                          []  Sleep                                           []  Normal Work                            []  Relations with other people  []  Enjoyment of life                     []  Cognitive Tasks                        []      PAIN AD Score: 0    http://geriatrictoolkit.[place].[facility]/cog/painad.pdf (press ctrl +  left click to view)          Dyspnea:                           [ ]Mild [ x]Moderate [ ]Severe  Anxiety:                             [ ]Mild [ ]Moderate [ ]Severe  Fatigue:                             [ ]Mild [ ]Moderate [ ]Severe  Nausea:                             [ ]Mild [ ]Moderate [ ]Severe  Loss of appetite:              [ ]Mild [ ]Moderate [ ]Severe  Constipation:                    [ ]Mild [ ]Moderate [ ]Severe    Other Symptoms:  [x ]All other review of systems negative     Karnofsky Performance Score/Palliative Performance Status Version 2:      50   %      http://npcrc.org/files/news/palliative_performance_scale_ppsv2.pdf      PHYSICAL EXAM:  Vital Signs Last 24 Hrs  T(C): 36.7 (14 Nov 2019 05:30), Max: 37 (13 Nov 2019 19:39)  T(F): 98 (14 Nov 2019 05:30), Max: 98.6 (13 Nov 2019 19:39)  HR: 105 (14 Nov 2019 08:50) (83 - 105)  BP: 119/72 (14 Nov 2019 05:30) (119/72 - 137/85)  BP(mean): --  RR: 20 (14 Nov 2019 08:50) (18 - 20)  SpO2: 90% (14 Nov 2019 08:50) (90% - 92%) I&O's Summary    13 Nov 2019 07:01  -  14 Nov 2019 07:00  --------------------------------------------------------  IN: 1200 mL / OUT: 0 mL / NET: 1200 mL      GENERAL:  [ x]Alert  [ ]Oriented x   [ ]Lethargic  [ ]Cachexia  [ ]Unarousable  [ ]Verbal  [ ]Non-Verbal [  x ] No distress   [   ] Gaunt  Behavioral:   [ ] Anxiety  [ ] Delirium [ ] Agitation  [ x  ]  Calm [ ] Other  HEENT:  [  ]Normal   [ ]Dry mouth   [ ]ET Tube/Trach  [ ]Oral lesions  [ ] Temporal Wasting  [ ]  Mucositis [ ]  Grade  HiFlo  PULMONARY:   [ ]Clear [ ]Tachypnea  [ ]Audible excessive secretions   [ ]Rhonchi        [ ]Right [ ]Left [ ]Bilateral  x[ ]Crackles        [ ]Right [x ]Left [ ]Bilateral  [ ]Wheezing     [ ]Right [ ]Left [ ]Bilateral  [x ] Diminished  [ ] Right  [  ] Left   [ x] Bilaterally  CARDIOVASCULAR:    [x ]Regular [ ]Irregular [ ]Tachy  [ ]Kirk [ ]Murmur [  ]   Edema  [ ]Other  GASTROINTESTINAL:  [ x]Soft  [ ]Distended   [ ]+BS  [ x]Non tender [ ]Tender  [ ]PEG [ ]OGT/ NGT    Last BM:       GENITOURINARY:  [x ]Normal [ ] Incontinent   [ ]Oliguria/Anuria   [ ]Novak [   ] Suprapubic tenderness  MUSCULOSKELETAL:   [x ]Normal   [ ]Weakness  [ ]Bed/Wheelchair bound [ ]Edema [  ] amputation  [  ] contraction  NEUROLOGIC:   [ x]No focal deficits  [ ] Cognitive impairment  [ ] Dysphagia [ ]Dysarthria [ ] Paresis [  ] Aphasia  [ ]Other   SKIN:   [ x]Normal   [ ]Pressure ulcer(s)  [ ]Rash [   ]  Wound    [  ] hyperpigmentation    CRITICAL CARE:  [ ] Shock Present  [ ]Septic [ ]Cardiogenic [ ]Neurologic [ ]Hypovolemic  [ ]  Vasopressors [ ]  Inotropes   [ ] Respiratory failure present [ ] mechanical ventilation [ ] non-invasive ventilatory support [ ] High flow  [ ] Acute  [ ] Chronic [ ] Hypoxic  [ ] Hypercarbic [ ] Other  [ ] Other organ failure       LABS:                        11.9   6.89  )-----------( 120      ( 13 Nov 2019 10:24 )             36.6   11-14    138  |  98  |  32<H>  ----------------------------<  123<H>  4.2   |  30  |  1.09    Ca    9.1      14 Nov 2019 06:26  Phos  4.2     11-14  Mg     2.6     11-14    TPro  6.9  /  Alb  3.4  /  TBili  0.5  /  DBili  x   /  AST  37  /  ALT  22  /  AlkPhos  131<H>  11-14                RADIOLOGY & ADDITIONAL STUDIES:    PROTEIN CALORIE MALNUTRITION PRESENT: [ ] Yes [ ] No  [ ] PPSV2 < or = to 30% [ ] significant weight loss  [ ] poor nutritional intake [ ] catabolic state [ ] anasarca     Albumin, Serum: 3.4 g/dL (11-14-19 @ 06:26)  Artificial Nutrition [ ]     REFERRALS:   [ ]Chaplaincy  [ ] Hospice  [ ]Child Life  [ ]Social Work  [ ]Case management [ ]Holistic Therapy     Goals of Care Document:   Care Coordination Assessment [C. Provider] (11-12-19 @ 15:39)   Progress Notes - Care Coordination [C. Provider] (11-12-19 @ 15:43)

## 2019-11-14 NOTE — CONSULT NOTE ADULT - ATTENDING COMMENTS
A review of the paper chart has been conducted  HCP form present:               Yes and valid []               Yes but invalid []                No []   MOLST present:                   Yes and valid []               Yes but invalid []                No []  Incapacity form present:   Yes and valid []                Yes but invalid []                No []                 N/A []  Living Will:                            Yes and valid []                Yes but invalid []                No []      Family Heatlh Care Decision Act Surrogate Decision Maker Hierarchy  [facility] Article 81 Guardian-->Spouse or domestic partner--> Adult child-->Parent-->Sibling--> Close friend      Contacts listed in the paper or electronic chart  Name  Relationship  Number(s) A review of the paper chart has been conducted  HCP form present:               Yes and valid []               Yes but invalid []                No []   MOLST present:                   Yes and valid []               Yes but invalid []                No []  Incapacity form present:   Yes and valid []                Yes but invalid []                No []                 N/A []  Living Will:                            Yes and valid []                Yes but invalid []                No []      Family Health Care Decision Act Surrogate Decision Maker Hierarchy  [facility] Article 81 Guardian-->Spouse or domestic partner--> Adult child-->Parent-->Sibling--> Close friend      Contacts listed in the paper or electronic chart  Name  Relationship  Number(s) A review of the paper chart has been conducted  HCP form present:               Yes and valid []               Yes but invalid []                No [x]   MOLST present:                   Yes and valid []               Yes but invalid []                No [x]  Incapacity form present:   Yes and valid []                Yes but invalid []                No []                 N/A []  Living Will:                            Yes and valid []                Yes but invalid []                No []      Family Health Care Decision Act Surrogate Decision Maker [facility] Article 81 Guardian-->Spouse or domestic partner--> Adult child-->Parent-->Sibling--> Close friend      Palliative Global Assessment  A review of the paper chart has been conducted  HCP form present:               Yes and valid []               Yes but invalid []                No []   MOLST present:                   Yes and valid []               Yes but invalid []                No []  Incapacity form present:   Yes and valid []                Yes but invalid []                No []                 N/A []  Living Will:                            Yes and valid []                Yes but invalid []                No []      Family Heat Care Decision Act Surrogate Decision Maker [facility] Article 81 Guardian-->Spouse or domestic partner--> Adult child-->Parent-->Sibling--> Close friend         Partner:   Family: Son 22 lives in the home as well  Residence: Two floor home in [place]  Functionality: Fair  Engagement in the home:  Employment: Retired, worked in the bank previously  Support network: Family  ---Neighbors Unclear role in support  ---Social Unclear role in support  ---Spiritual Unclear role in support  Financial concerns: None  HCP conversation: He claims his wife has a copy of the health care proxy, and she would be it    Extensive discussion with the patient  He is roughly 2 years into his diagnosis and he receives oral Tx from his oncologist in [facility]  In the past three months, he went to [facility] for a consultation.  At that time, it was known that the patient's illness was progressing through Tagrisso.  The visited concluded with the offer to treat his cancer with systemic chemo.  According to the patient, his [facility] oncologist was recommending an [facility] based clinical trial.  The patient is eager to attempt systemic therapy, if discharged, at  [facility].  Once this treatment is no longer viable, he then would like to transition to clinical trial if that opportunity exists.  His current goal is to maximize his life as long as he can using a variety of sequential medications, obtaining the maximum successive benefit from each.

## 2019-11-14 NOTE — PROGRESS NOTE ADULT - ASSESSMENT
59 year old M presents with acute respiratory failure due to pleural effusions possibly secondary to progression of NSCLC. On HFNC with initial improvement in symptoms. Found to have progression of disease

## 2019-11-14 NOTE — PROGRESS NOTE ADULT - SUBJECTIVE AND OBJECTIVE BOX
Chief Complaint: Patient is a 59y old  Male who presents with a chief complaint of SOB (13 Nov 2019 09:13)      INTERVAL HPI/OVERNIGHT EVENTS: No overnight events. Pt remains satting in low 90s on HFNC at 35 L/min. Says breathing remains improved on HFNC. Can go off NC for a few minutes, such as to use bathroom, without issue. Cough becoming more bothersome, with continued sputum and some pain today. Slept and ate well.     REVIEW OF SYSTEMS:   CONSTITUTIONAL:  Denies fever/chills, fatigue  Eyes:  Denies visual loss, blurred vision, double vision or scleral icterus.  Ears, Nose, Throat:  Denies hearing loss, sneezing, congestion, runny nose or sore throat.  SKIN:  Denies rash or itching.  CARDIOVASCULAR:  Denies chest pain, palpitations  RESPIRATORY: +SOB, cough productive of yellow sputum  GASTROINTESTINAL:  Denies anorexia, nausea, vomiting or diarrhea. No abdominal pain or blood.  GENITOURINARY:  Denies any hematuria, dysuria  NEUROLOGICAL:  Denies headache, dizziness, syncope, paralysis, ataxia, numbness or tingling in the extremities. No change in bowel or bladder control.  MUSCULOSKELETAL:  Denies muscle, back pain, joint pain or stiffness.  ENDOCRINOLOGIC:  Denies reports of sweating, cold or heat intolerance. No polyuria or polydipsia.      MEDICATIONS  (STANDING):  guaiFENesin  milliGRAM(s) Oral every 12 hours  heparin  Injectable 5000 Unit(s) SubCutaneous every 8 hours  hydrocodone/homatropine Syrup 5 milliLiter(s) Oral every 6 hours  methylPREDNISolone sodium succinate Injectable 40 milliGRAM(s) IV Push daily    MEDICATIONS  (PRN):  albuterol/ipratropium for Nebulization 3 milliLiter(s) Nebulizer every 6 hours PRN Shortness of Breath and/or Wheezing      Vital Signs Last 24 Hrs  T(C): 36.7 (14 Nov 2019 05:30), Max: 37 (13 Nov 2019 19:39)  T(F): 98 (14 Nov 2019 05:30), Max: 98.6 (13 Nov 2019 19:39)  HR: 85 (14 Nov 2019 05:30) (83 - 100)  BP: 119/72 (14 Nov 2019 05:30) (119/72 - 137/85)  BP(mean): --  RR: 18 (14 Nov 2019 05:30) (18 - 18)  SpO2: 90% (14 Nov 2019 05:30) (90% - 92%)  Supplemental O2: [ ] No, on Room Air [ ] Yes,     PHYSICAL EXAM:    GENERAL: NAD  HEAD:  NC/AT  EYES: EOMI, white sclerae  ENMT: MMM, no oropharyngeal lesions or erythema appreciated  Neck: trachea midline, no appreciable masses  Pulm: dry crackles b/l in mid and lower lung fields  CV: S1&S2+, tachycardic, no murmurs, rubs or gallops  ABDOMEN: soft, nontender, nondistended  : no cva tenderness, no aranda placed  EXTREMITIES: mild 2+ pitting edema in R leg, 1+ in L leg  Neuro: A&Ox3, no focal deficits  SKIN: warm and dry, no visible rash    LABS:         Microbiology:    Rapid Respiratory Viral Panel (11.11.19 @ 11:20)    Rapid RVP Result: NotDetec    RADIOLOGY & ADDITIONAL TESTS:    < from: CT Angio Chest w/ IV Cont (11.11.19 @ 13:49) >  IMPRESSION:     No pulmonary embolus.    Masslike opacities within the right upper and lower lower lobe with   extension into the hilum are slightly increased. 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    Small bilateral pleural effusions.    Soft tissue infiltrates of the mediastinum and bilateral alen, unchanged.   Enlarged right axillary lymph nodes are unchanged.    < from: 12 Lead ECG (11.11.19 @ 10:08) >  SINUS TACHYCARDIA  POSSIBLE LEFT ATRIAL ENLARGEMENT  RIGHTWARD AXIS  SEPTAL INFARCT , AGE UNDETERMINED  ABNORMAL ECG      < from: Transthoracic Echocardiogram (11.12.19 @ 14:42) >  Conclusions:  1. Normal left ventricular systolic function.  Septal  bounce with interventrcular dependance and elevated medial  E"  suggests constrictive physiology, howevere transmitral  flow pattern not consistent with constriction.  The ivc is  small and colapses normally.  Hepatic vein doppler flow not  obtained.  2. Estimated pulmonary artery systolic pressure equals 49  mm Hg, assuming right atrial pressure equals 8 mm Hg,  consistent with mild pulmonary pressures.  3. Thickened pericardium with trace pericardial effusion.    < end of copied text > Chief Complaint: Patient is a 59y old  Male who presents with a chief complaint of SOB (13 Nov 2019 09:13)      INTERVAL HPI/OVERNIGHT EVENTS: No overnight events. Pt remains satting in low 90s on HFNC at 35 L/min. Says breathing remains improved on HFNC. Can go off NC for a few minutes, such as to use bathroom, without issue. Cough becoming more bothersome, with continued sputum and some pain today. Slept and ate well.     REVIEW OF SYSTEMS:   CONSTITUTIONAL:  Denies fever/chills, fatigue  Eyes:  Denies visual loss, blurred vision, double vision or scleral icterus.  Ears, Nose, Throat:  Denies hearing loss, sneezing, congestion, runny nose or sore throat.  SKIN:  Denies rash or itching.  CARDIOVASCULAR:  Denies chest pain, palpitations  RESPIRATORY: +SOB, cough productive of yellow sputum  GASTROINTESTINAL:  Denies anorexia, nausea, vomiting or diarrhea. No abdominal pain or blood.  GENITOURINARY:  Denies any hematuria, dysuria  NEUROLOGICAL:  Denies headache, dizziness, syncope, paralysis, ataxia, numbness or tingling in the extremities. No change in bowel or bladder control.  MUSCULOSKELETAL:  Denies muscle, back pain, joint pain or stiffness.  ENDOCRINOLOGIC:  Denies reports of sweating, cold or heat intolerance. No polyuria or polydipsia.      MEDICATIONS  (STANDING):  guaiFENesin  milliGRAM(s) Oral every 12 hours  heparin  Injectable 5000 Unit(s) SubCutaneous every 8 hours  hydrocodone/homatropine Syrup 5 milliLiter(s) Oral every 6 hours  methylPREDNISolone sodium succinate Injectable 40 milliGRAM(s) IV Push daily    MEDICATIONS  (PRN):  albuterol/ipratropium for Nebulization 3 milliLiter(s) Nebulizer every 6 hours PRN Shortness of Breath and/or Wheezing      Vital Signs Last 24 Hrs  T(C): 36.7 (14 Nov 2019 05:30), Max: 37 (13 Nov 2019 19:39)  T(F): 98 (14 Nov 2019 05:30), Max: 98.6 (13 Nov 2019 19:39)  HR: 85 (14 Nov 2019 05:30) (83 - 100)  BP: 119/72 (14 Nov 2019 05:30) (119/72 - 137/85)  BP(mean): --  RR: 18 (14 Nov 2019 05:30) (18 - 18)  SpO2: 90% (14 Nov 2019 05:30) (90% - 92%)  Supplemental O2: [ ] No, on Room Air [ ] Yes,     PHYSICAL EXAM:    GENERAL: NAD  HEAD:  NC/AT  EYES: EOMI, white sclerae  ENMT: MMM, no oropharyngeal lesions or erythema appreciated  Neck: trachea midline, no appreciable masses  Pulm: dry crackles b/l in mid and lower lung fields  CV: S1&S2+, tachycardic, no murmurs, rubs or gallops  ABDOMEN: soft, nontender, nondistended  : no cva tenderness, no aranda placed  EXTREMITIES: mild 2+ pitting edema in R leg, 1+ in L leg  Neuro: A&Ox3, no focal deficits  SKIN: warm and dry, no visible rash    LABS:    11-14    138  |  98  |  32<H>  ----------------------------<  123<H>  4.2   |  30  |  1.09  11-13    140  |  101  |  31<H>  ----------------------------<  120<H>  4.2   |  27  |  1.20  11-12    139  |  101  |  24<H>  ----------------------------<  125<H>  4.4   |  26  |  1.13    Ca    9.1      14 Nov 2019 06:26  Ca    9.1      13 Nov 2019 07:14  Ca    8.7      12 Nov 2019 06:51  Phos  4.2     11-14  Mg     2.6     11-14    TPro  6.9  /  Alb  3.4  /  TBili  0.5  /  DBili  x   /  AST  37  /  ALT  22  /  AlkPhos  131<H>  11-14  TPro  6.8  /  Alb  3.4  /  TBili  0.4  /  DBili  x   /  AST  38  /  ALT  20  /  AlkPhos  129<H>  11-13  TPro  6.4  /  Alb  3.3  /  TBili  0.4  /  DBili  x   /  AST  42<H>  /  ALT  22  /  AlkPhos  128<H>  11-12                 12.1   5.07  )-----------( x        ( 14 Nov 2019 08:20 )             37.5                         11.9   6.89  )-----------( 120      ( 13 Nov 2019 10:24 )             36.6                         11.7   3.51  )-----------( 110      ( 12 Nov 2019 09:06 )             37.1       Microbiology:    Rapid Respiratory Viral Panel (11.11.19 @ 11:20)    Rapid RVP Result: [facility]    RADIOLOGY & ADDITIONAL TESTS:    < from: CT Angio Chest w/ IV Cont (11.11.19 @ 13:49) >  IMPRESSION:     No pulmonary embolus.    Masslike opacities within the right upper and lower lower lobe with   extension into the hilum are slightly increased. 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    Small bilateral pleural effusions.    Soft tissue infiltrates of the mediastinum and bilateral alen, unchanged.   Enlarged right axillary lymph nodes are unchanged.    < from: 12 Lead ECG (11.11.19 @ 10:08) >  SINUS TACHYCARDIA  POSSIBLE LEFT ATRIAL ENLARGEMENT  RIGHTWARD AXIS  SEPTAL INFARCT , AGE UNDETERMINED  ABNORMAL ECG      < from: Transthoracic Echocardiogram (11.12.19 @ 14:42) >  Conclusions:  1. Normal left ventricular systolic function.  Septal  bounce with interventrcular dependance and elevated medial  E"  suggests constrictive physiology, howevere transmitral  flow pattern not consistent with constriction.  The ivc is  small and colapses normally.  Hepatic vein doppler flow not  obtained.  2. Estimated pulmonary artery systolic pressure equals 49  mm Hg, assuming right atrial pressure equals 8 mm Hg,  consistent with mild pulmonary pressures.  3. Thickened pericardium with trace pericardial effusion.    < end of copied text >

## 2019-11-14 NOTE — PROGRESS NOTE ADULT - PROBLEM SELECTOR PLAN 1
- pt presenting with sudden onset SOB, found to be hypoxic to SpO2 60s in ED. Placed on NRB at 15L with improvement to 100%, then downgraded to 5L NC with SpO2 92%. Given ABG showing hypoxemia on NC, pt was switched to high flow NC  - Pt on 35L/min HFNC overnight, with sats in low 90s. Pt remains comfortable.   - RVP negative, no evidence of PNA on CT. Pt continues to be afebrile. WBC now uptrending, no longer meets SIRS criteria. Blood cultures pending. Procalcitonin noncontributory.  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 Unlikely that pleural effusions caused this pt's sudden-onset worsening of SOB. Most likely progression of disease. Additionally, 3% chance of ILD/pneumonitis with Tagrisso, which could be contributing.  - c/w  solumedrol 40mg IV, per pulm  - Will trial downgrading to regular NC today - pt presenting with sudden onset SOB, found to be hypoxic to SpO2 60s in ED. Placed on NRB at 15L with improvement to 100%, then downgraded to 5L NC with SpO2 92%. Given ABG showing hypoxemia on NC, pt was switched to high flow NC  - Pt on 35L/min HFNC overnight, with sats in low 90s. Pt remains comfortable.   - RVP negative, no evidence of PNA on CT. Pt continues to be afebrile. WBC now uptrending, no longer meets SIRS criteria. Blood cultures pending. Procalcitonin noncontributory.  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 Unlikely that pleural effusions caused this pt's sudden-onset worsening of SOB. Most likely progression of disease. Additionally, 3% chance of ILD/pneumonitis with Tagrisso, which could be contributing.  - c/w  solumedrol 40mg IV, per pulm  - Continue HFNC for now given desaturation overnight

## 2019-11-14 NOTE — PROGRESS NOTE ADULT - SUBJECTIVE AND OBJECTIVE BOX
[facility] Division of Internal Medicine  Romain Maldonado, PGY-1  Pager: [facility]: [phone] | [facility]: [id number]    Patient is a 59y old  Male who presents with a chief complaint of SOB (14 Nov 2019 07:19)      SUBJECTIVE / OVERNIGHT EVENTS: Desaturated to 90% on HFNC overnight, pt reports some coughing overnight  ADDITIONAL REVIEW OF SYSTEMS: No CP, fever, chills, nausea, vomiting, diarrhea, abdominal pain. Some SOB overnight but none currently.       MEDICATIONS  (STANDING):  guaiFENesin  milliGRAM(s) Oral every 12 hours  heparin  Injectable 5000 Unit(s) SubCutaneous every 8 hours  hydrocodone/homatropine Syrup 5 milliLiter(s) Oral every 6 hours  methylPREDNISolone sodium succinate Injectable 40 milliGRAM(s) IV Push daily    MEDICATIONS  (PRN):  albuterol/ipratropium for Nebulization 3 milliLiter(s) Nebulizer every 6 hours PRN Shortness of Breath and/or Wheezing      CAPILLARY BLOOD GLUCOSE        I&O's Summary    13 Nov 2019 07:01  -  14 Nov 2019 07:00  --------------------------------------------------------  IN: 1200 mL / OUT: 0 mL / NET: 1200 mL        PHYSICAL EXAM:  Vital Signs Last 24 Hrs  T(C): 36.7 (14 Nov 2019 05:30), Max: 37 (13 Nov 2019 19:39)  T(F): 98 (14 Nov 2019 05:30), Max: 98.6 (13 Nov 2019 19:39)  HR: 86 (14 Nov 2019 07:26) (83 - 100)  BP: 119/72 (14 Nov 2019 05:30) (119/72 - 137/85)  BP(mean): --  RR: 18 (14 Nov 2019 07:26) (18 - 18)  SpO2: 91% (14 Nov 2019 07:26) (90% - 92%)  CONSTITUTIONAL: on HFNC, NAD  HEAD: Head atraumatic, normal cephalic shape.  EYES: sclera clear bilaterally,  CARDIAC: regular rate, normal S1/S2, no murmurs  RESPIRATORY: no respiratory distress, R posterior base and middle lung decreased breath sounds  CHEST: no erythema, warmth, tenderness or crepitus  GASTROINTESTINAL: soft, nontender, bowel sounds present  MUSCULOSKELETAL: +1 LE edema b/l, normal strength and ROM, no muscle or joint tenderness  NEUROLOGICAL: AAOx3, no focal deficits, full strength   SKIN: normal skin color, no apparent rashes  PSYCH: normal mood/affect    LABS:                        11.9   6.89  )-----------( 120      ( 13 Nov 2019 10:24 )             36.6     11-14    138  |  98  |  32<H>  ----------------------------<  123<H>  4.2   |  30  |  1.09    Ca    9.1      14 Nov 2019 06:26  Phos  4.2     11-14  Mg     2.6     11-14    TPro  6.9  /  Alb  3.4  /  TBili  0.5  /  DBili  x   /  AST  37  /  ALT  22  /  AlkPhos  131<H>  11-14                RADIOLOGY & ADDITIONAL TESTS:  Results Reviewed:   Imaging Personally Reviewed:  Electrocardiogram Personally Reviewed:    COORDINATION OF CARE:  Care Discussed with Consultants/Other Providers [Y/N]:  Prior or Outpatient Records Reviewed [Y/N]:

## 2019-11-14 NOTE — PROGRESS NOTE ADULT - PROBLEM SELECTOR PLAN 3
- Pt with 2+ pitting edema b/l on exam, increased from 1+ on prior hospitalization (not addressed). Slightly improved on exam today  - Troponins wnl and EKG showed no evidence of ischemia  - TTE showing     - Normal LV systolic function (EF 66%) with normal LA and LV sizes     - Ventricular interdependence suggestive of constrictive physiology, however transmitral flow pattern not consistent with this. Also evidence of thickened pericardium and trace pericardial effusion.     - Mild pulmonary HTN  - Given TTE results, this pt has substantial evidence of RHF. Mild pulmonary HTN in the setting of normal LV systolic function could represent primary lung process; differential includes progression of cancer (including lymphangitic carcinomatosis) vs drug-induced ILD/pneumonitis from Tagrisso. 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 Etiologies of constrictive pericarditis in this patient include radiation-induced vs. malignancy vs idiopathic/viral.  - Note that Tagrisso has 4% incidence of decreased LVEF, 3% incidence of cardiomyopathy  -   -c/w lasix 40mg IV - Pt with 2+ pitting edema b/l on exam, increased from 1+ on prior hospitalization (not addressed). Slightly improved on exam today  - Troponins wnl and EKG showed no evidence of ischemia  - TTE showing     - Normal LV systolic function (EF 66%) with normal LA and LV sizes     - Ventricular interdependence suggestive of constrictive physiology, however transmitral flow pattern not consistent with this. Also evidence of thickened pericardium and trace pericardial effusion.     - Mild pulmonary HTN  - Given TTE results, this pt has substantial evidence of RHF. Mild pulmonary HTN in the setting of normal LV systolic function could represent primary lung process; differential includes progression of cancer (including lymphangitic carcinomatosis) vs drug-induced ILD/pneumonitis from Tagrisso. 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 Etiologies of constrictive pericarditis in this patient include radiation-induced vs. malignancy vs idiopathic/viral.  - Note that Tagrisso has 4% incidence of decreased LVEF, 3% incidence of cardiomyopathy  -   - edema much improved from admission, will hold lasix for now - see above

## 2019-11-14 NOTE — CONSULT NOTE ADULT - PROBLEM SELECTOR RECOMMENDATION 9
Principle issue: Dyspnea   Probably due to malignancy with lymphangitic spread on steroids  Associate issues: effusion versus drug rxn versus progression  Status: On HiFlo with hopes of titration down  Evolution: Improvement in symptoms since admission  Current regimen: On corticosteroids  Recommendations: No opioids for now  Action items: Continue to wean Principal issue: Dyspnea   Probably due to malignancy with lymphangitic spread on steroids  Associated issues: effusion versus drug rxn versus progression of disease  Status: On HiFlo with hopes of titration down  Evolution: Improvement in symptoms since admission  Current regimen: On corticosteroids  Recommendations: No opioids for now  Action items: Continue to wean

## 2019-11-14 NOTE — PROGRESS NOTE ADULT - PROBLEM SELECTOR PLAN 2
- Pt with bilateral pleural effusions on CTA  - Previous admission notable for thoaracentesis for RLL effusion consistent with malignancy  - See above; per pulm recs, not a candidate for thoracentesis/pleurx  - c/w lasix 40mg IV QD; Cr remains 1.09 (between 1.1-1.2 this admission); interestingly, Cr was 0.7-0.8 on admission 3 weeks ago. BUN/Cr ~26 this admission;  possibly pre-renal in setting of constrictive pericarditis/RHF? - Pt with bilateral pleural effusions on CTA  - Previous admission notable for thoaracentesis for RLL effusion consistent with malignancy  - See above; per pulm recs, not a candidate for thoracentesis/pleurx  - will hold lasix for now; Cr remains 1.09 (between 1.1-1.2 this admission); interestingly, Cr was 0.7-0.8 on admission 3 weeks ago. BUN/Cr ~26 this admission;  possibly pre-renal in setting of constrictive pericarditis/RHF coupled with diuresis

## 2019-11-14 NOTE — CONSULT NOTE ADULT - CONSULT REASON
Pleural Effusion/Hypoxia
abnormal echo findings
consulted for assistance with medical decision making in the context of a patient with advanced lung cancer

## 2019-11-14 NOTE — PROGRESS NOTE ADULT - ASSESSMENT
59M with NSCLC (currently treated with Tagrisso and radiation) and prior admission 10/26-11/1 for AHRF in the setting of PNA, respiratory virus and pleural effusion s/p thoracentesis, MS'ed on 4L home O2, presenting with sudden onset SOB as of this AM. Also c/o chronic cough productive of yellow sputum as well as orthopnea and leg swelling for 1-2 months. 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 There is no evidence of PE. 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

## 2019-11-14 NOTE — CONSULT NOTE ADULT - SUBJECTIVE AND OBJECTIVE BOX
John Ibrahim, PGY-1 Internal Medicine    Patient is a 59y old  Male who presents with a chief complaint of SOB (14 Nov 2019 09:49)      HPI:  59 year old M PMHx NSCLC (on radiation and Tagrisso) presents to the ED for acute onset dyspnea. Pt states he was in his usual state of health and suddenly became dyspneic, tachypneic, and tachycardic. No inciting factors. Endorses chronic cough with yellow sputum production, and subacute LE edema and orthopnea at night. Also endorses unintentional weight loss for the past year or so. Pt was recently hospitalized for PNA in Oct and received vanco and zosyn. He had a 1.5 L thoracentesis which showed malignant cells. He is on home oxygen 4L NC.     In the ED, he was hypoxic to SpO2 60%, started on nonrebreather 15 LPM and weaned to ~5L NC with O2 sat at ~90%. Tmax was 99.2, HR was 123, /88, RR 22, 97% at 15 LPM nonrebreather. CTA was negative for PE but showed small b/l pleural effusions, some increase in hilar mass, interlobular septal thickening. Given Lasix 40 mg IV, started on duonebs. TTE ordered, pulm consulted. (11 Nov 2019 17:01)  Currently holding cancer treatments.    INTERVAL EVENTS:  TTE results:  < from: Transthoracic Echocardiogram (11.12.19 @ 14:42) >  1. Normal left ventricular systolic function.  Septal  bounce with interventrcular dependance and elevated medial  E"  suggests constrictive physiology, howevere transmitral  flow pattern not consistent with constriction.  The ivc is  small and colapses normally.  Hepatic vein doppler flow not  obtained.  2. Estimated pulmonary artery systolic pressure equals 49  mm Hg, assuming right atrial pressure equals 8 mm Hg,  consistent with mild pulmonary pressures.  3. Thickened pericardium with trace pericardial effusion.    < end of copied text >    CXR results:  < from: CT Angio Chest w/ IV Cont (11.11.19 @ 13:49) >  IMPRESSION:     No pulmonary embolus.    Masslike opacities within the right upper and lower lower lobe with   extension into the hilum are slightly increased. 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    Small bilateral pleural effusions.    Soft tissue infiltrates of the mediastinum and bilateral alen, unchanged.   Enlarged right axillary lymph nodes are unchanged.    < end of copied text >        Pt still has ongoing SOB, is currently on HFNC at 35L/min at 40%, O2 sat of 90%. Has bilateral pleural effusions. Pulm consulted, recommended no thoracentesis. Plan to continue steroids (IV methylprednisolone) 40mg as needed and taper down O2 as tolerated. 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 Diuresing with Lasix 40mg IV qD.   Pt also receiving symptomatic treatment with guaifenesin and hydrocodone/homatropine, duonebs.  Pt still reporting +orthopnea, +SOB, +cough productive of yellow sputum. Denies chest pain, fever, chills, nausea, vomiting, headache, visual changes.   Goals of care under discussion with pt and family, palliative consult ordered.     PAST MEDICAL & SURGICAL HISTORY:  Non-small cell carcinoma of lung  No significant past surgical history        MEDICATIONS  (STANDING):  guaiFENesin  milliGRAM(s) Oral every 12 hours  heparin  Injectable 5000 Unit(s) SubCutaneous every 8 hours  hydrocodone/homatropine Syrup 5 milliLiter(s) Oral every 6 hours  methylPREDNISolone sodium succinate Injectable 40 milliGRAM(s) IV Push daily    MEDICATIONS  (PRN):  albuterol/ipratropium for Nebulization 3 milliLiter(s) Nebulizer every 6 hours PRN Shortness of Breath and/or Wheezing      FAMILY HISTORY:  Family history of cervical cancer        PHYSICAL EXAM:  Vital Signs Last 24 Hrs  T(C): 36.7 (14 Nov 2019 05:30), Max: 37 (13 Nov 2019 19:39)  T(F): 98 (14 Nov 2019 05:30), Max: 98.6 (13 Nov 2019 19:39)  HR: 105 (14 Nov 2019 08:50) (83 - 105)  BP: 119/72 (14 Nov 2019 05:30) (119/72 - 137/85)  BP(mean): --  RR: 20 (14 Nov 2019 08:50) (18 - 20)  SpO2: 90% (14 Nov 2019 08:50) (90% - 92%)    GENERAL: cachectic appearing, breathing with HFNC  HEAD:  Atraumatic, Normocephalic  ENT: PERRL, conjunctiva and sclera clear, neck supple, no JVD, moist mucosa, posterior oropharynx clear  CHEST/LUNG: +significant rales in bilateral lower lung fields, +mild wheezing in bilateral upper lung fields, +poor inspiratory effort  HEART: +tachycardia, normal rhythm; No murmurs, rubs, or gallops  ABDOMEN: Soft, nontender, nondistended; Bowel sounds present, no organomegaly  BACK: no spinal tenderness, no CVA tenderness  EXTREMITIES:  1+ pitting edema bilaterally to calves  PSYCH: Nl behavior, nl affect  NEUROLOGY: AAOx3, non-focal, moves all extremities spontaneously  SKIN: Normal color, No rashes or lesions      LABS:                        11.9   6.89  )-----------( 120      ( 13 Nov 2019 10:24 )             36.6     11-14    138  |  98  |  32<H>  ----------------------------<  123<H>  4.2   |  30  |  1.09    Ca    9.1      14 Nov 2019 06:26  Phos  4.2     11-14  Mg     2.6     11-14    TPro  6.9  /  Alb  3.4  /  TBili  0.5  /  DBili  x   /  AST  37  /  ALT  22  /  AlkPhos  131<H>  11-14            I&O's Summary    13 Nov 2019 07:01  -  14 Nov 2019 07:00  --------------------------------------------------------  IN: 1200 mL / OUT: 0 mL / NET: 1200 mL John Ibrahim, PGY-1 Internal Medicine    Patient is a 59y old  Male who presents with a chief complaint of SOB (14 Nov 2019 09:49)      HPI:  59 year old M PMHx NSCLC (on radiation and Tagrisso) presents to the ED for acute onset dyspnea. Pt states he was in his usual state of health and suddenly became dyspneic, tachypneic, and tachycardic. No inciting factors. Endorses chronic cough with yellow sputum production, and subacute LE edema and orthopnea at night. Also endorses unintentional weight loss for the past year or so. Pt was recently hospitalized for PNA in Oct and received vanco and zosyn. Of note, pt was admitted to [facility] from 10/26-11/1 for chronic cough and SOB of 1.5 months duration, when he was found to have acute respiratory failure in the setting of bacterial PNA, enterorhinovirus infection, as well as RLL pleural effusion. He was also found to have progression of his cancer, including lymphangitic spread during this time. He had a thoracentesis on 10/28 with removal of 1.5L, found to be exudate with malignant cells. He was dc'ed home on 4L NC oxygen at this time, as well as the completion of his levofloxacin dose for PNA.    In the ED, he was hypoxic to SpO2 60%, started on nonrebreather 15 LPM and weaned to ~5L NC with O2 sat at ~90%. Tmax was 99.2, HR was 123, /88, RR 22, 97% at 15 LPM nonrebreather. CTA was negative for PE but showed small b/l pleural effusions, some increase in hilar mass, interlobular septal thickening. Given Lasix 40 mg IV, started on duonebs. TTE ordered, pulm consulted. (11 Nov 2019 17:01)  Currently holding cancer treatments.    INTERVAL EVENTS:  TTE results:  < from: Transthoracic Echocardiogram (11.12.19 @ 14:42) >  1. Normal left ventricular systolic function.  Septal  bounce with interventrcular dependance and elevated medial  E"  suggests constrictive physiology, howevere transmitral  flow pattern not consistent with constriction.  The ivc is  small and colapses normally.  Hepatic vein doppler flow not  obtained.  2. Estimated pulmonary artery systolic pressure equals 49  mm Hg, assuming right atrial pressure equals 8 mm Hg,  consistent with mild pulmonary pressures.  3. Thickened pericardium with trace pericardial effusion.    < end of copied text >    CXR results:  < from: CT Angio Chest w/ IV Cont (11.11.19 @ 13:49) >  IMPRESSION:     No pulmonary embolus.    Masslike opacities within the right upper and lower lower lobe with   extension into the hilum are slightly increased. 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    Small bilateral pleural effusions.    Soft tissue infiltrates of the mediastinum and bilateral alen, unchanged.   Enlarged right axillary lymph nodes are unchanged.    < end of copied text >        Pt still has ongoing SOB, is currently on HFNC at 35L/min at 40%, O2 sat of 90%. Has bilateral pleural effusions. Pulm consulted, recommended no thoracentesis. Plan to continue steroids (IV methylprednisolone) 40mg as needed and taper down O2 as tolerated. 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 Diuresing with Lasix 40mg IV qD.   Pt also receiving symptomatic treatment with guaifenesin and hydrocodone/homatropine, duonebs.  Pt still reporting +orthopnea, +SOB, +cough productive of yellow sputum. Denies chest pain, fever, chills, nausea, vomiting, headache, visual changes.   Goals of care under discussion with pt and family, palliative consult ordered.     PAST MEDICAL & SURGICAL HISTORY:  Non-small cell carcinoma of lung  No significant past surgical history        MEDICATIONS  (STANDING):  guaiFENesin  milliGRAM(s) Oral every 12 hours  heparin  Injectable 5000 Unit(s) SubCutaneous every 8 hours  hydrocodone/homatropine Syrup 5 milliLiter(s) Oral every 6 hours  methylPREDNISolone sodium succinate Injectable 40 milliGRAM(s) IV Push daily    MEDICATIONS  (PRN):  albuterol/ipratropium for Nebulization 3 milliLiter(s) Nebulizer every 6 hours PRN Shortness of Breath and/or Wheezing      FAMILY HISTORY:  Family history of cervical cancer        PHYSICAL EXAM:  Vital Signs Last 24 Hrs  T(C): 36.7 (14 Nov 2019 05:30), Max: 37 (13 Nov 2019 19:39)  T(F): 98 (14 Nov 2019 05:30), Max: 98.6 (13 Nov 2019 19:39)  HR: 105 (14 Nov 2019 08:50) (83 - 105)  BP: 119/72 (14 Nov 2019 05:30) (119/72 - 137/85)  BP(mean): --  RR: 20 (14 Nov 2019 08:50) (18 - 20)  SpO2: 90% (14 Nov 2019 08:50) (90% - 92%)    GENERAL: cachectic appearing, breathing with HFNC  HEAD:  Atraumatic, Normocephalic  ENT: PERRL, conjunctiva and sclera clear, neck supple, no JVD, moist mucosa, posterior oropharynx clear  CHEST/LUNG: +significant rales in bilateral lower lung fields, +mild wheezing in bilateral upper lung fields, +poor inspiratory effort  HEART: +tachycardia, normal rhythm; No murmurs, rubs, or gallops  ABDOMEN: Soft, nontender, nondistended; Bowel sounds present, no organomegaly  BACK: no spinal tenderness, no CVA tenderness  EXTREMITIES:  1+ pitting edema bilaterally to calves  PSYCH: Nl behavior, nl affect  NEUROLOGY: AAOx3, non-focal, moves all extremities spontaneously  SKIN: Normal color, No rashes or lesions      LABS:                        11.9   6.89  )-----------( 120      ( 13 Nov 2019 10:24 )             36.6     11-14    138  |  98  |  32<H>  ----------------------------<  123<H>  4.2   |  30  |  1.09    Ca    9.1      14 Nov 2019 06:26  Phos  4.2     11-14  Mg     2.6     11-14    TPro  6.9  /  Alb  3.4  /  TBili  0.5  /  DBili  x   /  AST  37  /  ALT  22  /  AlkPhos  131<H>  11-14            I&O's Summary    13 Nov 2019 07:01  -  14 Nov 2019 07:00  --------------------------------------------------------  IN: 1200 mL / OUT: 0 mL / NET: 1200 mL

## 2019-11-14 NOTE — PROGRESS NOTE ADULT - PROBLEM SELECTOR PLAN 4
- Pt followed outpatient with Dr. Deras at [facility]; currently on Tagrisso 80mg PO QD and in between courses of radiation  - Hold Tagrisso for now  - As above, pt likely experiencing progression of disease on Tagrisso +/- possible toxicity from the drug  - spoke with private oncologist, who has no further treatment recommendations. Pt has refused chemo in the past. Could possibly refer to clinical trial if does not want chemo. Will speak with pt regarding this, as well as possible palliative consult - Pt followed outpatient with Dr. Deras at [facility]; currently on Tagrisso 80mg PO QD and in between courses of radiation  - Hold Tagrisso for now  - As above, pt likely experiencing progression of disease on Tagrisso +/- possible toxicity from the drug  - spoke with private oncologist, who has no further treatment recommendations. Pt has refused chemo in the past. Could possibly refer to clinical trial if does not want chemo.   - Palliative consulted

## 2019-11-14 NOTE — PROGRESS NOTE ADULT - PROBLEM SELECTOR PLAN 3
-holding Tagresso, possible ILD   -steroids per pulm rec  -onc consulted  --called private oncologist Dr. Giovana Deras - no further treatment recommendations, pt refused chemotherapy, was referred to clinical trial at [facility] (Dr. Giovana Wright)  -palliative consult- pt is progressing on Tagresso

## 2019-11-14 NOTE — PROGRESS NOTE ADULT - PROBLEM SELECTOR PLAN 5
- DVT ppx: Lovenox preferable in setting of malignancy-associated hypercoagulability; however given possible thoracentesis, continue on UFH; SCDs as well  - Pending speech and swallow eval, given possible aspiration event at home  - Dispo: pending clinical improvement

## 2019-11-15 ENCOUNTER — TRANSCRIPTION ENCOUNTER (OUTPATIENT)
Age: 59
End: 2019-11-15

## 2019-11-15 DIAGNOSIS — R13.10 DYSPHAGIA, UNSPECIFIED: ICD-10-CM

## 2019-11-15 LAB
ALBUMIN SERPL ELPH-MCNC: 3.5 G/DL — SIGNIFICANT CHANGE UP (ref 3.3–5)
ALP SERPL-CCNC: 136 U/L — HIGH (ref 40–120)
ALT FLD-CCNC: 44 U/L — SIGNIFICANT CHANGE UP (ref 10–45)
ANION GAP SERPL CALC-SCNC: 9 MMOL/L — SIGNIFICANT CHANGE UP (ref 5–17)
AST SERPL-CCNC: 74 U/L — HIGH (ref 10–40)
BASOPHILS # BLD AUTO: 0.01 K/UL — SIGNIFICANT CHANGE UP (ref 0–0.2)
BASOPHILS NFR BLD AUTO: 0.1 % — SIGNIFICANT CHANGE UP (ref 0–2)
BILIRUB SERPL-MCNC: 0.4 MG/DL — SIGNIFICANT CHANGE UP (ref 0.2–1.2)
BUN SERPL-MCNC: 33 MG/DL — HIGH (ref 7–23)
CALCIUM SERPL-MCNC: 9.1 MG/DL — SIGNIFICANT CHANGE UP (ref 8.4–10.5)
CHLORIDE SERPL-SCNC: 100 MMOL/L — SIGNIFICANT CHANGE UP (ref 96–108)
CO2 SERPL-SCNC: 30 MMOL/L — SIGNIFICANT CHANGE UP (ref 22–31)
CREAT SERPL-MCNC: 1.02 MG/DL — SIGNIFICANT CHANGE UP (ref 0.5–1.3)
EOSINOPHIL # BLD AUTO: 0 K/UL — SIGNIFICANT CHANGE UP (ref 0–0.5)
EOSINOPHIL NFR BLD AUTO: 0 % — SIGNIFICANT CHANGE UP (ref 0–6)
GLUCOSE SERPL-MCNC: 98 MG/DL — SIGNIFICANT CHANGE UP (ref 70–99)
HCT VFR BLD CALC: 38.4 % — LOW (ref 39–50)
HGB BLD-MCNC: 12.2 G/DL — LOW (ref 13–17)
IMM GRANULOCYTES NFR BLD AUTO: 0.3 % — SIGNIFICANT CHANGE UP (ref 0–1.5)
LYMPHOCYTES # BLD AUTO: 0.87 K/UL — LOW (ref 1–3.3)
LYMPHOCYTES # BLD AUTO: 9.8 % — LOW (ref 13–44)
MAGNESIUM SERPL-MCNC: 2.6 MG/DL — SIGNIFICANT CHANGE UP (ref 1.6–2.6)
MCHC RBC-ENTMCNC: 28.8 PG — SIGNIFICANT CHANGE UP (ref 27–34)
MCHC RBC-ENTMCNC: 31.8 GM/DL — LOW (ref 32–36)
MCV RBC AUTO: 90.6 FL — SIGNIFICANT CHANGE UP (ref 80–100)
MONOCYTES # BLD AUTO: 0.99 K/UL — HIGH (ref 0–0.9)
MONOCYTES NFR BLD AUTO: 11.2 % — SIGNIFICANT CHANGE UP (ref 2–14)
NEUTROPHILS # BLD AUTO: 6.96 K/UL — SIGNIFICANT CHANGE UP (ref 1.8–7.4)
NEUTROPHILS NFR BLD AUTO: 78.6 % — HIGH (ref 43–77)
PHOSPHATE SERPL-MCNC: 3.1 MG/DL — SIGNIFICANT CHANGE UP (ref 2.5–4.5)
PLATELET # BLD AUTO: 97 K/UL — LOW (ref 150–400)
POTASSIUM SERPL-MCNC: 3.7 MMOL/L — SIGNIFICANT CHANGE UP (ref 3.5–5.3)
POTASSIUM SERPL-SCNC: 3.7 MMOL/L — SIGNIFICANT CHANGE UP (ref 3.5–5.3)
PROT SERPL-MCNC: 6.8 G/DL — SIGNIFICANT CHANGE UP (ref 6–8.3)
RBC # BLD: 4.24 M/UL — SIGNIFICANT CHANGE UP (ref 4.2–5.8)
RBC # FLD: 14.5 % — SIGNIFICANT CHANGE UP (ref 10.3–14.5)
SODIUM SERPL-SCNC: 139 MMOL/L — SIGNIFICANT CHANGE UP (ref 135–145)
WBC # BLD: 8.86 K/UL — SIGNIFICANT CHANGE UP (ref 3.8–10.5)
WBC # FLD AUTO: 8.86 K/UL — SIGNIFICANT CHANGE UP (ref 3.8–10.5)

## 2019-11-15 PROCEDURE — 99233 SBSQ HOSP IP/OBS HIGH 50: CPT

## 2019-11-15 PROCEDURE — 99233 SBSQ HOSP IP/OBS HIGH 50: CPT | Mod: GC

## 2019-11-15 RX ORDER — GABAPENTIN 400 MG/1
200 CAPSULE ORAL EVERY 24 HOURS
Refills: 0 | Status: DISCONTINUED | OUTPATIENT
Start: 2019-11-15 | End: 2019-11-18

## 2019-11-15 RX ADMIN — Medication 40 MILLIGRAM(S): at 05:58

## 2019-11-15 RX ADMIN — Medication 600 MILLIGRAM(S): at 05:58

## 2019-11-15 NOTE — DIETITIAN INITIAL EVALUATION ADULT. - REASON INDICATOR FOR ASSESSMENT
Nutrition consult received 11/13 for nutrition optimization for possible chemotherapy.  Source: Patient and EMR

## 2019-11-15 NOTE — PROGRESS NOTE ADULT - PROBLEM SELECTOR PLAN 4
- Pt followed outpatient with Dr. Deras at [facility]; currently on Tagrisso 80mg PO QD and in between courses of radiation  - Hold Tagrisso for now  - As above, pt likely experiencing progression of disease on Tagrisso +/- possible toxicity from the drug  - spoke with private oncologist, who has no further treatment recommendations. Pt has refused chemo in the past. Could possibly refer to clinical trial if does not want chemo.   - Palliative consulted - Pt followed outpatient with Dr. Deras at [facility]; currently on Tagrisso 80mg PO QD and in between courses of radiation  - Hold Tagrisso for now  - As above, pt likely experiencing progression of disease on Tagrisso +/- possible toxicity from the drug  - palliative on board  - pt wishes to follow up with private oncologist once discharged; given progression on Tagrisso, would like to consider chemotherapy at [facility], leaving clinical trials as a last resort

## 2019-11-15 NOTE — PROGRESS NOTE ADULT - PROBLEM SELECTOR PLAN 1
- pt presenting with sudden onset SOB, found to be hypoxic to SpO2 60s in ED. Placed on NRB at 15L with improvement to 100%, then downgraded to 5L NC with SpO2 92%. Given ABG showing hypoxemia on NC, pt was switched to high flow NC  - Pt on 35L/min HFNC overnight, with sats in low 90s. Pt remains comfortable.   - RVP negative, no evidence of PNA on CT. Pt continues to be afebrile. WBC now uptrending, no longer meets SIRS criteria. Blood cultures pending. Procalcitonin noncontributory.  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 Unlikely that pleural effusions caused this pt's sudden-onset worsening of SOB. Most likely progression of disease. Additionally, 3% chance of ILD/pneumonitis with Tagrisso, which could be contributing.  - c/w  solumedrol 40mg IV, per pulm  - Continue HFNC for now given desaturation overnight

## 2019-11-15 NOTE — PROGRESS NOTE ADULT - PROBLEM SELECTOR PLAN 1
On HiFlo  Continue to attempt weaning  4 L at baseline  Probably due to progression of disease On HiFlo  Continue to attempt weaning  4 L at baseline  Continue with steroids  Probably due to progression of disease

## 2019-11-15 NOTE — PROGRESS NOTE ADULT - SUBJECTIVE AND OBJECTIVE BOX
[facility] Division of Internal Medicine  Romain Maldonado, PGY-1  Pager: [facility]: [phone] | [facility]: [phone]    Patient is a 59y old  Male who presents with a chief complaint of SOB (15 Nov 2019 13:44)      SUBJECTIVE / OVERNIGHT EVENTS: No acute events overnight, increased frequency of coughing    ADDITIONAL REVIEW OF SYSTEMS: Cough as above. No SOB except when lying down. No CP, fever, chills, nausea, vomiting, diarrhea, abdominal pain.       MEDICATIONS  (STANDING):  guaiFENesin  milliGRAM(s) Oral every 12 hours  heparin  Injectable 5000 Unit(s) SubCutaneous every 8 hours  hydrocodone/homatropine Syrup 5 milliLiter(s) Oral every 6 hours  methylPREDNISolone 40 milliGRAM(s) Oral daily    MEDICATIONS  (PRN):  albuterol/ipratropium for Nebulization 3 milliLiter(s) Nebulizer every 6 hours PRN Shortness of Breath and/or Wheezing      CAPILLARY BLOOD GLUCOSE        I&O's Summary    14 Nov 2019 07:01  -  15 Nov 2019 07:00  --------------------------------------------------------  IN: 1800 mL / OUT: 0 mL / NET: 1800 mL    15 Nov 2019 07:01  -  15 Nov 2019 14:06  --------------------------------------------------------  IN: 480 mL / OUT: 0 mL / NET: 480 mL        PHYSICAL EXAM:  Vital Signs Last 24 Hrs  T(C): 36.8 (15 Nov 2019 11:09), Max: 36.8 (14 Nov 2019 23:57)  T(F): 98.3 (15 Nov 2019 11:09), Max: 98.3 (14 Nov 2019 23:57)  HR: 88 (15 Nov 2019 11:09) (79 - 100)  BP: 126/80 (15 Nov 2019 11:09) (100/64 - 141/86)  BP(mean): --  RR: 18 (15 Nov 2019 11:09) (18 - 22)  SpO2: 92% (15 Nov 2019 11:09) (89% - 93%)  CONSTITUTIONAL: on HFNC, NAD  HEAD: Head atraumatic, normal cephalic shape.  EYES: sclera clear bilaterally,  CARDIAC: regular rate, normal S1/S2, no murmurs  RESPIRATORY: no respiratory distress, R posterior base and middle lung decreased breath sounds, L posterior fields mild crackles   CHEST: no erythema, warmth, tenderness or crepitus  GASTROINTESTINAL: soft, nontender, bowel sounds present  MUSCULOSKELETAL: +1 LE edema b/l, normal strength and ROM, no muscle or joint tenderness  NEUROLOGICAL: AAOx3, no focal deficits, full strength   SKIN: normal skin color, no apparent rashes  PSYCH: normal mood/affect  LABS:                        12.2   8.86  )-----------( 97       ( 15 Nov 2019 08:39 )             38.4     11-15    139  |  100  |  33<H>  ----------------------------<  98  3.7   |  30  |  1.02    Ca    9.1      15 Nov 2019 06:48  Phos  3.1     11-15  Mg     2.6     11-15    TPro  6.8  /  Alb  3.5  /  TBili  0.4  /  DBili  x   /  AST  74<H>  /  ALT  44  /  AlkPhos  136<H>  11-15              Culture - Blood (collected 13 Nov 2019 10:35)  Source: .Blood Blood-Venous  Preliminary Report (14 Nov 2019 11:01):    No growth to date.    Culture - Blood (collected 13 Nov 2019 10:35)  Source: .Blood Blood-Peripheral  Preliminary Report (14 Nov 2019 11:01):    No growth to date.        RADIOLOGY & ADDITIONAL TESTS:  Results Reviewed:   Imaging Personally Reviewed:  Electrocardiogram Personally Reviewed:    COORDINATION OF CARE:  Care Discussed with Consultants/Other Providers [Y/N]:  Prior or Outpatient Records Reviewed [Y/N]:

## 2019-11-15 NOTE — DIETITIAN INITIAL EVALUATION ADULT. - NUTRITIONGOAL OUTCOME1
Pt to intake and tolerate >75% of estimated energy needs and >75% of supplements Pt to intake and tolerate >75% of estimated energy needs

## 2019-11-15 NOTE — DISCHARGE NOTE PROVIDER - NSDCMRMEDTOKEN_GEN_ALL_CORE_FT
guaiFENesin 100 mg/5 mL oral liquid: 5 milliliter(s) orally every 6 hours  home oxygen: 4 liter(s) continuous with portables via nasal cannula  SpO2 81% room air at rest  Diagnosis bilateral pleural effusions (ICD 10 J91.8)  BEV 99 mo  Height 170 cm  Weight 69 kg  Tagrisso 80 mg oral tablet: 1 tab(s) orally once a day guaiFENesin 100 mg/5 mL oral liquid: 5 milliliter(s) orally every 6 hours  home oxygen: 4 liter(s) continuous with portables via nasal cannula  SpO2 81% room air at rest  Diagnosis bilateral pleural effusions (ICD 10 J91.8)  BEV 99 mo  Height 170 cm  Weight 69 kg guaiFENesin 100 mg/5 mL oral liquid: 5 milliliter(s) orally every 6 hours  home oxygen: 4 liter(s) continuous with portables via nasal cannula  SpO2 81% room air at rest  Diagnosis bilateral pleural effusions (ICD 10 J91.8)  BEV 99 mo  Height 170 cm  Weight 69 kg  Hospital Bed: 1 Hospital Bed gabapentin 300 mg oral capsule: 1 cap(s) orally once a day (at bedtime)  guaiFENesin 100 mg/5 mL oral liquid: 5 milliliter(s) orally every 6 hours  home oxygen: 4 liter(s) continuous with portables via nasal cannula  SpO2 81% room air at rest  Diagnosis bilateral pleural effusions (ICD 10 J91.8)  BEV 99 mo  Height 170 cm  Weight 69 kg  Hospital Bed: 1 Hospital Bed  ipratropium-albuterol 0.5 mg-2.5 mg/3 mLinhalation solution: 3 milliliter(s) inhaled every 6 hours, As needed, Shortness of Breath and/or Wheezing  Neurontin 100 mg oral capsule: 1 cap(s) orally 2 times a day at 8 am and 2 pm  predniSONE 50 mg oral tablet: 1 tab(s) orally once a day  tiotropium 18 mcg inhalation capsule: 1 cap(s) inhaled once a day  traMADol 50 mg oral tablet: 0.5 tab(s) orally every 6 hours, As needed, Severe Pain (7 - 10) MDD:2 tabs

## 2019-11-15 NOTE — PROGRESS NOTE ADULT - PROBLEM SELECTOR PLAN 2
- Pt with bilateral pleural effusions on CTA  - Previous admission notable for thoaracentesis for RLL effusion consistent with malignancy  - See above; per pulm recs, not a candidate for thoracentesis/pleurx  - will hold lasix for now; Cr remains 1.09 (between 1.1-1.2 this admission); interestingly, Cr was 0.7-0.8 on admission 3 weeks ago. BUN/Cr ~26 this admission;  possibly pre-renal in setting of constrictive pericarditis/RHF coupled with diuresis

## 2019-11-15 NOTE — PROGRESS NOTE ADULT - PROBLEM SELECTOR PLAN 4
-may be 2/2 hx of radiation tx for NSCLC  -evaluated by speech and swallow  --rec: NPO, could not tolerate pureed and honey thick  -pt does not wish to be NPO, does not want tube feeds  -will discuss risks of aspiration w/patient

## 2019-11-15 NOTE — CHART NOTE - NSCHARTNOTEFT_GEN_A_CORE
Upon Nutritional Assessment by the Registered Dietitian your patient was determined to meet criteria / has evidence of the following diagnosis/diagnoses:          [ ]  Mild Protein Calorie Malnutrition        [ ]  Moderate Protein Calorie Malnutrition        [ x] Severe Protein Calorie Malnutrition        [ ] Unspecified Protein Calorie Malnutrition        [ ] Underweight / BMI <19        [ ] Morbid Obesity / BMI > 40      Findings as based on:  [ x] Comprehensive nutrition assessment   [ x] Nutrition Focused Physical Exam  [x ] Other: PO intake <75% of estimated energy needs, wt loss 15.9% x3mths, severe/moderate fat and muscle waste.       Nutrition Plan/Recommendations:    1) If patient is to continue PO intake, recommend continuing with regular diet, consistency to discretion of SLP and provider.   2) Recommend Orgain two times a day, with consistency to discretion of provider.   3) Malnutrition alert placed.   4) Reinforce high protein/high calorie diet education as needed.      PROVIDER Section:     By signing this assessment you are acknowledging and agree with the diagnosis/diagnoses assigned by the Registered Dietitian    Comments:

## 2019-11-15 NOTE — DISCHARGE NOTE PROVIDER - CARE PROVIDERS DIRECT ADDRESSES
[email or web address]

## 2019-11-15 NOTE — SWALLOW BEDSIDE ASSESSMENT ADULT - ADDITIONAL RECOMMENDATIONS
Maintain adequate oral hygiene.  If patient titrated to nasal cannula, can consider MBS to objectively assess oropharyngeal swallow function and determine safest, most appropriate diet for patient prior to discharge.

## 2019-11-15 NOTE — DIETITIAN INITIAL EVALUATION ADULT. - SIGNS/SYMPTOMS
PO intake <75% of estimated energy needs, wt loss 15.9% x3mths, severe/moderate fat and muscle waste

## 2019-11-15 NOTE — SWALLOW BEDSIDE ASSESSMENT ADULT - SLP PERTINENT HISTORY OF CURRENT PROBLEM
59 year old M PMHx NSCLC (on radiation and Tagrisso) presents to the ED for acute onset dyspnea. Pt states he was in his usual state of health and suddenly became dyspneic, tachypneic, and tachycardic. No inciting factors. Endorses chronic cough with yellow sputum production, and subacute LE edema and orthopnea at night. Also endorses unintentional weight loss for the past year or so. Pt was recently hospitalized for PNA in Oct and received vanco and zosyn. He had a 1.5 L thoracentesis which showed malignant cells. He is on home oxygen 4L NC. In the ED, he was hypoxic to SpO2 60%, started on nonrebreather 15 LPM and weaned to ~5L NC with O2 sat at ~90%. Tmax was 99.2, HR was 123, /88, RR 22, 97% at 15 LPM nonrebreather.

## 2019-11-15 NOTE — CHART NOTE - NSCHARTNOTEFT_GEN_A_CORE
After a dysphagia screen and evaluation, the SLP recommended patient be NPO and have parenteral nutrition, which the patient did not want. I explained to the patient the risks of aspiration and aspiration pneumonia. I explained the risks, benefits and alternatives to treatment, such as NG tube and PEG tube, as well as the risks of continuing to eat PO. I offered to answer any questions and fully answered any such questions. The patient understood the risks of continuing to eat and would like to continue eating PO.     Romain Maldonado MD  Internal Medicine, PGY-1

## 2019-11-15 NOTE — DIETITIAN INITIAL EVALUATION ADULT. - ENERGY NEEDS
Pertinent Information: Patient is a 59 year old male with a history of PNA, respiratory virus and pleural effusion s/p thoracentesis, non-small cell lung cancer (stage 4), that is being treated with Tagrisso and radiation. Pt admitted on 11/11 for shortness of breath. Upon admission pt found to have cute hypoxemic respiratory failure, and bilateral pleural effusions. [place] [facility] [place]

## 2019-11-15 NOTE — DIETITIAN INITIAL EVALUATION ADULT. - PHYSICAL APPEARANCE
other (specify) Ht: 67 inches  Wt: 148.1 pounds  BMI: 23.5 kg/m2  IBW: 148 pounds +/-10% IBW%: 100%  Skin: WDL as per nursing flowsheet. Edema: +2 B/L leg  NFPE conducted with patient consent revealing muscle wasting severe to the shoulders and scapula, severe to moderate to the clavicles and iterosseous, and moderate to the temples and calf; moderate fat wasting to the oribtal, triceps and fat overlying the ribs. Ht: 67 inches  Wt: 148.1 pounds  BMI: 23.5 kg/m2  IBW: 148 pounds +/-10% IBW%: 100%  Skin: WDL as per nursing flowsheet. Edema: +2 B/L leg  NFPE conducted with patient consent revealing muscle wasting severe to the shoulders and scapula, moderate to the clavicles, interosseous, temples and calf; moderate fat wasting to the oribtal, triceps and fat overlying the ribs.

## 2019-11-15 NOTE — SWALLOW BEDSIDE ASSESSMENT ADULT - SWALLOW EVAL: RECOMMENDED DIET
NPO, with non-oral nutrition, hydration, medications is recommended based upon the results of this examination; however, would suggest Palliative Care consult to determine the GOC with re: to provision of nutrition in this patient with advanced illness.

## 2019-11-15 NOTE — SWALLOW BEDSIDE ASSESSMENT ADULT - SLP GENERAL OBSERVATIONS
Pt encountered asleep in bed, aroused to verbal stimuli. Pt positioned himself upright on edge of bed for evaluation, on high flow O2 35 L/min 39%. A&Ox4. Pt endorsed dysphagia characterized by "difficulty getting food down, specifically meats, liquids occasionally going down the wrong way, and a noise when he swallows." Pt also endorsed poor appetite/dysguesia post radiation and significant weight loss. Denied odynophagia or changes in speech, language, and memory.

## 2019-11-15 NOTE — PROGRESS NOTE ADULT - PROBLEM SELECTOR PLAN 3
- Pt with 2+ pitting edema b/l on exam, increased from 1+ on prior hospitalization (not addressed). Slightly improved on exam today  - Troponins wnl and EKG showed no evidence of ischemia  - TTE showing     - Normal LV systolic function (EF 66%) with normal LA and LV sizes     - Ventricular interdependence suggestive of constrictive physiology, however transmitral flow pattern not consistent with this. Also evidence of thickened pericardium and trace pericardial effusion.     - Mild pulmonary HTN  - Given TTE results, this pt has substantial evidence of RHF. Mild pulmonary HTN in the setting of normal LV systolic function could represent primary lung process; differential includes progression of cancer (including lymphangitic carcinomatosis) vs drug-induced ILD/pneumonitis from Tagrisso. 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 Etiologies of constrictive pericarditis in this patient include radiation-induced vs. malignancy vs idiopathic/viral.  - Note that Tagrisso has 4% incidence of decreased LVEF, 3% incidence of cardiomyopathy  -   - edema much improved from admission, will hold lasix for now - see above

## 2019-11-15 NOTE — PROGRESS NOTE ADULT - ASSESSMENT
59M with NSCLC (currently treated with Tagrisso and radiation) and prior admission 10/26-11/1 for AHRF in the setting of PNA, respiratory virus and pleural effusion s/p thoracentesis, OH'ed on 4L home O2, presenting with sudden onset SOB as of this AM. Also c/o chronic cough productive of yellow sputum as well as orthopnea and leg swelling for 1-2 months. 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 There is no evidence of PE. 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

## 2019-11-15 NOTE — PROGRESS NOTE ADULT - PROBLEM SELECTOR PLAN 2
Probably due to lung cancer over aspiration  Will trial gabapentin 200mg qhs Probably due to lung cancer over aspiration  Original pain trial gabapentin 200mg qhs but now due to NPO status the options are limited.  May consider low dose opioids IV dilaudid 0.2mg q4H PRN given this issue

## 2019-11-15 NOTE — DISCHARGE NOTE PROVIDER - NSDCFUSCHEDAPPT_GEN_ALL_CORE_FT
MATHIEU WU ; 11/25/2019 ; NPP Med Pulm [facility] MATHIEU WU ; 11/25/2019 ; NPP Med Pulm [facility]

## 2019-11-15 NOTE — PROGRESS NOTE ADULT - SUBJECTIVE AND OBJECTIVE BOX
His cough kept him up most of the night  No fevers or chills      RECENT VITALS/LABS/MEDICATIONS/ASSAYS     11-15-19 @ 13:45    T(C): 36.8 (11-15-19 @ 11:09), Max: 36.8 (11-14-19 @ 23:57)  HR: 88 (11-15-19 @ 11:09) (79 - 100)  BP: 126/80 (11-15-19 @ 11:09) (100/64 - 141/86)  RR: 18 (11-15-19 @ 11:09) (18 - 22)  SpO2: 92% (11-15-19 @ 11:09) (89% - 93%)  Wt(kg): --      14 Nov 2019 07:01  -  15 Nov 2019 07:00  --------------------------------------------------------  IN:    Oral Fluid: 1800 mL  Total IN: 1800 mL    OUT:  Total OUT: 0 mL    Total NET: 1800 mL      15 Nov 2019 07:01  -  15 Nov 2019 13:45  --------------------------------------------------------  IN:    Oral Fluid: 240 mL  Total IN: 240 mL    OUT:  Total OUT: 0 mL    Total NET: 240 mL          11-14 @ 07:01  -  11-15 @ 07:00  --------------------------------------------------------  IN: 1800 mL / OUT: 0 mL / NET: 1800 mL    11-15 @ 07:01  -  11-15 @ 13:45  --------------------------------------------------------  IN: 240 mL / OUT: 0 mL / NET: 240 mL      CAPILLARY BLOOD GLUCOSE            albuterol/ipratropium for Nebulization 3 milliLiter(s) Nebulizer every 6 hours PRN  guaiFENesin  milliGRAM(s) Oral every 12 hours  heparin  Injectable 5000 Unit(s) SubCutaneous every 8 hours  hydrocodone/homatropine Syrup 5 milliLiter(s) Oral every 6 hours  methylPREDNISolone 40 milliGRAM(s) Oral daily          11-15    139  |  100  |  33<H>  ----------------------------<  98  3.7   |  30  |  1.02    Ca    9.1      15 Nov 2019 06:48  Phos  3.1     11-15  Mg     2.6     11-15    TPro  6.8  /  Alb  3.5  /  TBili  0.4  /  DBili  x   /  AST  74<H>  /  ALT  44  /  AlkPhos  136<H>  11-15      ProcalcProcalcitonin, Serum: 0.20 ng/mL (11-13-19 @ 07:13)    BNPSerum Pro-Brain Natriuretic Peptide: 523 pg/mL (11-13-19 @ 07:14)    ABG                          12.2   8.86  )-----------( 97       ( 15 Nov 2019 08:39 )             38.4           blood and urine cultures           PERTINENT PM/SXH:   Non-small cell carcinoma of lung  Hypertension  No pertinent past medical history  No pertinent past medical history    No significant past surgical history    FAMILY HISTORY:  Family history of cervical cancer    ITEMS NOT CHECKED ARE NOT PRESENT    SOCIAL HISTORY:   Significant other/partner:  [x ]  Children:  [ x]  Judaism/Spirituality:  Substance hx:  [ ]   Tobacco hx:  [ ]   Alcohol hx: [ ]   Home Opioid hx:  [ ] I-Stop Reference No:  Living Situation: [x ]Home  [ ]Long term care  [ ]Rehab [ ]Other    ADVANCE DIRECTIVES:    DNR  MOLST  [ ]  Living Will  [ ]   DECISION MAKER(s):  [ ] Health Care Proxy(s)  [ ] Surrogate(s)  [ ] Guardian           Name(s):   Phone Number(s):    BASELINE (I)ADL(s) (prior to admission):  [place]: [ ]Total  [ ] Moderate [ ]Dependent    Allergies    No Known Allergies    Intolerances              PRESENT SYMPTOMS: [ ]Unable to obtain due to poor mentation   Source if other than patient:  [ ]Family   [ ]Team  [ ] Staff [ ] Inferred    Pain: [ ] yes [x ] no  QOL impact -   Location -                    Aggravating factors -  Quality -  Radiation -  Timing-  Severity (0-10 scale):  Minimal acceptable level (0-10 scale):       Pain Assessment (scores are out of 10)    O  L  D  C  A  R  T  S    Pain Now:  Pain average over 24 hours:  Pain maximum:  Onset of prn (minutes):  Pain minimum:  Time to pain minimum (minutes):  Personalized pain goal:  Duration of prn (hours):  Ascent of pain:  Pain score at which prn is requested:    Does the pain have a negative impact on the following domains  An "X" denotes yes    General activity                        []  Walking ability                          []  Mood                                          []  Sleep                                           []  Normal Work                            []  Relations with other people  []  Enjoyment of life                     []  Cognitive Tasks                        []      PAIN AD Score: 0    [email or web address].[email or web address]/[email or web address] (press ctrl +  left click to view)          Dyspnea:                           [ ]Mild [ x]Moderate [ ]Severe  Anxiety:                             [ ]Mild [ ]Moderate [ ]Severe  Fatigue:                             [ ]Mild [ ]Moderate [ ]Severe  Nausea:                             [ ]Mild [ ]Moderate [ ]Severe  Loss of appetite:              [ ]Mild [ ]Moderate [ ]Severe  Constipation:                    [ ]Mild [ ]Moderate [ ]Severe    Other Symptoms: Cough persistent  [x ]All other review of systems negative     Karnofsky Performance Score/Palliative Performance Status Version 2:      50   %      http://[facility].org/files/news/[email or web address]_performance_scale_[email or web address].pdf      PHYSICAL EXAM:         GENERAL:  [ x]Alert  [ ]Oriented x   [ ]Lethargic  [ ]Cachexia  [ ]Unarousable  [ ]Verbal  [ ]Non-Verbal [  x ] No distress   [   ] Gaunt  Behavioral:   [ ] Anxiety  [ ] Delirium [ ] Agitation  [ x  ]  Calm [ ] Other  HEENT:  [  ]Normal   [ ]Dry mouth   [ ]ET Tube/Trach  [ ]Oral lesions  [ ] Temporal Wasting  [ ]  Mucositis [ ]  Grade  HiFlo  PULMONARY:   [ ]Clear [ ]Tachypnea  [ ]Audible excessive secretions   [ ]Rhonchi        [ ]Right [ ]Left [ ]Bilateral  x[ ]Crackles        [ ]Right [x ]Left [ ]Bilateral  [ ]Wheezing     [ ]Right [ ]Left [ ]Bilateral  [x ] Diminished  [ ] Right  [  ] Left   [ x] Bilaterally  CARDIOVASCULAR:    [x ]Regular [ ]Irregular [ ]Tachy  [ ]Kirk [ ]Murmur [  ]   Edema  [ ]Other  GASTROINTESTINAL:  [ x]Soft  [ ]Distended   [ ]+BS  [ x]Non tender [ ]Tender  [ ]PEG [ ]OGT/ NGT    Last BM:       GENITOURINARY:  [x ]Normal [ ] Incontinent   [ ]Oliguria/Anuria   [ ]Novak [   ] Suprapubic tenderness  MUSCULOSKELETAL:   [x ]Normal   [ ]Weakness  [ ]Bed/Wheelchair bound [ ]Edema [  ] amputation  [  ] contraction  NEUROLOGIC:   [ x]No focal deficits  [ ] Cognitive impairment  [ ] Dysphagia [ ]Dysarthria [ ] Paresis [  ] Aphasia  [ ]Other   SKIN:   [ x]Normal   [ ]Pressure ulcer(s)  [ ]Rash [   ]  Wound    [  ] hyperpigmentation    CRITICAL CARE:  [ ] Shock Present  [ ]Septic [ ]Cardiogenic [ ]Neurologic [ ]Hypovolemic  [ ]  Vasopressors [ ]  Inotropes   [ ] Respiratory failure present [ ] mechanical ventilation [ ] non-invasive ventilatory support [ ] High flow  [ ] Acute  [ ] Chronic [ ] Hypoxic  [ ] Hypercarbic [ ] Other  [ ] Other organ failure       LABS:                        11.9   6.89  )-----------( 120      ( 13 Nov 2019 10:24 )             36.6   11-14    138  |  98  |  32<H>  ----------------------------<  123<H>  4.2   |  30  |  1.09    Ca    9.1      14 Nov 2019 06:26  Phos  4.2     11-14  Mg     2.6     11-14    TPro  6.9  /  Alb  3.4  /  TBili  0.5  /  DBili  x   /  AST  37  /  ALT  22  /  AlkPhos  131<H>  11-14                RADIOLOGY & ADDITIONAL STUDIES:    PROTEIN CALORIE MALNUTRITION PRESENT: [ ] Yes [ ] No  [ ] PPSV2 < or = to 30% [ ] significant weight loss  [ ] poor nutritional intake [ ] catabolic state [ ] anasarca     Albumin, Serum: 3.4 g/dL (11-14-19 @ 06:26)  Artificial Nutrition [ ]     REFERRALS:   [ ]Chaplaincy  [ ] Hospice  [ ]Child Life  [ ]Social Work  [ ]Case management [ ]Holistic Therapy     Goals of Care Document:   Care Coordination Assessment [C. Provider] (11-12-19 @ 15:39)   Progress Notes - Care Coordination [C. Provider] (11-12-19 @ 15:43)

## 2019-11-15 NOTE — DISCHARGE NOTE PROVIDER - CARE PROVIDER_API CALL
Mary Ogden (DO)  Cardio [place] [facility], [address] [address] [address] [place] [address]  Phone: [phone]  Fax: [phone]  Follow Up Time: 1 week Mary Ogden ()  Cardio [place] [facility], [address] [address] [address] [place] [address]  Phone: [phone]  Fax: [phone]  Follow Up Time: 1 week    Lisker, Gita N (MD)  Critical Care Medicine; Pulmonary Disease  [address] [facility], [place] [address]  Phone: [phone]  Fax: [phone]  Scheduled Appointment: 11/25/2019 03:00 PM

## 2019-11-15 NOTE — PROGRESS NOTE ADULT - PROBLEM SELECTOR PLAN 3
S/S test failed  Goals of care discussion yesterday, link to GOC note, surrounding chemotherapy revealed that the patient is interested in preserving the length of his life with chemo, using multiple lines if necessary S/S test failed  Goals of care discussion yesterday, link to [facility] note, surrounding chemotherapy revealed that the patient is interested in preserving the length of his life with chemo, using multiple lines if necessary. Given the tenor of the meeting, I would be surprised if he refused a PEG.  Will continue to follow.

## 2019-11-15 NOTE — PROGRESS NOTE ADULT - PROBLEM SELECTOR PLAN 3
-holding Tagresso, possible ILD   -steroids per pulm rec  -onc consulted  --called private oncologist Dr. Giovana Deras - no further treatment recommendations, pt refused chemotherapy, was referred to clinical trial at [facility] (Dr. Giovana Wright)  -palliative consult- pt is progressing on Tagresso

## 2019-11-15 NOTE — DISCHARGE NOTE PROVIDER - NSFOLLOWUPCLINICS_GEN_ALL_ED_FT
[facility]  Hematology/Oncology  [address] [id number]  Phone: [phone]  Fax:   Follow Up Time: 2 weeks

## 2019-11-15 NOTE — DIETITIAN INITIAL EVALUATION ADULT. - PROBLEM SELECTOR PLAN 1
-HFNC  -TTE to eval cardiac function  -Troponins and BNP   -BCx to r/o PNA, holding abx for now  -f/u pulm re: thoracentesis, further management

## 2019-11-15 NOTE — SWALLOW BEDSIDE ASSESSMENT ADULT - COMMENTS
hx cont'd: CTA was negative for PE but showed small b/l pleural effusions, some increase in hilar mass, interlobular septal thickening. Given Lasix 40 mg IV, started on duonebs. TTE ordered, pulm consulted. CT chest: No pulmonary embolus. Masslike opacities within the right upper and lower lower lobe with extension into the hilum are slightly increased. 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 Small bilateral pleural effusions. Soft tissue infiltrates of the mediastinum and bilateral alen, unchanged. Enlarged right axillary lymph nodes are unchanged. As per medicine 11/12: Pt breathing more easily on high flow (currently on 31 L/min), with sats between 92-95%. Still feels like he cannot take a deep breath. As per pulm: At this time will hold off thoracentesis and if effusion increases in size or created diaphragmatic inversion will consider thoracentesis or PleurX catheter placement. 11/13: SpO2 90% on HFNC. Palliative consulted to assist with GOC and reported “His current goal is to maximize his life as long as he can using a variety of sequential medications, obtaining the maximum successive benefit from each.” Palliative also stated “Pt suspects that he may have dysphagia.” Pt O2 remained stable during PO trials

## 2019-11-15 NOTE — SWALLOW BEDSIDE ASSESSMENT ADULT - SWALLOW EVAL: PATIENT/FAMILY GOALS STATEMENT
Pt wishes to continue with oral nutrition despite education at length re: risks of aspiration, aspiration pna.

## 2019-11-15 NOTE — DISCHARGE NOTE PROVIDER - NSDCCPCAREPLAN_GEN_ALL_CORE_FT
PRINCIPAL DISCHARGE DIAGNOSIS  Diagnosis: Acute hypoxemic respiratory failure  Assessment and Plan of Treatment: When you came to the hospital, you were having difficulty breathing. Your oxygen saturation was very low, so you were placed on a high-flow nasal cannula. This helpde you to breathe more comfortably.

## 2019-11-15 NOTE — DISCHARGE NOTE PROVIDER - NSDCFUADDAPPT_GEN_ALL_CORE_FT
You have an appointment with the pulmonologist Dr. Lisker on 11/25/19 at 3 pm at [address] [place] [address]

## 2019-11-15 NOTE — SWALLOW BEDSIDE ASSESSMENT ADULT - SWALLOW EVAL: DIAGNOSIS
Pt seen for dysphagia evaluation s/p being admitted with acute onset dyspnea, with NSCLC, and recent hospitalization for pna. Today, pt presents with pharyngeal dysphagia characterized by 1) s/s suggestive of pharyngeal stasis and s/s of laryngeal penetration/aspiration with conservative textures (coughing with puree and single sips of honey thick liquids).

## 2019-11-15 NOTE — DISCHARGE NOTE PROVIDER - HOSPITAL COURSE
HPI:    59M with PMH of NSCLC (dx May 2017, currently being treated at [facility] [place] with Tagrisso and radiation) presenting with sudden-onset SOB. Pt was in his usual state of health until awaking this morning, when he awoke feeling very short of breath and noted that his heart was beating very fast, at which time he called EMS and was brought to the hospital. Pt denies any chest pain accompanying the episode. He has had a chronic cough for the last 1-2 months productive of yellow sputum, however this has been unchanged of late - with no change in frequency of cough or sputum purulence. He notes that he has been sleeping in a chair during this time, because he coughs when lying down. He is on 4L of oxygen at home, and this has not changed recently. He denies recent illness, fevers or chills, sick contacts, abdominal pain, n/v, or change in urinary or bowel habits. He has not traveled anywhere recently, however does note that he has been relatively immobile while at home, mainly sitting in a chair without much physical activity.     Of note, pt was admitted to [facility] from 10/26-11/1 for chronic cough and SOB of 1.5 months duration, when he was found to have acute respiratory failure in the setting of bacterial PNA, enterorhinovirus infection, as well as RLL pleural effusion. He was also found to have progression of his cancer, including lymphangitic spread during this time. He had a thoracentesis on 10/28 with removal of 1.5L, found to be exudate with malignant cells. He was dc'ed home on oxygen at this time, as well as the completion of his levofloxacin dose for PNA.        Hospital Course:    In ED, pt was found to be hypoxic to the 60s and put on 15L NRB, which improved his SpO2 to 100%; he was brought down to 5L, which resulted in SpO2 92% with RR 22 and pt comfortable. He was afebrile at 99.2F, tachycardic to 120s, with BPs 110s-120s/70s-80s. Exam was also notable for 2+ pitting edema b/l. ABG was notable only for hypoxemia, and other labs were wnl. Pt was then placed on HFNC at 35L/min at 40%, which he remained on comfortably for the duration of his stay. He was found to have small bilateral pleural effusions on imaging; he was seen by pulmonology, who deemed that these effusions were too small to intervene on and likely not the cause of his acute hypoxia. 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 He received three days of IV lasix with much improvement in peripheral edema. After consultation with pulmonology and cardiology, it was deemed that his current symptoms likely represented progression of disease on his current medication. Palliative was consulted, and pt understands situation. He wishes to follow up at [facility] [place] for possible chemotherapy, leaving clinical trials as a last resort. HPI:    59M with PMH of NSCLC (dx May 2017, currently being treated at [facility] [place] with Tagrisso and radiation) presenting with sudden-onset SOB. Pt was in his usual state of health until awaking this morning, when he awoke feeling very short of breath and noted that his heart was beating very fast, at which time he called EMS and was brought to the hospital. Pt denies any chest pain accompanying the episode. He has had a chronic cough for the last 1-2 months productive of yellow sputum, however this has been unchanged of late - with no change in frequency of cough or sputum purulence. He notes that he has been sleeping in a chair during this time, because he coughs when lying down. He is on 4L of oxygen at home, and this has not changed recently. He denies recent illness, fevers or chills, sick contacts, abdominal pain, n/v, or change in urinary or bowel habits. He has not traveled anywhere recently, however does note that he has been relatively immobile while at home, mainly sitting in a chair without much physical activity.     Of note, pt was admitted to [facility] from 10/26-11/1 for chronic cough and SOB of 1.5 months duration, when he was found to have acute respiratory failure in the setting of bacterial PNA, enterorhinovirus infection, as well as RLL pleural effusion. He was also found to have progression of his cancer, including lymphangitic spread during this time. He had a thoracentesis on 10/28 with removal of 1.5L, found to be exudate with malignant cells. He was dc'ed home on oxygen at this time, as well as the completion of his levofloxacin dose for PNA.        Hospital Course:    In ED, pt was found to be hypoxic to the 60s and put on 15L NRB, which improved his SpO2 to 100%; he was brought down to 5L, which resulted in SpO2 92% with RR 22 and pt comfortable. He was afebrile at 99.2F, tachycardic to 120s, with BPs 110s-120s/70s-80s. Exam was also notable for 2+ pitting edema b/l. ABG was notable only for hypoxemia, and other labs were wnl. Pt was then placed on HFNC at 35L/min at 40%, which he remained on comfortably for the duration of his stay. He was found to have small bilateral pleural effusions on imaging; he was seen by pulmonology, who deemed that these effusions were too small to intervene on and likely not the cause of his acute hypoxia. 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 He received three days of IV lasix with much improvement in peripheral edema. After consultation with pulmonology and cardiology, it was deemed that his current symptoms likely represented progression of disease on his current medication. Palliative was consulted, and pt understands situation. He wishes to follow up at [facility] for possible chemotherapy, leaving clinical trials as a last resort. He was seen by the SLP for reported dysphagia at home and recommended to be made NPO, however the patient wished to continue eating and understood the risks of aspiration. HPI:    59M with PMH of NSCLC (dx May 2017, currently being treated at [facility] [place] with Tagrisso and radiation) presenting with sudden-onset SOB. Pt was in his usual state of health until awaking this morning, when he awoke feeling very short of breath and noted that his heart was beating very fast, at which time he called EMS and was brought to the hospital. Pt denies any chest pain accompanying the episode. He has had a chronic cough for the last 1-2 months productive of yellow sputum, however this has been unchanged of late - with no change in frequency of cough or sputum purulence. He notes that he has been sleeping in a chair during this time, because he coughs when lying down. He is on 4L of oxygen at home, and this has not changed recently. He denies recent illness, fevers or chills, sick contacts, abdominal pain, n/v, or change in urinary or bowel habits. He has not traveled anywhere recently, however does note that he has been relatively immobile while at home, mainly sitting in a chair without much physical activity.     Of note, pt was admitted to [facility] from 10/26-11/1 for chronic cough and SOB of 1.5 months duration, when he was found to have acute respiratory failure in the setting of bacterial PNA, enterorhinovirus infection, as well as RLL pleural effusion. He was also found to have progression of his cancer, including lymphangitic spread during this time. He had a thoracentesis on 10/28 with removal of 1.5L, found to be exudate with malignant cells. He was dc'ed home on oxygen at this time, as well as the completion of his levofloxacin dose for PNA.        Hospital Course:    In ED, pt was found to be hypoxic to the 60s and put on 15L NRB, which improved his SpO2 to 100%; he was brought down to 5L, which resulted in SpO2 92% with RR 22 and pt comfortable. He was afebrile at 99.2F, tachycardic to 120s, with BPs 110s-120s/70s-80s. Exam was also notable for 2+ pitting edema b/l. ABG was notable only for hypoxemia, and other labs were wnl. Pt was then placed on HFNC at 35L/min at 40%, which he remained on comfortably for the duration of his stay. He was found to have small bilateral pleural effusions on imaging; he was seen by pulmonology, who deemed that these effusions were too small to intervene on and likely not the cause of his acute hypoxia. 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 He received three days of IV lasix with much improvement in peripheral edema. After consultation with pulmonology and cardiology, it was deemed that his current symptoms likely represented progression of disease on his current medication. Palliative was consulted, and pt understands situation. He wishes to follow up at [facility] for possible chemotherapy, leaving clinical trials as a last resort. He was seen by the SLP for reported dysphagia at home and recommended to be made NPO, however the patient wished to continue eating and understood the risks of aspiration. Pleurx catheter placed without complications and improved symptoms of SOB. HPI:    59M with PMH of NSCLC (dx May 2017, currently being treated at [facility] [place] with Tagrisso and radiation) presenting with sudden-onset SOB. Pt was in his usual state of health until awaking this morning, when he awoke feeling very short of breath and noted that his heart was beating very fast, at which time he called EMS and was brought to the hospital. Pt denies any chest pain accompanying the episode. He has had a chronic cough for the last 1-2 months productive of yellow sputum, however this has been unchanged of late - with no change in frequency of cough or sputum purulence. He notes that he has been sleeping in a chair during this time, because he coughs when lying down. He is on 4L of oxygen at home, and this has not changed recently. He denies recent illness, fevers or chills, sick contacts, abdominal pain, n/v, or change in urinary or bowel habits. He has not traveled anywhere recently, however does note that he has been relatively immobile while at home, mainly sitting in a chair without much physical activity.     Of note, pt was admitted to [facility] from 10/26-11/1 for chronic cough and SOB of 1.5 months duration, when he was found to have acute respiratory failure in the setting of bacterial PNA, enterorhinovirus infection, as well as RLL pleural effusion. He was also found to have progression of his cancer, including lymphangitic spread during this time. He had a thoracentesis on 10/28 with removal of 1.5L, found to be exudate with malignant cells. He was dc'ed home on oxygen at this time, as well as the completion of his levofloxacin dose for PNA.        Hospital Course:    In ED, pt was found to be hypoxic to the 60s and put on 15L NRB, which improved his SpO2 to 100%; he was brought down to 5L, which resulted in SpO2 92% with RR 22 and pt comfortable. He was afebrile at 99.2F, tachycardic to 120s, with BPs 110s-120s/70s-80s. Exam was also notable for 2+ pitting edema b/l. ABG was notable only for hypoxemia, and other labs were wnl. Pt was then placed on HFNC at 35L/min at 40%, which he remained on comfortably for the duration of his stay. He was found to have small bilateral pleural effusions on imaging; he was seen by pulmonology, who deemed that these effusions were too small to intervene on and likely not the cause of his acute hypoxia. 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 He received three days of IV lasix with much improvement in peripheral edema. After consultation with pulmonology and cardiology, it was deemed that his current symptoms likely represented progression of disease on his current medication. Palliative was consulted, and pt understands situation. He wishes to follow up at [facility] [place] for possible chemotherapy, leaving clinical trials as a last resort. He was seen by the SLP for reported dysphagia at home and recommended to be made NPO, however the patient wished to continue eating and understood the risks of aspiration. Pleurx catheter placed without complications and improved symptoms of SOB. Discharged home w/visiting nursing services and close follow-up.

## 2019-11-15 NOTE — DIETITIAN INITIAL EVALUATION ADULT. - ETIOLOGY
related to inadequate protein/energy intake in setting of increased metabolic neesd related to inadequate protein/energy intake in setting of increased psychological demand for nutrients

## 2019-11-15 NOTE — DIETITIAN INITIAL EVALUATION ADULT. - ADD RECOMMEND
1) If patient is to continue PO intake, recommend continuing with regular diet, consistency to discretion of SLP and provider. 2) Recommend Orgain two times a day, with consistency to discretion of provider. 3) Malnutrition alert placed. 4) Reinforce high protein/high calorie diet education as needed.

## 2019-11-15 NOTE — SWALLOW BEDSIDE ASSESSMENT ADULT - PHARYNGEAL PHASE
Cough post oral intake/Decreased laryngeal elevation/Multiple swallows Cough post oral intake/Multiple swallows/Decreased laryngeal elevation

## 2019-11-15 NOTE — DISCHARGE NOTE PROVIDER - PROVIDER TOKENS
PROVIDER:[TOKEN:[[id number]],FOLLOWUP:[1 week]] PROVIDER:[TOKEN:[[id number]],FOLLOWUP:[1 week]],PROVIDER:[TOKEN:[[id number]],SCHEDULEDAPPT:[11/25/2019],SCHEDULEDAPPTTIME:[03:00 PM]]

## 2019-11-15 NOTE — PROGRESS NOTE ADULT - SUBJECTIVE AND OBJECTIVE BOX
Chief Complaint: Patient is a 59y old  Male who presents with a chief complaint of SOB (14 Nov 2019 10:24)      INTERVAL HPI/OVERNIGHT EVENTS:     REVIEW OF SYSTEMS:   CONSTITUTIONAL:  Denies fever/chills, fatigue  Eyes:  Denies visual loss, blurred vision, double vision or scleral icterus.  Ears, Nose, Throat:  Denies hearing loss, sneezing, congestion, runny nose or sore throat.  SKIN:  Denies rash or itching.  CARDIOVASCULAR:  Denies chest pain, palpitations  RESPIRATORY: +SOB, cough productive of yellow sputum  GASTROINTESTINAL:  Denies anorexia, nausea, vomiting or diarrhea. No abdominal pain or blood.  GENITOURINARY:  Denies any hematuria, dysuria  NEUROLOGICAL:  Denies headache, dizziness, syncope, paralysis, ataxia, numbness or tingling in the extremities. No change in bowel or bladder control.  MUSCULOSKELETAL:  Denies muscle, back pain, joint pain or stiffness.  ENDOCRINOLOGIC:  Denies reports of sweating, cold or heat intolerance. No polyuria or polydipsia.      MEDICATIONS  (STANDING):  guaiFENesin  milliGRAM(s) Oral every 12 hours  heparin  Injectable 5000 Unit(s) SubCutaneous every 8 hours  hydrocodone/homatropine Syrup 5 milliLiter(s) Oral every 6 hours  methylPREDNISolone sodium succinate Injectable 40 milliGRAM(s) IV Push daily    MEDICATIONS  (PRN):  albuterol/ipratropium for Nebulization 3 milliLiter(s) Nebulizer every 6 hours PRN Shortness of Breath and/or Wheezing      Vital Signs Last 24 Hrs  T(C): 36.7 (15 Nov 2019 05:54), Max: 36.8 (14 Nov 2019 13:14)  T(F): 98 (15 Nov 2019 05:54), Max: 98.3 (14 Nov 2019 13:14)  HR: 79 (15 Nov 2019 05:54) (79 - 105)  BP: 100/64 (15 Nov 2019 05:54) (100/64 - 141/86)  BP(mean): --  RR: 18 (15 Nov 2019 05:54) (18 - 20)  SpO2: 92% (15 Nov 2019 05:54) (90% - 93%)  Supplemental O2: [ ] No, on Room Air [ ] Yes,     I&O's Detail    14 Nov 2019 07:01  -  15 Nov 2019 07:00  --------------------------------------------------------  IN:    Oral Fluid: 1800 mL  Total IN: 1800 mL    OUT:  Total OUT: 0 mL    Total NET: 1800 mL      PHYSICAL EXAM:    GENERAL: NAD  HEAD:  NC/AT  EYES: EOMI, white sclerae  ENMT: MMM, no oropharyngeal lesions or erythema appreciated, dentition well appearing  Neck: trachea midline, no appreciable masses  Pulm: normal work of breathing, CTA b/l  CV: S1&S2+, RRR, no murmurs, rubs or gallops  ABDOMEN: soft, nontender, nondistended  : no cva tenderness, no aranda placed  EXTREMITIES:  no appreciable edema in b/l LE  Neuro: A&Ox3, no focal deficits  SKIN: warm and dry, no visible rash    LABS:      Microbiology:    RADIOLOGY & ADDITIONAL TESTS: Chief Complaint: Patient is a 59y old  Male who presents with a chief complaint of SOB (14 Nov 2019 10:24)      INTERVAL HPI/OVERNIGHT EVENTS: No overnight events. Pt did not sleep well as he was coughing very frequently. Breathing comfortably on HFNC at 35L/min with sats in low 90s. Anxious to go home, wishes to trial going down on O2 requirements so that he can leave on regular NC. Eating, drinking, using restroom fine.     REVIEW OF SYSTEMS:   CONSTITUTIONAL:  Denies fever/chills, fatigue  Eyes:  Denies visual loss, blurred vision, double vision or scleral icterus.  Ears, Nose, Throat:  Denies hearing loss, sneezing, congestion, runny nose or sore throat.  SKIN:  Denies rash or itching.  CARDIOVASCULAR:  Denies chest pain, palpitations  RESPIRATORY: +SOB, cough productive of yellow sputum  GASTROINTESTINAL:  Denies anorexia, nausea, vomiting or diarrhea. No abdominal pain or blood.  GENITOURINARY:  Denies any hematuria, dysuria  NEUROLOGICAL:  Denies headache, dizziness, syncope, paralysis, ataxia, numbness or tingling in the extremities. No change in bowel or bladder control.  MUSCULOSKELETAL:  Denies muscle, back pain, joint pain or stiffness.  ENDOCRINOLOGIC:  Denies reports of sweating, cold or heat intolerance. No polyuria or polydipsia.      MEDICATIONS  (STANDING):  guaiFENesin  milliGRAM(s) Oral every 12 hours  heparin  Injectable 5000 Unit(s) SubCutaneous every 8 hours  hydrocodone/homatropine Syrup 5 milliLiter(s) Oral every 6 hours  methylPREDNISolone sodium succinate Injectable 40 milliGRAM(s) IV Push daily    MEDICATIONS  (PRN):  albuterol/ipratropium for Nebulization 3 milliLiter(s) Nebulizer every 6 hours PRN Shortness of Breath and/or Wheezing      Vital Signs Last 24 Hrs  T(C): 36.7 (15 Nov 2019 05:54), Max: 36.8 (14 Nov 2019 13:14)  T(F): 98 (15 Nov 2019 05:54), Max: 98.3 (14 Nov 2019 13:14)  HR: 79 (15 Nov 2019 05:54) (79 - 105)  BP: 100/64 (15 Nov 2019 05:54) (100/64 - 141/86)  BP(mean): --  RR: 18 (15 Nov 2019 05:54) (18 - 20)  SpO2: 92% (15 Nov 2019 05:54) (90% - 93%)  Supplemental O2: [ ] No, on Room Air [ ] Yes,     I&O's Detail    14 Nov 2019 07:01  -  15 Nov 2019 07:00  --------------------------------------------------------  IN:    Oral Fluid: 1800 mL  Total IN: 1800 mL    OUT:  Total OUT: 0 mL    Total NET: 1800 mL      PHYSICAL EXAM:    GENERAL: NAD  HEAD:  NC/AT  EYES: EOMI, white sclerae  ENMT: MMM, no oropharyngeal lesions or erythema appreciated, dentition well appearing  Neck: trachea midline, no appreciable masses  Pulm: Decreased breath sounds over R lower lung field. Dry crackles appreciated scattered throughout.  CV: S1&S2+, tachycardic, no murmurs, rubs or gallops  ABDOMEN: soft, nontender, nondistended  : no cva tenderness, no aranda placed  EXTREMITIES:  mild 1+ edema b/l  Neuro: A&Ox3, no focal deficits  SKIN: warm and dry, no visible rash    LABS:      Microbiology:    RADIOLOGY & ADDITIONAL TESTS: Chief Complaint: Patient is a 59y old  Male who presents with a chief complaint of SOB (14 Nov 2019 10:24)      INTERVAL HPI/OVERNIGHT EVENTS: No overnight events. Pt did not sleep well as he was coughing very frequently. Breathing comfortably on HFNC at 35L/min with sats in low 90s. Anxious to go home, wishes to trial going down on O2 requirements so that he can leave on regular NC. Eating, drinking, using restroom fine.     REVIEW OF SYSTEMS:   CONSTITUTIONAL:  Denies fever/chills, fatigue  Eyes:  Denies visual loss, blurred vision, double vision or scleral icterus.  Ears, Nose, Throat:  Denies hearing loss, sneezing, congestion, runny nose or sore throat.  SKIN:  Denies rash or itching.  CARDIOVASCULAR:  Denies chest pain, palpitations  RESPIRATORY: +SOB, cough productive of yellow sputum  GASTROINTESTINAL:  Denies anorexia, nausea, vomiting or diarrhea. No abdominal pain or blood.  GENITOURINARY:  Denies any hematuria, dysuria  NEUROLOGICAL:  Denies headache, dizziness, syncope, paralysis, ataxia, numbness or tingling in the extremities. No change in bowel or bladder control.  MUSCULOSKELETAL:  Denies muscle, back pain, joint pain or stiffness.  ENDOCRINOLOGIC:  Denies reports of sweating, cold or heat intolerance. No polyuria or polydipsia.      MEDICATIONS  (STANDING):  guaiFENesin  milliGRAM(s) Oral every 12 hours  heparin  Injectable 5000 Unit(s) SubCutaneous every 8 hours  hydrocodone/homatropine Syrup 5 milliLiter(s) Oral every 6 hours  methylPREDNISolone sodium succinate Injectable 40 milliGRAM(s) IV Push daily    MEDICATIONS  (PRN):  albuterol/ipratropium for Nebulization 3 milliLiter(s) Nebulizer every 6 hours PRN Shortness of Breath and/or Wheezing      Vital Signs Last 24 Hrs  T(C): 36.7 (15 Nov 2019 05:54), Max: 36.8 (14 Nov 2019 13:14)  T(F): 98 (15 Nov 2019 05:54), Max: 98.3 (14 Nov 2019 13:14)  HR: 79 (15 Nov 2019 05:54) (79 - 105)  BP: 100/64 (15 Nov 2019 05:54) (100/64 - 141/86)  BP(mean): --  RR: 18 (15 Nov 2019 05:54) (18 - 20)  SpO2: 92% (15 Nov 2019 05:54) (90% - 93%)  Supplemental O2: [ ] No, on Room Air [ ] Yes,     I&O's Detail    14 Nov 2019 07:01  -  15 Nov 2019 07:00  --------------------------------------------------------  IN:    Oral Fluid: 1800 mL  Total IN: 1800 mL    OUT:  Total OUT: 0 mL    Total NET: 1800 mL      PHYSICAL EXAM:    GENERAL: NAD  HEAD:  NC/AT  EYES: EOMI, white sclerae  ENMT: MMM, no oropharyngeal lesions or erythema appreciated, dentition well appearing  Neck: trachea midline, no appreciable masses  Pulm: Decreased breath sounds over R lower lung field. Dry crackles appreciated scattered throughout.  CV: S1&S2+, tachycardic, no murmurs, rubs or gallops  ABDOMEN: soft, nontender, nondistended  : no cva tenderness, no aranda placed  EXTREMITIES:  mild 1+ edema b/l  Neuro: A&Ox3, no focal deficits  SKIN: warm and dry, no visible rash    LABS:    11-15    139  |  100  |  33<H>  ----------------------------<  98  3.7   |  30  |  1.02  11-14    138  |  98  |  32<H>  ----------------------------<  123<H>  4.2   |  30  |  1.09  11-13    140  |  101  |  31<H>  ----------------------------<  120<H>  4.2   |  27  |  1.20    Ca    9.1      15 Nov 2019 06:48  Ca    9.1      14 Nov 2019 06:26  Ca    9.1      13 Nov 2019 07:14  Phos  3.1     11-15  Mg     2.6     11-15    TPro  6.8  /  Alb  3.5  /  TBili  0.4  /  DBili  x   /  AST  74<H>  /  ALT  44  /  AlkPhos  136<H>  11-15  TPro  6.9  /  Alb  3.4  /  TBili  0.5  /  DBili  x   /  AST  37  /  ALT  22  /  AlkPhos  131<H>  11-14  TPro  6.8  /  Alb  3.4  /  TBili  0.4  /  DBili  x   /  AST  38  /  ALT  20  /  AlkPhos  129<H>  11-13                 12.2   8.86  )-----------( 97       ( 15 Nov 2019 08:39 )             38.4                         12.1   5.07  )-----------( 105      ( 14 Nov 2019 08:20 )             37.5                         11.9   6.89  )-----------( 120      ( 13 Nov 2019 10:24 )             36.6       Microbiology:    RADIOLOGY & ADDITIONAL TESTS:

## 2019-11-15 NOTE — DIETITIAN INITIAL EVALUATION ADULT. - OTHER INFO
Pt admitted 11/11 with a chief complaint of SOB. Pt with stage 4 non-small cell carcinoma of lung. Pt states that he used to be considered overweight, before the cancer diagnosis, with a UBW of 199 pounds. Pt reports gradual weight decrease in the past 2.5 years since first diagnosis of lung cancer with a UBW of 176 pounds. However, pt states that wt loss has accelerated in the past three months. Pt attributes wt loss to decrease in PO intake from lack of appetite due to change in taste perception. Pt had been following a mostly vegetarian diet at home, and reports taking Orgain to increase his caloric intake. Pt reports swallowing difficulties in the past 3 months. Pt denies any nausea, vomiting, constipation or diarrhea, or mineral/supplement use. Pt confirms NFKA.     Wt: 148.1 pounds (11/13), Usual weight 176 pounds as per pt     Since admission pt states that appetite and PO intake has been poor. Of note pt failed speech and swallow evaluation today (11/15). SLP recommended making pt NPO. Discussed alternate means of nutrition, pt not amendable to EN at this time. Pt was verbally educated on importance of adequate calorie and protein intake. Pt encouraged to eat small meals throughout the day and supplement with protein drinks in-between mealtime. All of pt's concerns were addressed at this time. Pt admitted 11/11 with a chief complaint of SOB. Pt with stage 4 non-small cell carcinoma of lung. Pt states that he used to be considered overweight, before the cancer diagnosis, with a UBW of 199 pounds. Pt reports gradual weight decrease in the past 2.5 years since first diagnosis of lung cancer with a UBW of 176 pounds. However, pt states that wt loss has accelerated in the past three months. Pt attributes wt loss to decrease in PO intake from lack of appetite due to change in taste perception. Pt had been following a mostly vegetarian diet at home, and reports taking Orgain to increase his caloric intake. Pt reports swallowing difficulties in the past 3 months. Pt denies any nausea, vomiting, constipation or diarrhea, or mineral/supplement use. Pt confirms NFKA.     Wt: 148.1 pounds (11/13), Usual weight 176 pounds as per pt     Since admission pt states that appetite and PO intake has been poor. Of note pt failed speech and swallow evaluation today (11/15). SLP recommended making pt NPO. Discussed alternate means of nutrition, pt not amendable to EN at this time. Pt was verbally educated on importance of adequate calorie and protein intake. Pt encouraged to eat small meals throughout the day and supplement with protein drinks in-between mealtime. All of pt's concerns were addressed at this time. Last BM 11/14 Pt admitted 11/11 with a chief complaint of SOB. Pt with stage 4 non-small cell carcinoma of lung. Pt states that he used to be considered overweight, before the cancer diagnosis, with a UBW of 199 pounds. Pt reports gradual weight decrease in the past 2.5 years since first diagnosis of lung cancer with a UBW of 176 pounds. However, pt states that wt loss has accelerated in the past three months (has lost 28 pounds). Pt attributes wt loss to decrease in PO intake from lack of appetite due to change in taste perception secondary to radiation treatment. Pt had been following a mostly vegetarian diet at home, and reports taking Orgain to increase his caloric intake. However, in house pt has been consuming meat. Pt reports swallowing difficulties in the past 3 months. Pt denies any nausea, vomiting, constipation or diarrhea, or mineral/supplement use. Pt confirms NFKA.     Wt: 148.1 pounds (11/13), Usual weight 176 pounds as per pt     Since admission pt states that appetite and PO intake has been poor. Of note pt failed speech and swallow evaluation today (11/15). SLP recommended making pt NPO. Discussed alternate means of nutrition, pt not amendable to EN at this time. Pt was verbally educated on importance of adequate calorie and protein intake. Pt encouraged to eat small meals throughout the day and supplement with protein drinks in-between mealtime. All of pt's concerns were addressed at this time. Last BM 11/14

## 2019-11-16 PROCEDURE — 99233 SBSQ HOSP IP/OBS HIGH 50: CPT | Mod: GC

## 2019-11-16 PROCEDURE — 99233 SBSQ HOSP IP/OBS HIGH 50: CPT

## 2019-11-16 RX ORDER — LANOLIN ALCOHOL/MO/W.PET/CERES
5 CREAM (GRAM) TOPICAL AT BEDTIME
Refills: 0 | Status: DISCONTINUED | OUTPATIENT
Start: 2019-11-16 | End: 2019-11-21

## 2019-11-16 RX ADMIN — Medication 50 MILLIGRAM(S): at 12:40

## 2019-11-16 RX ADMIN — GABAPENTIN 200 MILLIGRAM(S): 400 CAPSULE ORAL at 00:15

## 2019-11-16 NOTE — PROGRESS NOTE ADULT - PROBLEM SELECTOR PLAN 1
On HiFlo  Continue to attempt weaning  4 L at baseline  Continue with steroids  Probably due to progression of disease

## 2019-11-16 NOTE — PROGRESS NOTE ADULT - ASSESSMENT
59 year old M presents with acute respiratory failure due to pleural effusions possibly secondary to progression of NSCLC. On HFNC with initial improvement in symptoms. Found to have progression of disease. Difficulty weaning off HFNC because patient quickly desaturates.

## 2019-11-16 NOTE — PROGRESS NOTE ADULT - SUBJECTIVE AND OBJECTIVE BOX
SUBJECTIVE AND OBJECTIVE: Patient seen and examined, continues to complain of cough. started on gabapentin last night. He states he think it made "some difference." but having poor sleep due to cough. intermittently refusing hycodan over last 24 hours. Discussed utilzation of opiates at length with patient. continues to have reluctance but willing to revisit it tomorrow based on how he does today with the new med.    INTERVAL HPI/OVERNIGHT EVENTS: no acute overnight events.     DNR on chart:   Allergies    No Known Allergies    Intolerances    MEDICATIONS  (STANDING):  gabapentin 200 milliGRAM(s) Oral every 24 hours  guaiFENesin  milliGRAM(s) Oral every 12 hours  heparin  Injectable 5000 Unit(s) SubCutaneous every 8 hours  hydrocodone/homatropine Syrup 5 milliLiter(s) Oral every 6 hours  predniSONE   Tablet 50 milliGRAM(s) Oral daily    MEDICATIONS  (PRN):  albuterol/ipratropium for Nebulization 3 milliLiter(s) Nebulizer every 6 hours PRN Shortness of Breath and/or Wheezing      ITEMS UNCHECKED ARE NOT PRESENT    PRESENT SYMPTOMS: [ ]Unable to obtain due to poor mentation   Source if other than patient:  [ ]Family   [ ]Team     Pain (Impact on QOL):    Location:  Minimal acceptable level (0-10 scale):                   Aggravating factors:  Quality:  Radiation:  Severity (0-10 scale):    Timing:    Dyspnea:                           [x ]Mild [ ]Moderate [ ]Severe  Anxiety:                             [ ]Mild [ ]Moderate [ ]Severe  Fatigue:                             [ ]Mild [x ]Moderate [ ]Severe  Nausea:                             [ ]Mild [ ]Moderate [ ]Severe  Loss of appetite:              [ ]Mild [ ]Moderate [ ]Severe  Constipation:                    [ ]Mild [ ]Moderate [ ]Severe  cough:                             [x] moderate    PAIN AD Score:	  http://geriatrictoolkit.[place].[email or web address].pdf (Ctrl + left click to view)    Other Symptoms:  [ x]All other review of systems negative     Karnofsky Performance Score/Palliative Performance Status Version 2:         40%    http://palliative.info/resource_material/[email or web address].pdf  PHYSICAL EXAM:  Vital Signs Last 24 Hrs  T(C): 36.8 (16 Nov 2019 10:05), Max: 36.9 (15 Nov 2019 16:12)  T(F): 98.2 (16 Nov 2019 10:05), Max: 98.5 (15 Nov 2019 16:12)  HR: 85 (16 Nov 2019 10:05) (84 - 93)  BP: 112/73 (16 Nov 2019 10:05) (111/75 - 130/73)  BP(mean): --  RR: 18 (16 Nov 2019 10:05) (16 - 20)  SpO2: 94% (16 Nov 2019 10:05) (90% - 94%) I&O's Summary    15 Nov 2019 07:01  -  16 Nov 2019 07:00  --------------------------------------------------------  IN: 1690 mL / OUT: 0 mL / NET: 1690 mL    16 Nov 2019 07:01  -  16 Nov 2019 11:34  --------------------------------------------------------  IN: 240 mL / OUT: 0 mL / NET: 240 mL     GENERAL:  [x ]Alert  [ x]Oriented x3   [ ]Lethargic  [ ]Cachexia  [ ]Unarousable  [ ]Verbal  [ ]Non-Verbal  Behavioral:   [ ] Anxiety  [ ] Delirium [ ] Agitation [ ] Other  HEENT:  [ ]Normal   [x ]Dry mouth   [ ]ET Tube/Trach  [ ]Oral lesions  PULMONARY:   [ ]Clear [ ]Tachypnea  [ ]Audible excessive secretions   [x ]Rhonchi        [ ]Right [ ]Left [ ]Bilateral  [x ]Crackles        [ ]Right [ ]Left [ ]Bilateral  [ ]Wheezing     [ ]Right [ ]Left [ ]Bilateral  CARDIOVASCULAR:    [x ]Regular [ ]Irregular [ ]Tachy  [ ]Kirk [ ]Murmur [ ]Other  GASTROINTESTINAL:  [x ]Soft  [ ]Distended   [x ]+BS  [ x]Non tender [ ]Tender  [ ]PEG [ ]OGT/ NGT   Last BM:    GENITOURINARY:  [ x]Normal [ ] Incontinent   [ ]Oliguria/Anuria   [ ][place]ak  MUSCULOSKELETAL:   [ ]Normal   [x ]Weakness  [ ]Bed/Wheelchair bound [ ]Edema  NEUROLOGIC:   [x ]No focal deficits  [ ] Cognitive impairment  [ ] Dysphagia [ ]Dysarthria [ ] Paresis [ ]Other   SKIN: ecchymoses  [ ]Normal   [ ]Pressure ulcer(s)  [ ]Rash    CRITICAL CARE:  [ ] Shock Present  [ ]Septic [ ]Cardiogenic [ ]Neurologic [ ]Hypovolemic  [ ]  Vasopressors [ ]  Inotropes   [ ] Respiratory failure present  [ ] Acute  [ ] Chronic [ ] Hypoxic  [ ] Hypercarbic [ ] Other  [ ] Other organ failure     LABS:                        12.2   8.86  )-----------( 97       ( 15 Nov 2019 08:39 )             38.4   11-15    139  |  100  |  33<H>  ----------------------------<  98  3.7   |  30  |  1.02    Ca    9.1      15 Nov 2019 06:48  Phos  3.1     11-15  Mg     2.6     11-15    TPro  6.8  /  Alb  3.5  /  TBili  0.4  /  DBili  x   /  AST  74<H>  /  ALT  44  /  AlkPhos  136<H>  11-15        RADIOLOGY & ADDITIONAL STUDIES: no new imaging studies for review    Protein Calorie Malnutrition Present: [ ] yes [ ] no  [ ] PPSV2 < or = 30%  [ ] significant weight loss [ ] poor nutritional intake [ ] anasarca [ ] catabolic state Albumin, Serum: 3.5 g/dL (11-15-19 @ 06:48)  Artificial Nutrition [ ]     REFERRALS:   [ ]Chaplaincy  [ ] Hospice  [ ]Child Life  [ ]Social Work  [x ]Case management [ ]Holistic Therapy   Goals of Care Document:

## 2019-11-16 NOTE — PROGRESS NOTE ADULT - PROBLEM SELECTOR PLAN 1
-HFNC, plan to wean down as tolerated   -TTE: unclear if any constrictive pathology, mild pHTN, thickened pericardium w/trace effusions  --card rec: no acute interventions, rec lasix 40 PO daily  -Troponins and BNP   -BCx to r/o PNA, holding abx for now, f/u BCx  -pulm c/s: no thoracentesis, steroids, titrate down O2, likely 2/2 progression of cancer -HFNC, plan to wean down as tolerated   -TTE: unclear if any constrictive pathology, mild pHTN, thickened pericardium w/trace effusions  -card rec: no acute interventions, rec lasix 40 PO daily  -Troponins and BNP   -BCx to r/o PNA, holding abx for now, f/u BCx  -pulm c/s: no thoracentesis, steroids, titrate down O2, likely 2/2 progression of cancer

## 2019-11-16 NOTE — PROGRESS NOTE ADULT - SUBJECTIVE AND OBJECTIVE BOX
[facility] Division of Internal Medicine  Romain Maldonado, PGY-1  Pager: [facility]: [phone] | [facility]: [phone]    Patient is a 59y old  Male who presents with a chief complaint of SOB (15 Nov 2019 14:06)      SUBJECTIVE / OVERNIGHT EVENTS: No acute events overnight    ADDITIONAL REVIEW OF SYSTEMS: Continues to have coughing at night. No CP,fever, chills, nausea, vomiting, diarrhea, abdominal pain.      MEDICATIONS  (STANDING):  gabapentin 200 milliGRAM(s) Oral every 24 hours  guaiFENesin  milliGRAM(s) Oral every 12 hours  heparin  Injectable 5000 Unit(s) SubCutaneous every 8 hours  hydrocodone/homatropine Syrup 5 milliLiter(s) Oral every 6 hours  predniSONE   Tablet 50 milliGRAM(s) Oral daily    MEDICATIONS  (PRN):  albuterol/ipratropium for Nebulization 3 milliLiter(s) Nebulizer every 6 hours PRN Shortness of Breath and/or Wheezing      CAPILLARY BLOOD GLUCOSE        I&O's Summary    15 Nov 2019 07:01  -  16 Nov 2019 07:00  --------------------------------------------------------  IN: 1690 mL / OUT: 0 mL / NET: 1690 mL    16 Nov 2019 07:01  -  16 Nov 2019 09:23  --------------------------------------------------------  IN: 240 mL / OUT: 0 mL / NET: 240 mL        PHYSICAL EXAM:  Vital Signs Last 24 Hrs  T(C): 36.9 (16 Nov 2019 05:19), Max: 36.9 (15 Nov 2019 16:12)  T(F): 98.4 (16 Nov 2019 05:19), Max: 98.5 (15 Nov 2019 16:12)  HR: 84 (16 Nov 2019 05:19) (84 - 100)  BP: 123/81 (16 Nov 2019 05:19) (111/75 - 130/73)  BP(mean): --  RR: 20 (16 Nov 2019 05:19) (16 - 20)  SpO2: 90% (16 Nov 2019 05:19) (89% - 93%)  CONSTITUTIONAL: on HFNC, NAD  HEAD: Head atraumatic, normal cephalic shape.  EYES: sclera clear bilaterally,  CARDIAC: regular rate, normal S1/S2, no murmurs  RESPIRATORY: no respiratory distress, R posterior base and middle lung decreased breath sounds, L posterior fields mild crackles   CHEST: no erythema, warmth, tenderness or crepitus  GASTROINTESTINAL: soft, nontender, bowel sounds present  MUSCULOSKELETAL: +1 LE edema b/l, normal strength and ROM, no muscle or joint tenderness  NEUROLOGICAL: AAOx3, no focal deficits, full strength   SKIN: normal skin color, no apparent rashes  PSYCH: normal mood/affect    LABS:                        12.2   8.86  )-----------( 97       ( 15 Nov 2019 08:39 )             38.4     11-15    139  |  100  |  33<H>  ----------------------------<  98  3.7   |  30  |  1.02    Ca    9.1      15 Nov 2019 06:48  Phos  3.1     11-15  Mg     2.6     11-15    TPro  6.8  /  Alb  3.5  /  TBili  0.4  /  DBili  x   /  AST  74<H>  /  ALT  44  /  AlkPhos  136<H>  11-15              Culture - Blood (collected 13 Nov 2019 10:35)  Source: .Blood Blood-Venous  Preliminary Report (14 Nov 2019 11:01):    No growth to date.    Culture - Blood (collected 13 Nov 2019 10:35)  Source: .Blood Blood-Peripheral  Preliminary Report (14 Nov 2019 11:01):    No growth to date.        RADIOLOGY & ADDITIONAL TESTS:  Results Reviewed:   Imaging Personally Reviewed:  Electrocardiogram Personally Reviewed:    COORDINATION OF CARE:  Care Discussed with Consultants/Other Providers [Y/N]:  Prior or Outpatient Records Reviewed [Y/N]: [facility] Division of Internal Medicine  Romain Maldonado, PGY-1  Pager: [facility]: [phone] | [facility]: [phone]    Patient is a 59y old  Male who presents with a chief complaint of SOB (15 Nov 2019 14:06)      SUBJECTIVE / OVERNIGHT EVENTS: No acute events overnight    ADDITIONAL REVIEW OF SYSTEMS: Continues to have coughing at night. No CP,fever, chills, nausea, vomiting, diarrhea, abdominal pain.      MEDICATIONS  (STANDING):  gabapentin 200 milliGRAM(s) Oral every 24 hours  guaiFENesin  milliGRAM(s) Oral every 12 hours  heparin  Injectable 5000 Unit(s) SubCutaneous every 8 hours  hydrocodone/homatropine Syrup 5 milliLiter(s) Oral every 6 hours  predniSONE   Tablet 50 milliGRAM(s) Oral daily    MEDICATIONS  (PRN):  albuterol/ipratropium for Nebulization 3 milliLiter(s) Nebulizer every 6 hours PRN Shortness of Breath and/or Wheezing      CAPILLARY BLOOD GLUCOSE        I&O's Summary    15 Nov 2019 07:01  -  16 Nov 2019 07:00  --------------------------------------------------------  IN: 1690 mL / OUT: 0 mL / NET: 1690 mL    16 Nov 2019 07:01  -  16 Nov 2019 09:23  --------------------------------------------------------  IN: 240 mL / OUT: 0 mL / NET: 240 mL        PHYSICAL EXAM:  Vital Signs Last 24 Hrs  T(C): 36.9 (16 Nov 2019 05:19), Max: 36.9 (15 Nov 2019 16:12)  T(F): 98.4 (16 Nov 2019 05:19), Max: 98.5 (15 Nov 2019 16:12)  HR: 84 (16 Nov 2019 05:19) (84 - 100)  BP: 123/81 (16 Nov 2019 05:19) (111/75 - 130/73)  BP(mean): --  RR: 20 (16 Nov 2019 05:19) (16 - 20)  SpO2: 90% (16 Nov 2019 05:19) (89% - 93%)  CONSTITUTIONAL: on HFNC, NAD  HEAD: Head atraumatic, normal cephalic shape.  EYES: sclera clear bilaterally,  CARDIAC: regular rate, normal S1/S2, no murmurs  RESPIRATORY: no respiratory distress, R posterior base and middle lung decreased breath sounds, L posterior fields mild crackles   CHEST: no erythema, warmth, tenderness or crepitus  GASTROINTESTINAL: soft, nontender, bowel sounds present  MUSCULOSKELETAL: +1 LE edema b/l, normal strength and ROM, no muscle or joint tenderness  NEUROLOGICAL: AAOx3, no focal deficits, full strength   SKIN: normal skin color, no apparent rashes  PSYCH: normal mood/affect    LABS:                        12.2   8.86  )-----------( 97       ( 15 Nov 2019 08:39 )             38.4     11-15    139  |  100  |  33<H>  ----------------------------<  98  3.7   |  30  |  1.02    Ca    9.1      15 Nov 2019 06:48  Phos  3.1     11-15  Mg     2.6     11-15    TPro  6.8  /  Alb  3.5  /  TBili  0.4  /  DBili  x   /  AST  74<H>  /  ALT  44  /  AlkPhos  136<H>  11-15              Culture - Blood (collected 13 Nov 2019 10:35)  Source: .Blood Blood-Venous  Preliminary Report (14 Nov 2019 11:01):    No growth to date.    Culture - Blood (collected 13 Nov 2019 10:35)  Source: .Blood Blood-Peripheral  Preliminary Report (14 Nov 2019 11:01):    No growth to date.        RADIOLOGY & ADDITIONAL TESTS:  Results Reviewed:   Imaging Personally Reviewed:  Electrocardiogram Personally Reviewed:    COORDINATION OF CARE:  Care Discussed with Consultants/Other Providers [Y/N]: [facility] Palliative  Prior or Outpatient Records Reviewed [Y/N]:

## 2019-11-16 NOTE — PROGRESS NOTE ADULT - PROBLEM SELECTOR PLAN 3
-holding Tagresso, possible ILD   -steroids per pulm rec  -onc consulted  --called private oncologist Dr. Giovana Deras - no further treatment recommendations, pt refused chemotherapy, was referred to clinical trial at [facility] (Dr. Giovana Wright)  -palliative consult- pt is progressing on Tagresso  --[place] [facility] [place], however pt is not stable enough off of HFNC to be transporated safely.

## 2019-11-16 NOTE — PROGRESS NOTE ADULT - PROBLEM SELECTOR PLAN 2
Probably due to lung cancer over aspiration  hycodan as tolerated  trial gabapentin 200mg qhs  patient reluctant to trial opiates, discussed at length today.  if no improvement with gabapentin, will reconsider opiates

## 2019-11-16 NOTE — PROGRESS NOTE ADULT - PROBLEM SELECTOR PLAN 4
-may be 2/2 hx of radiation tx for NSCLC  -evaluated by speech and swallow  --rec: NPO, could not tolerate pureed and honey thick  -pt does not wish to be NPO, does not want tube feeds  -discussed risks of aspiration w/patient, in chart note

## 2019-11-17 PROCEDURE — 99233 SBSQ HOSP IP/OBS HIGH 50: CPT | Mod: GC

## 2019-11-17 PROCEDURE — 99233 SBSQ HOSP IP/OBS HIGH 50: CPT

## 2019-11-17 RX ORDER — OSIMERTINIB 80 1/1
1 TABLET, FILM COATED ORAL
Qty: 0 | Refills: 0 | DISCHARGE

## 2019-11-17 RX ADMIN — Medication 600 MILLIGRAM(S): at 17:02

## 2019-11-17 RX ADMIN — Medication 5 MILLIGRAM(S): at 00:01

## 2019-11-17 RX ADMIN — Medication 600 MILLIGRAM(S): at 06:05

## 2019-11-17 RX ADMIN — GABAPENTIN 200 MILLIGRAM(S): 400 CAPSULE ORAL at 23:06

## 2019-11-17 RX ADMIN — GABAPENTIN 200 MILLIGRAM(S): 400 CAPSULE ORAL at 00:01

## 2019-11-17 RX ADMIN — Medication 50 MILLIGRAM(S): at 08:43

## 2019-11-17 NOTE — PROGRESS NOTE ADULT - PROBLEM SELECTOR PLAN 1
-HFNC, plan to wean down as tolerated   -TTE: unclear if any constrictive pathology, mild pHTN, thickened pericardium w/trace effusions  -card rec: no acute interventions, rec lasix 40 PO daily  -Troponins and BNP   -BCx to r/o PNA, holding abx for now, f/u BCx  -pulm c/s: no thoracentesis, steroids, titrate down O2, likely 2/2 progression of cancer

## 2019-11-17 NOTE — PROGRESS NOTE ADULT - PROBLEM SELECTOR PLAN 2
Probably due to lung cancer over aspiration  hycodan as tolerated- has been refusing  trial gabapentin 200mg qhs- states "maybe" helping  refusing utilization of steroids

## 2019-11-17 NOTE — PROGRESS NOTE ADULT - SUBJECTIVE AND OBJECTIVE BOX
PROGRESS NOTE:     Patient is a 59y old  Male who presents with a chief complaint of SOB (16 Nov 2019 11:33)      SUBJECTIVE / OVERNIGHT EVENTS: Patient refused AM labs and does not want to be on High irena oxygen any more according to chart notes and overnight resident. SpO2>92% on nasal cannula overnight. Also refused heparin sub Q.    ADDITIONAL REVIEW OF SYSTEMS:    MEDICATIONS  (STANDING):  gabapentin 200 milliGRAM(s) Oral every 24 hours  guaiFENesin  milliGRAM(s) Oral every 12 hours  heparin  Injectable 5000 Unit(s) SubCutaneous every 8 hours  hydrocodone/homatropine Syrup 5 milliLiter(s) Oral every 6 hours  melatonin 5 milliGRAM(s) Oral at bedtime  predniSONE   Tablet 50 milliGRAM(s) Oral daily    MEDICATIONS  (PRN):  albuterol/ipratropium for Nebulization 3 milliLiter(s) Nebulizer every 6 hours PRN Shortness of Breath and/or Wheezing      CAPILLARY BLOOD GLUCOSE        I&O's Summary    16 Nov 2019 07:01  -  17 Nov 2019 07:00  --------------------------------------------------------  IN: 970 mL / OUT: 0 mL / NET: 970 mL        PHYSICAL EXAM:  Vital Signs Last 24 Hrs  T(C): 36.7 (17 Nov 2019 06:04), Max: 36.9 (16 Nov 2019 16:01)  T(F): 98.1 (17 Nov 2019 06:04), Max: 98.5 (16 Nov 2019 16:01)  HR: 97 (17 Nov 2019 06:32) (84 - 100)  BP: 114/73 (17 Nov 2019 06:04) (112/72 - 125/78)  BP(mean): --  RR: 18 (17 Nov 2019 06:32) (18 - 18)  SpO2: 93% (17 Nov 2019 06:32) (91% - 94%)    CONSTITUTIONAL: NAD, well-developed  RESPIRATORY: Normal respiratory effort; lungs are clear to auscultation bilaterally  CARDIOVASCULAR: Regular rate and rhythm, normal S1 and S2, no murmur/rub/gallop; No lower extremity edema; Peripheral pulses are 2+ bilaterally  ABDOMEN: Nontender to palpation, normoactive bowel sounds, no rebound/guarding; No hepatosplenomegaly  MUSCLOSKELETAL: no clubbing or cyanosis of digits; no joint swelling or tenderness to palpation  PSYCH: A+O to person, place, and time; affect appropriate    LABS:                        12.2   8.86  )-----------( 97       ( 15 Nov 2019 08:39 )             38.4                       RADIOLOGY & ADDITIONAL TESTS:  Results Reviewed:   Imaging Personally Reviewed:  Electrocardiogram Personally Reviewed:    COORDINATION OF CARE:  Care Discussed with Consultants/Other Providers [Y/N]:  Prior or Outpatient Records Reviewed [Y/N]: PROGRESS NOTE:     Patient is a 59y old  Male who presents with a chief complaint of SOB (16 Nov 2019 11:33)      SUBJECTIVE / OVERNIGHT EVENTS: Patient refused AM labs. Has been tolerating NC @5LPM well. Overnight was on HFNC. No other concerns this AM.    ADDITIONAL REVIEW OF SYSTEMS:    MEDICATIONS  (STANDING):  gabapentin 200 milliGRAM(s) Oral every 24 hours  guaiFENesin  milliGRAM(s) Oral every 12 hours  heparin  Injectable 5000 Unit(s) SubCutaneous every 8 hours  hydrocodone/homatropine Syrup 5 milliLiter(s) Oral every 6 hours  melatonin 5 milliGRAM(s) Oral at bedtime  predniSONE   Tablet 50 milliGRAM(s) Oral daily    MEDICATIONS  (PRN):  albuterol/ipratropium for Nebulization 3 milliLiter(s) Nebulizer every 6 hours PRN Shortness of Breath and/or Wheezing      CAPILLARY BLOOD GLUCOSE        I&O's Summary    16 Nov 2019 07:01  -  17 Nov 2019 07:00  --------------------------------------------------------  IN: 970 mL / OUT: 0 mL / NET: 970 mL        PHYSICAL EXAM:  Vital Signs Last 24 Hrs  T(C): 36.7 (17 Nov 2019 06:04), Max: 36.9 (16 Nov 2019 16:01)  T(F): 98.1 (17 Nov 2019 06:04), Max: 98.5 (16 Nov 2019 16:01)  HR: 97 (17 Nov 2019 06:32) (84 - 100)  BP: 114/73 (17 Nov 2019 06:04) (112/72 - 125/78)  BP(mean): --  RR: 18 (17 Nov 2019 06:32) (18 - 18)  SpO2: 93% (17 Nov 2019 06:32) (91% - 94%)    CONSTITUTIONAL: NAD  RESPIRATORY: Normal respiratory effort; lungs are clear to auscultation bilaterally  CARDIOVASCULAR: Regular rate and rhythm, normal S1 and S2, no murmur/rub/gallop; No lower extremity edema; Peripheral pulses are 2+ bilaterally  ABDOMEN: Nontender to palpation, normoactive bowel sounds, no rebound/guarding; No hepatosplenomegaly  MUSCLOSKELETAL: no clubbing or cyanosis of digits; no joint swelling or tenderness to palpation  PSYCH: A+O to person, place, and time; affect appropriate    LABS:                        12.2   8.86  )-----------( 97       ( 15 Nov 2019 08:39 )             38.4                       RADIOLOGY & ADDITIONAL TESTS:  Results Reviewed:   Imaging Personally Reviewed:  Electrocardiogram Personally Reviewed:    COORDINATION OF CARE:  Care Discussed with Consultants/Other Providers [Y/N]:  Prior or Outpatient Records Reviewed [Y/N]: PROGRESS NOTE:     Patient is a 59y old  Male who presents with a chief complaint of SOB (16 Nov 2019 11:33)      SUBJECTIVE / OVERNIGHT EVENTS: Patient refused AM labs. Has been tolerating NC @5LPM well. Overnight was on HFNC. No other concerns this AM.    ADDITIONAL REVIEW OF SYSTEMS:    MEDICATIONS  (STANDING):  gabapentin 200 milliGRAM(s) Oral every 24 hours  guaiFENesin  milliGRAM(s) Oral every 12 hours  heparin  Injectable 5000 Unit(s) SubCutaneous every 8 hours  hydrocodone/homatropine Syrup 5 milliLiter(s) Oral every 6 hours  melatonin 5 milliGRAM(s) Oral at bedtime  predniSONE   Tablet 50 milliGRAM(s) Oral daily    MEDICATIONS  (PRN):  albuterol/ipratropium for Nebulization 3 milliLiter(s) Nebulizer every 6 hours PRN Shortness of Breath and/or Wheezing      CAPILLARY BLOOD GLUCOSE        I&O's Summary    16 Nov 2019 07:01  -  17 Nov 2019 07:00  --------------------------------------------------------  IN: 970 mL / OUT: 0 mL / NET: 970 mL        PHYSICAL EXAM:  Vital Signs Last 24 Hrs  T(C): 36.7 (17 Nov 2019 06:04), Max: 36.9 (16 Nov 2019 16:01)  T(F): 98.1 (17 Nov 2019 06:04), Max: 98.5 (16 Nov 2019 16:01)  HR: 97 (17 Nov 2019 06:32) (84 - 100)  BP: 114/73 (17 Nov 2019 06:04) (112/72 - 125/78)  BP(mean): --  RR: 18 (17 Nov 2019 06:32) (18 - 18)  SpO2: 93% (17 Nov 2019 06:32) (91% - 94%)    CONSTITUTIONAL: NAD  RESPIRATORY: Normal respiratory effort; lungs are clear to auscultation bilaterally  CARDIOVASCULAR: Regular rate and rhythm, normal S1 and S2, no murmur/rub/gallop; No lower extremity edema; Peripheral pulses are 2+ bilaterally  ABDOMEN: Nontender to palpation, normoactive bowel sounds, no rebound/guarding; No hepatosplenomegaly  MUSCLOSKELETAL: no clubbing or cyanosis of digits; no joint swelling or tenderness to palpation  PSYCH: A+O to person, place, and time; affect appropriate    LABS:                        12.2   8.86  )-----------( 97       ( 15 Nov 2019 08:39 )             38.4                       RADIOLOGY & ADDITIONAL TESTS:  Results Reviewed:   Imaging Personally Reviewed:  Electrocardiogram Personally Reviewed:    COORDINATION OF CARE:  Care Discussed with Consultants/Other Providers [Y/N]: Y Palliative  Prior or Outpatient Records Reviewed [Y/N]:

## 2019-11-17 NOTE — PROGRESS NOTE ADULT - SUBJECTIVE AND OBJECTIVE BOX
SUBJECTIVE AND OBJECTIVE:  INTERVAL HPI/OVERNIGHT EVENTS:    DNR on chart:   Allergies    No Known Allergies    Intolerances    MEDICATIONS  (STANDING):  gabapentin 200 milliGRAM(s) Oral every 24 hours  guaiFENesin  milliGRAM(s) Oral every 12 hours  heparin  Injectable 5000 Unit(s) SubCutaneous every 8 hours  hydrocodone/homatropine Syrup 5 milliLiter(s) Oral every 6 hours  melatonin 5 milliGRAM(s) Oral at bedtime  predniSONE   Tablet 50 milliGRAM(s) Oral daily    MEDICATIONS  (PRN):  albuterol/ipratropium for Nebulization 3 milliLiter(s) Nebulizer every 6 hours PRN Shortness of Breath and/or Wheezing      ITEMS UNCHECKED ARE NOT PRESENT    PRESENT SYMPTOMS: [ ]Unable to obtain due to poor mentation   Source if other than patient:  [ ]Family   [ ]Team     Pain (Impact on QOL):    Location:  Minimal acceptable level (0-10 scale):                   Aggravating factors:  Quality:  Radiation:  Severity (0-10 scale):    Timing:    Dyspnea:                           [ ]Mild [ ]Moderate [ ]Severe  Anxiety:                             [ ]Mild [ ]Moderate [ ]Severe  Fatigue:                             [ ]Mild [ ]Moderate [ ]Severe  Nausea:                             [ ]Mild [ ]Moderate [ ]Severe  Loss of appetite:              [ ]Mild [ ]Moderate [ ]Severe  Constipation:                    [ ]Mild [ ]Moderate [ ]Severe    PAIN AD Score:	  http://geriatrictoolkit.[email or web address]/painad.pdf (Ctrl + left click to view)    Other Symptoms:  [ ]All other review of systems negative     Karnofsky Performance Score/Palliative Performance Status Version 2:         %    http://palliative.info/resource_material/PPSv2.pdf  PHYSICAL EXAM:  Vital Signs Last 24 Hrs  T(C): 36.7 (17 Nov 2019 06:04), Max: 36.9 (16 Nov 2019 16:01)  T(F): 98.1 (17 Nov 2019 06:04), Max: 98.5 (16 Nov 2019 16:01)  HR: 97 (17 Nov 2019 06:32) (84 - 100)  BP: 114/73 (17 Nov 2019 06:04) (112/72 - 125/78)  BP(mean): --  RR: 18 (17 Nov 2019 06:32) (18 - 18)  SpO2: 93% (17 Nov 2019 06:32) (91% - 94%) I&O's Summary    16 Nov 2019 07:01  -  17 Nov 2019 07:00  --------------------------------------------------------  IN: 970 mL / OUT: 0 mL / NET: 970 mL    17 Nov 2019 07:01  -  17 Nov 2019 09:59  --------------------------------------------------------  IN: 240 mL / OUT: 0 mL / NET: 240 mL     GENERAL:  [ ]Alert  [ ]Oriented x   [ ]Lethargic  [ ]Cachexia  [ ]Unarousable  [ ]Verbal  [ ]Non-Verbal  Behavioral:   [ ] Anxiety  [ ] Delirium [ ] Agitation [ ] Other  HEENT:  [ ]Normal   [ ]Dry mouth   [ ]ET Tube/Trach  [ ]Oral lesions  PULMONARY:   [ ]Clear [ ]Tachypnea  [ ]Audible excessive secretions   [ ]Rhonchi        [ ]Right [ ]Left [ ]Bilateral  [ ]Crackles        [ ]Right [ ]Left [ ]Bilateral  [ ]Wheezing     [ ]Right [ ]Left [ ]Bilateral  CARDIOVASCULAR:    [ ]Regular [ ]Irregular [ ]Tachy  [ ]Kirk [ ]Murmur [ ]Other  GASTROINTESTINAL:  [ ]Soft  [ ]Distended   [ ]+BS  [ ]Non tender [ ]Tender  [ ]PEG [ ]OGT/ NGT   Last BM:    GENITOURINARY:  [ ]Normal [ ] Incontinent   [ ]Oliguria/Anuria   [ ]Novak  MUSCULOSKELETAL:   [ ]Normal   [ ]Weakness  [ ]Bed/Wheelchair bound [ ]Edema  NEUROLOGIC:   [ ]No focal deficits  [ ] Cognitive impairment  [ ] Dysphagia [ ]Dysarthria [ ] Paresis [ ]Other   SKIN:   [ ]Normal   [ ]Pressure ulcer(s)  [ ]Rash    CRITICAL CARE:  [ ] Shock Present  [ ]Septic [ ]Cardiogenic [ ]Neurologic [ ]Hypovolemic  [ ]  Vasopressors [ ]  Inotropes   [ ] Respiratory failure present  [ ] Acute  [ ] Chronic [ ] Hypoxic  [ ] Hypercarbic [ ] Other  [ ] Other organ failure     LABS:            RADIOLOGY & ADDITIONAL STUDIES:    Protein Calorie Malnutrition Present: [ ] yes [ ] no  [ ] PPSV2 < or = 30%  [ ] significant weight loss [ ] poor nutritional intake [ ] anasarca [ ] catabolic state Albumin, Serum: 3.5 g/dL (11-15-19 @ 06:48)  Artificial Nutrition [ ]     REFERRALS:   [ ]Chaplaincy  [ ] Hospice  [ ]Child Life  [ ]Social Work  [ ]Case management [ ]Holistic Therapy   Goals of Care Document: SUBJECTIVE AND OBJECTIVE: Patient seen and examined. sleepy, appears uncomfortable. has been refusing hycodan and mucinex. states gabapentin "maybe" has helped. discussed utilization of opiates again and patient continues to display reluctance. he is otherwise without complaints and wants to sleep.     INTERVAL HPI/OVERNIGHT EVENTS: no acute overnight events.     DNR on chart:   Allergies    No Known Allergies    Intolerances    MEDICATIONS  (STANDING):  gabapentin 200 milliGRAM(s) Oral every 24 hours  guaiFENesin  milliGRAM(s) Oral every 12 hours  heparin  Injectable 5000 Unit(s) SubCutaneous every 8 hours  hydrocodone/homatropine Syrup 5 milliLiter(s) Oral every 6 hours  melatonin 5 milliGRAM(s) Oral at bedtime  predniSONE   Tablet 50 milliGRAM(s) Oral daily    MEDICATIONS  (PRN):  albuterol/ipratropium for Nebulization 3 milliLiter(s) Nebulizer every 6 hours PRN Shortness of Breath and/or Wheezing      ITEMS UNCHECKED ARE NOT PRESENT    PRESENT SYMPTOMS: [ ]Unable to obtain due to poor mentation   Source if other than patient:  [ ]Family   [ ]Team     Pain (Impact on QOL):    Location:  Minimal acceptable level (0-10 scale):                   Aggravating factors:  Quality:  Radiation:  Severity (0-10 scale):    Timing:    Dyspnea:                           [x ]Mild [ ]Moderate [ ]Severe  Anxiety:                             [ ]Mild [ ]Moderate [ ]Severe  Fatigue:                             [ ]Mild [x ]Moderate [ ]Severe  Nausea:                             [ ]Mild [ ]Moderate [ ]Severe  Loss of appetite:              [ ]Mild [ ]Moderate [ ]Severe  Constipation:                    [ ]Mild [ ]Moderate [ ]Severe    PAIN AD Score:	  http://geriatrictoolkit.[place].[email or web address]/cog/painad.pdf (Ctrl + left click to view)    Other Symptoms:  [ x]All other review of systems negative     Karnofsky Performance Score/Palliative Performance Status Version 2:        40 %    http://palliative.info/resource_material/PPSv2.pdf  PHYSICAL EXAM:  Vital Signs Last 24 Hrs  T(C): 36.7 (17 Nov 2019 06:04), Max: 36.9 (16 Nov 2019 16:01)  T(F): 98.1 (17 Nov 2019 06:04), Max: 98.5 (16 Nov 2019 16:01)  HR: 97 (17 Nov 2019 06:32) (84 - 100)  BP: 114/73 (17 Nov 2019 06:04) (112/72 - 125/78)  BP(mean): --  RR: 18 (17 Nov 2019 06:32) (18 - 18)  SpO2: 93% (17 Nov 2019 06:32) (91% - 94%) I&O's Summary    16 Nov 2019 07:01  -  17 Nov 2019 07:00  --------------------------------------------------------  IN: 970 mL / OUT: 0 mL / NET: 970 mL    17 Nov 2019 07:01  -  17 Nov 2019 09:59  --------------------------------------------------------  IN: 240 mL / OUT: 0 mL / NET: 240 mL     GENERAL:  [ x]Alert  [ x]Oriented x3   [ ]Lethargic  [ ]Cachexia  [ ]Unarousable  [ ]Verbal  [ ]Non-Verbal  Behavioral:   [ ] Anxiety  [ ] Delirium [ ] Agitation [ ] Other  HEENT:  [ ]Normal   [x ]Dry mouth   [ ]ET Tube/Trach  [ ]Oral lesions  PULMONARY:   [ ]Clear [ ]Tachypnea  [ ]Audible excessive secretions   [ ]Rhonchi        [ ]Right [ ]Left [ ]Bilateral  [x ]Crackles        [ ]Right [ ]Left [ ]Bilateral  [ ]Wheezing     [ ]Right [ ]Left [ ]Bilateral  CARDIOVASCULAR:    [x ]Regular [ ]Irregular [ ]Tachy  [ ]Kirk [ ]Murmur [ ]Other  GASTROINTESTINAL:  [x ]Soft  [ ]Distended   [x ]+BS  [x ]Non tender [ ]Tender  [ ]PEG [ ]OGT/ NGT   Last BM:    GENITOURINARY:  [x ]Normal [ ] Incontinent   [ ]Oliguria/Anuria   [ ]Novak  MUSCULOSKELETAL:   [ ]Normal   [x ]Weakness  [ ]Bed/Wheelchair bound [ ]Edema  NEUROLOGIC:   [ x]No focal deficits  [ ] Cognitive impairment  [ ] Dysphagia [ ]Dysarthria [ ] Paresis [ ]Other   SKIN:   [ x]Normal   [ ]Pressure ulcer(s)  [ ]Rash    CRITICAL CARE:  [ ] Shock Present  [ ]Septic [ ]Cardiogenic [ ]Neurologic [ ]Hypovolemic  [ ]  Vasopressors [ ]  Inotropes   [x ] Respiratory failure present- on high flow  [ ] Acute  [ ] Chronic [x ] Hypoxic  [ ] Hypercarbic [ ] Other  [ ] Other organ failure     LABS: no new labs    RADIOLOGY & ADDITIONAL STUDIES: no new imaging studies    Protein Calorie Malnutrition Present: [ ] yes [ ] no  [ ] PPSV2 < or = 30%  [ ] significant weight loss [ ] poor nutritional intake [ ] anasarca [ ] catabolic state Albumin, Serum: 3.5 g/dL (11-15-19 @ 06:48)  Artificial Nutrition [ ]     REFERRALS:   [ ]Chaplaincy  [ ] Hospice  [ ]Child Life  [ ]Social Work  [ x]Case management [ ]Holistic Therapy   Goals of Care Document:

## 2019-11-17 NOTE — PROGRESS NOTE ADULT - PROBLEM SELECTOR PLAN 3
-holding Tagresso, possible ILD   -steroids per pulm rec  -onc consulted  --called private oncologist Dr. Giovana Deras - no further treatment recommendations, pt refused chemotherapy, was referred to clinical trial at [facility] (Dr. Giovana Wright)  -palliative consult- pt is progressing on Tagresso  --eventual plan for treatment at [facility] w/Dr. Wright, however pt is not stable enough off of HFNC to be transporated safely.

## 2019-11-18 LAB
CULTURE RESULTS: SIGNIFICANT CHANGE UP
CULTURE RESULTS: SIGNIFICANT CHANGE UP
SPECIMEN SOURCE: SIGNIFICANT CHANGE UP
SPECIMEN SOURCE: SIGNIFICANT CHANGE UP

## 2019-11-18 PROCEDURE — 99233 SBSQ HOSP IP/OBS HIGH 50: CPT

## 2019-11-18 PROCEDURE — 99232 SBSQ HOSP IP/OBS MODERATE 35: CPT | Mod: GC

## 2019-11-18 RX ORDER — GABAPENTIN 400 MG/1
100 CAPSULE ORAL
Refills: 0 | Status: DISCONTINUED | OUTPATIENT
Start: 2019-11-18 | End: 2019-11-21

## 2019-11-18 RX ORDER — GABAPENTIN 400 MG/1
300 CAPSULE ORAL AT BEDTIME
Refills: 0 | Status: DISCONTINUED | OUTPATIENT
Start: 2019-11-18 | End: 2019-11-21

## 2019-11-18 RX ADMIN — GABAPENTIN 300 MILLIGRAM(S): 400 CAPSULE ORAL at 23:09

## 2019-11-18 RX ADMIN — Medication 600 MILLIGRAM(S): at 17:27

## 2019-11-18 RX ADMIN — Medication 50 MILLIGRAM(S): at 08:41

## 2019-11-18 RX ADMIN — Medication 600 MILLIGRAM(S): at 05:28

## 2019-11-18 RX ADMIN — Medication 5 MILLIGRAM(S): at 23:09

## 2019-11-18 NOTE — PROGRESS NOTE ADULT - PROBLEM SELECTOR PLAN 1
-HFNC, plan to wean down as tolerated   -TTE: unclear if any constrictive pathology, mild pHTN, thickened pericardium w/trace effusions  -card rec: no acute interventions, rec lasix 40 PO daily  -Troponins and BNP   -BCx to r/o PNA, holding abx for now, f/u BCx  -pulm c/s: no thoracentesis, steroids, titrate down O2, likely 2/2 progression of cancer -HFNC, plan to wean down as tolerated   -TTE: unclear if any constrictive pathology, mild pHTN, thickened pericardium w/trace effusions  -card rec: no acute interventions, rec lasix 40 PO daily  -Troponins and BNP   -BCx to r/o PNA, holding abx for now, f/u BCx  -pulm c/s: no thoracentesis, steroids, titrate down O2, likely 2/2 progression of cancer w/ repeat US today showing reaccumulation, will need pleurex placement w/ pulm tomorrow.

## 2019-11-18 NOTE — CHART NOTE - NSCHARTNOTEFT_GEN_A_CORE
Nutrition Services Malnutrition follow up    Chart reviewed, events noted  Subjective:  Patient visited in room during dinner. Patient consumes <50% of meals, refuses ensure, likes Orgain organic supplement.  Patient c/o loss of taste, and taste alteration  from RT.   Discussed importance of eating for nutritional benefits and avoiding further weight loss and subsequent weakness.        Objective: 59 year old M PMHx NSCLC (on radiation and Tagrisso) presents to the ED for acute onset dyspnea. Pt states he was in his usual state of health and suddenly became dyspneic, tachypneic, and tachycardic. No inciting factors. Endorses chronic cough with yellow sputum production, and subacute LE edema and orthopnea at night. Also endorses unintentional weight loss for the past year or so. Pt failed SLP bedside swallow evaluation yesterday, as noted not a candidiate for MBS.    Patient continued on Soft diet, will request Orgain organic supplement 2x daily.      Diet: Soft, patient makes preferences known, c/o loss of taste, taste alteration    PO intake  ~50%    Weight 68kg.     Nutrition diagnosis: Severe malnutrition  ongoing, addressed with supplements, encouragement, altered food texture    Recommendations  1. add Orgain supplement 2x daily.  2. assist with menus, food preferences  3. follow course for GOC,     Monitor intake, weight, skin, labs.

## 2019-11-18 NOTE — PROGRESS NOTE ADULT - PROBLEM SELECTOR PLAN 3
-holding Tagresso, possible ILD   -steroids per pulm rec  -onc consulted  --called private oncologist Dr. Giovana Deras - no further treatment recommendations, pt refused chemotherapy, was referred to clinical trial at [facility] (Dr. Giovana Wright)  -palliative consult- pt is progressing on Tagresso  --eventual plan [place] [facility] [place], however pt is not stable enough off of HFNC to be transporated safely. -holding Tagresso, possible ILD   -steroids per pulm rec  -onc consulted  --called private oncologist Dr. Giovana Deras - no further treatment recommendations, pt refused chemotherapy, was referred to clinical trial at [facility] (Dr. Giovana Wright)  -palliative consult- pt is progressing on Tagresso  --eventual plan for treatment at [facility] w/Dr. Wright

## 2019-11-18 NOTE — CHART NOTE - NSCHARTNOTEFT_GEN_A_CORE
Based on patient's ongoing issues with deconditioning and generalized weakness secondary to patient's diagnosis of stage IV lung cancer the patient will require a semi electric hospital bed. This is necessary to achieve positioning and elevation not able to obtain in an ordinary bed. Bed pillows and wedges have been tried and ruled out. The patient requires this bed for positioning purposes. It must be at 30 degrees at all times due to aspiration risk.

## 2019-11-18 NOTE — PROGRESS NOTE ADULT - SUBJECTIVE AND OBJECTIVE BOX
Patient is a 59y old  Male who presents with a chief complaint of SOB (17 Nov 2019 09:58)      SUBJECTIVE / OVERNIGHT EVENTS:    Pt seen and examined at bedside. Reports being on NC overnight, no hi-irena reqiureiemnt, tolerating 5-6 LPM. Slight productive cough this AM, unchanged from previous, tolerating po intake. Understands NPO recommendation at this time. Pt reports BM yesterday. He is OOB, able to walk around unit however dependent on O2.     MEDICATIONS  (STANDING):  gabapentin 200 milliGRAM(s) Oral every 24 hours  guaiFENesin  milliGRAM(s) Oral every 12 hours  heparin  Injectable 5000 Unit(s) SubCutaneous every 8 hours  hydrocodone/homatropine Syrup 5 milliLiter(s) Oral every 6 hours  melatonin 5 milliGRAM(s) Oral at bedtime  predniSONE   Tablet 50 milliGRAM(s) Oral daily    MEDICATIONS  (PRN):  albuterol/ipratropium for Nebulization 3 milliLiter(s) Nebulizer every 6 hours PRN Shortness of Breath and/or Wheezing      Vital Signs Last 24 Hrs  T(C): 36.6 (18 Nov 2019 05:26), Max: 37.3 (17 Nov 2019 19:45)  T(F): 97.9 (18 Nov 2019 05:26), Max: 99.1 (17 Nov 2019 19:45)  HR: 92 (18 Nov 2019 06:54) (80 - 94)  BP: 129/86 (18 Nov 2019 05:26) (120/76 - 129/86)  BP(mean): --  RR: 18 (18 Nov 2019 06:54) (18 - 19)  SpO2: 92% (18 Nov 2019 06:54) (91% - 93%)  CAPILLARY BLOOD GLUCOSE        I&O's Summary    17 Nov 2019 07:01  -  18 Nov 2019 07:00  --------------------------------------------------------  IN: 810 mL / OUT: 0 mL / NET: 810 mL        PHYSICAL EXAM:  Vital Signs Last 24 Hrs  T(C): 36.6 (11-18-19 @ 05:26)  T(F): 97.9 (11-18-19 @ 05:26), Max: 99.1 (11-17-19 @ 19:45)  HR: 92 (11-18-19 @ 06:54) (80 - 94)  BP: 129/86 (11-18-19 @ 05:26)  BP(mean): --  RR: 18 (11-18-19 @ 06:54) (18 - 19)  SpO2: 92% (11-18-19 @ 06:54) (91% - 93%)  Wt(kg): --    11-17 @ 07:01  -  11-18 @ 07:00  --------------------------------------------------------  IN: 810 mL / OUT: 0 mL / NET: 810 mL      Constitutional: NAD, awake and alert  EYES: EOMI  ENT:  Normal Hearing, no tonsillar exudates   Neck: Soft and supple , no thyromegaly   Respiratory: L lung clear lung fields, R lung diminished lung sounds at base to midlung, No wheezing, rales or rhonchi  Cardiovascular: S1 and S2, regular rate and rhythm, no Murmurs, gallops or rubs, no JVD,    Gastrointestinal: Bowel Sounds present, soft, nontender, nondistended, no guarding, no rebound  Extremities: No cyanosis or clubbing; warm to touch  Vascular: 2+ peripheral pulses lower ex  Neurological: No focal deficits, CN II-XII intact bilaterally, sensation to light touch intact in all extremities, gait intact. Pupils are equally reactive to light and symmetrical in size.   Musculoskeletal: 5/5 strength b/l upper and lower extremities; no joint swelling.  Skin: No rashes  Psych: no depression or anhedonia, AAOx3  HEME: no bruises, no nose bleeds      LABS:                    RADIOLOGY & ADDITIONAL TESTS:    Imaging Personally Reviewed:    Consultant(s) Notes Reviewed:      Care Discussed with Consultants/Other Providers:

## 2019-11-18 NOTE — PROGRESS NOTE ADULT - PROBLEM SELECTOR PLAN 2
Probably due to lung cancer over aspiration  Broad DDx  Probably due to disease progression, secondarily by subtle aspiration

## 2019-11-18 NOTE — PROGRESS NOTE ADULT - SUBJECTIVE AND OBJECTIVE BOX
Pt seen and examined  Cough has not improved at all  Pt experienced no adverse effects upon ROS  Eager to uptitrate to gain benefit  He is eating soft foods        RECENT VITALS/LABS/MEDICATIONS/ASSAYS     11-18-19 @ 14:17    T(C): 36.9 (11-18-19 @ 11:18), Max: 37.3 (11-17-19 @ 19:45)  HR: 98 (11-18-19 @ 11:18) (80 - 98)  BP: 125/80 (11-18-19 @ 11:18) (120/76 - 129/86)  RR: 18 (11-18-19 @ 11:18) (18 - 19)  SpO2: 91% (11-18-19 @ 11:18) (91% - 93%)  Wt(kg): --      17 Nov 2019 07:01  -  18 Nov 2019 07:00  --------------------------------------------------------  IN:    Oral Fluid: 810 mL  Total IN: 810 mL    OUT:  Total OUT: 0 mL    Total NET: 810 mL      18 Nov 2019 07:01  -  18 Nov 2019 14:17  --------------------------------------------------------  IN:    Oral Fluid: 240 mL  Total IN: 240 mL    OUT:  Total OUT: 0 mL    Total NET: 240 mL          11-17 @ 07:01  -  11-18 @ 07:00  --------------------------------------------------------  IN: 810 mL / OUT: 0 mL / NET: 810 mL    11-18 @ 07:01  -  11-18 @ 14:17  --------------------------------------------------------  IN: 240 mL / OUT: 0 mL / NET: 240 mL      CAPILLARY BLOOD GLUCOSE            albuterol/ipratropium for Nebulization 3 milliLiter(s) Nebulizer every 6 hours PRN  gabapentin 300 milliGRAM(s) Oral at bedtime  gabapentin 100 milliGRAM(s) Oral <User Schedule>  guaiFENesin  milliGRAM(s) Oral every 12 hours  heparin  Injectable 5000 Unit(s) SubCutaneous every 8 hours  hydrocodone/homatropine Syrup 5 milliLiter(s) Oral every 6 hours  melatonin 5 milliGRAM(s) Oral at bedtime  predniSONE   Tablet 50 milliGRAM(s) Oral daily                  Procalc  BNP  ABG              blood and urine cultures        DNR on chart:   Allergies    No Known Allergies    Intolerances    MEDICATIONS  (STANDING):     MEDICATIONS  (PRN):  albuterol/ipratropium for Nebulization 3 milliLiter(s) Nebulizer every 6 hours PRN Shortness of Breath and/or Wheezing      ITEMS UNCHECKED ARE NOT PRESENT    PRESENT SYMPTOMS: [ ]Unable to obtain due to poor mentation   Source if other than patient:  [ ]Family   [ ]Team     Pain (Impact on QOL):    Location:  Minimal acceptable level (0-10 scale):                   Aggravating factors:  Quality:  Radiation:  Severity (0-10 scale):    Timing:    Dyspnea:                           [x ]Mild [ ]Moderate [ ]Severe  Anxiety:                             [ ]Mild [ ]Moderate [ ]Severe  Fatigue:                             [ ]Mild [x ]Moderate [ ]Severe  Nausea:                             [ ]Mild [ ]Moderate [ ]Severe  Loss of appetite:              [ ]Mild [ ]Moderate [ ]Severe  Constipation:                    [ ]Mild [ ]Moderate [ ]Severe    PAIN AD Score:	  [email or web address] (Ctrl + left click to view)    Other Symptoms:  [ x]All other review of systems negative     Karnofsky Performance Score/Palliative Performance Status Version 2:        40 %    http://palliative.info/resource_material/[email or web address].pdf  PHYSICAL EXAM:  Vital Signs Last 24 Hrs  T(C): 36.7 (17 Nov 2019 06:04), Max: 36.9 (16 Nov 2019 16:01)  T(F): 98.1 (17 Nov 2019 06:04), Max: 98.5 (16 Nov 2019 16:01)  HR: 97 (17 Nov 2019 06:32) (84 - 100)  BP: 114/73 (17 Nov 2019 06:04) (112/72 - 125/78)  BP(mean): --  RR: 18 (17 Nov 2019 06:32) (18 - 18)  SpO2: 93% (17 Nov 2019 06:32) (91% - 94%) I&O's Summary    16 Nov 2019 07:01  -  17 Nov 2019 07:00  --------------------------------------------------------  IN: 970 mL / OUT: 0 mL / NET: 970 mL    17 Nov 2019 07:01  -  17 Nov 2019 09:59  --------------------------------------------------------  IN: 240 mL / OUT: 0 mL / NET: 240 mL     GENERAL:  [ x]Alert  [ x]Oriented x3   [ ]Lethargic  [ ]Cachexia  [ ]Unarousable  [ ]Verbal  [ ]Non-Verbal  Behavioral:   [ ] Anxiety  [ ] Delirium [ ] Agitation [ ] Other  HEENT:  [ ]Normal   [x ]Dry mouth   [ ]ET Tube/Trach  [ ]Oral lesions  PULMONARY:   [ ]Clear [ ]Tachypnea  [ ]Audible excessive secretions   [ ]Rhonchi        [ ]Right [ ]Left [ ]Bilateral  [x ]Crackles        [ ]Right [ ]Left [ ]Bilateral  [ ]Wheezing     [ ]Right [ ]Left [ ]Bilateral  CARDIOVASCULAR:    [x ]Regular [ ]Irregular [ ]Tachy  [ ]Kirk [ ]Murmur [ ]Other  GASTROINTESTINAL:  [x ]Soft  [ ]Distended   [x ]+BS  [x ]Non tender [ ]Tender  [ ]PEG [ ]OGT/ NGT   Last BM:    GENITOURINARY:  [x ]Normal [ ] Incontinent   [ ]Oliguria/Anuria   [ ]Novak  MUSCULOSKELETAL:   [ ]Normal   [x ]Weakness  [ ]Bed/Wheelchair bound [ ]Edema  NEUROLOGIC:   [ x]No focal deficits  [ ] Cognitive impairment  [ ] Dysphagia [ ]Dysarthria [ ] Paresis [ ]Other   SKIN:   [ x]Normal   [ ]Pressure ulcer(s)  [ ]Rash    CRITICAL CARE:  [ ] Shock Present  [ ]Septic [ ]Cardiogenic [ ]Neurologic [ ]Hypovolemic  [ ]  Vasopressors [ ]  Inotropes   [x ] Respiratory failure present- on high flow  [ ] Acute  [ ] Chronic [x ] Hypoxic  [ ] Hypercarbic [ ] Other  [ ] Other organ failure     LABS: no new labs    RADIOLOGY & ADDITIONAL STUDIES: no new imaging studies    Protein Calorie Malnutrition Present: [ ] yes [ ] no  [ ] PPSV2 < or = 30%  [ ] significant weight loss [ ] poor nutritional intake [ ] anasarca [ ] catabolic state Albumin, Serum: 3.5 g/dL (11-15-19 @ 06:48)  Artificial Nutrition [ ]     REFERRALS:   [ ]Chaplaincy  [ ] Hospice  [ ]Child Life  [ ]Social Work  [ x]Case management [ ]Holistic Therapy   Goals of Care Document:

## 2019-11-18 NOTE — PROGRESS NOTE ADULT - PROBLEM SELECTOR PLAN 7
See instructions below for outpatient referral
will continue to follow
Will follow until discharge  Please see earlier consult  Place supportive oncology referral in discharge summary
will continue to follow

## 2019-11-19 LAB
ALBUMIN SERPL ELPH-MCNC: 3.7 G/DL — SIGNIFICANT CHANGE UP (ref 3.3–5)
ALP SERPL-CCNC: 170 U/L — HIGH (ref 40–120)
ALT FLD-CCNC: 40 U/L — SIGNIFICANT CHANGE UP (ref 10–45)
ANION GAP SERPL CALC-SCNC: 12 MMOL/L — SIGNIFICANT CHANGE UP (ref 5–17)
APTT BLD: 29.4 SEC — SIGNIFICANT CHANGE UP (ref 27.5–36.3)
AST SERPL-CCNC: 48 U/L — HIGH (ref 10–40)
BASOPHILS # BLD AUTO: 0.01 K/UL — SIGNIFICANT CHANGE UP (ref 0–0.2)
BASOPHILS NFR BLD AUTO: 0.1 % — SIGNIFICANT CHANGE UP (ref 0–2)
BILIRUB SERPL-MCNC: 0.5 MG/DL — SIGNIFICANT CHANGE UP (ref 0.2–1.2)
BUN SERPL-MCNC: 33 MG/DL — HIGH (ref 7–23)
CALCIUM SERPL-MCNC: 9 MG/DL — SIGNIFICANT CHANGE UP (ref 8.4–10.5)
CHLORIDE SERPL-SCNC: 100 MMOL/L — SIGNIFICANT CHANGE UP (ref 96–108)
CO2 SERPL-SCNC: 28 MMOL/L — SIGNIFICANT CHANGE UP (ref 22–31)
CREAT SERPL-MCNC: 0.97 MG/DL — SIGNIFICANT CHANGE UP (ref 0.5–1.3)
EOSINOPHIL # BLD AUTO: 0.03 K/UL — SIGNIFICANT CHANGE UP (ref 0–0.5)
EOSINOPHIL NFR BLD AUTO: 0.3 % — SIGNIFICANT CHANGE UP (ref 0–6)
GLUCOSE SERPL-MCNC: 121 MG/DL — HIGH (ref 70–99)
HCT VFR BLD CALC: 42.6 % — SIGNIFICANT CHANGE UP (ref 39–50)
HGB BLD-MCNC: 13.2 G/DL — SIGNIFICANT CHANGE UP (ref 13–17)
IMM GRANULOCYTES NFR BLD AUTO: 0.5 % — SIGNIFICANT CHANGE UP (ref 0–1.5)
INR BLD: 1.14 RATIO — SIGNIFICANT CHANGE UP (ref 0.88–1.16)
LYMPHOCYTES # BLD AUTO: 0.51 K/UL — LOW (ref 1–3.3)
LYMPHOCYTES # BLD AUTO: 5.6 % — LOW (ref 13–44)
MCHC RBC-ENTMCNC: 28.6 PG — SIGNIFICANT CHANGE UP (ref 27–34)
MCHC RBC-ENTMCNC: 31 GM/DL — LOW (ref 32–36)
MCV RBC AUTO: 92.4 FL — SIGNIFICANT CHANGE UP (ref 80–100)
MONOCYTES # BLD AUTO: 0.9 K/UL — SIGNIFICANT CHANGE UP (ref 0–0.9)
MONOCYTES NFR BLD AUTO: 9.8 % — SIGNIFICANT CHANGE UP (ref 2–14)
NEUTROPHILS # BLD AUTO: 7.65 K/UL — HIGH (ref 1.8–7.4)
NEUTROPHILS NFR BLD AUTO: 83.7 % — HIGH (ref 43–77)
PLATELET # BLD AUTO: 100 K/UL — LOW (ref 150–400)
POTASSIUM SERPL-MCNC: 4.7 MMOL/L — SIGNIFICANT CHANGE UP (ref 3.5–5.3)
POTASSIUM SERPL-SCNC: 4.7 MMOL/L — SIGNIFICANT CHANGE UP (ref 3.5–5.3)
PROT SERPL-MCNC: 7.2 G/DL — SIGNIFICANT CHANGE UP (ref 6–8.3)
PROTHROM AB SERPL-ACNC: 12.9 SEC — SIGNIFICANT CHANGE UP (ref 10–13.1)
RBC # BLD: 4.61 M/UL — SIGNIFICANT CHANGE UP (ref 4.2–5.8)
RBC # FLD: 14.6 % — HIGH (ref 10.3–14.5)
SODIUM SERPL-SCNC: 140 MMOL/L — SIGNIFICANT CHANGE UP (ref 135–145)
WBC # BLD: 9.15 K/UL — SIGNIFICANT CHANGE UP (ref 3.8–10.5)
WBC # FLD AUTO: 9.15 K/UL — SIGNIFICANT CHANGE UP (ref 3.8–10.5)

## 2019-11-19 PROCEDURE — 99233 SBSQ HOSP IP/OBS HIGH 50: CPT | Mod: GC,25

## 2019-11-19 PROCEDURE — 99232 SBSQ HOSP IP/OBS MODERATE 35: CPT | Mod: GC

## 2019-11-19 PROCEDURE — 71045 X-RAY EXAM CHEST 1 VIEW: CPT | Mod: 26

## 2019-11-19 PROCEDURE — 75989 ABSCESS DRAINAGE UNDER X-RAY: CPT | Mod: 26,GC

## 2019-11-19 RX ORDER — TIOTROPIUM BROMIDE 18 UG/1
1 CAPSULE ORAL; RESPIRATORY (INHALATION) DAILY
Refills: 0 | Status: DISCONTINUED | OUTPATIENT
Start: 2019-11-19 | End: 2019-11-21

## 2019-11-19 RX ORDER — TRAMADOL HYDROCHLORIDE 50 MG/1
25 TABLET ORAL EVERY 6 HOURS
Refills: 0 | Status: DISCONTINUED | OUTPATIENT
Start: 2019-11-19 | End: 2019-11-21

## 2019-11-19 RX ORDER — OXYCODONE AND ACETAMINOPHEN 5; 325 MG/1; MG/1
1 TABLET ORAL ONCE
Refills: 0 | Status: DISCONTINUED | OUTPATIENT
Start: 2019-11-19 | End: 2019-11-19

## 2019-11-19 RX ORDER — ACETAMINOPHEN 500 MG
1000 TABLET ORAL EVERY 6 HOURS
Refills: 0 | Status: DISCONTINUED | OUTPATIENT
Start: 2019-11-19 | End: 2019-11-21

## 2019-11-19 RX ORDER — ACETAMINOPHEN 500 MG
650 TABLET ORAL EVERY 6 HOURS
Refills: 0 | Status: DISCONTINUED | OUTPATIENT
Start: 2019-11-19 | End: 2019-11-21

## 2019-11-19 RX ORDER — ACETAMINOPHEN 500 MG
650 TABLET ORAL ONCE
Refills: 0 | Status: COMPLETED | OUTPATIENT
Start: 2019-11-19 | End: 2019-11-19

## 2019-11-19 RX ADMIN — Medication 650 MILLIGRAM(S): at 18:43

## 2019-11-19 RX ADMIN — Medication 3 MILLILITER(S): at 00:47

## 2019-11-19 RX ADMIN — GABAPENTIN 100 MILLIGRAM(S): 400 CAPSULE ORAL at 14:34

## 2019-11-19 RX ADMIN — OXYCODONE AND ACETAMINOPHEN 1 TABLET(S): 5; 325 TABLET ORAL at 10:06

## 2019-11-19 RX ADMIN — Medication 50 MILLIGRAM(S): at 08:35

## 2019-11-19 RX ADMIN — GABAPENTIN 100 MILLIGRAM(S): 400 CAPSULE ORAL at 08:35

## 2019-11-19 RX ADMIN — Medication 650 MILLIGRAM(S): at 17:46

## 2019-11-19 RX ADMIN — Medication 100 MILLIGRAM(S): at 14:34

## 2019-11-19 RX ADMIN — OXYCODONE AND ACETAMINOPHEN 1 TABLET(S): 5; 325 TABLET ORAL at 10:07

## 2019-11-19 NOTE — PROVIDER CONTACT NOTE (OTHER) - ASSESSMENT
A&Ox4  ambulates frequently
pt A&Ox4 in no distress
pt A&Ox4 in no distress
pt A&Ox4, denies pain, pt in no distress, resting in bed
O2: 88% RR: 24 BP: 126/81 HR: 95 T: 98 F  Lung sounds wheezing present
uses 4L o2 at home but is currently on fio2 40 of high flow for hypoxemia. last SPO2 is 90% on high flow

## 2019-11-19 NOTE — PROVIDER CONTACT NOTE (OTHER) - RECOMMENDATIONS
patient educated on importance of having labs drawn
patient educated on importance of high flow o2
patient educated on purpose of these medications
patient educated on purpose of these medications.
Admin PRN duoneb and cough medicine
notify MD

## 2019-11-19 NOTE — PROVIDER CONTACT NOTE (OTHER) - ACTION/TREATMENT ORDERED:
PRovider notified & to speak to pt to educate further. orders for melatonin to follow.
Provider notified. No new orders.
Provider notified. no new orders.
provider notified & to follow up with primary team. no new orders at this time.
As per MD Duenas admin duoneb and cough med, reassess vital signs. Continue to monitor.
MD aware. per MD will reassess spo2 and will notify if <88% on high flow. will continue to monitor

## 2019-11-19 NOTE — CHART NOTE - NSCHARTNOTEFT_GEN_A_CORE
:  Demarcus Baker MD    INDICATION:    Pleural Effusion    PROCEDURE:  [ ] LIMITED ECHO  [ X] LIMITED CHEST  [ ] LIMITED RETROPERITONEAL  [ ] LIMITED ABDOMINAL  [ ] LIMITED DVT  [ ] NEEDLE GUIDANCE VASCULAR  [ X] NEEDLE GUIDANCE THORACENTESIS  [ ] NEEDLE GUIDANCE PARACENTESIS  [ ] NEEDLE GUIDANCE PERICARDIOCENTESIS  [ ] OTHER    FINDINGS:  There is a moderate right sided pleural effusion and anterior B line pattern    After insertion of an indwelling pleural catheter on the right side, there is a trace right sided pleural effusion.   There is anterior B line pattern.    INTERPRETATION:    Successful insertion of indwelling pleural catheter.   Removed right sided pleural effusion.   No pneumothorax.    Images stored in [facility] :  Demarcus Baker MD    INDICATION:    Pleural Effusion    PROCEDURE:  [ ] LIMITED ECHO  [ X] LIMITED CHEST  [ ] LIMITED RETROPERITONEAL  [ ] LIMITED ABDOMINAL  [ ] LIMITED DVT  [ ] NEEDLE GUIDANCE VASCULAR  [ X] NEEDLE GUIDANCE THORACENTESIS  [ ] NEEDLE GUIDANCE PARACENTESIS  [ ] NEEDLE GUIDANCE PERICARDIOCENTESIS  [ ] OTHER    FINDINGS:  There is a moderate right sided pleural effusion and anterior B line pattern    After insertion of an indwelling pleural catheter on the right side, there is a trace right sided pleural effusion.   There is anterior B line pattern.    INTERPRETATION:    Successful insertion of indwelling pleural catheter.   Removed right sided pleural effusion.   No pneumothorax.    Images stored in [facility]    Pulmonary attending    I was present throughout the ultrasound examination and agree with findings as described above.  No evidence of pneumothorax post procedure.

## 2019-11-19 NOTE — PROGRESS NOTE ADULT - PROBLEM SELECTOR PLAN 3
-holding Tagresso, possible ILD   -steroids per pulm rec  -onc consulted  --called private oncologist Dr. Giovana Deras - no further treatment recommendations, pt refused chemotherapy, was referred to clinical trial at [facility] (Dr. Giovana Wright)  --in-house onc recs: no treatment recommended  -palliative consult- pt is progressing on Tagresso  --eventual plan for treatment at [facility] w/Dr. Wright

## 2019-11-19 NOTE — PROGRESS NOTE ADULT - PROBLEM SELECTOR PLAN 1
Baseline chronic hypoxic respiratory failure from underlying NSCLC w/ lymphangitic spread. Unclear etiology of acute decompensation as CTA shows no PE and slightly increased masses but not to explain degree of decompensation or acuity. TTE with EF 65%, thick pericardium, trace pericardial effusion and some features consistent with constrictive pericarditis? Improved with steroids. Now, POCUS with larger R pleural effusion, trace L pleural.  - c/w Prednisone 50mg PO daily  - c/w Hycodan for cough  - Plan for PleurX today  - Titrate down supplemental O2 to maintain SO2 > 90%    Shane Galarza MD  Pulmonary & Critical Care Fellow  [phone] Baseline chronic hypoxic respiratory failure from underlying NSCLC w/ lymphangitic spread. Unclear etiology of acute decompensation as CTA shows no PE and slightly increased masses but not to explain degree of decompensation or acuity. TTE with EF 65%, thick pericardium, trace pericardial effusion and some features consistent with constrictive pericarditis? Improved with steroids. Now, POCUS with larger R pleural effusion, trace L pleural.  - c/w Prednisone 50mg PO daily  - c/w Hycodan for cough  - Plan for PleurX today, would drain 500cc every other day for now  - Titrate down supplemental O2 to maintain SO2 > 90%    Shane Galarza MD  Pulmonary & Critical Care Fellow  [phone]

## 2019-11-19 NOTE — PROGRESS NOTE ADULT - ASSESSMENT
Patient is a 58 yo M w/ NSCLC (dx'ed 3 years ago, currently on Osimertinib/Tagrisso) w/ extension to R neck (s/p RT) presents to the ED for acute onset dyspnea. Of note, patient was recently hospitalized for PNA (10/26-11/1), treated empirically w/ Vanc/Zosyn and also had a 1.5 L thoracentesis which showed malignant cells. He was discharged on oxygen 4L NC and was stable until he became acutely dyspneic, tachypneic, and tachycardic on morning of admission. CTA was negative for PE but showed small b/l pleural effusions, some increase in hilar mass, interlobular septal thickening.

## 2019-11-19 NOTE — PROGRESS NOTE ADULT - SUBJECTIVE AND OBJECTIVE BOX
[facility] Division of Internal Medicine  Ngozitrev Joel, PGY-1  Pager: [facility]: [phone] | [facility]: [phone]    Patient is a 59y old  Male who presents with a chief complaint of SOB (18 Nov 2019 12:43)      SUBJECTIVE / OVERNIGHT EVENTS: Episode of desat overnight to 88%, improved to 91-93% s/p duonebs.  ADDITIONAL REVIEW OF SYSTEMS: Continues to endorse coughing, not improved with Hycodan. Refuses labs intermittently. No fever, chills, nausea, vomiting, diarrhea, abdominal pain.      MEDICATIONS  (STANDING):  gabapentin 300 milliGRAM(s) Oral at bedtime  gabapentin 100 milliGRAM(s) Oral <User Schedule>  guaiFENesin  milliGRAM(s) Oral every 12 hours  heparin  Injectable 5000 Unit(s) SubCutaneous every 8 hours  melatonin 5 milliGRAM(s) Oral at bedtime  predniSONE   Tablet 50 milliGRAM(s) Oral daily    MEDICATIONS  (PRN):  albuterol/ipratropium for Nebulization 3 milliLiter(s) Nebulizer every 6 hours PRN Shortness of Breath and/or Wheezing  hydrocodone/homatropine Syrup 5 milliLiter(s) Oral every 6 hours PRN Cough      CAPILLARY BLOOD GLUCOSE        I&O's Summary    17 Nov 2019 07:01  -  18 Nov 2019 07:00  --------------------------------------------------------  IN: 810 mL / OUT: 0 mL / NET: 810 mL    18 Nov 2019 07:01  -  19 Nov 2019 06:39  --------------------------------------------------------  IN: 840 mL / OUT: 0 mL / NET: 840 mL        PHYSICAL EXAM:  Vital Signs Last 24 Hrs  T(C): 36.5 (19 Nov 2019 04:39), Max: 36.9 (18 Nov 2019 11:18)  T(F): 97.7 (19 Nov 2019 04:39), Max: 98.5 (18 Nov 2019 11:18)  HR: 90 (19 Nov 2019 04:39) (83 - 98)  BP: 122/81 (19 Nov 2019 04:39) (115/71 - 148/75)  BP(mean): --  RR: 18 (19 Nov 2019 04:39) (18 - 24)  SpO2: 93% (19 Nov 2019 04:39) (88% - 93%)  CONSTITUTIONAL: NAD  HEAD: Head atraumatic, normal cephalic shape.  EYES: sclera clear bilaterally,  CARDIAC: regular rate, normal S1/S2, no murmurs  RESPIRATORY: no respiratory distress, R posterior base and middle lung decreased breath sounds, L posterior fields mild crackles   CHEST: no erythema, warmth, tenderness or crepitus  GASTROINTESTINAL: soft, nontender, bowel sounds present  MUSCULOSKELETAL: +1 LE edema b/l, normal strength and ROM, no muscle or joint tenderness  NEUROLOGICAL: AAOx3, no focal deficits, full strength   SKIN: normal skin color, no apparent rashes  PSYCH: normal mood/affect    LABS:                      RADIOLOGY & ADDITIONAL TESTS:  Results Reviewed:   Imaging Personally Reviewed:  Electrocardiogram Personally Reviewed:    COORDINATION OF CARE:  Care Discussed with Consultants/Other Providers [Y/N]:  Prior or Outpatient Records Reviewed [Y/N]:

## 2019-11-19 NOTE — PROGRESS NOTE ADULT - SUBJECTIVE AND OBJECTIVE BOX
CHIEF COMPLAINT:    Interval Events: Complaints of L pleuritic chest pain. Scheduled for pleurX today.    REVIEW OF SYSTEMS:  Constitutional: [ ] negative [ ] fevers [ ] chills [ ] weight loss [ ] weight gain  HEENT: [ ] negative [ ] dry eyes [ ] eye irritation [ ] postnasal drip [ ] nasal congestion  CV: [ ] negative  [ ] chest pain [ ] orthopnea [ ] palpitations [ ] murmur  Resp: [ ] negative [X] cough [X] shortness of breath [ ] dyspnea [ ] wheezing [ ] sputum [ ] hemoptysis  GI: [ ] negative [ ] nausea [ ] vomiting [ ] diarrhea [ ] constipation [ ] abd pain [ ] dysphagia   : [ ] negative [ ] dysuria [ ] nocturia [ ] hematuria [ ] increased urinary frequency  Musculoskeletal: [ ] negative [ ] back pain [ ] myalgias [ ] arthralgias [ ] fracture  Skin: [ ] negative [ ] rash [ ] itch  Neurological: [ ] negative [ ] headache [ ] dizziness [ ] syncope [ ] weakness [ ] numbness  Psychiatric: [ ] negative [ ] anxiety [ ] depression  Endocrine: [ ] negative [ ] diabetes [ ] thyroid problem  Hematologic/Lymphatic: [ ] negative [ ] anemia [ ] bleeding problem  Allergic/Immunologic: [ ] negative [ ] itchy eyes [ ] nasal discharge [ ] hives [ ] angioedema  [X] All other systems negative  [ ] Unable to assess ROS because ________    OBJECTIVE:  ICU Vital Signs Last 24 Hrs  T(C): 36.5 (19 Nov 2019 10:16), Max: 36.7 (19 Nov 2019 00:35)  T(F): 97.7 (19 Nov 2019 10:16), Max: 98 (19 Nov 2019 00:35)  HR: 90 (19 Nov 2019 10:16) (83 - 96)  BP: 122/81 (19 Nov 2019 10:16) (115/71 - 148/75)  BP(mean): --  ABP: --  ABP(mean): --  RR: 18 (19 Nov 2019 10:16) (18 - 24)  SpO2: 93% (19 Nov 2019 10:16) (88% - 93%)        11-18 @ 07:01  -  11-19 @ 07:00  --------------------------------------------------------  IN: 840 mL / OUT: 0 mL / NET: 840 mL    11-19 @ 07:01  -  11-19 @ 12:29  --------------------------------------------------------  IN: 360 mL / OUT: 0 mL / NET: 360 mL      CAPILLARY BLOOD GLUCOSE          PHYSICAL EXAM:  General: NAD, resting comfortably on nasal cannula  HEENT: EOMI, PERRLA, R chin/neck fullness/swelling  Respiratory: CTAB, decreased breath sounds at bases  Cardiovascular: S1,S2 RRR  Abdomen: Soft, NTND, BS+  Extremities: Trace pitting peripheral edema up to [facility]    HOSPITAL MEDICATIONS:  MEDICATIONS  (STANDING):  benzonatate 100 milliGRAM(s) Oral every 8 hours  gabapentin 300 milliGRAM(s) Oral at bedtime  gabapentin 100 milliGRAM(s) Oral <User Schedule>  guaiFENesin  milliGRAM(s) Oral every 12 hours  heparin  Injectable 5000 Unit(s) SubCutaneous every 8 hours  melatonin 5 milliGRAM(s) Oral at bedtime  predniSONE   Tablet 50 milliGRAM(s) Oral daily  tiotropium 18 MICROgram(s) Capsule 1 Capsule(s) Inhalation daily    MEDICATIONS  (PRN):  albuterol/ipratropium for Nebulization 3 milliLiter(s) Nebulizer every 6 hours PRN Shortness of Breath and/or Wheezing  hydrocodone/homatropine Syrup 5 milliLiter(s) Oral every 6 hours PRN Cough      LABS:    Hgb Trend: 12.2<--, 12.1<--, 11.9<--  11-19    140  |  100  |  33<H>  ----------------------------<  121<H>  4.7   |  28  |  0.97    Ca    9.0      19 Nov 2019 10:49    TPro  7.2  /  Alb  3.7  /  TBili  0.5  /  DBili  x   /  AST  48<H>  /  ALT  40  /  AlkPhos  170<H>  11-19    Creatinine Trend: 0.97<--, 1.02<--, 1.09<--, 1.20<--, 1.13<--, 1.16<--            MICROBIOLOGY:       RADIOLOGY:  [ ] Reviewed and interpreted by me    PULMONARY FUNCTION TESTS:    EKG:

## 2019-11-19 NOTE — CHART NOTE - NSCHARTNOTEFT_GEN_A_CORE
Indication: Malignant Pleural Effusion    Operators:  Heather Aguilera    Pre-op Dx: Recurrent malignany pleural effusion     Consent: Obtained and placed in chart.     Preop medications: 15cc of 1% lidocaine and 5cc of 2% lidocaine for local injection    Xray/CT Findings: Large right sided effusion  Ultrasound exam immediately before the procedure confirmed large right sided effusion.     Procedure: The patient was consented and a timeout was done. The area was identified with ultrasound and marked. Local anesthesia to the area was provided as above. The pleural fluid was accessed with a 18 gauge needle and a guidewire was placed. The guidewire was confirmed in the pleural space with ultrasound. A tract was tunneled for placement of IPC. Two dilations were performed over the guidewire with insertion of the catheter through a peel-away trochar. There was 1000cc of fluid that was removed. The two incisions were closed with one suture each and the area was dressed.  The pleural catheter was left connected to a pleuravac to passively drain. The patient tolerated the procedure well.  CXR ordered. Indication: Malignant Pleural Effusion    Operators:  Heather Aguilera    Pre-op Dx: Recurrent malignany pleural effusion     Consent: Obtained and placed in chart.     Preop medications: 15cc of 1% lidocaine and 5cc of 2% lidocaine for local injection    Xray/CT Findings: Large right sided effusion  Ultrasound exam immediately before the procedure confirmed large right sided effusion.     Procedure: The patient was consented and a timeout was done. The area was identified with ultrasound and marked. Local anesthesia to the area was provided as above. The pleural fluid was accessed with a 18 gauge needle and a guidewire was placed. The guidewire was confirmed in the pleural space with ultrasound. A tract was tunneled for placement of IPC. Two dilations were performed over the guidewire with insertion of the catheter through a peel-away trochar. There was 1000cc of fluid that was removed. The two incisions were closed with one suture each and the area was dressed.  The pleural catheter was left connected to a pleuravac to passively drain. The patient tolerated the procedure well.  CXR ordered.      Pulmonary Attending    I was present throughout the entire procedure.  Agree with description as outlined above.  Patient tolerated the procedure well.

## 2019-11-20 ENCOUNTER — TRANSCRIPTION ENCOUNTER (OUTPATIENT)
Age: 59
End: 2019-11-20

## 2019-11-20 LAB
ALBUMIN SERPL ELPH-MCNC: 3.4 G/DL — SIGNIFICANT CHANGE UP (ref 3.3–5)
ALP SERPL-CCNC: 165 U/L — HIGH (ref 40–120)
ALT FLD-CCNC: 36 U/L — SIGNIFICANT CHANGE UP (ref 10–45)
ANION GAP SERPL CALC-SCNC: 13 MMOL/L — SIGNIFICANT CHANGE UP (ref 5–17)
APTT BLD: 29.2 SEC — SIGNIFICANT CHANGE UP (ref 27.5–36.3)
AST SERPL-CCNC: 40 U/L — SIGNIFICANT CHANGE UP (ref 10–40)
BASOPHILS # BLD AUTO: 0 K/UL — SIGNIFICANT CHANGE UP (ref 0–0.2)
BASOPHILS NFR BLD AUTO: 0 % — SIGNIFICANT CHANGE UP (ref 0–2)
BILIRUB SERPL-MCNC: 0.5 MG/DL — SIGNIFICANT CHANGE UP (ref 0.2–1.2)
BUN SERPL-MCNC: 34 MG/DL — HIGH (ref 7–23)
CALCIUM SERPL-MCNC: 8.8 MG/DL — SIGNIFICANT CHANGE UP (ref 8.4–10.5)
CHLORIDE SERPL-SCNC: 99 MMOL/L — SIGNIFICANT CHANGE UP (ref 96–108)
CO2 SERPL-SCNC: 27 MMOL/L — SIGNIFICANT CHANGE UP (ref 22–31)
CREAT SERPL-MCNC: 0.91 MG/DL — SIGNIFICANT CHANGE UP (ref 0.5–1.3)
EOSINOPHIL # BLD AUTO: 0.03 K/UL — SIGNIFICANT CHANGE UP (ref 0–0.5)
EOSINOPHIL NFR BLD AUTO: 0.3 % — SIGNIFICANT CHANGE UP (ref 0–6)
GLUCOSE SERPL-MCNC: 98 MG/DL — SIGNIFICANT CHANGE UP (ref 70–99)
HCT VFR BLD CALC: 42.5 % — SIGNIFICANT CHANGE UP (ref 39–50)
HGB BLD-MCNC: 13.5 G/DL — SIGNIFICANT CHANGE UP (ref 13–17)
IMM GRANULOCYTES NFR BLD AUTO: 0.4 % — SIGNIFICANT CHANGE UP (ref 0–1.5)
LYMPHOCYTES # BLD AUTO: 0.66 K/UL — LOW (ref 1–3.3)
LYMPHOCYTES # BLD AUTO: 6.4 % — LOW (ref 13–44)
MAGNESIUM SERPL-MCNC: 2.4 MG/DL — SIGNIFICANT CHANGE UP (ref 1.6–2.6)
MCHC RBC-ENTMCNC: 29 PG — SIGNIFICANT CHANGE UP (ref 27–34)
MCHC RBC-ENTMCNC: 31.8 GM/DL — LOW (ref 32–36)
MCV RBC AUTO: 91.4 FL — SIGNIFICANT CHANGE UP (ref 80–100)
MONOCYTES # BLD AUTO: 1.33 K/UL — HIGH (ref 0–0.9)
MONOCYTES NFR BLD AUTO: 12.9 % — SIGNIFICANT CHANGE UP (ref 2–14)
NEUTROPHILS # BLD AUTO: 8.28 K/UL — HIGH (ref 1.8–7.4)
NEUTROPHILS NFR BLD AUTO: 80 % — HIGH (ref 43–77)
PHOSPHATE SERPL-MCNC: 3.4 MG/DL — SIGNIFICANT CHANGE UP (ref 2.5–4.5)
PLATELET # BLD AUTO: 98 K/UL — LOW (ref 150–400)
POTASSIUM SERPL-MCNC: 4.1 MMOL/L — SIGNIFICANT CHANGE UP (ref 3.5–5.3)
POTASSIUM SERPL-SCNC: 4.1 MMOL/L — SIGNIFICANT CHANGE UP (ref 3.5–5.3)
PROT SERPL-MCNC: 6.8 G/DL — SIGNIFICANT CHANGE UP (ref 6–8.3)
RBC # BLD: 4.65 M/UL — SIGNIFICANT CHANGE UP (ref 4.2–5.8)
RBC # FLD: 14.6 % — HIGH (ref 10.3–14.5)
SODIUM SERPL-SCNC: 139 MMOL/L — SIGNIFICANT CHANGE UP (ref 135–145)
WBC # BLD: 10.34 K/UL — SIGNIFICANT CHANGE UP (ref 3.8–10.5)
WBC # FLD AUTO: 10.34 K/UL — SIGNIFICANT CHANGE UP (ref 3.8–10.5)

## 2019-11-20 PROCEDURE — 99232 SBSQ HOSP IP/OBS MODERATE 35: CPT | Mod: GC

## 2019-11-20 PROCEDURE — 99233 SBSQ HOSP IP/OBS HIGH 50: CPT | Mod: GC

## 2019-11-20 RX ADMIN — Medication 50 MILLIGRAM(S): at 09:45

## 2019-11-20 RX ADMIN — Medication 650 MILLIGRAM(S): at 00:40

## 2019-11-20 RX ADMIN — GABAPENTIN 100 MILLIGRAM(S): 400 CAPSULE ORAL at 09:45

## 2019-11-20 RX ADMIN — GABAPENTIN 300 MILLIGRAM(S): 400 CAPSULE ORAL at 22:06

## 2019-11-20 RX ADMIN — GABAPENTIN 100 MILLIGRAM(S): 400 CAPSULE ORAL at 13:09

## 2019-11-20 RX ADMIN — Medication 650 MILLIGRAM(S): at 06:26

## 2019-11-20 RX ADMIN — Medication 650 MILLIGRAM(S): at 06:56

## 2019-11-20 RX ADMIN — Medication 5 MILLIGRAM(S): at 22:06

## 2019-11-20 RX ADMIN — Medication 5 MILLIGRAM(S): at 00:08

## 2019-11-20 RX ADMIN — TIOTROPIUM BROMIDE 1 CAPSULE(S): 18 CAPSULE ORAL; RESPIRATORY (INHALATION) at 22:48

## 2019-11-20 RX ADMIN — Medication 650 MILLIGRAM(S): at 00:10

## 2019-11-20 RX ADMIN — GABAPENTIN 300 MILLIGRAM(S): 400 CAPSULE ORAL at 00:10

## 2019-11-20 NOTE — PROGRESS NOTE ADULT - PROBLEM SELECTOR PLAN 1
-weaning from HFNC, on 5.5 LPM NC  -TTE: unclear if any constrictive pathology, mild pHTN, thickened pericardium w/trace effusions  -card rec: no acute interventions, rec lasix 40 PO daily  -Troponins and BNP   -BCx to r/o PNA, holding abx for now, BCx NGTD  -pulm c/s: no thoracentesis, steroids, titrate down O2, likely 2/2 progression of cancer w/ repeat US showing reaccumulation, s/p Pleurex placement

## 2019-11-20 NOTE — PROGRESS NOTE ADULT - PROBLEM SELECTOR PLAN 3
-holding Tagresso, possible ILD   -steroids per pulm rec  -onc consulted  --called private oncologist Dr. Giovana Deras - no further treatment recommendations, pt refused chemotherapy, was referred to clinical trial at [facility] (Dr. Giovana Wright)  --in-house onc recs: no treatment recommended  -palliative consult- pt is progressing on Tagresso  --eventual plan for treatment at [facility] w/Dr. Wright

## 2019-11-20 NOTE — PROGRESS NOTE ADULT - ASSESSMENT
59 year old M presents with acute respiratory failure due to pleural effusions possibly secondary to progression of NSCLC. On HFNC with initial improvement in symptoms. Found to have progression of disease. Tolerating NC at 5.5 LPM.

## 2019-11-20 NOTE — PROGRESS NOTE ADULT - PROBLEM SELECTOR PLAN 1
Baseline chronic hypoxic respiratory failure from underlying NSCLC w/ lymphangitic spread. Unclear etiology of acute decompensation as CTA shows no PE and slightly increased masses but not to explain degree of decompensation or acuity. TTE with EF 65%, thick pericardium, trace pericardial effusion and some features consistent with constrictive pericarditis? Improved with steroids. Now, POCUS with larger R pleural effusion, trace L pleural,  s/p PleurX yesterday with 1000cc removed.  - c/w Prednisone 50mg PO daily  - c/w Hycodan for cough  - Would drain 500cc from PleurX every other day for now  - Titrate down supplemental O2 to maintain SO2 > 90%  - Needs outpatient pulmonary follow up upon discharge at [address] [place] [address], [address] ([phone]).  Please e-mail [email or web address] to make an appointment for the patient.  Please include the name, , and a phone number for you and the patient)    Shane Galarza MD  Pulmonary & Critical Care Fellow  [phone]

## 2019-11-20 NOTE — DISCHARGE NOTE NURSING/CASE MANAGEMENT/SOCIAL WORK - NSDCDMETYPESERV_GEN_ALL_CORE_FT
Hospital Bed If you want a Hospital Bed call [facility] & request a [facility].  You can see a picture of the bed @ [email or web address]

## 2019-11-20 NOTE — PROGRESS NOTE ADULT - SUBJECTIVE AND OBJECTIVE BOX
CHIEF COMPLAINT:    Interval Events: s/p PleurX yesterday with 1000cc removed. Complaining of R pleuritic chest pain but cough improved.    REVIEW OF SYSTEMS:  Constitutional: [ ] negative [ ] fevers [ ] chills [ ] weight loss [ ] weight gain  HEENT: [ ] negative [ ] dry eyes [ ] eye irritation [ ] postnasal drip [ ] nasal congestion  CV: [ ] negative  [X] chest pain [ ] orthopnea [ ] palpitations [ ] murmur  Resp: [ ] negative [X] cough [ ] shortness of breath [X] dyspnea [ ] wheezing [ ] sputum [ ] hemoptysis  GI: [ ] negative [ ] nausea [ ] vomiting [ ] diarrhea [ ] constipation [ ] abd pain [ ] dysphagia   : [ ] negative [ ] dysuria [ ] nocturia [ ] hematuria [ ] increased urinary frequency  Musculoskeletal: [ ] negative [ ] back pain [ ] myalgias [ ] arthralgias [ ] fracture  Skin: [ ] negative [ ] rash [ ] itch  Neurological: [ ] negative [ ] headache [ ] dizziness [ ] syncope [ ] weakness [ ] numbness  Psychiatric: [ ] negative [ ] anxiety [ ] depression  Endocrine: [ ] negative [ ] diabetes [ ] thyroid problem  Hematologic/Lymphatic: [ ] negative [ ] anemia [ ] bleeding problem  Allergic/Immunologic: [ ] negative [ ] itchy eyes [ ] nasal discharge [ ] hives [ ] angioedema  [X] All other systems negative  [ ] Unable to assess ROS because ________    OBJECTIVE:  ICU Vital Signs Last 24 Hrs  T(C): 36.4 (20 Nov 2019 05:13), Max: 36.7 (20 Nov 2019 00:07)  T(F): 97.5 (20 Nov 2019 05:13), Max: 98 (20 Nov 2019 00:07)  HR: 90 (20 Nov 2019 05:13) (90 - 114)  BP: 122/82 (20 Nov 2019 05:13) (118/72 - 136/84)  BP(mean): --  ABP: --  ABP(mean): --  RR: 20 (20 Nov 2019 05:13) (18 - 20)  SpO2: 92% (20 Nov 2019 05:13) (90% - 94%)        11-19 @ 07:01  -  11-20 @ 07:00  --------------------------------------------------------  IN: 1080 mL / OUT: 700 mL / NET: 380 mL      CAPILLARY BLOOD GLUCOSE          PHYSICAL EXAM:  General: NAD, resting comfortably on nasal cannula  HEENT: EOMI, PERRLA, R chin/neck fullness/swelling  Respiratory: CTAB, decreased breath sounds at bases  Cardiovascular: S1,S2 RRR  Abdomen: Soft, NTND, BS+  Extremities: Trace pitting peripheral edema up to [facility] MEDICATIONS:  MEDICATIONS  (STANDING):  benzonatate 100 milliGRAM(s) Oral every 8 hours  gabapentin 300 milliGRAM(s) Oral at bedtime  gabapentin 100 milliGRAM(s) Oral <User Schedule>  guaiFENesin  milliGRAM(s) Oral every 12 hours  heparin  Injectable 5000 Unit(s) SubCutaneous every 8 hours  melatonin 5 milliGRAM(s) Oral at bedtime  predniSONE   Tablet 50 milliGRAM(s) Oral daily  tiotropium 18 MICROgram(s) Capsule 1 Capsule(s) Inhalation daily    MEDICATIONS  (PRN):  acetaminophen   Tablet .. 650 milliGRAM(s) Oral every 6 hours PRN Mild Pain (1 - 3)  acetaminophen   Tablet .. 1000 milliGRAM(s) Oral every 6 hours PRN Moderate Pain (4 - 6)  albuterol/ipratropium for Nebulization 3 milliLiter(s) Nebulizer every 6 hours PRN Shortness of Breath and/or Wheezing  hydrocodone/homatropine Syrup 5 milliLiter(s) Oral every 6 hours PRN Cough  traMADol 25 milliGRAM(s) Oral every 6 hours PRN Severe Pain (7 - 10)      LABS:                        13.2   9.15  )-----------( 100      ( 19 Nov 2019 16:58 )             42.6     Hgb Trend: 13.2<--, 12.2<--, 12.1<--, 11.9<--  11-20    139  |  99  |  34<H>  ----------------------------<  98  4.1   |  27  |  0.91    Ca    8.8      20 Nov 2019 09:37  Phos  3.4     11-20  Mg     2.4     11-20    TPro  6.8  /  Alb  3.4  /  TBili  0.5  /  DBili  x   /  AST  40  /  ALT  36  /  AlkPhos  165<H>  11-20    Creatinine Trend: 0.91<--, 0.97<--, 1.02<--, 1.09<--, 1.20<--, 1.13<--  PT/INR - ( 19 Nov 2019 18:09 )   PT: 12.9 sec;   INR: 1.14 ratio         PTT - ( 19 Nov 2019 18:09 )  PTT:29.4 sec          MICROBIOLOGY:       RADIOLOGY:  [ ] Reviewed and interpreted by me    PULMONARY FUNCTION TESTS:    EKG:

## 2019-11-20 NOTE — DISCHARGE NOTE NURSING/CASE MANAGEMENT/SOCIAL WORK - NSDCFUADDAPPT_GEN_ALL_CORE_FT
You have an appointment with the pulmonologist Dr. Lisker on 11/25/19 at 3 pm at [address] [place] [address]

## 2019-11-20 NOTE — PROGRESS NOTE ADULT - SUBJECTIVE AND OBJECTIVE BOX
[facility] Division of Internal Medicine  Romain Maldonado, PGY-1  Pager: [facility]: [phone] | [facility]: [phone]    Patient is a 59y old  Male who presents with a chief complaint of SOB (19 Nov 2019 12:29)      SUBJECTIVE / OVERNIGHT EVENTS: No acute events overnight. Refuses meds and labs intermittently     ADDITIONAL REVIEW OF SYSTEMS: +cough, intermittent sputum production. No fever, chills, nausea, vomiting, diarrhea, abdominal pain.      MEDICATIONS  (STANDING):  benzonatate 100 milliGRAM(s) Oral every 8 hours  gabapentin 300 milliGRAM(s) Oral at bedtime  gabapentin 100 milliGRAM(s) Oral <User Schedule>  guaiFENesin  milliGRAM(s) Oral every 12 hours  heparin  Injectable 5000 Unit(s) SubCutaneous every 8 hours  melatonin 5 milliGRAM(s) Oral at bedtime  predniSONE   Tablet 50 milliGRAM(s) Oral daily  tiotropium 18 MICROgram(s) Capsule 1 Capsule(s) Inhalation daily    MEDICATIONS  (PRN):  acetaminophen   Tablet .. 650 milliGRAM(s) Oral every 6 hours PRN Mild Pain (1 - 3)  acetaminophen   Tablet .. 1000 milliGRAM(s) Oral every 6 hours PRN Moderate Pain (4 - 6)  albuterol/ipratropium for Nebulization 3 milliLiter(s) Nebulizer every 6 hours PRN Shortness of Breath and/or Wheezing  hydrocodone/homatropine Syrup 5 milliLiter(s) Oral every 6 hours PRN Cough  traMADol 25 milliGRAM(s) Oral every 6 hours PRN Severe Pain (7 - 10)      CAPILLARY BLOOD GLUCOSE        I&O's Summary    19 Nov 2019 07:01  -  20 Nov 2019 07:00  --------------------------------------------------------  IN: 1080 mL / OUT: 700 mL / NET: 380 mL        PHYSICAL EXAM:  Vital Signs Last 24 Hrs  T(C): 36.4 (20 Nov 2019 05:13), Max: 36.7 (20 Nov 2019 00:07)  T(F): 97.5 (20 Nov 2019 05:13), Max: 98 (20 Nov 2019 00:07)  HR: 90 (20 Nov 2019 05:13) (90 - 114)  BP: 122/82 (20 Nov 2019 05:13) (118/72 - 136/84)  BP(mean): --  RR: 20 (20 Nov 2019 05:13) (18 - 20)  SpO2: 92% (20 Nov 2019 05:13) (90% - 94%)  CONSTITUTIONAL: NAD  HEAD: Head atraumatic, normal cephalic shape.  EYES: sclera clear bilaterally,  CARDIAC: regular rate, normal S1/S2, no murmurs  RESPIRATORY: no respiratory distress, R posterior base and middle lung decreased breath sounds, L posterior fields mild crackles   CHEST: +R pleurx site, c/d/i; no erythema, warmth, tenderness or crepitus  GASTROINTESTINAL: soft, nontender, bowel sounds present  MUSCULOSKELETAL: +1 LE edema b/l, normal strength and ROM, no muscle or joint tenderness  NEUROLOGICAL: AAOx3, no focal deficits, full strength   SKIN: normal skin color, no apparent rashes  PSYCH: normal mood/affect    LABS:                        13.2   9.15  )-----------( 100      ( 19 Nov 2019 16:58 )             42.6     11-19    140  |  100  |  33<H>  ----------------------------<  121<H>  4.7   |  28  |  0.97    Ca    9.0      19 Nov 2019 10:49    TPro  7.2  /  Alb  3.7  /  TBili  0.5  /  DBili  x   /  AST  48<H>  /  ALT  40  /  AlkPhos  170<H>  11-19    PT/INR - ( 19 Nov 2019 18:09 )   PT: 12.9 sec;   INR: 1.14 ratio         PTT - ( 19 Nov 2019 18:09 )  PTT:29.4 sec            RADIOLOGY & ADDITIONAL TESTS:  Results Reviewed:   Imaging Personally Reviewed:  Electrocardiogram Personally Reviewed:    COORDINATION OF CARE:  Care Discussed with Consultants/Other Providers [Y/N]:  Prior or Outpatient Records Reviewed [Y/N]:

## 2019-11-20 NOTE — DISCHARGE NOTE NURSING/CASE MANAGEMENT/SOCIAL WORK - NSSCTYPOFSERV_GEN_ALL_CORE
Start of Care: 11/21 Service requested: RN for Pleurax Tube management. A nurse will call to set up an appointment. Start of Care: 11/21 Service requested: RN for Pleurax Tube management. Your next drainage will be Friday, 11/22. A nurse will call to set up an appointment for 11/22.

## 2019-11-21 VITALS
HEART RATE: 105 BPM | SYSTOLIC BLOOD PRESSURE: 119 MMHG | DIASTOLIC BLOOD PRESSURE: 76 MMHG | RESPIRATION RATE: 20 BRPM | OXYGEN SATURATION: 91 %

## 2019-11-21 PROCEDURE — 83010 ASSAY OF HAPTOGLOBIN QUANT: CPT

## 2019-11-21 PROCEDURE — 83605 ASSAY OF LACTIC ACID: CPT

## 2019-11-21 PROCEDURE — 87798 DETECT AGENT NOS DNA AMP: CPT

## 2019-11-21 PROCEDURE — 85730 THROMBOPLASTIN TIME PARTIAL: CPT

## 2019-11-21 PROCEDURE — 99239 HOSP IP/OBS DSCHRG MGMT >30: CPT

## 2019-11-21 PROCEDURE — 83880 ASSAY OF NATRIURETIC PEPTIDE: CPT

## 2019-11-21 PROCEDURE — 87040 BLOOD CULTURE FOR BACTERIA: CPT

## 2019-11-21 PROCEDURE — 93306 TTE W/DOPPLER COMPLETE: CPT

## 2019-11-21 PROCEDURE — 85027 COMPLETE CBC AUTOMATED: CPT

## 2019-11-21 PROCEDURE — 80053 COMPREHEN METABOLIC PANEL: CPT

## 2019-11-21 PROCEDURE — 85610 PROTHROMBIN TIME: CPT

## 2019-11-21 PROCEDURE — 84466 ASSAY OF TRANSFERRIN: CPT

## 2019-11-21 PROCEDURE — 94640 AIRWAY INHALATION TREATMENT: CPT

## 2019-11-21 PROCEDURE — 85014 HEMATOCRIT: CPT

## 2019-11-21 PROCEDURE — 93005 ELECTROCARDIOGRAM TRACING: CPT

## 2019-11-21 PROCEDURE — 83615 LACTATE (LD) (LDH) ENZYME: CPT

## 2019-11-21 PROCEDURE — 84484 ASSAY OF TROPONIN QUANT: CPT

## 2019-11-21 PROCEDURE — 84100 ASSAY OF PHOSPHORUS: CPT

## 2019-11-21 PROCEDURE — 71045 X-RAY EXAM CHEST 1 VIEW: CPT

## 2019-11-21 PROCEDURE — 83550 IRON BINDING TEST: CPT

## 2019-11-21 PROCEDURE — 92610 EVALUATE SWALLOWING FUNCTION: CPT

## 2019-11-21 PROCEDURE — 87486 CHLMYD PNEUM DNA AMP PROBE: CPT

## 2019-11-21 PROCEDURE — 83540 ASSAY OF IRON: CPT

## 2019-11-21 PROCEDURE — 82435 ASSAY OF BLOOD CHLORIDE: CPT

## 2019-11-21 PROCEDURE — 71275 CT ANGIOGRAPHY CHEST: CPT

## 2019-11-21 PROCEDURE — 84132 ASSAY OF SERUM POTASSIUM: CPT

## 2019-11-21 PROCEDURE — C1729: CPT

## 2019-11-21 PROCEDURE — 87581 M.PNEUMON DNA AMP PROBE: CPT

## 2019-11-21 PROCEDURE — 82728 ASSAY OF FERRITIN: CPT

## 2019-11-21 PROCEDURE — 82803 BLOOD GASES ANY COMBINATION: CPT

## 2019-11-21 PROCEDURE — 99285 EMERGENCY DEPT VISIT HI MDM: CPT

## 2019-11-21 PROCEDURE — 83735 ASSAY OF MAGNESIUM: CPT

## 2019-11-21 PROCEDURE — 87633 RESP VIRUS 12-25 TARGETS: CPT

## 2019-11-21 PROCEDURE — 84145 PROCALCITONIN (PCT): CPT

## 2019-11-21 PROCEDURE — 82947 ASSAY GLUCOSE BLOOD QUANT: CPT

## 2019-11-21 PROCEDURE — 82330 ASSAY OF CALCIUM: CPT

## 2019-11-21 PROCEDURE — 85045 AUTOMATED RETICULOCYTE COUNT: CPT

## 2019-11-21 PROCEDURE — 84295 ASSAY OF SERUM SODIUM: CPT

## 2019-11-21 RX ORDER — GABAPENTIN 400 MG/1
1 CAPSULE ORAL
Qty: 60 | Refills: 0
Start: 2019-11-21 | End: 2019-12-20

## 2019-11-21 RX ORDER — TIOTROPIUM BROMIDE 18 UG/1
1 CAPSULE ORAL; RESPIRATORY (INHALATION)
Qty: 1 | Refills: 0
Start: 2019-11-21 | End: 2019-12-20

## 2019-11-21 RX ORDER — TRAMADOL HYDROCHLORIDE 50 MG/1
0.5 TABLET ORAL
Qty: 6 | Refills: 0
Start: 2019-11-21 | End: 2019-11-23

## 2019-11-21 RX ORDER — GABAPENTIN 400 MG/1
1 CAPSULE ORAL
Qty: 30 | Refills: 0
Start: 2019-11-21 | End: 2019-12-20

## 2019-11-21 RX ORDER — IPRATROPIUM/ALBUTEROL SULFATE 18-103MCG
3 AEROSOL WITH ADAPTER (GRAM) INHALATION
Qty: 1 | Refills: 0
Start: 2019-11-21 | End: 2019-12-20

## 2019-11-21 RX ADMIN — Medication 1000 MILLIGRAM(S): at 05:49

## 2019-11-21 RX ADMIN — GABAPENTIN 100 MILLIGRAM(S): 400 CAPSULE ORAL at 09:56

## 2019-11-21 RX ADMIN — GABAPENTIN 100 MILLIGRAM(S): 400 CAPSULE ORAL at 15:33

## 2019-11-21 RX ADMIN — Medication 1000 MILLIGRAM(S): at 04:49

## 2019-11-21 RX ADMIN — Medication 50 MILLIGRAM(S): at 09:56

## 2019-11-21 NOTE — PROGRESS NOTE ADULT - PROBLEM SELECTOR PROBLEM 2
Cough
Cough
Pleural effusion
Cough
Pleural effusion
Cough
Pleural effusion

## 2019-11-21 NOTE — PROGRESS NOTE ADULT - PROBLEM SELECTOR PROBLEM 1
Dyspnea, unspecified type
Acute hypoxemic respiratory failure
Dyspnea, unspecified type
Hypoxia
Acute hypoxemic respiratory failure
Dyspnea, unspecified type
Acute hypoxemic respiratory failure
Dyspnea, unspecified type

## 2019-11-21 NOTE — PROGRESS NOTE ADULT - PROBLEM SELECTOR PROBLEM 6
Debility
Debility
Discharge planning issues
Debility
Discharge planning issues
Debility
Discharge planning issues

## 2019-11-21 NOTE — PROGRESS NOTE ADULT - PROBLEM SELECTOR PROBLEM 3
Dysphagia, unspecified type
Dysphagia, unspecified type
Edema of both lower extremities
Non-small cell carcinoma of lung
Dysphagia, unspecified type
Non-small cell carcinoma of lung
Dysphagia, unspecified type
Non-small cell carcinoma of lung

## 2019-11-21 NOTE — PROGRESS NOTE ADULT - ATTENDING COMMENTS
Patient seen and examined, data and imaging reviewed.  cxr post placement of PleuRX shows no pneumothorax, catheter in place and markedly dimiished pleural fluid.  He has drained approximately 2 L since placement, and is feeling less dyspneic.   Would cap PleuRx today and place on TIW drainage schedule, up to 1 L per drainage.  He can follow up with me at [address] [place] [address] on discharge.
Agree with above. Patient with progressive metastatic lung cancer with large lung mass and malignant pleural effusion. He presents with acute shortness of breath and hypoxia. A CTPA is negative for PE but does appear to show slight progression of disease and lymphangitic spread of cancer. He does have a small pleural effusion which looks similar to last admission. At this time will hold off thoracentesis and if effusion increases in size or created diaphragmatic inversion will consider thoracentesis or PleurX catheter placement. At this time the effusion is small and his diaphragm has a normal contour but we will continue to follow his lung exam and imaging.    Continue supplemental O2 to maintain O2 sats > 90%.  Unfortunately I do think we are seeing a progression of his underlying disease. It is reasonable to give a trial of steroids for the lymphangitic spread. Would consider Oncology evaluation to see if there are other treatment options as patient is clearly progressing on Tagrisso. If no further treatment options would consider Palliative care evaluation    Patient also describes aspiration symptoms. He has recently had radiation to his neck for a mass and now has some coughing/choking when eating. Would suggest speech/swallow evaluation.    He has bilateral lower extremity pitting edema. He does not have any known heart failure but would suggest gentle diuresis. His echo appears to show evidence of constriction. Would consider cardiology evaluation.    Acute on Chronic Hypoxemic Respiratory Failure - continue supplemental O2 to maintain O2 sat > 88%. Titrate down FiO2 as able. Incentive spirometer. This AM when patient was off all supplemental O2 his saturation was 75%. He improved to 94% when the HiFlow NC was placed back.
Patient seen and examined, data and imaging personally reviewed.  He is experiencing increasing shortness of breath and pleuritic chest pain and right pleural effusion is recurring.  Will place PleuRx catheter for drainage today.
Appears more euvolemic today with slight increase in Cr. 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 Unclear when or if pt will be able to be titrated off HFNC  GOC conversations ongoing
Remains on HFNC. Attempting to continuing to downtitrate off to NC as pt is hopeful for continued therapy  Will continue current management with HFNC and steroids for lymphangitic spread of cancer. Suspect symptoms are 2/2 progressing cancer
Respiratory status remains stable, pt reports feeling slightly improved today although remains on HFNC. Will continue with steroids and zack diuresis. 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 Will consult oncology
Seen at bedside. Cough remains improved. Was taught how to drain pleurex yesterday. To go home with VNS services.  Pulm recommending TIW drainage schedule, up to 1 L per drainage. To follow up with Pulm, and Oncology Stable for discharge    d/c time spent 41 minutes
s/p pleurex placement. Coughj improved. To be taught how self care at home today. Needs VNS set up for home care.   Should teaching happen today and VNS set up will plan for discharge home later today vs tomorrow
59 year old male with NSCLC with recent admission for AHRF secondary to pneumonia, respiratory virus and effusions s/p thoracentesis who again presents with acute onset dyspnea found to be significantly hypoxic felt to be secondary to progressive disease.     1. AHRF: We are weaning HF as tolerated. This morning, he tried to transition to NC, maintaining oxygen saturations mid 80's with movement, to 90% if resting. I discussed with him, our goal would be to get him out of the hospital to pursue whatever treatment avenues available to him. However, as we are having a difficult time weaning him, it may be prudent to have our oncology team provide some input while he is hospitalized. He is agreeable to this. We will consult.    As I noted yesterday, He failed his speech and swallow and has little reserve were he to aspirate. He understands risks and benefits and wishes to continue to eat.
Increased effusion seen on POCUS yesterday. Plan for pleurex placement today.   Still with continued cough. Did not sleep well as a result. Could trial Advair/Spiriva as pt reported some improvement with Duoneb treatment
Pt is still seeking DMT  He would like to receive concurrent Palliative Medicine and chemotherapy at [facility]  I provided him with the option of seeing our outpatient palliative provider  Outpatient Palliative Referral   This patient would benefit greatly from a referral to the [facility]   at [address] [place] [id number] to see Dr Yanci Cole.    The patient ought to follow up to address:  Pain symptoms []  Non-pain symptoms [x] Cough related to malignancy  Hospice / Palliative Medicine, Geriatric Medicine  Phone: [phone]  [address] [place] [address], [address] [address]    Please  contact the  to secure an appointment and include the following:  Patient name  Patient   Patient MRN  Principal diagnosis  Reason for referral  Name of PMD (if known)  Once the date has been confirmed, please document that information in the discharge paperwork and include the following:  Date of appointment  Time of appointment  Location  Contact information for the office  Thank you for helping to enhance this transition.                    A review of the paper chart has been conducted  HCP form present:               Yes and valid []               Yes but invalid []                No [x]   MOLST present:                   Yes and valid []               Yes but invalid []                No [x]  Incapacity form present:   Yes and valid []                Yes but invalid []                No []                 N/A []  Living Will:                            Yes and valid []                Yes but invalid []                No []      Family Health Care Decision Act Surrogate Decision Maker Hierarchy  [facility] Article 81 Guardian-->Spouse or domestic partner--> Adult child-->Parent-->Sibling--> Close friend      Palliative Global Assessment  A review of the paper chart has been conducted  HCP form present:               Yes and valid []               Yes but invalid []                No []   MOLST present:                   Yes and valid []               Yes but invalid []                No []  Incapacity form present:   Yes and valid []                Yes but invalid []                No []                 N/A []  Living Will:                            Yes and valid []                Yes but invalid []                No []      Family Heatlh Care Decision Act Surrogate Decision Maker [facility] Article 81 Guardian-->Spouse or domestic partner--> Adult child-->Parent-->Sibling--> Close friend         Partner:   Family: Son 22 lives in the home as well  Residence: Two floor home in [place]  Functionality: Fair  Engagement in the home:  Employment: Retired, worked in the bank previously  Support network: Family  ---Neighbors Unclear role in support  ---Social Unclear role in support  ---Spiritual Unclear role in support  Financial concerns: None  HCP conversation: He claims his wife has a copy of the health care proxy, and she would be it    Extensive discussion with the patient  He is roughly 2 years into his diagnosis and he receives oral Tx from his oncologist in [facility]  In the past three months, he went to [facility] for a consultation.  At that time, it was known that the patient's illness was progressing through Tagrisso.  The visited concluded with the offer to treat his cancer with systemic chemo.  According to the patient, his [facility] oncologist was recommending an [facility] based clinical trial.  The patient is eager to attempt systemic therapy, if discharged, at  [facility].  Once this treatment is no longer viable, he then would like to transition to clinical trial if that opportunity exists.  His current goal is to maximize his life as long as he can using a variety of sequential medications, obtaining the maximum successive benefit from each.
Respiratory status remains stable on supplemental oxygen. Currently on 5LNC. Pt's plan is for continued treatment of his stage IV lung carcinoma at [facility]. Pt understands speech and swallow recommendations and continues to want to eat, aware of risks of aspiration and potential worsening respiratory failure. No further inpatient workup required at this time. Discharge planning to home    d/c time spent 32 minutes
59 year old male with NSCLC with recent admission for AHRF secondary to pneumonia, respiratory virus and effusions s/p thoracentesis who again presents with acute onset dyspnea found to be significantly hypoxic felt to be secondary to progressive disease.     1. AHRF: We are weaning HF as tolerated. Unfortunately, if this is due to his disease. He would like to pursue all therapeutic avenues possible. Palliative is following and we appreciate their help in this complicated case.     Of note, He failed his speech and swallow and has little reserve were he to aspirate. He understands risks and benefits and wishes to continue to eat.
Respiratory status slightly improved today  Will c/w steroids given lymphangitic spread of cancer.   Pleural effusion likely too small to tap  Awaiting TTE, will dose additional lasix 40mg X1 today

## 2019-11-21 NOTE — PROGRESS NOTE ADULT - SUBJECTIVE AND OBJECTIVE BOX
[facility] Division of Internal Medicine  Ngozitrev Joel, PGY-1  Pager: [facility]: [phone] | [facility]: [phone]    Patient is a 59y old  Male who presents with a chief complaint of SOB (20 Nov 2019 10:09)      SUBJECTIVE / OVERNIGHT EVENTS: No acute events overnight, likely d/c home today    ADDITIONAL REVIEW OF SYSTEMS: Continued cough w/pleuritic pain. Some pleuritic pain s/p plerux catheter but controlled w/tylenol. No CP, SOB, fever, chills, nausea, vomiting, diarrhea, abdominal pain.      MEDICATIONS  (STANDING):  benzonatate 100 milliGRAM(s) Oral every 8 hours  gabapentin 300 milliGRAM(s) Oral at bedtime  gabapentin 100 milliGRAM(s) Oral <User Schedule>  guaiFENesin  milliGRAM(s) Oral every 12 hours  heparin  Injectable 5000 Unit(s) SubCutaneous every 8 hours  melatonin 5 milliGRAM(s) Oral at bedtime  predniSONE   Tablet 50 milliGRAM(s) Oral daily  tiotropium 18 MICROgram(s) Capsule 1 Capsule(s) Inhalation daily    MEDICATIONS  (PRN):  acetaminophen   Tablet .. 650 milliGRAM(s) Oral every 6 hours PRN Mild Pain (1 - 3)  acetaminophen   Tablet .. 1000 milliGRAM(s) Oral every 6 hours PRN Moderate Pain (4 - 6)  albuterol/ipratropium for Nebulization 3 milliLiter(s) Nebulizer every 6 hours PRN Shortness of Breath and/or Wheezing  hydrocodone/homatropine Syrup 5 milliLiter(s) Oral every 6 hours PRN Cough  traMADol 25 milliGRAM(s) Oral every 6 hours PRN Severe Pain (7 - 10)      CAPILLARY BLOOD GLUCOSE        I&O's Summary    20 Nov 2019 07:01  -  21 Nov 2019 07:00  --------------------------------------------------------  IN: 760 mL / OUT: 100 mL / NET: 660 mL        PHYSICAL EXAM:  Vital Signs Last 24 Hrs  T(C): 36.8 (21 Nov 2019 05:00), Max: 37 (20 Nov 2019 19:50)  T(F): 98.2 (21 Nov 2019 05:00), Max: 98.6 (20 Nov 2019 19:50)  HR: 94 (21 Nov 2019 05:00) (92 - 105)  BP: 125/83 (21 Nov 2019 05:00) (117/77 - 132/73)  BP(mean): --  RR: 20 (21 Nov 2019 05:00) (20 - 20)  SpO2: 91% (21 Nov 2019 05:00) (90% - 93%)  CONSTITUTIONAL: NAD  HEAD: Head atraumatic, normal cephalic shape.  EYES: sclera clear bilaterally,  CARDIAC: regular rate, normal S1/S2, no murmurs  RESPIRATORY: no respiratory distress, R posterior base and middle lung decreased breath sounds, L posterior fields mild crackles   CHEST: +R pleurx site, c/d/i; no erythema, warmth, tenderness or crepitus  GASTROINTESTINAL: soft, nontender, bowel sounds present  MUSCULOSKELETAL: +1 LE edema b/l, normal strength and ROM, no muscle or joint tenderness  NEUROLOGICAL: AAOx3, no focal deficits, full strength   SKIN: normal skin color, no apparent rashes  PSYCH: normal mood/affect    LABS:                        13.5   10.34 )-----------( 98       ( 20 Nov 2019 13:06 )             42.5     11-20    139  |  99  |  34<H>  ----------------------------<  98  4.1   |  27  |  0.91    Ca    8.8      20 Nov 2019 09:37  Phos  3.4     11-20  Mg     2.4     11-20    TPro  6.8  /  Alb  3.4  /  TBili  0.5  /  DBili  x   /  AST  40  /  ALT  36  /  AlkPhos  165<H>  11-20    PT/INR - ( 19 Nov 2019 18:09 )   PT: 12.9 sec;   INR: 1.14 ratio         PTT - ( 20 Nov 2019 13:05 )  PTT:29.2 sec            RADIOLOGY & ADDITIONAL TESTS:  Results Reviewed:   Imaging Personally Reviewed:  Electrocardiogram Personally Reviewed:    COORDINATION OF CARE:  Care Discussed with Consultants/Other Providers [Y/N]:  Prior or Outpatient Records Reviewed [Y/N]:

## 2019-11-21 NOTE — PROGRESS NOTE ADULT - PROBLEM SELECTOR PLAN 3
-holding Tagresso, possible ILD   -steroids per pulm rec  -onc consulted  --called private oncologist Dr. Giovana Deras - no further treatment recommendations, pt refused chemotherapy, was referred to clinical trial at [facility] (Dr. Giovana Wright)  --in-house onc recs: no treatment recommended  -palliative consult- pt is progressing on Tagresso  --eventual plan for treatment at [facility] w/Dr. Wright

## 2019-11-21 NOTE — PROGRESS NOTE ADULT - PROBLEM SELECTOR PLAN 6
PPS
PPS 30-40%
Transitions of Care Status:  1.  Name of PCP: Mary Ogden  2.  PCP Contacted on Admission: [ ] Y    [x ] N    3.  PCP contacted at Discharge: [ ] Y    [ ] N    [ ] N/A  4.  Post-Discharge Appointment Date and Location:  5.  Summary of Handoff given to PCP:
PPS 40%
Transitions of Care Status:  1.  Name of PCP: Mary Ogden  2.  PCP Contacted on Admission: [ ] Y    [x ] N    3.  PCP contacted at Discharge: [ ] Y    [ ] N    [ ] N/A  4.  Post-Discharge Appointment Date and Location:  5.  Summary of Handoff given to PCP:
PPS 40%
Transitions of Care Status:  1.  Name of PCP: Mary Ogden  2.  PCP Contacted on Admission: [ ] Y    [x ] N    3.  PCP contacted at Discharge: [ ] Y    [ ] N    [ ] N/A  4.  Post-Discharge Appointment Date and Location:  5.  Summary of Handoff given to PCP:

## 2019-11-21 NOTE — PROGRESS NOTE ADULT - PROBLEM SELECTOR PROBLEM 5
Non-small cell carcinoma of lung
Discharge planning issues
Non-small cell carcinoma of lung
Prophylactic measure
Non-small cell carcinoma of lung
Prophylactic measure
Non-small cell carcinoma of lung
Prophylactic measure

## 2019-11-21 NOTE — PROGRESS NOTE ADULT - PROBLEM SELECTOR PROBLEM 4
Acute hypoxemic respiratory failure
Acute hypoxemic respiratory failure
Dysphagia, unspecified type
Non-small cell carcinoma of lung
Prophylactic measure
Acute hypoxemic respiratory failure
Dysphagia, unspecified type
Acute hypoxemic respiratory failure
Dysphagia, unspecified type

## 2019-11-25 ENCOUNTER — APPOINTMENT (OUTPATIENT)
Dept: PULMONOLOGY | Facility: CLINIC | Age: 59
End: 2019-11-25

## 2019-11-27 LAB
CULTURE RESULTS: SIGNIFICANT CHANGE UP
SPECIMEN SOURCE: SIGNIFICANT CHANGE UP

## 2019-12-02 ENCOUNTER — CLINICAL ADVICE (OUTPATIENT)
Age: 59
End: 2019-12-02

## 2019-12-02 ENCOUNTER — INPATIENT (INPATIENT)
Facility: HOSPITAL | Age: 59
LOS: 5 days | DRG: 180 | End: 2019-12-08
Attending: HOSPITALIST | Admitting: HOSPITALIST
Payer: COMMERCIAL

## 2019-12-02 VITALS
TEMPERATURE: 99 F | OXYGEN SATURATION: 79 % | RESPIRATION RATE: 35 BRPM | DIASTOLIC BLOOD PRESSURE: 78 MMHG | HEART RATE: 122 BPM | SYSTOLIC BLOOD PRESSURE: 134 MMHG

## 2019-12-02 LAB
ALBUMIN SERPL ELPH-MCNC: 3.5 G/DL — SIGNIFICANT CHANGE UP (ref 3.3–5)
ALP SERPL-CCNC: 269 U/L — HIGH (ref 40–120)
ALT FLD-CCNC: 52 U/L — HIGH (ref 10–45)
ANION GAP SERPL CALC-SCNC: 15 MMOL/L — SIGNIFICANT CHANGE UP (ref 5–17)
APTT BLD: 29.6 SEC — SIGNIFICANT CHANGE UP (ref 27.5–36.3)
AST SERPL-CCNC: 79 U/L — HIGH (ref 10–40)
BASOPHILS # BLD AUTO: 0 K/UL — SIGNIFICANT CHANGE UP (ref 0–0.2)
BASOPHILS NFR BLD AUTO: 0 % — SIGNIFICANT CHANGE UP (ref 0–2)
BILIRUB SERPL-MCNC: 1.1 MG/DL — SIGNIFICANT CHANGE UP (ref 0.2–1.2)
BUN SERPL-MCNC: 54 MG/DL — HIGH (ref 7–23)
CALCIUM SERPL-MCNC: 8.8 MG/DL — SIGNIFICANT CHANGE UP (ref 8.4–10.5)
CHLORIDE SERPL-SCNC: 94 MMOL/L — LOW (ref 96–108)
CO2 SERPL-SCNC: 23 MMOL/L — SIGNIFICANT CHANGE UP (ref 22–31)
CREAT SERPL-MCNC: 1.33 MG/DL — HIGH (ref 0.5–1.3)
EOSINOPHIL # BLD AUTO: 0 K/UL — SIGNIFICANT CHANGE UP (ref 0–0.5)
EOSINOPHIL NFR BLD AUTO: 0 % — SIGNIFICANT CHANGE UP (ref 0–6)
GAS PNL BLDV: SIGNIFICANT CHANGE UP
GLUCOSE SERPL-MCNC: 129 MG/DL — HIGH (ref 70–99)
HCT VFR BLD CALC: 45.4 % — SIGNIFICANT CHANGE UP (ref 39–50)
HGB BLD-MCNC: 15.2 G/DL — SIGNIFICANT CHANGE UP (ref 13–17)
INR BLD: 1.16 RATIO — SIGNIFICANT CHANGE UP (ref 0.88–1.16)
LYMPHOCYTES # BLD AUTO: 0.28 K/UL — LOW (ref 1–3.3)
LYMPHOCYTES # BLD AUTO: 1.8 % — LOW (ref 13–44)
MCHC RBC-ENTMCNC: 29.1 PG — SIGNIFICANT CHANGE UP (ref 27–34)
MCHC RBC-ENTMCNC: 33.5 GM/DL — SIGNIFICANT CHANGE UP (ref 32–36)
MCV RBC AUTO: 86.8 FL — SIGNIFICANT CHANGE UP (ref 80–100)
MONOCYTES # BLD AUTO: 0.55 K/UL — SIGNIFICANT CHANGE UP (ref 0–0.9)
MONOCYTES NFR BLD AUTO: 3.5 % — SIGNIFICANT CHANGE UP (ref 2–14)
NEUTROPHILS # BLD AUTO: 14.89 K/UL — HIGH (ref 1.8–7.4)
NEUTROPHILS NFR BLD AUTO: 94.7 % — HIGH (ref 43–77)
PLATELET # BLD AUTO: 90 K/UL — LOW (ref 150–400)
POTASSIUM SERPL-MCNC: 5.4 MMOL/L — HIGH (ref 3.5–5.3)
POTASSIUM SERPL-SCNC: 5.4 MMOL/L — HIGH (ref 3.5–5.3)
PROT SERPL-MCNC: 7 G/DL — SIGNIFICANT CHANGE UP (ref 6–8.3)
PROTHROM AB SERPL-ACNC: 13.4 SEC — HIGH (ref 10–12.9)
RBC # BLD: 5.23 M/UL — SIGNIFICANT CHANGE UP (ref 4.2–5.8)
RBC # FLD: 15 % — HIGH (ref 10.3–14.5)
SODIUM SERPL-SCNC: 132 MMOL/L — LOW (ref 135–145)
WBC # BLD: 15.72 K/UL — HIGH (ref 3.8–10.5)
WBC # FLD AUTO: 15.72 K/UL — HIGH (ref 3.8–10.5)

## 2019-12-02 PROCEDURE — 71275 CT ANGIOGRAPHY CHEST: CPT | Mod: 26

## 2019-12-02 PROCEDURE — 99291 CRITICAL CARE FIRST HOUR: CPT

## 2019-12-02 PROCEDURE — 71045 X-RAY EXAM CHEST 1 VIEW: CPT | Mod: 26

## 2019-12-02 NOTE — ED PROVIDER NOTE - ATTENDING CONTRIBUTION TO CARE
Attending MD Guzmán:   I personally have seen and examined this patient.  ACP, Resident, medical student note reviewed and agree on plan of care and except where noted.     59y M PMH stage 4 lung ca, pleurex catheter right chest, on 6L home O2 presents with worsening shortness of breath, cough, hemoptysis. Wife notes worsening LE edema for several weeks.    On exam patient is thin, ill appearing, hypoxic, tachycardic, tachypneic, normotensive. tachy regular s1s2, lungs with diffuse rhonchi, abdomen soft, bilateral LE pitting edema, no neuro deficits.     Bedside US suggests pulmonary edema, small pericardial effusion. Ddx includes but is not limited to CHF, PE, bronchopulmonary fistula, pneumonia, bronchitis, unlikely tamponade as patient is normotensive. Will obtain labs, ekg, CTA, reassess.

## 2019-12-02 NOTE — ED ADULT NURSE NOTE - OBJECTIVE STATEMENT
Patient is a 59 year old male complaining of acute worsening SOB prod green mucous cough and maninder LE edema +3. Arrived by EMS from home. Patient has history of stage IV non-small cell lung ca (dx 2 y/a s/p RT 7 w/a, on daily chemotx pills), R pleural effusion (s/p VATS) on home O2 5L continuous (baseline sat 85-89% on O2). Patient is A&O x 4. Pt reports he has had 3 hospitalizations for similar complaint,. Had thoracentesis recently, unk amt of fluid drained and 320mL drained today. Denies complaints of fevers, chills, n/v/d, headache, syncope, burning urination, blood in urine, blood in stool, hx of known clots. Abd is soft, non tender, non distended. Skin is warm and dry. Color is consistent with ethnicity. Pt tachycardic, hypoxic and tachypneic, MD Guzmán aware. Pt on NRB mask and responding appropriately. Safety and comfort maintained. Wife at the bedside). Will continue to monitor.

## 2019-12-02 NOTE — ED PROVIDER NOTE - CARE PLAN
Principal Discharge DX:	Pulmonary edema  Secondary Diagnosis:	Multifocal pneumonia  Secondary Diagnosis:	Pleural effusion  Secondary Diagnosis:	Pericardial effusion

## 2019-12-02 NOTE — ED PROVIDER NOTE - OBJECTIVE STATEMENT
60 yo M c PMH of stage IV non-small cell lung ca (dx 2 y/a s/p RT 7 w/a, on daily chemotx pills), R pleural effusion (s/p VATS) on home O2 5L continuous (baseline sat 85-89% on O2) p/w 2 day h/o worsening SOB and BLE edema and cough with green and blood streaked sputum production. h/o similar sx with 3 recent hopspitalizations, completed tx for PNA 30 d/a. no recent travel/ill contacts. no personal/FH of DVT/PE. has R pleural effusion drained q other day, today was drained by nurse 320 mL, usually drains 300 mL.

## 2019-12-02 NOTE — ED PROVIDER NOTE - PROGRESS NOTE DETAILS
AV: Patient becoming more short of breath, worsening hypoxia. Will start bipap. CT read pending. AV: CT shows multifocal pneumonia in addition to pulmonary edema, pericardial effusion. No PE. Will admit to medicine.

## 2019-12-02 NOTE — ED PROVIDER NOTE - CLINICAL SUMMARY MEDICAL DECISION MAKING FREE TEXT BOX
60 yo M c PMH of stage IV non-small cell lung ca (dx 2 y/a s/p RT 7 w/a, on daily chemotx pills), R pleural effusion (s/p VATS) on home O2 5L continuous (baseline sat 85-89% on O2) p/w 2 day h/o worsening SOB and BLE edema and cough with green and blood streaked sputum production.  On exam, satting 80 on RA 87 on NRB 5 L VS otherwise wnl, will obtain labs/cxr/cta to assess for pleural efffusion/pe/pna will reassess.

## 2019-12-03 ENCOUNTER — APPOINTMENT (OUTPATIENT)
Dept: PULMONOLOGY | Facility: CLINIC | Age: 59
End: 2019-12-03

## 2019-12-03 DIAGNOSIS — J18.9 PNEUMONIA, UNSPECIFIED ORGANISM: ICD-10-CM

## 2019-12-03 DIAGNOSIS — J96.91 RESPIRATORY FAILURE, UNSPECIFIED WITH HYPOXIA: ICD-10-CM

## 2019-12-03 DIAGNOSIS — Z71.89 OTHER SPECIFIED COUNSELING: ICD-10-CM

## 2019-12-03 DIAGNOSIS — I31.3 PERICARDIAL EFFUSION (NONINFLAMMATORY): ICD-10-CM

## 2019-12-03 DIAGNOSIS — A41.9 SEPSIS, UNSPECIFIED ORGANISM: ICD-10-CM

## 2019-12-03 DIAGNOSIS — R60.0 LOCALIZED EDEMA: ICD-10-CM

## 2019-12-03 DIAGNOSIS — R79.89 OTHER SPECIFIED ABNORMAL FINDINGS OF BLOOD CHEMISTRY: ICD-10-CM

## 2019-12-03 DIAGNOSIS — N17.9 ACUTE KIDNEY FAILURE, UNSPECIFIED: ICD-10-CM

## 2019-12-03 DIAGNOSIS — Z29.9 ENCOUNTER FOR PROPHYLACTIC MEASURES, UNSPECIFIED: ICD-10-CM

## 2019-12-03 DIAGNOSIS — J81.1 CHRONIC PULMONARY EDEMA: ICD-10-CM

## 2019-12-03 DIAGNOSIS — C34.90 MALIGNANT NEOPLASM OF UNSPECIFIED PART OF UNSPECIFIED BRONCHUS OR LUNG: ICD-10-CM

## 2019-12-03 LAB
ALBUMIN SERPL ELPH-MCNC: 3.4 G/DL — SIGNIFICANT CHANGE UP (ref 3.3–5)
ALP SERPL-CCNC: 251 U/L — HIGH (ref 40–120)
ALT FLD-CCNC: 45 U/L — SIGNIFICANT CHANGE UP (ref 10–45)
ANION GAP SERPL CALC-SCNC: 14 MMOL/L — SIGNIFICANT CHANGE UP (ref 5–17)
APTT BLD: 28.5 SEC — SIGNIFICANT CHANGE UP (ref 27.5–36.3)
AST SERPL-CCNC: 69 U/L — HIGH (ref 10–40)
BASE EXCESS BLDV CALC-SCNC: 3.3 MMOL/L — HIGH (ref -2–2)
BILIRUB SERPL-MCNC: 0.8 MG/DL — SIGNIFICANT CHANGE UP (ref 0.2–1.2)
BUN SERPL-MCNC: 54 MG/DL — HIGH (ref 7–23)
CA-I SERPL-SCNC: 1.08 MMOL/L — LOW (ref 1.12–1.3)
CALCIUM SERPL-MCNC: 9.5 MG/DL — SIGNIFICANT CHANGE UP (ref 8.4–10.5)
CHLORIDE BLDV-SCNC: 102 MMOL/L — SIGNIFICANT CHANGE UP (ref 96–108)
CHLORIDE SERPL-SCNC: 90 MMOL/L — LOW (ref 96–108)
CO2 BLDV-SCNC: 29 MMOL/L — SIGNIFICANT CHANGE UP (ref 22–30)
CO2 SERPL-SCNC: 25 MMOL/L — SIGNIFICANT CHANGE UP (ref 22–31)
CREAT SERPL-MCNC: 1.42 MG/DL — HIGH (ref 0.5–1.3)
GAS PNL BLDV: 128 MMOL/L — LOW (ref 135–145)
GAS PNL BLDV: SIGNIFICANT CHANGE UP
GAS PNL BLDV: SIGNIFICANT CHANGE UP
GLUCOSE BLDV-MCNC: 125 MG/DL — HIGH (ref 70–99)
GLUCOSE SERPL-MCNC: 135 MG/DL — HIGH (ref 70–99)
HCO3 BLDV-SCNC: 28 MMOL/L — SIGNIFICANT CHANGE UP (ref 21–29)
HCT VFR BLD CALC: 44.1 % — SIGNIFICANT CHANGE UP (ref 39–50)
HCT VFR BLDA CALC: 43 % — SIGNIFICANT CHANGE UP (ref 39–50)
HGB BLD CALC-MCNC: 14 G/DL — SIGNIFICANT CHANGE UP (ref 13–17)
HGB BLD-MCNC: 14.6 G/DL — SIGNIFICANT CHANGE UP (ref 13–17)
INR BLD: 1.15 RATIO — SIGNIFICANT CHANGE UP (ref 0.88–1.16)
LACTATE BLDV-MCNC: 2.5 MMOL/L — HIGH (ref 0.7–2)
MAGNESIUM SERPL-MCNC: 2.8 MG/DL — HIGH (ref 1.6–2.6)
MCHC RBC-ENTMCNC: 29.1 PG — SIGNIFICANT CHANGE UP (ref 27–34)
MCHC RBC-ENTMCNC: 33.1 GM/DL — SIGNIFICANT CHANGE UP (ref 32–36)
MCV RBC AUTO: 88 FL — SIGNIFICANT CHANGE UP (ref 80–100)
PCO2 BLDV: 45 MMHG — SIGNIFICANT CHANGE UP (ref 35–50)
PH BLDV: 7.41 — SIGNIFICANT CHANGE UP (ref 7.35–7.45)
PHOSPHATE SERPL-MCNC: 4.3 MG/DL — SIGNIFICANT CHANGE UP (ref 2.5–4.5)
PLATELET # BLD AUTO: 84 K/UL — LOW (ref 150–400)
PO2 BLDV: 38 MMHG — SIGNIFICANT CHANGE UP (ref 25–45)
POTASSIUM BLDV-SCNC: 4.9 MMOL/L — SIGNIFICANT CHANGE UP (ref 3.5–5.3)
POTASSIUM SERPL-MCNC: 4.8 MMOL/L — SIGNIFICANT CHANGE UP (ref 3.5–5.3)
POTASSIUM SERPL-SCNC: 4.8 MMOL/L — SIGNIFICANT CHANGE UP (ref 3.5–5.3)
PROT SERPL-MCNC: 6.4 G/DL — SIGNIFICANT CHANGE UP (ref 6–8.3)
PROTHROM AB SERPL-ACNC: 13.1 SEC — SIGNIFICANT CHANGE UP (ref 10–13.1)
RAPID RVP RESULT: SIGNIFICANT CHANGE UP
RBC # BLD: 5.01 M/UL — SIGNIFICANT CHANGE UP (ref 4.2–5.8)
RBC # FLD: 15.1 % — HIGH (ref 10.3–14.5)
SAO2 % BLDV: 68 % — SIGNIFICANT CHANGE UP (ref 67–88)
SODIUM SERPL-SCNC: 129 MMOL/L — LOW (ref 135–145)
TROPONIN T, HIGH SENSITIVITY RESULT: 85 NG/L — HIGH (ref 0–51)
WBC # BLD: 18.26 K/UL — HIGH (ref 3.8–10.5)
WBC # FLD AUTO: 18.26 K/UL — HIGH (ref 3.8–10.5)

## 2019-12-03 PROCEDURE — 12345: CPT | Mod: NC,GC

## 2019-12-03 PROCEDURE — 99223 1ST HOSP IP/OBS HIGH 75: CPT | Mod: GC

## 2019-12-03 PROCEDURE — 99222 1ST HOSP IP/OBS MODERATE 55: CPT

## 2019-12-03 PROCEDURE — 99223 1ST HOSP IP/OBS HIGH 75: CPT

## 2019-12-03 PROCEDURE — 93970 EXTREMITY STUDY: CPT | Mod: 26

## 2019-12-03 RX ORDER — GABAPENTIN 400 MG/1
100 CAPSULE ORAL
Refills: 0 | Status: DISCONTINUED | OUTPATIENT
Start: 2019-12-03 | End: 2019-12-08

## 2019-12-03 RX ORDER — VANCOMYCIN HCL 1 G
1000 VIAL (EA) INTRAVENOUS EVERY 12 HOURS
Refills: 0 | Status: DISCONTINUED | OUTPATIENT
Start: 2019-12-03 | End: 2019-12-04

## 2019-12-03 RX ORDER — LANOLIN ALCOHOL/MO/W.PET/CERES
3 CREAM (GRAM) TOPICAL AT BEDTIME
Refills: 0 | Status: DISCONTINUED | OUTPATIENT
Start: 2019-12-03 | End: 2019-12-08

## 2019-12-03 RX ORDER — CEFEPIME 1 G/1
2000 INJECTION, POWDER, FOR SOLUTION INTRAMUSCULAR; INTRAVENOUS EVERY 12 HOURS
Refills: 0 | Status: DISCONTINUED | OUTPATIENT
Start: 2019-12-03 | End: 2019-12-05

## 2019-12-03 RX ORDER — VANCOMYCIN HCL 1 G
1000 VIAL (EA) INTRAVENOUS ONCE
Refills: 0 | Status: COMPLETED | OUTPATIENT
Start: 2019-12-03 | End: 2019-12-03

## 2019-12-03 RX ORDER — FUROSEMIDE 40 MG
40 TABLET ORAL DAILY
Refills: 0 | Status: DISCONTINUED | OUTPATIENT
Start: 2019-12-04 | End: 2019-12-04

## 2019-12-03 RX ORDER — FUROSEMIDE 40 MG
40 TABLET ORAL ONCE
Refills: 0 | Status: COMPLETED | OUTPATIENT
Start: 2019-12-03 | End: 2019-12-03

## 2019-12-03 RX ORDER — TRAMADOL HYDROCHLORIDE 50 MG/1
25 TABLET ORAL EVERY 6 HOURS
Refills: 0 | Status: DISCONTINUED | OUTPATIENT
Start: 2019-12-03 | End: 2019-12-04

## 2019-12-03 RX ORDER — HEPARIN SODIUM 5000 [USP'U]/ML
5000 INJECTION INTRAVENOUS; SUBCUTANEOUS EVERY 8 HOURS
Refills: 0 | Status: DISCONTINUED | OUTPATIENT
Start: 2019-12-03 | End: 2019-12-04

## 2019-12-03 RX ORDER — TIOTROPIUM BROMIDE 18 UG/1
1 CAPSULE ORAL; RESPIRATORY (INHALATION) DAILY
Refills: 0 | Status: DISCONTINUED | OUTPATIENT
Start: 2019-12-03 | End: 2019-12-03

## 2019-12-03 RX ORDER — PIPERACILLIN AND TAZOBACTAM 4; .5 G/20ML; G/20ML
3.38 INJECTION, POWDER, LYOPHILIZED, FOR SOLUTION INTRAVENOUS EVERY 8 HOURS
Refills: 0 | Status: DISCONTINUED | OUTPATIENT
Start: 2019-12-03 | End: 2019-12-03

## 2019-12-03 RX ORDER — CALCIUM GLUCONATE 100 MG/ML
2 VIAL (ML) INTRAVENOUS ONCE
Refills: 0 | Status: COMPLETED | OUTPATIENT
Start: 2019-12-03 | End: 2019-12-03

## 2019-12-03 RX ORDER — CEFTRIAXONE 500 MG/1
1000 INJECTION, POWDER, FOR SOLUTION INTRAMUSCULAR; INTRAVENOUS ONCE
Refills: 0 | Status: COMPLETED | OUTPATIENT
Start: 2019-12-03 | End: 2019-12-03

## 2019-12-03 RX ORDER — AZITHROMYCIN 500 MG/1
500 TABLET, FILM COATED ORAL ONCE
Refills: 0 | Status: COMPLETED | OUTPATIENT
Start: 2019-12-03 | End: 2019-12-03

## 2019-12-03 RX ORDER — IPRATROPIUM/ALBUTEROL SULFATE 18-103MCG
3 AEROSOL WITH ADAPTER (GRAM) INHALATION EVERY 6 HOURS
Refills: 0 | Status: DISCONTINUED | OUTPATIENT
Start: 2019-12-03 | End: 2019-12-08

## 2019-12-03 RX ORDER — GABAPENTIN 400 MG/1
300 CAPSULE ORAL AT BEDTIME
Refills: 0 | Status: DISCONTINUED | OUTPATIENT
Start: 2019-12-03 | End: 2019-12-08

## 2019-12-03 RX ADMIN — GABAPENTIN 100 MILLIGRAM(S): 400 CAPSULE ORAL at 05:39

## 2019-12-03 RX ADMIN — Medication 250 MILLIGRAM(S): at 00:42

## 2019-12-03 RX ADMIN — Medication 250 MILLIGRAM(S): at 23:26

## 2019-12-03 RX ADMIN — GABAPENTIN 300 MILLIGRAM(S): 400 CAPSULE ORAL at 21:58

## 2019-12-03 RX ADMIN — AZITHROMYCIN 250 MILLIGRAM(S): 500 TABLET, FILM COATED ORAL at 02:43

## 2019-12-03 RX ADMIN — Medication 1000 MILLIGRAM(S): at 02:04

## 2019-12-03 RX ADMIN — Medication 40 MILLIGRAM(S): at 18:43

## 2019-12-03 RX ADMIN — CEFEPIME 100 MILLIGRAM(S): 1 INJECTION, POWDER, FOR SOLUTION INTRAMUSCULAR; INTRAVENOUS at 18:44

## 2019-12-03 RX ADMIN — Medication 40 MILLIGRAM(S): at 00:42

## 2019-12-03 RX ADMIN — Medication 3 MILLILITER(S): at 23:26

## 2019-12-03 RX ADMIN — Medication 3 MILLIGRAM(S): at 23:26

## 2019-12-03 RX ADMIN — Medication 3 MILLILITER(S): at 13:12

## 2019-12-03 RX ADMIN — Medication 200 GRAM(S): at 06:06

## 2019-12-03 RX ADMIN — HEPARIN SODIUM 5000 UNIT(S): 5000 INJECTION INTRAVENOUS; SUBCUTANEOUS at 22:05

## 2019-12-03 RX ADMIN — GABAPENTIN 100 MILLIGRAM(S): 400 CAPSULE ORAL at 18:43

## 2019-12-03 RX ADMIN — Medication 250 MILLIGRAM(S): at 13:12

## 2019-12-03 RX ADMIN — Medication 40 MILLIGRAM(S): at 06:06

## 2019-12-03 RX ADMIN — Medication 3 MILLILITER(S): at 18:46

## 2019-12-03 RX ADMIN — Medication 3 MILLILITER(S): at 05:37

## 2019-12-03 RX ADMIN — CEFTRIAXONE 100 MILLIGRAM(S): 500 INJECTION, POWDER, FOR SOLUTION INTRAMUSCULAR; INTRAVENOUS at 02:04

## 2019-12-03 NOTE — PROGRESS NOTE ADULT - PROBLEM SELECTOR PLAN 6
-s/p lasix 40 IV. Will dose another lasix 40 IV now.  - Per patient, he was not taking lasix at home. -s/p lasix 40 IV. Will dose another lasix 40 IV now.  - C/w Lasix 40 mg IV  - BLE US

## 2019-12-03 NOTE — PROGRESS NOTE ADULT - PROBLEM SELECTOR PLAN 4
HS trop stable at 84x2. EKG showed sinus tachy. Noted ventricular trigeminy, but likely from hypoxia.  - Low c/f ACS  - Likely from demand and KARSON.   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 But patient has other acute issues at present. -s/p lasix 40 IV. Will dose another lasix 40 IV at 6pm.  - Per patient, he was not taking lasix at home.

## 2019-12-03 NOTE — PROGRESS NOTE ADULT - PROBLEM SELECTOR PLAN 3
- Continue vanc/zosyn  - maintain >90% on BiPAP. If desats on BiPAP on increased O2 requirements, plan for MICU c/s.  - ID c/s in AM for recurrent pna - Cr elevated to 1.3, was previously wnl.  - Will continue to trend.

## 2019-12-03 NOTE — CONSULT NOTE ADULT - SUBJECTIVE AND OBJECTIVE BOX
CHIEF COMPLAINT: SOB    HPI:  Patient is a 60 y/o M with PMHx of stage IV NSCLC with malignant pleural effusions s/p XRT on Tagrisso with indwelling pleural catheter and chronic hypoxemic respiratory failure on 5L O2 who presents with SOB and worsening LE edema.     PAST MEDICAL & SURGICAL HISTORY:  Non-small cell carcinoma of lung  No significant past surgical history      FAMILY HISTORY:  Family history of cervical cancer      SOCIAL HISTORY:  Smoking: [ ] Never Smoked [ ] Former Smoker (__ packs x ___ years) [ ] Current Smoker  (__ packs x ___ years)  Substance Use: [ ] Never Used [ ] Used ____  EtOH Use:  Marital Status: [ ] Single [ ]  [ ]  [ ]   Sexual History:   Occupation:  Recent Travel:  Country of Birth:  Advance Directives:    Allergies    No Known Allergies    Intolerances        HOME MEDICATIONS:  Home Medications:      REVIEW OF SYSTEMS:  Constitutional: [ ] negative [ ] fevers [ ] chills [ ] weight loss [ ] weight gain  HEENT: [ ] negative [ ] dry eyes [ ] eye irritation [ ] postnasal drip [ ] nasal congestion  CV: [ ] negative  [ ] chest pain [ ] orthopnea [ ] palpitations [ ] murmur  Resp: [ ] negative [ ] cough [ ] shortness of breath [ ] dyspnea [ ] wheezing [ ] sputum [ ] hemoptysis  GI: [ ] negative [ ] nausea [ ] vomiting [ ] diarrhea [ ] constipation [ ] abd pain [ ] dysphagia   : [ ] negative [ ] dysuria [ ] nocturia [ ] hematuria [ ] increased urinary frequency  Musculoskeletal: [ ] negative [ ] back pain [ ] myalgias [ ] arthralgias [ ] fracture  Skin: [ ] negative [ ] rash [ ] itch  Neurological: [ ] negative [ ] headache [ ] dizziness [ ] syncope [ ] weakness [ ] numbness  Psychiatric: [ ] negative [ ] anxiety [ ] depression  Endocrine: [ ] negative [ ] diabetes [ ] thyroid problem  Hematologic/Lymphatic: [ ] negative [ ] anemia [ ] bleeding problem  Allergic/Immunologic: [ ] negative [ ] itchy eyes [ ] nasal discharge [ ] hives [ ] angioedema  [ ] All other systems negative  [ ] Unable to assess ROS because ________    OBJECTIVE:  ICU Vital Signs Last 24 Hrs  T(C): 36.6 (03 Dec 2019 08:35), Max: 37.1 (02 Dec 2019 22:00)  T(F): 97.8 (03 Dec 2019 08:35), Max: 98.8 (02 Dec 2019 22:00)  HR: 109 (03 Dec 2019 08:35) (100 - 122)  BP: 120/80 (03 Dec 2019 08:35) (113/91 - 134/78)  BP(mean): 101 (03 Dec 2019 03:18) (101 - 101)  ABP: --  ABP(mean): --  RR: 27 (03 Dec 2019 08:35) (27 - 35)  SpO2: 90% (03 Dec 2019 08:35) (79% - 97%)        CAPILLARY BLOOD GLUCOSE          PHYSICAL EXAM:  General:   HEENT:   Lymph Nodes:  Neck:   Respiratory:   Cardiovascular:   Abdomen:   Extremities:   Skin:   Neurological:  Psychiatry:    LINES:     HOSPITAL MEDICATIONS:  Standing Meds:  albuterol/ipratropium for Nebulization 3 milliLiter(s) Nebulizer every 6 hours  gabapentin 300 milliGRAM(s) Oral at bedtime  gabapentin 100 milliGRAM(s) Oral two times a day  heparin  Injectable 5000 Unit(s) SubCutaneous every 8 hours  piperacillin/tazobactam IVPB.. 3.375 Gram(s) IV Intermittent every 8 hours  tiotropium 18 MICROgram(s) Capsule 1 Capsule(s) Inhalation daily  vancomycin  IVPB 1000 milliGRAM(s) IV Intermittent every 12 hours      PRN Meds:  traMADol 25 milliGRAM(s) Oral every 6 hours PRN      LABS:                        15.2   15.72 )-----------( 90       ( 02 Dec 2019 22:34 )             45.4     Hgb Trend: 15.2<--  12-03    129<L>  |  90<L>  |  54<H>  ----------------------------<  135<H>  4.8   |  25  |  1.42<H>    Ca    9.5      03 Dec 2019 06:54  Phos  4.3     12-03  Mg     2.8     12-03    TPro  6.4  /  Alb  3.4  /  TBili  0.8  /  DBili  x   /  AST  69<H>  /  ALT  45  /  AlkPhos  251<H>  12-03    Creatinine Trend: 1.42<--, 1.33<--, 0.91<--, 0.97<--, 1.02<--, 1.09<--  PT/INR - ( 02 Dec 2019 22:34 )   PT: 13.4 sec;   INR: 1.16 ratio         PTT - ( 02 Dec 2019 22:34 )  PTT:29.6 sec      Venous Blood Gas:  12-03 @ 00:56  7.41/45/38/28/68  VBG Lactate: 2.5  Venous Blood Gas:  12-02 @ 22:34  7.42/46/24/29/35  VBG Lactate: 3.1      MICROBIOLOGY:       RADIOLOGY:  [ ] Reviewed and interpreted by me    PULMONARY FUNCTION TESTS:    EKG: CHIEF COMPLAINT: SOB    HPI:  Patient is a 58 y/o M with PMHx of stage IV NSCLC with malignant pleural effusions s/p XRT on Tagrisso with indwelling pleural catheter and chronic hypoxemic respiratory failure on 5L O2 who presents with SOB and worsening LE edema. Patient is well known to pulmonary service and has had multiple recent hospitalizations for SOB and cough. Two months ago, patient had thoracentesis that was diagnostic for malignant pleural effusion, after which he had VATS procedure and pleur-ex catheter placed for outpatient drainage. Patient reports that when he went home he initially felt ok, but still had SOB. He was unable to get his nebulizers due to difficulty acquiring a nebulizer machine. A visiting nurse comes every other day to drain his effusion, of which there is usually about 300 cc of drainage. Patient reports no new f/c, increased sputum production, chest pain, pleuritic chest pain or hemoptysis. He reports the pleur-ex site is not tender and the pleural fluid has not changed in characteristic recently. Does report persistent SOB and hypoxemia, despite 5L O2 at home.    PAST MEDICAL & SURGICAL HISTORY:  Non-small cell carcinoma of lung  No significant past surgical history      FAMILY HISTORY:  Family history of cervical cancer      SOCIAL HISTORY:  Smoking: [x] Never Smoked [ ] Former Smoker (__ packs x ___ years) [ ] Current Smoker  (__ packs x ___ years)  Substance Use: [x] Never Used [ ] Used ____  EtOH Use: denies  Marital Status: [ ] Single [ ]  [ ]  [ ]   Sexual History:   Occupation:  Recent Travel:  Country of Birth:  Advance Directives:    Allergies    No Known Allergies    Intolerances        HOME MEDICATIONS:  Home Medications:      REVIEW OF SYSTEMS:  Constitutional: [x] negative [ ] fevers [ ] chills [ ] weight loss [ ] weight gain  HEENT: [ ] negative [ ] dry eyes [ ] eye irritation [ ] postnasal drip [ ] nasal congestion  CV: [ ] negative  [ ] chest pain [ ] orthopnea [ ] palpitations [ ] murmur  Resp: [ ] negative [x] cough [x] shortness of breath [ ] dyspnea [ ] wheezing [ ] sputum [ ] hemoptysis  GI: [ ] negative [ ] nausea [ ] vomiting [ ] diarrhea [ ] constipation [ ] abd pain [ ] dysphagia   : [ ] negative [ ] dysuria [ ] nocturia [ ] hematuria [ ] increased urinary frequency  Musculoskeletal: [ ] negative [ ] back pain [ ] myalgias [ ] arthralgias [ ] fracture [x] increased LE edema  Skin: [ ] negative [ ] rash [ ] itch  Neurological: [ ] negative [ ] headache [ ] dizziness [ ] syncope [ ] weakness [ ] numbness  Psychiatric: [ ] negative [ ] anxiety [ ] depression  Endocrine: [ ] negative [ ] diabetes [ ] thyroid problem  Hematologic/Lymphatic: [ ] negative [ ] anemia [ ] bleeding problem  Allergic/Immunologic: [ ] negative [ ] itchy eyes [ ] nasal discharge [ ] hives [ ] angioedema  [ ] All other systems negative  [ ] Unable to assess ROS because ________    OBJECTIVE:  ICU Vital Signs Last 24 Hrs  T(C): 36.6 (03 Dec 2019 08:35), Max: 37.1 (02 Dec 2019 22:00)  T(F): 97.8 (03 Dec 2019 08:35), Max: 98.8 (02 Dec 2019 22:00)  HR: 109 (03 Dec 2019 08:35) (100 - 122)  BP: 120/80 (03 Dec 2019 08:35) (113/91 - 134/78)  BP(mean): 101 (03 Dec 2019 03:18) (101 - 101)  ABP: --  ABP(mean): --  RR: 27 (03 Dec 2019 08:35) (27 - 35)  SpO2: 90% (03 Dec 2019 08:35) (79% - 97%)        CAPILLARY BLOOD GLUCOSE          PHYSICAL EXAM:  General: awake and alert, nontoxic appearing male sitting up in bed with BiPAP, NAD  HEENT: NC/AT, EOMI b/l, conjunctiva normal, MMM  Neck: supple  Respiratory: coarse breath sounds diffusely, worse in R lower lung field, wheezing heard diffusely, appears comfortable on BiPAP, no conversational dyspnea or accessory muscle use, indwelling pleural catheter present in R lateral chest wall, site appears c/d/i without erythema or tenderness  Cardiovascular: S1 S2 present, tachycardic, regular rhythm, no m/r/g  Abdomen: soft, NT/ND  Extremities: 4+ LE pitting edema b/l, no c/c  Skin: no rashes or lesions noted  Neurological: AAOx3, no focal deficits  Psychiatry: calm, cooperative    LINES:     HOSPITAL MEDICATIONS:  Standing Meds:  albuterol/ipratropium for Nebulization 3 milliLiter(s) Nebulizer every 6 hours  gabapentin 300 milliGRAM(s) Oral at bedtime  gabapentin 100 milliGRAM(s) Oral two times a day  heparin  Injectable 5000 Unit(s) SubCutaneous every 8 hours  piperacillin/tazobactam IVPB.. 3.375 Gram(s) IV Intermittent every 8 hours  tiotropium 18 MICROgram(s) Capsule 1 Capsule(s) Inhalation daily  vancomycin  IVPB 1000 milliGRAM(s) IV Intermittent every 12 hours      PRN Meds:  traMADol 25 milliGRAM(s) Oral every 6 hours PRN      LABS:                        15.2   15.72 )-----------( 90       ( 02 Dec 2019 22:34 )             45.4     Hgb Trend: 15.2<--  12-03    129<L>  |  90<L>  |  54<H>  ----------------------------<  135<H>  4.8   |  25  |  1.42<H>    Ca    9.5      03 Dec 2019 06:54  Phos  4.3     12-03  Mg     2.8     12-03    TPro  6.4  /  Alb  3.4  /  TBili  0.8  /  DBili  x   /  AST  69<H>  /  ALT  45  /  AlkPhos  251<H>  12-03    Creatinine Trend: 1.42<--, 1.33<--, 0.91<--, 0.97<--, 1.02<--, 1.09<--  PT/INR - ( 02 Dec 2019 22:34 )   PT: 13.4 sec;   INR: 1.16 ratio         PTT - ( 02 Dec 2019 22:34 )  PTT:29.6 sec      Venous Blood Gas:  12-03 @ 00:56  7.41/45/38/28/68  VBG Lactate: 2.5  Venous Blood Gas:  12-02 @ 22:34  7.42/46/24/29/35  VBG Lactate: 3.1      MICROBIOLOGY:       RADIOLOGY:  < from: CT Angio Chest w/ IV Cont (12.02.19 @ 23:25) >  FINDINGS:     PULMONARY VESSELS: No pulmonary embolus. Main pulmonary artery normal in   diameter.    HEART AND VASCULATURE: Normal heart size without pericardial effusion. No   CT evidence of right heart strain.    No aortic aneurysm or dissection.    LUNGS, AIRWAYS, PLEURA: Patent central airways.    Narrowing of the basilar segmental bronchi of right lower lobe. In   comparison with 11/11/2019, interval decrease of masslikeopacity within   the right lower lobe nodule measures approximately 9 x 8 cm, previously   10 x 9 cm. Perihilar opacities within the right upper lobe are also   decreased.    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    A right Pleurx catheter is present enteric the chest wall at the right 8   rib space, the tip is at the medial basilar right lower pleural space.   The right pleural effusion has been resolved. Small layering left pleural   effusion is unchanged. No pneumothorax.    MEDIASTINUM: Extensive soft tissue infiltrates into the bilateral hilum   and mediastinum is again noted, slightly decreased.    Enlarged right axillary lymph nodes are unchanged, the largest one   measures 2 cm short axis.    UPPER ABDOMEN: Extensive enlarged retroperitoneal and mesenteric lymph   nodes    BONES AND SOFT TISSUES: No aggressive osseous lesion. Subcentimeter   subcutaneous nodules within the right upper back projecting over the   scapula (series 5 image 42) are again noted and likely metastatic.    LOWER NECK: Within normal limits.      IMPRESSION:     1.  No pulmonary embolus.    2.  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    3.  In comparison with 11/11/2019, right lower lobe masslike opacity and   perihilar infiltrates within the right upper lobe are slightly decreased.    4.  Extensive soft tissue infiltration to the bilateral hilaand   mediastinum is again noted, slightly decreased.    5.  Right pleural effusion has been resolved. Small layering left pleural   effusion is unchanged.    < end of copied text >      PULMONARY FUNCTION TESTS:    EKG:

## 2019-12-03 NOTE — H&P ADULT - PROBLEM SELECTOR PLAN 7
Was on tagrisso but noted progression on imaging last admission.  - Patient was planning to follow with Dr. Wright at [facility]  - Onc c/s in AM

## 2019-12-03 NOTE — H&P ADULT - PROBLEM SELECTOR PLAN 6
-s/p lasix 40 IV  - Will repeat TTE -s/p lasix 40 IV. Will dose another lasix 40 IV now.  - Per patient, he was not taking lasix at home.

## 2019-12-03 NOTE — CONSULT NOTE ADULT - SUBJECTIVE AND OBJECTIVE BOX
Patient is a 59y old  Male who presents with a chief complaint of SOB (03 Dec 2019 08:56)    HPI:  58 yo M with stage IV non-small cell lung ca (dx 2 year s/p radiation, progressing on Tegrisso), R pleural effusion (s/p VATS) and PleurX catheter, on home O2 5L continuous (baseline sat 85-89% on O2) presenting from home with increasing SOB and BLE edema for 2 days. Patient continues to have chronic cough: no changed from yellow to green, with blood streaked sputum production. Denies fevers at home. This is third hospitalization since 10/2019. He was treated for pna with vanc/zosyn in October. R pleural effusion is drained every other day by nurse at home. Pluerx was drained today, outputting 320 mL. Patient called Dr. Carbajal d/t increased SOB, who advised him to return to ED.    In ED, patient was placed on nonrebreater (15L) but remained tachypneic. He's now satting 90% on 10/5 BiPAP at 50%. He was given lasix 40 IVx1, azithromycin, CTX and vanc. Of note, patient had 10 beats of ventricular trigeminy at 3:30 AM, same time as he removed the BiPAP and desatted to 76-82%. (03 Dec 2019 02:54)    Of note, during patient's hospitalization in October, he received a 7 day course of antibiotics for presumed post obstructive pneumonia (vancomycin and zosyn). During admission in November, patient was evaluated by     PAST MEDICAL & SURGICAL HISTORY:  Non-small cell carcinoma of lung  No significant past surgical history      Social history: Denies tobacco, ETOH, or IVDU    FAMILY HISTORY:  Family history of cervical cancer in mom  History of lung CA in grandfather?     REVIEW OF SYSTEMS    CONSTITUTIONAL:  Denies fevers or chills   HEENT: Denies nasal congestion or changes in vision   SKIN:  Denies rash or itching.  CARDIOVASCULAR:  Denies chest pain, + bilateral LLE  RESPIRATORY: + SOB, LIRA, chronic cough, denies increased sputum production   GASTROINTESTINAL:  Denies abdominal pain, nausea, or vomiting   GENITOURINARY:  Denies any hematuria, dysuria  NEUROLOGICAL:  Denies headache, dizziness, or near syncope   MUSCULOSKELETAL:  Denies muscle, back pain, joint pain or stiffness.  HEMATOLOGIC:  Denies anemia, bleeding or bruising.  LYMPHATICS:  Denies enlarged nodes.       Allergies    No Known Allergies    Intolerances        Antimicrobials:    cefepime   IVPB 2000 milliGRAM(s) IV Intermittent every 12 hours  vancomycin  IVPB 1000 milliGRAM(s) IV Intermittent every 12 hours        Vital Signs Last 24 Hrs  T(C): 36.6 (03 Dec 2019 08:35), Max: 37.1 (02 Dec 2019 22:00)  T(F): 97.8 (03 Dec 2019 08:35), Max: 98.8 (02 Dec 2019 22:00)  HR: 110 (03 Dec 2019 08:35) (100 - 122)  BP: 120/80 (03 Dec 2019 08:35) (113/91 - 134/78)  BP(mean): 101 (03 Dec 2019 03:18) (101 - 101)  RR: 27 (03 Dec 2019 08:35) (27 - 35)  SpO2: 92% (03 Dec 2019 08:35) (79% - 97%)    PHYSICAL EXAM:    GENERAL: breathing comfortably on BiPAP, mild respiratory distress    HEAD:  Normocephalic, atraumatic  EYES: EOMI, PERRLA  HEENT: Moist mucous membranes  NECK: Supple, No JVD  NERVOUS SYSTEM: AAOx3, no focal neurological deficits noted, moving all 4 extremities   CHEST/LUNG: diffuse ronchi and wheezes appreciated bilaterally, no crackles   HEART: tachycardic, regular rhythm no murmurs appreciated   ABDOMEN: +BS, soft, non tender, non distended   EXTREMITIES: 2+ pitting edema in lower extremities bilaterally   MUSCULOSKELETAL: No muscle tenderness, no joint tenderness  SKIN: warm and dry, no rash                               14.6   18.26 )-----------( 84       ( 03 Dec 2019 09:18 )             44.1         12-03    129<L>  |  90<L>  |  54<H>  ----------------------------<  135<H>  4.8   |  25  |  1.42<H>    Ca    9.5      03 Dec 2019 06:54  Phos  4.3     12-03  Mg     2.8     12-03    TPro  6.4  /  Alb  3.4  /  TBili  0.8  /  DBili  x   /  AST  69<H>  /  ALT  45  /  AlkPhos  251<H>  12-03      RECENT CULTURES:      Radiology:    < from: Xray Chest 1 View- PORTABLE-Urgent (12.02.19 @ 22:22) >  IMPRESSION: Partially imaged catheter seen in the right lower thorax.   Unchanged bilateral patchy opacities. No pneumothorax.    < end of copied text >        < from: CT Angio Chest w/ IV Cont (12.02.19 @ 23:25) >  IMPRESSION:     1.  No pulmonary embolus.    2.  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    3.  In comparison with 11/11/2019, right lower lobe masslike opacity and   perihilar infiltrates within the right upper lobe are slightly decreased.    4.  Extensive soft tissue infiltration to the bilateral hilaand   mediastinum is again noted, slightly decreased.    5.  Right pleural effusion has been resolved. Small layering left pleural   effusion is unchanged.        < end of copied text > Patient is a 59y old Male who presents with a chief complaint of SOB (03 Dec 2019 08:56)    HPI:  58 yo M with stage IV non-small cell lung ca (dx 2 year s/p radiation, progressing on Tegrisso), R pleural effusion (s/p VATS) and PleurX catheter, on home O2 5L continuous (baseline sat 85-89% on O2) presenting from home with increasing SOB and BLE edema for 2 days. He has a chronic cough with yellow mucus production and occasional blood streaking. He had his pleurX catheter drained yesterday by his visiting nurse. He told her that he was continuing to experience shortness of breath and worsening lower extremity edema and she recommended he call his pulmonologist, Dr. Carbajal. She recommended he come to the ED to be evaluated.     Of note, during patient's hospitalization in October, he received a 7 day course of antibiotics for presumed post obstructive pneumonia (vancomycin and zosyn). During admission in November, patient was evaluated by pulmonology who felt his presentation was not consistent with infectious etiology and recommended monitoring off antibiotics. He was discharged on prednisone 50mg daily which he continued to take until Sunday. Upon arrival yesterday evening, he was afebrile with tachycardia and tachypnea. ID was consulted during this admission for "recurrent pneumonias".     When seen, the patient denies any fevers, chills, nausea, vomiting, diarrhea, abdominal pain, or dysuria. He denies any URI symptoms or sick contacts. He states the primary reason for his presentation is his shortness of breath and lower extremity swelling.     Since his admission, the patient received IV vancomycin ceftriaxone, azithromycin, zosyn, and is now on vancomycin and cefepime.     PAST MEDICAL & SURGICAL HISTORY:  Non-small cell carcinoma of lung  No significant past surgical history      Social history: Denies tobacco, ETOH, or IVDU    FAMILY HISTORY:  Family history of cervical cancer in mom  History of lung CA in grandfather?     REVIEW OF SYSTEMS    CONSTITUTIONAL:  Denies fevers or chills   HEENT: Denies nasal congestion or changes in vision   SKIN:  Denies rash or itching.  CARDIOVASCULAR:  Denies chest pain, + bilateral LLE  RESPIRATORY: + SOB, LIRA, chronic cough, denies increased sputum production   GASTROINTESTINAL:  Denies abdominal pain, nausea, or vomiting   GENITOURINARY:  Denies any hematuria, dysuria  NEUROLOGICAL:  Denies headache, dizziness, or near syncope   MUSCULOSKELETAL:  Denies muscle, back pain, joint pain or stiffness.  HEMATOLOGIC:  Denies anemia, bleeding or bruising.  LYMPHATICS:  Denies enlarged nodes.       Allergies    No Known Allergies    Intolerances        Antimicrobials:    cefepime   IVPB 2000 milliGRAM(s) IV Intermittent every 12 hours  vancomycin  IVPB 1000 milliGRAM(s) IV Intermittent every 12 hours        Vital Signs Last 24 Hrs  T(C): 36.6 (03 Dec 2019 08:35), Max: 37.1 (02 Dec 2019 22:00)  T(F): 97.8 (03 Dec 2019 08:35), Max: 98.8 (02 Dec 2019 22:00)  HR: 110 (03 Dec 2019 08:35) (100 - 122)  BP: 120/80 (03 Dec 2019 08:35) (113/91 - 134/78)  BP(mean): 101 (03 Dec 2019 03:18) (101 - 101)  RR: 27 (03 Dec 2019 08:35) (27 - 35)  SpO2: 92% (03 Dec 2019 08:35) (79% - 97%)    PHYSICAL EXAM:    GENERAL: breathing comfortably on BiPAP, mild respiratory distress    HEAD:  Normocephalic, atraumatic  EYES: EOMI, PERRLA  HEENT: Moist mucous membranes  NECK: Supple, No JVD  NERVOUS SYSTEM: AAOx3, no focal neurological deficits noted, moving all 4 extremities   CHEST/LUNG: diffuse ronchi and wheezes appreciated bilaterally, no crackles   HEART: tachycardic, regular rhythm no murmurs appreciated   ABDOMEN: +BS, soft, non tender, non distended   EXTREMITIES: 2+ pitting edema in lower extremities bilaterally   MUSCULOSKELETAL: No muscle tenderness, no joint tenderness  SKIN: warm and dry, no rash                               14.6   18.26 )-----------( 84       ( 03 Dec 2019 09:18 )             44.1         12-03    129<L>  |  90<L>  |  54<H>  ----------------------------<  135<H>  4.8   |  25  |  1.42<H>    Ca    9.5      03 Dec 2019 06:54  Phos  4.3     12-03  Mg     2.8     12-03    TPro  6.4  /  Alb  3.4  /  TBili  0.8  /  DBili  x   /  AST  69<H>  /  ALT  45  /  AlkPhos  251<H>  12-03      RECENT CULTURES:      Radiology:    < from: Xray Chest 1 View- PORTABLE-Urgent (12.02.19 @ 22:22) >  IMPRESSION: Partially imaged catheter seen in the right lower thorax.   Unchanged bilateral patchy opacities. No pneumothorax.    < end of copied text >        < from: CT Angio Chest w/ IV Cont (12.02.19 @ 23:25) >  IMPRESSION:     1.  No pulmonary embolus.    2.  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    3.  In comparison with 11/11/2019, right lower lobe masslike opacity and   perihilar infiltrates within the right upper lobe are slightly decreased.    4.  Extensive soft tissue infiltration to the bilateral hilaand   mediastinum is again noted, slightly decreased.    5.  Right pleural effusion has been resolved. Small layering left pleural   effusion is unchanged.        < end of copied text >

## 2019-12-03 NOTE — H&P ADULT - ASSESSMENT
58 yo M with stage IV non-small cell lung ca (dx 2 year s/p radiation, progressing on Tegrisso), R pleural effusion (s/p VATS) and PleurX catheter, on home O2 5L continuous (baseline sat 85-89% on O2) presenting from home with increasing SOB and BLE edema for 2 days, admitted with hypoxic respiratory failure 2/2 pna and met lung cancer.

## 2019-12-03 NOTE — PROGRESS NOTE ADULT - ASSESSMENT
Mr. 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 He is currently on bipap and vanc, azithomycin, and CTX for the pna. Mr. 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 He is currently on 50% bipap. He was given vanc, azithomycin, and CTX for the pna. He's currently on vanc and zosyn. He was given lasix for his BLE edema. This is a 60 yo M with stage IV non-SCLC with R malignant pleural effusion s/p indwelling pleural catheter, progressing despite immunotherapy, and chronic hypoxemic respiratory failure on 5L at home, presenting with persistent and increasing SOB and BLE edema since his last admission over 1 month ago. CT chest findings are concerning for worsening lymphangitic spread of known primary NSCLC.

## 2019-12-03 NOTE — PROGRESS NOTE ADULT - PROBLEM SELECTOR PLAN 2
severe sepsis likely 2/2 pna. Here with hypoxia, tachypnea, leukocytosis with lalo on admission  - Will continue vanc. Will transition zosyn to cefepime to avoid nephrotoxicity  - F/u blood cultures  - If patient remains afebrile for 24 more hours, will likely d/c abx per ID and Pulm recs severe sepsis likely 2/2 pna. Here with hypoxia, tachypnea, leukocytosis with lalo on admission  - Will continue vanc. Will transition zosyn to cefepime to avoid nephrotoxicity  - F/u blood cultures  - If patient remains afebrile for 24 more hours, will likely d/c abx per ID and Pulm recs  -F/u sputum culture and MRSA/MSSA swab.

## 2019-12-03 NOTE — PROGRESS NOTE ADULT - PROBLEM SELECTOR PLAN 7
Was on tagrisso but noted progression on imaging last admission.  - Patient was planning to follow with Dr. Wright at [facility]  - Onc c/s in AM DVT ppx: HSQ  Npo while on Bipap. Was on dysphagia diet on prior admission.

## 2019-12-03 NOTE — PROGRESS NOTE ADULT - PROBLEM SELECTOR PLAN 3
Less likely pneumonia, more likely progression of disease.  - Continue vanc/zosyn  - maintain >90% on BiPAP. If desats on BiPAP on increased O2 requirements, plan for MICU c/s.  - ID c/s in AM for recurrent pna Less likely pneumonia, more likely progression of disease.  - Continue vanc/zosyn  - maintain >88% on HFNC, If desats on HFNC or increased O2 requirements, place on BiPAP, then plan for MICU c/s if still hypoxic  - Appreciate ID and Pulm recs

## 2019-12-03 NOTE — H&P ADULT - HISTORY OF PRESENT ILLNESS
60 yo M c PMH of stage IV non-small cell lung ca (dx 2 y/a s/p RT 7 w/a, on daily chemotx pills), R pleural effusion (s/p VATS) on home O2 5L continuous (baseline sat 85-89% on O2) p/w 2 day h/o worsening SOB and BLE edema and cough with green and blood streaked sputum production. h/o similar sx with 3 recent hopspitalizations, completed tx for PNA 30 d/a. no recent travel/ill contacts. no personal/FH of DVT/PE. has R pleural effusion drained q other day, today was drained by nurse 320 mL, usually drains 300 mL. 60 yo M with stage IV non-small cell lung ca (dx 2 year s/p radiation, progressing on Tegrisso), R pleural effusion (s/p VATS) and PleurX catheter, on home O2 5L continuous (baseline sat 85-89% on O2) presenting from home with increasing SOB and BLE edema for 2 days. Patient continues to have chronic cough: no changed from yellow to green, with blood streaked sputum production. Denies fevers at home. This is third hospitalization since 10/2019. He was treated for pna with vanc/zosyn in October. R pleural effusion is drained every other day by nurse at home. Pluerx was drained today, outputting 320 mL. Patient called Dr. Carbajal d/t increased SOB, who advised him to return to ED.    In ED, patient was placed on nonrebreater (15L) but remained tachypneic. He's now satting 90% on 10/5 BiPAP at 50%. He was given lasix 40 IVx1, azithromycin, CTX and vanc. Of note, karl has 10 beats of ventricular trigeminy at 3:30 AM, same time as he removed the BiPAP and desatted to 76-82%. 58 yo M with stage IV non-small cell lung ca (dx 2 year s/p radiation, progressing on Tegrisso), R pleural effusion (s/p VATS) and PleurX catheter, on home O2 5L continuous (baseline sat 85-89% on O2) presenting from home with increasing SOB and BLE edema for 2 days. Patient continues to have chronic cough: no changed from yellow to green, with blood streaked sputum production. Denies fevers at home. This is third hospitalization since 10/2019. He was treated for pna with vanc/zosyn in October. R pleural effusion is drained every other day by nurse at home. Pluerx was drained today, outputting 320 mL. Patient called Dr. Carbajal d/t increased SOB, who advised him to return to ED.    In ED, patient was placed on nonrebreater (15L) but remained tachypneic. He's now satting 90% on 10/5 BiPAP at 50%. He was given lasix 40 IVx1, azithromycin, CTX and vanc. Of note, patient had 10 beats of ventricular trigeminy at 3:30 AM, same time as he removed the BiPAP and desatted to 76-82%.

## 2019-12-03 NOTE — H&P ADULT - ATTENDING COMMENTS
Pt seen and examined at bedside.  I have precepted this case with house staff and agree with resident note above and have edited it where appropriate.    60 yo M with stage IV non-small cell lung ca (dx 2 year s/p radiation, progressing on Tegrisso), R pleural effusion (s/p VATS) and PleurX catheter, on home O2 5L continuous (baseline sat 85-89% on O2) presenting from home with increasing SOB and BLE edema for 2 days, admitted with hypoxic respiratory failure 2/2 pna and met lung cancer. Pt with severe sepsis with lalo on admission. Admit for IV abx with broad spec coverage (vanco/zosyn).  Increased O2 req compared to home, now on high flow as pt did not tolerate bipap, with improvement in resp rate, a bit more comfortable on exam.  Needs pulm, ID, heme/onc eval.  it appears pt has metastatic spread of cancer to multiple lymph nodes and liver, which pt is prev unaware of.  Pt also with evidence of fluid overload/pulm edema for which he has received PRN lasix, and with noted trigem on tele in setting of hypoxia.  needs close monitoring and serial exams, if worsening resp status would re-start bipap and get micu eval.     Care to be assumed by day hospitalist at 8 am.  This patient was assigned to me by the hospitalist in charge; my involvement in this case has consisted of the initial history, physical, chart review, and management plan.  This patient was previously unknown to me.

## 2019-12-03 NOTE — PROGRESS NOTE ADULT - PROBLEM SELECTOR PLAN 7
Was on tagrisso but noted progression on imaging last admission.  - Patient was planning to follow with Dr. Wright at [facility]  - Onc c/s in AM Was on tagrisso but noted progression on imaging last admission.  - Patient was planning to follow with Dr. Wright at [facility] [place]  - Tried reaching out, will try again.

## 2019-12-03 NOTE — PROGRESS NOTE ADULT - SUBJECTIVE AND OBJECTIVE BOX
Incomplete note in progress by Ramón Tinsley, MS3    Mr. 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    Mr. Tony is currently on nonrebreather (15L)/bipap, has been given CTX, vanc, and azithromycin.    SUBJECTIVE / OVERNIGHT EVENTS:  Patient is seen at bedside in the ED.    MEDICATIONS  (STANDING):  albuterol/ipratropium for Nebulization 3 milliLiter(s) Nebulizer every 6 hours  gabapentin 300 milliGRAM(s) Oral at bedtime  gabapentin 100 milliGRAM(s) Oral two times a day  heparin  Injectable 5000 Unit(s) SubCutaneous every 8 hours  piperacillin/tazobactam IVPB.. 3.375 Gram(s) IV Intermittent every 8 hours  tiotropium 18 MICROgram(s) Capsule 1 Capsule(s) Inhalation daily  vancomycin  IVPB 1000 milliGRAM(s) IV Intermittent every 12 hours    MEDICATIONS  (PRN):  traMADol 25 milliGRAM(s) Oral every 6 hours PRN Severe Pain (7 - 10)    Vital Signs Last 24 Hrs  T(C): 37.1 (12-02-19 @ 22:00)  T(F): 98.8 (12-02-19 @ 22:00), Max: 98.8 (12-02-19 @ 22:00)  HR: 100 (12-03-19 @ 05:00) (100 - 122)  BP: 113/91 (12-03-19 @ 03:18)  BP(mean): 101 (12-03-19 @ 03:18) (101 - 101)  RR: 28 (12-03-19 @ 05:00) (27 - 35)  SpO2: 89% (12-03-19 @ 05:00) (79% - 97%)    PHYSICAL EXAM:  GENERAL: NAD, well-developed  HEAD:  Atraumatic, Normocephalic  EYES: EOMI, PERRLA, conjunctiva and sclera clear  NECK: Supple, No JVD  CHEST/LUNG: Clear to auscultation bilaterally; No wheeze  HEART: Regular rate and rhythm; No murmurs, rubs, or gallops  ABDOMEN: Soft, Nontender, Nondistended; Bowel sounds present  EXTREMITIES:  2+ Peripheral Pulses, No clubbing, cyanosis, or edema  PSYCH: AAOx3  NEUROLOGY: non-focal  SKIN: No rashes or lesions    LABS:                      15.2   15.72 )-----------( 90       ( 02 Dec 2019 22:34 )             45.4     12-02    132<L>  |  94<L>  |  54<H>  ----------------------------<  129<H>  5.4<H>   |  23  |  1.33<H>    Ca    8.8      02 Dec 2019 22:34    TPro  7.0  /  Alb  3.5  /  TBili  1.1  /  DBili  x   /  AST  79<H>  /  ALT  52<H>  /  AlkPhos  269<H>  12-02    PT/INR - ( 02 Dec 2019 22:34 )   PT: 13.4 sec;   INR: 1.16 ratio         PTT - ( 02 Dec 2019 22:34 )  PTT:29.6 sec    Troponin T, High Sensitivity Result: 84 (12.02.19 @ 22:34)  Troponin T, High Sensitivity Result: 85: (12.03.19 @ 02:33)    Serum Pro-Brain Natriuretic Peptide: 805 pg/mL (12.02.19 @ 22:34)    Blood Gas Venous - Lactate: 2.5 mmoL/L (12.03.19 @ 00:56)  Blood Gas Venous - Lactate: 3.1 mmoL/L (12.02.19 @ 22:34)    Rapid RVP Result: NotDetec (12.02.19 @ 23:17)      RADIOLOGY & ADDITIONAL TESTS:  < from: Xray Chest 1 View- PORTABLE-Urgent (12.02.19 @ 22:22) >  ***Preliminary Report***  INTERPRETATION:  Partially imaged catheter seen in the right lower   thorax. Unchanged bilateral patchy opacities. No pneumothorax.    NAIMA ALEXANDER M.D., RADIOLOGY RESIDENT  < end of copied text >    < from: CT Angio Chest w/ IV Cont (12.02.19 @ 23:25) >  IMPRESSION:   1. Severe hydrostatic interstitial pulmonary edema/CHF.   2. Multifocal bilateral pneumonia, worst in the right lower lobe.   3. Small left pleural effusion. Trace right pleural effusion.   4. Small pericardial effusion.   5. Probable mediastinal and bihilar lymphadenopathy.   6. Bulky right axillary lymphadenopathy.   7. Hepatic metastases.   8. Gastrohepatic ligament, celiac axis, and retroperitoneal   lymphadenopathy,   incompletely visualized.    JEANNA PASCUAL M.D.;AD RADIOLOGIST    < end of copied text > Incomplete note in progress by Ramón Tinsley, MS3    Mr. 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    Mr. Tony is currently on nonrebreather (15L)/bipap, has been given CTX, vanc, and azithromycin.    SUBJECTIVE / OVERNIGHT EVENTS:  Patient is seen at bedside in the ED.    MEDICATIONS  (STANDING):  albuterol/ipratropium for Nebulization 3 milliLiter(s) Nebulizer every 6 hours  gabapentin 300 milliGRAM(s) Oral at bedtime  gabapentin 100 milliGRAM(s) Oral two times a day  heparin  Injectable 5000 Unit(s) SubCutaneous every 8 hours  piperacillin/tazobactam IVPB.. 3.375 Gram(s) IV Intermittent every 8 hours  tiotropium 18 MICROgram(s) Capsule 1 Capsule(s) Inhalation daily  vancomycin  IVPB 1000 milliGRAM(s) IV Intermittent every 12 hours    MEDICATIONS  (PRN):  traMADol 25 milliGRAM(s) Oral every 6 hours PRN Severe Pain (7 - 10)    Vital Signs Last 24 Hrs  T(C): 37.1 (12-02-19 @ 22:00)  T(F): 98.8 (12-02-19 @ 22:00), Max: 98.8 (12-02-19 @ 22:00)  HR: 100 (12-03-19 @ 05:00) (100 - 122)  BP: 113/91 (12-03-19 @ 03:18)  BP(mean): 101 (12-03-19 @ 03:18) (101 - 101)  RR: 28 (12-03-19 @ 05:00) (27 - 35)  SpO2: 89% (12-03-19 @ 05:00) (79% - 97%)    PHYSICAL EXAM:      LABS:                      15.2   15.72 )-----------( 90       ( 02 Dec 2019 22:34 )             45.4     12-02    132<L>  |  94<L>  |  54<H>  ----------------------------<  129<H>  5.4<H>   |  23  |  1.33<H>    Ca    8.8      02 Dec 2019 22:34    TPro  7.0  /  Alb  3.5  /  TBili  1.1  /  DBili  x   /  AST  79<H>  /  ALT  52<H>  /  AlkPhos  269<H>  12-02    PT/INR - ( 02 Dec 2019 22:34 )   PT: 13.4 sec;   INR: 1.16 ratio         PTT - ( 02 Dec 2019 22:34 )  PTT:29.6 sec    Troponin T, High Sensitivity Result: 84 (12.02.19 @ 22:34)  Troponin T, High Sensitivity Result: 85: (12.03.19 @ 02:33)    Serum Pro-Brain Natriuretic Peptide: 805 pg/mL (12.02.19 @ 22:34)    Blood Gas Venous - Lactate: 2.5 mmoL/L (12.03.19 @ 00:56)  Blood Gas Venous - Lactate: 3.1 mmoL/L (12.02.19 @ 22:34)    Rapid RVP Result: NotDetec (12.02.19 @ 23:17)      RADIOLOGY & ADDITIONAL TESTS:  < from: Xray Chest 1 View- PORTABLE-Urgent (12.02.19 @ 22:22) >  ***Preliminary Report***  INTERPRETATION:  Partially imaged catheter seen in the right lower   thorax. Unchanged bilateral patchy opacities. No pneumothorax.    NAIMA ALEXANDER M.D., RADIOLOGY RESIDENT  < end of copied text >    < from: CT Angio Chest w/ IV Cont (12.02.19 @ 23:25) >  IMPRESSION:   1. Severe hydrostatic interstitial pulmonary edema/CHF.   2. Multifocal bilateral pneumonia, worst in the right lower lobe.   3. Small left pleural effusion. Trace right pleural effusion.   4. Small pericardial effusion.   5. Probable mediastinal and bihilar lymphadenopathy.   6. Bulky right axillary lymphadenopathy.   7. Hepatic metastases.   8. Gastrohepatic ligament, celiac axis, and retroperitoneal   lymphadenopathy,   incompletely visualized.    JEANNA PASCUAL M.D.;VRAD RADIOLOGIST    < end of copied text > Mr. 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    Mr. Tony was on bipap but is now on high flow. He is currently on empiric antibiotic therapy (vancomycin and cefepime) until cultures result.     SUBJECTIVE / OVERNIGHT EVENTS:  Patient is seen at bedside in the ED. He states that his breathing is better and is not short of breath when he was on the bipap. He denies fevers, chills, nausea, vomiting, or diarrhea.     MEDICATIONS  (STANDING):  albuterol/ipratropium for Nebulization 3 milliLiter(s) Nebulizer every 6 hours  gabapentin 300 milliGRAM(s) Oral at bedtime  gabapentin 100 milliGRAM(s) Oral two times a day  heparin  Injectable 5000 Unit(s) SubCutaneous every 8 hours  piperacillin/tazobactam IVPB.. 3.375 Gram(s) IV Intermittent every 8 hours  tiotropium 18 MICROgram(s) Capsule 1 Capsule(s) Inhalation daily  vancomycin  IVPB 1000 milliGRAM(s) IV Intermittent every 12 hours    MEDICATIONS  (PRN):  traMADol 25 milliGRAM(s) Oral every 6 hours PRN Severe Pain (7 - 10)    Vital Signs Last 24 Hrs  T(C): 37.1 (12-02-19 @ 22:00)  T(F): 98.8 (12-02-19 @ 22:00), Max: 98.8 (12-02-19 @ 22:00)  HR: 100 (12-03-19 @ 05:00) (100 - 122)  BP: 113/91 (12-03-19 @ 03:18)  BP(mean): 101 (12-03-19 @ 03:18) (101 - 101)  RR: 28 (12-03-19 @ 05:00) (27 - 35)  SpO2: 89% (12-03-19 @ 05:00) (79% - 97%)    PHYSICAL EXAM:      LABS:                      15.2   15.72 )-----------( 90       ( 02 Dec 2019 22:34 )             45.4     12-02    132<L>  |  94<L>  |  54<H>  ----------------------------<  129<H>  5.4<H>   |  23  |  1.33<H>    Ca    8.8      02 Dec 2019 22:34    TPro  7.0  /  Alb  3.5  /  TBili  1.1  /  DBili  x   /  AST  79<H>  /  ALT  52<H>  /  AlkPhos  269<H>  12-02    PT/INR - ( 02 Dec 2019 22:34 )   PT: 13.4 sec;   INR: 1.16 ratio         PTT - ( 02 Dec 2019 22:34 )  PTT:29.6 sec    Troponin T, High Sensitivity Result: 84 (12.02.19 @ 22:34)  Troponin T, High Sensitivity Result: 85: (12.03.19 @ 02:33)    Serum Pro-Brain Natriuretic Peptide: 805 pg/mL (12.02.19 @ 22:34)    Blood Gas Venous - Lactate: 2.5 mmoL/L (12.03.19 @ 00:56)  Blood Gas Venous - Lactate: 3.1 mmoL/L (12.02.19 @ 22:34)    Rapid RVP Result: NotDetec (12.02.19 @ 23:17)      RADIOLOGY:  < from: CT Angio Chest w/ IV Cont (12.02.19 @ 23:25) >  FINDINGS:     PULMONARY VESSELS: No pulmonary embolus. Main pulmonary artery normal in   diameter.    HEART AND VASCULATURE: Normal heart size without pericardial effusion. No   CT evidence of right heart strain.    No aortic aneurysm or dissection.    LUNGS, AIRWAYS, PLEURA: Patent central airways.    Narrowing of the basilar segmental bronchi of right lower lobe. In   comparison with 11/11/2019, interval decrease of masslikeopacity within   the right lower lobe nodule measures approximately 9 x 8 cm, previously   10 x 9 cm. Perihilar opacities within the right upper lobe are also   decreased.    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    A right Pleurx catheter is present enteric the chest wall at the right 8   rib space, the tip is at the medial basilar right lower pleural space.   The right pleural effusion has been resolved. Small layering left pleural   effusion is unchanged. No pneumothorax.    MEDIASTINUM: Extensive soft tissue infiltrates into the bilateral hilum   and mediastinum is again noted, slightly decreased.    Enlarged right axillary lymph nodes are unchanged, the largest one   measures 2 cm short axis.    UPPER ABDOMEN: Extensive enlarged retroperitoneal and mesenteric lymph   nodes    BONES AND SOFT TISSUES: No aggressive osseous lesion. Subcentimeter   subcutaneous nodules within the right upper back projecting over the   scapula (series 5 image 42) are again noted and likely metastatic.    LOWER NECK: Within normal limits.      IMPRESSION:     1.  No pulmonary embolus.    2.  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    3.  In comparison with 11/11/2019, right lower lobe masslike opacity and   perihilar infiltrates within the right upper lobe are slightly decreased.    4.  Extensive soft tissue infiltration to the bilateral hilaand   mediastinum is again noted, slightly decreased.    5.  Right pleural effusion has been resolved. Small layering left pleural   effusion is unchanged.    < end of copied text >

## 2019-12-03 NOTE — CONSULT NOTE ADULT - ASSESSMENT
ASSESSMENT  60 yo M with stage IV non-small cell lung ca (dx 2 year s/p radiation, progressing on Tegrisso), R pleural effusion (s/p VATS) and PleurX catheter, on home O2 5L continuous (baseline sat 85-89% on O2) presenting from home with increasing SOB and BLE edema for 2 days, admitted for hypoxic respiratory failure secondary to likely underlying malignancy.    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    Rae Cervantes MD  Internal Medicine PGY3 ASSESSMENT  60 yo M with stage IV non-small cell lung ca (dx 2 year s/p radiation, progressing on Tegrisso), R pleural effusion (s/p VATS) and PleurX catheter, on home O2 5L continuous (baseline sat 85-89% on O2) presenting from home with increasing SOB and BLE edema for 2 days, admitted for hypoxic respiratory failure secondary to likely underlying malignancy.    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    Rae Cervantes MD  Internal Medicine PGY3

## 2019-12-03 NOTE — H&P ADULT - NSHPPHYSICALEXAM_GEN_ALL_CORE
Vital Signs Last 24 Hrs  T(C): 37.1 (02 Dec 2019 22:00), Max: 37.1 (02 Dec 2019 22:00)  T(F): 98.8 (02 Dec 2019 22:00), Max: 98.8 (02 Dec 2019 22:00)  HR: 104 (03 Dec 2019 02:00) (104 - 122)  BP: 120/84 (03 Dec 2019 02:00) (118/83 - 134/78)  BP(mean): --  RR: 27 (03 Dec 2019 02:00) (27 - 35)  SpO2: 93% (03 Dec 2019 02:00) (79% - 97%)

## 2019-12-03 NOTE — H&P ADULT - PROBLEM SELECTOR PLAN 3
- Continue vanc/zosyn  - maitain >90% on BiPAP  - Pulm c/s in AM - Continue vanc/zosyn  - maintain >90% on BiPAP. If desats on BiPAP on increased O2 requirements, plan for MICU c/s.  - ID c/s in AM for recurrent pna

## 2019-12-03 NOTE — PROGRESS NOTE ADULT - PROBLEM SELECTOR PLAN 1
Most likely 2/2 progression of malignancy. Patient was hypoxic on nonrebreather, satting well on 50% Bipap 10/5. Likely multifactorial from pna and met lung cancer. Now satting at 90% on HFNC.  - continue on HFNC, titrate to SpO2 > 90%.  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 Patient was hypoxic on nonrebreather, satting well on 50% Bipap 10/5. Likely multifactorial from pna and met lung cancer. Now satting at 90% on HFNC.  - continue on HFNC, titrate to SpO2 > 90%.  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 Patient was hypoxic on nonrebreather, satting well on 50% Bipap 10/5. Likely multifactorial from pna and met lung cancer. Now satting at 90% on HFNC.  - continue on HFNC, titrate to SpO2 > 90%.  - Appreciate Pulm and ID recs  - Unlikely pneumonia, however will continue with antibiotics for now  - Prednisone 40 mg daily   - Duonebs q6h  -C/w IV Lasix for now.  -F/u with pulm regarding draining Pleurex.

## 2019-12-03 NOTE — ED ADULT NURSE REASSESSMENT NOTE - NS ED NURSE REASSESS COMMENT FT1
Pt to be placed on high flow nasal cannula. Respiratory contacted. Patient aware of plan. It has been explained that if SpO2 cannot remain above 88% on high flow, he will return to bipap mask.

## 2019-12-03 NOTE — PROGRESS NOTE ADULT - PROBLEM SELECTOR PLAN 2
severe sepsis 2/2 pna. Here with hypoxia, tachypnea, leukocytosis with lalo on admission  - Will continue vanc/zosyn.  - F/u blood cultures HS trop stable at 84x2. EKG showed sinus tachy. Noted ventricular trigeminy, but likely from hypoxia.  - Low c/f ACS  - Likely from demand and KARSON.   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 But patient has other acute issues at present.

## 2019-12-03 NOTE — ED ADULT NURSE REASSESSMENT NOTE - NS ED NURSE REASSESS COMMENT FT1
Critical troponin resulted at 84, reported by lab. Result escalated to ELI Qureshi. Will repeat troponin at this time. left normal/right normal

## 2019-12-03 NOTE — PROGRESS NOTE ADULT - SUBJECTIVE AND OBJECTIVE BOX
***INCOMPLETE NOTE***    Casper Mario MD PGY-1  Internal Medicine  Pager [phone] / [phone] ***INCOMPLETE NOTE***    Casper Mario MD PGY-1  Internal Medicine  Pager [phone] / [phone]    Patient is a 59y old  Male who presents with a chief complaint of SOB (03 Dec 2019 12:48)    SUBJECTIVE / OVERNIGHT EVENTS:  Patient is seen at bedside in the ED. He states that his breathing is better and is not short of breath when he was on the bipap. He denies fevers, chills, nausea, vomiting, or diarrhea.   Weaned to HFNC prior to moving up to floor. Patient does endorse yellow and "strawberry" colored phlegm production. Has not had changes in bowel movements. Endorses edema, odynophagia, and feeling of heart racing, he has not eaten much since his previous discharge and has lost 5-10 pounds. Endorses left calf pain for several days.   Upon moving to floor, experienced 4 beats of wide complex tachycardia.     MEDICATIONS  (STANDING):  albuterol/ipratropium for Nebulization 3 milliLiter(s) Nebulizer every 6 hours  cefepime   IVPB 2000 milliGRAM(s) IV Intermittent every 12 hours  furosemide   Injectable 40 milliGRAM(s) IV Push once  gabapentin 300 milliGRAM(s) Oral at bedtime  gabapentin 100 milliGRAM(s) Oral two times a day  heparin  Injectable 5000 Unit(s) SubCutaneous every 8 hours  predniSONE   Tablet 40 milliGRAM(s) Oral daily  vancomycin  IVPB 1000 milliGRAM(s) IV Intermittent every 12 hours    MEDICATIONS  (PRN):  traMADol 25 milliGRAM(s) Oral every 6 hours PRN Severe Pain (7 - 10)      CAPILLARY BLOOD GLUCOSE        I&O's Summary      PHYSICAL EXAM:  Vital Signs Last 24 Hrs  T(C): 36.8 (12-03-19 @ 16:37)  T(F): 98.3 (12-03-19 @ 16:37), Max: 98.8 (12-02-19 @ 22:00)  HR: 116 (12-03-19 @ 16:37) (100 - 122)  BP: 118/80 (12-03-19 @ 16:37)  BP(mean): 101 (12-03-19 @ 03:18) (101 - 101)  RR: 23 (12-03-19 @ 16:37) (20 - 35)  SpO2: 85% (12-03-19 @ 16:37) (79% - 97%)  Wt(kg): --    Constitutional: Wearing bipap, awake and alert  EYES: EOMI  ENT:  Normal Hearing  Neck: Soft and supple, Right supraclavicular lymph node palpated.   Respiratory: on BIPAP. Diffuse rhonchi and wheezes, increased in R lung field.   Cardiovascular: S1 and S2, tachycardic, regular, no Murmurs, gallops or rubs, no JVD,    Gastrointestinal: Bowel Sounds present, soft, nontender, nondistended, no guarding, no rebound  Extremities:3+ bilateral lower extremity edema, non-tender to palpation  Neurological: No focal deficits, CN II-XII intact bilaterally, sensation to light touch intact in all extremities. Pupils are equally reactive to light and symmetrical in size.   Musculoskeletal: no joint swelling.  Skin: No rashes  Psych: AAOx3  HEME: Few petechiae on lower extremities      LABS:                        14.6   18.26 )-----------( 84       ( 03 Dec 2019 09:18 )             44.1     12-03    129<L>  |  90<L>  |  54<H>  ----------------------------<  135<H>  4.8   |  25  |  1.42<H>    Ca    9.5      03 Dec 2019 06:54  Phos  4.3     12-03  Mg     2.8     12-03    TPro  6.4  /  Alb  3.4  /  TBili  0.8  /  DBili  x   /  AST  69<H>  /  ALT  45  /  AlkPhos  251<H>  12-03    PT/INR - ( 03 Dec 2019 08:53 )   PT: 13.1 sec;   INR: 1.15 ratio         PTT - ( 03 Dec 2019 08:53 )  PTT:28.5 sec      RADIOLOGY & ADDITIONAL TESTS:    Imaging Personally Reviewed:  < from: CT Angio Chest w/ IV Cont (12.02.19 @ 23:25) >  IMPRESSION:     1.  No pulmonary embolus.    2.  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    3.  In comparison with 11/11/2019, right lower lobe masslike opacity and   perihilar infiltrates within the right upper lobe are slightly decreased.    4.  Extensive soft tissue infiltration to the bilateral hilaand   mediastinum is again noted, slightly decreased.    5.  Right pleural effusion has been resolved. Small layering left pleural   effusion is unchanged.    < end of copied text >    Consultant(s) Notes Reviewed:    Infectious Disease  Pulmonology    Care Discussed with Consultants/Other Providers: Casper Mario MD PGY-1  Internal Medicine  Pager [phone] / [phone]    Patient is a 59y old  Male who presents with a chief complaint of SOB (03 Dec 2019 12:48)    SUBJECTIVE / OVERNIGHT EVENTS:  Patient is seen at bedside in the ED. He states that his breathing is better and is not short of breath when he was on the bipap. He denies fevers, chills, nausea, vomiting, or diarrhea.   Weaned to HFNC prior to moving up to floor. Patient does endorse yellow and "strawberry" colored phlegm production. Has not had changes in bowel movements. Endorses edema, odynophagia, and feeling of heart racing, he has not eaten much since his previous discharge and has lost 5-10 pounds. Endorses left calf pain for several days.   Upon moving to floor, experienced 4 beats of wide complex tachycardia.     MEDICATIONS  (STANDING):  albuterol/ipratropium for Nebulization 3 milliLiter(s) Nebulizer every 6 hours  cefepime   IVPB 2000 milliGRAM(s) IV Intermittent every 12 hours  furosemide   Injectable 40 milliGRAM(s) IV Push once  gabapentin 300 milliGRAM(s) Oral at bedtime  gabapentin 100 milliGRAM(s) Oral two times a day  heparin  Injectable 5000 Unit(s) SubCutaneous every 8 hours  predniSONE   Tablet 40 milliGRAM(s) Oral daily  vancomycin  IVPB 1000 milliGRAM(s) IV Intermittent every 12 hours    MEDICATIONS  (PRN):  traMADol 25 milliGRAM(s) Oral every 6 hours PRN Severe Pain (7 - 10)      CAPILLARY BLOOD GLUCOSE        I&O's Summary      PHYSICAL EXAM:  Vital Signs Last 24 Hrs  T(C): 36.8 (12-03-19 @ 16:37)  T(F): 98.3 (12-03-19 @ 16:37), Max: 98.8 (12-02-19 @ 22:00)  HR: 116 (12-03-19 @ 16:37) (100 - 122)  BP: 118/80 (12-03-19 @ 16:37)  BP(mean): 101 (12-03-19 @ 03:18) (101 - 101)  RR: 23 (12-03-19 @ 16:37) (20 - 35)  SpO2: 85% (12-03-19 @ 16:37) (79% - 97%)  Wt(kg): --    Constitutional: Wearing bipap, awake and alert  EYES: EOMI  ENT:  Normal Hearing  Neck: Soft and supple, Right supraclavicular lymph node palpated.   Respiratory: on BIPAP. Diffuse rhonchi and wheezes, increased in R lung field.   Cardiovascular: S1 and S2, tachycardic, regular, no Murmurs, gallops or rubs, no JVD,    Gastrointestinal: Bowel Sounds present, soft, nontender, nondistended, no guarding, no rebound  Extremities:3+ bilateral lower extremity edema, non-tender to palpation  Neurological: No focal deficits, CN II-XII intact bilaterally, sensation to light touch intact in all extremities. Pupils are equally reactive to light and symmetrical in size.   Musculoskeletal: no joint swelling.  Skin: No rashes  Psych: AAOx3  HEME: Few petechiae on lower extremities      LABS:                        14.6   18.26 )-----------( 84       ( 03 Dec 2019 09:18 )             44.1     12-03    129<L>  |  90<L>  |  54<H>  ----------------------------<  135<H>  4.8   |  25  |  1.42<H>    Ca    9.5      03 Dec 2019 06:54  Phos  4.3     12-03  Mg     2.8     12-03    TPro  6.4  /  Alb  3.4  /  TBili  0.8  /  DBili  x   /  AST  69<H>  /  ALT  45  /  AlkPhos  251<H>  12-03    PT/INR - ( 03 Dec 2019 08:53 )   PT: 13.1 sec;   INR: 1.15 ratio         PTT - ( 03 Dec 2019 08:53 )  PTT:28.5 sec      RADIOLOGY & ADDITIONAL TESTS:    Imaging Personally Reviewed:  < from: CT Angio Chest w/ IV Cont (12.02.19 @ 23:25) >  IMPRESSION:     1.  No pulmonary embolus.    2.  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    3.  In comparison with 11/11/2019, right lower lobe masslike opacity and   perihilar infiltrates within the right upper lobe are slightly decreased.    4.  Extensive soft tissue infiltration to the bilateral hilaand   mediastinum is again noted, slightly decreased.    5.  Right pleural effusion has been resolved. Small layering left pleural   effusion is unchanged.    < end of copied text >    Consultant(s) Notes Reviewed:    Infectious Disease  Pulmonology    Care Discussed with Consultants/Other Providers:

## 2019-12-03 NOTE — H&P ADULT - PROBLEM SELECTOR PLAN 8
Patient wants to remain full code. Has not been seen by palliative on previous admissions. May benefit from c/s. Patient wants to remain full code. Has not been seen by palliative on previous admissions. May benefit from palliative c/s.

## 2019-12-03 NOTE — H&P ADULT - PROBLEM SELECTOR PLAN 2
2/2 pna. Here with hypoxia, tachypnea, leukocytosis.  - Will continue vanc/zosyn.  - F/u blood cultures severe sepsis 2/2 pna. Here with hypoxia, tachypnea, leukocytosis with lalo on admission  - Will continue vanc/zosyn.  - F/u blood cultures

## 2019-12-03 NOTE — PROVIDER CONTACT NOTE (OTHER) - SITUATION
Pending transport to 4Mon, satting 86% on hi flow. Pt comfortable, in no distress. Refusing bipap at this time, wishes to remain on hi flow for transport to the floor.

## 2019-12-03 NOTE — PROGRESS NOTE ADULT - PROBLEM SELECTOR PLAN 5
- Cr elevated to 1.3, was previously wnl.  - Will continue to trend. Was on tagrisso but noted progression on imaging last admission.  - Patient was planning to follow with Dr. Deras regarding treatment options for primary cancer

## 2019-12-03 NOTE — PROGRESS NOTE ADULT - PROBLEM SELECTOR PLAN 8
Patient wants to remain full code. Has not been seen by palliative on previous admissions. May benefit from palliative c/s.

## 2019-12-03 NOTE — PROGRESS NOTE ADULT - ASSESSMENT
60 yo M with stage IV non-small cell lung ca (dx 2 year s/p radiation, progressing on Tegrisso), R pleural effusion (s/p VATS) and PleurX catheter, on home O2 5L continuous (baseline sat 85-89% on O2) presenting from home with increasing SOB and BLE edema for 2 days, admitted with hypoxic respiratory failure 2/2 pna and met lung cancer.

## 2019-12-03 NOTE — PROGRESS NOTE ADULT - PROBLEM SELECTOR PLAN 5
- Cr elevated to 1.3, was previously wnl.  - Will continue to trend. - Cr elevated to 1.3, was previously wnl.  - Will continue to trend.  - Check labs at midnight, will continue with lasix but be mindful of KARSON  - Avoid nephrotoxic meds - Cr elevated to 1.3, was previously wnl.  - Will continue to trend.  - Check labs at midnight, will continue with lasix but be mindful of KARSON  - Avoid nephrotoxic meds  -Strict I's/O's and daily weights.

## 2019-12-03 NOTE — PROGRESS NOTE ADULT - PROBLEM SELECTOR PLAN 6
-s/p lasix 40 IV. Will dose another lasix 40 IV now.  - Per patient, he was not taking lasix at home. Patient wants to remain full code. Has not been seen by palliative on previous admissions. May benefit from palliative c/s.

## 2019-12-03 NOTE — PROGRESS NOTE ADULT - ATTENDING COMMENTS
-Patient seen/examined on 12/3/19. Case/plan discussed with the intern and resident as reviewed/edited by me above and in any comments below. -Patient seen/examined on 12/3/19. Case/plan discussed with the intern and resident as reviewed/edited by me above and in any comments below.  -Discussed with patient and his wife at bedside.   -Pulm and ID recs appreciated.   -IV diuresis. -F/u LE duplex.

## 2019-12-03 NOTE — PROGRESS NOTE ADULT - PROBLEM SELECTOR PLAN 4
HS trop stable at 84x2. EKG showed sinus tachy. Noted ventricular trigeminy, but likely from hypoxia.  - Low c/f ACS  - Likely from demand and KARSON.   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 But patient has other acute issues at present.

## 2019-12-03 NOTE — PROGRESS NOTE ADULT - PROBLEM SELECTOR PLAN 1
Patient was hypoxic on nonrebreather, satting well on 50% Bipap 10/5. Likely multifactorial from pna and met lung cancer.  - continue on Bipap 10/5.  - Maintain spo2>90%,  - Will email pulm Patient was hypoxic on nonrebreather, satting well on 50% Bipap 10/5. Likely 2/2 lymphangitic spread of known stage IV NSCLC.  - Transition from Bipap10/5 to high flow.  - C/w empiric treatment of infection until cultures result   - Maintain spo2>88%,  - Appreciate pulm recs: restart duonebs (patient needs nebulizer machine at home), restart prednisone, d/c spiriva, c/w effusion drainage, and contact Dr. Deras regarding further primary cancer treatment.

## 2019-12-03 NOTE — H&P ADULT - PROBLEM SELECTOR PLAN 1
Patient was hypoxic on nonrebreather, satting well on 50% Bipap 10/5. Likely multifactorial from pna and met lung cancer.  - continue on Bipap 10/5.  - Maintain spo2>90%,  - Pulm c/s in AM. Patient was hypoxic on nonrebreather, satting well on 50% Bipap 10/5. Likely multifactorial from pna and met lung cancer.  - continue on Bipap 10/5.  - Maintain spo2>90%,  - Will email pulm

## 2019-12-03 NOTE — H&P ADULT - NSHPLABSRESULTS_GEN_ALL_CORE
CBC Full  -  ( 02 Dec 2019 22:34 )  WBC Count : 15.72 K/uL  RBC Count : 5.23 M/uL  Hemoglobin : 15.2 g/dL  Hematocrit : 45.4 %  Platelet Count - Automated : 90 K/uL  Mean Cell Volume : 86.8 fl  Mean Cell Hemoglobin : 29.1 pg  Mean Cell Hemoglobin Concentration : 33.5 gm/dL  Auto Neutrophil # : 14.89 K/uL  Auto Lymphocyte # : 0.28 K/uL  Auto Monocyte # : 0.55 K/uL  Auto Eosinophil # : 0.00 K/uL  Auto Basophil # : 0.00 K/uL  Auto Neutrophil % : 94.7 %  Auto Lymphocyte % : 1.8 %  Auto Monocyte % : 3.5 %  Auto Eosinophil % : 0.0 %  Auto Basophil % : 0.0 %    12-02    132<L>  |  94<L>  |  54<H>  ----------------------------<  129<H>  5.4<H>   |  23  |  1.33<H>    Ca    8.8      02 Dec 2019 22:34    TPro  7.0  /  Alb  3.5  /  TBili  1.1  /  DBili  x   /  AST  79<H>  /  ALT  52<H>  /  AlkPhos  269<H>  12-02    CTA chest:   IMPRESSION:   1. Severe hydrostatic interstitial pulmonary edema/CHF.   2. Multifocal bilateral pneumonia, worst in the right lower lobe.   3. Small left pleural effusion. Trace right pleural effusion.   4. Small pericardial effusion.   5. Probable mediastinal and bihilar lymphadenopathy.   6. Bulky right axillary lymphadenopathy.   7. Hepatic metastases.   8. Gastrohepatic ligament, celiac axis, and retroperitoneal   lymphadenopathy,   incompletely visualized. CBC Full  -  ( 02 Dec 2019 22:34 )  WBC Count : 15.72 K/uL  RBC Count : 5.23 M/uL  Hemoglobin : 15.2 g/dL  Hematocrit : 45.4 %  Platelet Count - Automated : 90 K/uL  Mean Cell Volume : 86.8 fl  Mean Cell Hemoglobin : 29.1 pg  Mean Cell Hemoglobin Concentration : 33.5 gm/dL  Auto Neutrophil # : 14.89 K/uL  Auto Lymphocyte # : 0.28 K/uL  Auto Monocyte # : 0.55 K/uL  Auto Eosinophil # : 0.00 K/uL  Auto Basophil # : 0.00 K/uL  Auto Neutrophil % : 94.7 %  Auto Lymphocyte % : 1.8 %  Auto Monocyte % : 3.5 %  Auto Eosinophil % : 0.0 %  Auto Basophil % : 0.0 %    12-02    132<L>  |  94<L>  |  54<H>  ----------------------------<  129<H>  5.4<H>   |  23  |  1.33<H>    Ca    8.8      02 Dec 2019 22:34    TPro  7.0  /  Alb  3.5  /  TBili  1.1  /  DBili  x   /  AST  79<H>  /  ALT  52<H>  /  AlkPhos  269<H>  12-02    CTA chest:   IMPRESSION:   1. Severe hydrostatic interstitial pulmonary edema/CHF.   2. Multifocal bilateral pneumonia, worst in the right lower lobe.   3. Small left pleural effusion. Trace right pleural effusion.   4. Small pericardial effusion.   5. Probable mediastinal and bihilar lymphadenopathy.   6. Bulky right axillary lymphadenopathy.   7. Hepatic metastases.   8. Gastrohepatic ligament, celiac axis, and retroperitoneal   lymphadenopathy,   incompletely visualized.    EKG  Sinus tachycardia CBC Full  -  ( 02 Dec 2019 22:34 )  WBC Count : 15.72 K/uL  RBC Count : 5.23 M/uL  Hemoglobin : 15.2 g/dL  Hematocrit : 45.4 %  Platelet Count - Automated : 90 K/uL  Mean Cell Volume : 86.8 fl  Mean Cell Hemoglobin : 29.1 pg  Mean Cell Hemoglobin Concentration : 33.5 gm/dL  Auto Neutrophil # : 14.89 K/uL  Auto Lymphocyte # : 0.28 K/uL  Auto Monocyte # : 0.55 K/uL  Auto Eosinophil # : 0.00 K/uL  Auto Basophil # : 0.00 K/uL  Auto Neutrophil % : 94.7 %  Auto Lymphocyte % : 1.8 %  Auto Monocyte % : 3.5 %  Auto Eosinophil % : 0.0 %  Auto Basophil % : 0.0 %    12-02    132<L>  |  94<L>  |  54<H>  ----------------------------<  129<H>  5.4<H>   |  23  |  1.33<H>    Ca    8.8      02 Dec 2019 22:34    TPro  7.0  /  Alb  3.5  /  TBili  1.1  /  DBili  x   /  AST  79<H>  /  ALT  52<H>  /  AlkPhos  269<H>  12-02    CTA chest:   IMPRESSION:   1. Severe hydrostatic interstitial pulmonary edema/CHF.   2. Multifocal bilateral pneumonia, worst in the right lower lobe.   3. Small left pleural effusion. Trace right pleural effusion.   4. Small pericardial effusion.   5. Probable mediastinal and bihilar lymphadenopathy.   6. Bulky right axillary lymphadenopathy.   7. Hepatic metastases.   8. Gastrohepatic ligament, celiac axis, and retroperitoneal   lymphadenopathy,   incompletely visualized.    EKG  Sinus tachycardia    Labs, imaging, ekg personally reviewed

## 2019-12-03 NOTE — H&P ADULT - PROBLEM SELECTOR PLAN 4
HS trop stable at 84x2. EKG showed sinus tachy. Noted ventricular trigeminy, but likely from hypoxia.  - Low c/f ACS  - Likely from demand and KARSON, but may benefit from cardiology c/s given small pericardial effusion. HS trop stable at 84x2. EKG showed sinus tachy. Noted ventricular trigeminy, but likely from hypoxia.  - Low c/f ACS  - Likely from demand and KARSON, but may benefit from cardiology c/s given small pericardial effusion. 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 HS trop stable at 84x2. EKG showed sinus tachy. Noted ventricular trigeminy, but likely from hypoxia.  - Low c/f ACS  - Likely from demand and KARSON.   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 But patient has other acute issues at present.

## 2019-12-03 NOTE — ED ADULT NURSE REASSESSMENT NOTE - NS ED NURSE REASSESS COMMENT FT1
Patient is reporting he wants to take bipap off, tugging at mask, feels that it is uncomfortable. Requesting to be placed on high flow or have medication to help him sleep and relax. MD Ortega paged. Pt has been repeatedly educated to remain on bipap mask at this time. Pt and wife also educated that patient previously went into cardiac arrythmia with mask removed and hypoxia. Patient and wife verbalize understanding.

## 2019-12-03 NOTE — H&P ADULT - PROBLEM SELECTOR PLAN 5
- Cr elevated to 1.3, was previously wnl.  - c/f CHF given LE edema and KARSON.  - TTE on last admission showed normal LV systolic function and EF 45%. - Cr elevated to 1.3, was previously wnl.  - c/f CHF given LE edema and KARSON.  - TTE on last admission showed normal LV systolic function, but notable for constricitve pericarditis. - Cr elevated to 1.3, was previously wnl.  - Will continue to trend.

## 2019-12-03 NOTE — CONSULT NOTE ADULT - ASSESSMENT
This is a 60 y/o M with stage IV NSCLC with malignant pleural effusions s/p indwelling pleural catheter, progressing despite immunotherapy, and chronic hypoxemic respiratory failure on 5L O2 at home who presents with SOB and worsening b/l LE edema.    #Chronic hypoxemic respiratory failure  -     Pulmonary will continue to follow.  --------------------------------------------  Dante Palomino PGY-4  Pulmonary/Critical Care Fellow  Pager: [phone] ([facility]) [phone] ([place])  Pulmonary Spectra #[id number] This is a 58 y/o M with stage IV NSCLC with malignant pleural effusions s/p indwelling pleural catheter, progressing despite immunotherapy, and chronic hypoxemic respiratory failure on 5L O2 at home who presents with SOB and worsening b/l LE edema. Patient has had persistent SOB since admission over 1 month ago. CT chest findings are concerning for worsening lymphangitic spread of known primary NSCLC with known malignant effusion. There is no focal consolidation to suggest pneumonia, however empirically treating for infection until cultures result is not unreasonable. Need to discuss his treatment options with his primary oncologist, Dr. Marie Deras ([phone], [phone]) because his SOB is most likely due to lymphangitic spread of primary tumor, which portends very poor prognosis.    #Chronic hypoxemic respiratory failure  - likely 2/2 lymphangitic spread of known stage IV NSCLC  - can continue empiric antibiotic therapy until cultures result, however doubt clinical pneumonia  - supplemental O2, maintain SpO2>88%, can continue BiPAP for comfort if patient desires vs high flow for oxygenation  - restart duonebs -> patient reports he needs nebulizer machine at home  - please restart prednisone 40 mg daily for symptom relief  - can d/c spiriva, especially with scheduled duonebs  - continue to drain effusion every other day while inpatient  - contact Dr. Deras regarding further treatment options for primary cancer    Pulmonary will continue to follow.  --------------------------------------------  Dante Palomino PGY-4  Pulmonary/Critical Care Fellow  Pager: [phone] ([facility]) [phone] ([place])  Pulmonary Spectra #[id number] This is a 60 y/o M with stage IV NSCLC with malignant pleural effusions s/p indwelling pleural catheter, progressing despite immunotherapy, and chronic hypoxemic respiratory failure on 5L O2 at home who presents with SOB and worsening b/l LE edema. Patient has had persistent SOB since admission over 1 month ago. CT chest findings are concerning for worsening lymphangitic spread of known primary NSCLC with known malignant effusion. There is no focal consolidation to suggest pneumonia, however empirically treating for infection until cultures result is not unreasonable. Need to discuss his treatment options with his primary oncologist, Dr. Marie Deras ([phone], [phone]) because his SOB is most likely due to lymphangitic spread of primary tumor, which portends very poor prognosis.    #Chronic hypoxemic respiratory failure  - likely 2/2 lymphangitic spread of known stage IV NSCLC  - can continue empiric antibiotic therapy until cultures result, however doubt clinical pneumonia  - supplemental O2, maintain SpO2>88%, can continue BiPAP for comfort if patient desires vs high flow for oxygenation  - restart duonebs -> patient reports he needs nebulizer machine at home  - please restart prednisone 40 mg daily for symptom relief  - can d/c spiriva, especially with scheduled duonebs  - continue to drain effusion every other day while inpatient  - would give lasix and monitor I/Os in case there is a component of pulmonary edema  - contact Dr. Deras regarding further treatment options for primary cancer    Pulmonary will continue to follow.  --------------------------------------------  Dante Palomino PGY-4  Pulmonary/Critical Care Fellow  Pager: [phone] ([facility]) [phone] ([place])  Pulmonary Spectra #[phone]

## 2019-12-04 DIAGNOSIS — R13.10 DYSPHAGIA, UNSPECIFIED: ICD-10-CM

## 2019-12-04 DIAGNOSIS — I82.403 ACUTE EMBOLISM AND THROMBOSIS OF UNSPECIFIED DEEP VEINS OF LOWER EXTREMITY, BILATERAL: ICD-10-CM

## 2019-12-04 LAB
ALBUMIN SERPL ELPH-MCNC: 3.1 G/DL — LOW (ref 3.3–5)
ALP SERPL-CCNC: 276 U/L — HIGH (ref 40–120)
ALT FLD-CCNC: 51 U/L — HIGH (ref 10–45)
ANION GAP SERPL CALC-SCNC: 19 MMOL/L — HIGH (ref 5–17)
AST SERPL-CCNC: 86 U/L — HIGH (ref 10–40)
BASOPHILS # BLD AUTO: 0.02 K/UL — SIGNIFICANT CHANGE UP (ref 0–0.2)
BASOPHILS NFR BLD AUTO: 0.1 % — SIGNIFICANT CHANGE UP (ref 0–2)
BILIRUB SERPL-MCNC: 0.8 MG/DL — SIGNIFICANT CHANGE UP (ref 0.2–1.2)
BUN SERPL-MCNC: 58 MG/DL — HIGH (ref 7–23)
CALCIUM SERPL-MCNC: 8.8 MG/DL — SIGNIFICANT CHANGE UP (ref 8.4–10.5)
CHLORIDE SERPL-SCNC: 90 MMOL/L — LOW (ref 96–108)
CO2 SERPL-SCNC: 23 MMOL/L — SIGNIFICANT CHANGE UP (ref 22–31)
CREAT SERPL-MCNC: 1.65 MG/DL — HIGH (ref 0.5–1.3)
CULTURE RESULTS: NO GROWTH — SIGNIFICANT CHANGE UP
EOSINOPHIL # BLD AUTO: 0 K/UL — SIGNIFICANT CHANGE UP (ref 0–0.5)
EOSINOPHIL NFR BLD AUTO: 0 % — SIGNIFICANT CHANGE UP (ref 0–6)
GLUCOSE SERPL-MCNC: 121 MG/DL — HIGH (ref 70–99)
HCT VFR BLD CALC: 42.4 % — SIGNIFICANT CHANGE UP (ref 39–50)
HGB BLD-MCNC: 14 G/DL — SIGNIFICANT CHANGE UP (ref 13–17)
IMM GRANULOCYTES NFR BLD AUTO: 0.6 % — SIGNIFICANT CHANGE UP (ref 0–1.5)
LACTATE SERPL-SCNC: 2.3 MMOL/L — HIGH (ref 0.7–2)
LDH SERPL L TO P-CCNC: 809 U/L — HIGH (ref 50–242)
LYMPHOCYTES # BLD AUTO: 0.4 K/UL — LOW (ref 1–3.3)
LYMPHOCYTES # BLD AUTO: 2.6 % — LOW (ref 13–44)
MAGNESIUM SERPL-MCNC: 2.7 MG/DL — HIGH (ref 1.6–2.6)
MCHC RBC-ENTMCNC: 28.9 PG — SIGNIFICANT CHANGE UP (ref 27–34)
MCHC RBC-ENTMCNC: 33 GM/DL — SIGNIFICANT CHANGE UP (ref 32–36)
MCV RBC AUTO: 87.4 FL — SIGNIFICANT CHANGE UP (ref 80–100)
MONOCYTES # BLD AUTO: 0.87 K/UL — SIGNIFICANT CHANGE UP (ref 0–0.9)
MONOCYTES NFR BLD AUTO: 5.7 % — SIGNIFICANT CHANGE UP (ref 2–14)
MRSA PCR RESULT.: SIGNIFICANT CHANGE UP
NEUTROPHILS # BLD AUTO: 13.9 K/UL — HIGH (ref 1.8–7.4)
NEUTROPHILS NFR BLD AUTO: 91 % — HIGH (ref 43–77)
PHOSPHATE SERPL-MCNC: 4.7 MG/DL — HIGH (ref 2.5–4.5)
PLATELET # BLD AUTO: 90 K/UL — LOW (ref 150–400)
POTASSIUM SERPL-MCNC: 4.5 MMOL/L — SIGNIFICANT CHANGE UP (ref 3.5–5.3)
POTASSIUM SERPL-SCNC: 4.5 MMOL/L — SIGNIFICANT CHANGE UP (ref 3.5–5.3)
PROCALCITONIN SERPL-MCNC: 0.63 NG/ML — HIGH (ref 0.02–0.1)
PROT SERPL-MCNC: 6.6 G/DL — SIGNIFICANT CHANGE UP (ref 6–8.3)
RBC # BLD: 4.85 M/UL — SIGNIFICANT CHANGE UP (ref 4.2–5.8)
RBC # FLD: 14.9 % — HIGH (ref 10.3–14.5)
S AUREUS DNA NOSE QL NAA+PROBE: DETECTED
SODIUM SERPL-SCNC: 132 MMOL/L — LOW (ref 135–145)
SPECIMEN SOURCE: SIGNIFICANT CHANGE UP
WBC # BLD: 15.28 K/UL — HIGH (ref 3.8–10.5)
WBC # FLD AUTO: 15.28 K/UL — HIGH (ref 3.8–10.5)

## 2019-12-04 PROCEDURE — 99233 SBSQ HOSP IP/OBS HIGH 50: CPT

## 2019-12-04 PROCEDURE — 99497 ADVNCD CARE PLAN 30 MIN: CPT | Mod: GC

## 2019-12-04 PROCEDURE — 99223 1ST HOSP IP/OBS HIGH 75: CPT | Mod: GC

## 2019-12-04 PROCEDURE — 99232 SBSQ HOSP IP/OBS MODERATE 35: CPT | Mod: GC

## 2019-12-04 PROCEDURE — 99233 SBSQ HOSP IP/OBS HIGH 50: CPT | Mod: GC

## 2019-12-04 RX ORDER — HYDROMORPHONE HYDROCHLORIDE 2 MG/ML
0.5 INJECTION INTRAMUSCULAR; INTRAVENOUS; SUBCUTANEOUS EVERY 4 HOURS
Refills: 0 | Status: DISCONTINUED | OUTPATIENT
Start: 2019-12-04 | End: 2019-12-05

## 2019-12-04 RX ORDER — ENOXAPARIN SODIUM 100 MG/ML
90 INJECTION SUBCUTANEOUS DAILY
Refills: 0 | Status: DISCONTINUED | OUTPATIENT
Start: 2019-12-04 | End: 2019-12-08

## 2019-12-04 RX ORDER — FUROSEMIDE 40 MG
40 TABLET ORAL
Refills: 0 | Status: DISCONTINUED | OUTPATIENT
Start: 2019-12-04 | End: 2019-12-04

## 2019-12-04 RX ADMIN — Medication 3 MILLILITER(S): at 05:18

## 2019-12-04 RX ADMIN — CEFEPIME 100 MILLIGRAM(S): 1 INJECTION, POWDER, FOR SOLUTION INTRAMUSCULAR; INTRAVENOUS at 05:39

## 2019-12-04 RX ADMIN — Medication 3 MILLIGRAM(S): at 21:23

## 2019-12-04 RX ADMIN — Medication 3 MILLILITER(S): at 18:18

## 2019-12-04 RX ADMIN — Medication 3 MILLILITER(S): at 13:00

## 2019-12-04 RX ADMIN — Medication 32 MILLIGRAM(S): at 10:27

## 2019-12-04 RX ADMIN — GABAPENTIN 300 MILLIGRAM(S): 400 CAPSULE ORAL at 21:23

## 2019-12-04 RX ADMIN — Medication 40 MILLIGRAM(S): at 10:27

## 2019-12-04 RX ADMIN — GABAPENTIN 100 MILLIGRAM(S): 400 CAPSULE ORAL at 05:38

## 2019-12-04 RX ADMIN — Medication 3 MILLILITER(S): at 23:40

## 2019-12-04 RX ADMIN — GABAPENTIN 100 MILLIGRAM(S): 400 CAPSULE ORAL at 18:18

## 2019-12-04 RX ADMIN — CEFEPIME 100 MILLIGRAM(S): 1 INJECTION, POWDER, FOR SOLUTION INTRAMUSCULAR; INTRAVENOUS at 18:18

## 2019-12-04 RX ADMIN — ENOXAPARIN SODIUM 90 MILLIGRAM(S): 100 INJECTION SUBCUTANEOUS at 08:18

## 2019-12-04 RX ADMIN — HEPARIN SODIUM 5000 UNIT(S): 5000 INJECTION INTRAVENOUS; SUBCUTANEOUS at 05:39

## 2019-12-04 NOTE — PROGRESS NOTE ADULT - SUBJECTIVE AND OBJECTIVE BOX
Interval Events:  Remained on high flow and BiPAP overnight for hypoxemia, oxygenation stable  Found to have b/l LE DVT on Dopplers      14 point ROS negative except as noted above          OBJECTIVE:  ICU Vital Signs Last 24 Hrs  T(C): 36.8 (04 Dec 2019 04:07), Max: 36.9 (03 Dec 2019 20:35)  T(F): 98.2 (04 Dec 2019 04:07), Max: 98.5 (03 Dec 2019 20:35)  HR: 102 (04 Dec 2019 04:54) (102 - 116)  BP: 102/66 (04 Dec 2019 04:07) (102/66 - 120/80)  BP(mean): --  ABP: --  ABP(mean): --  RR: 21 (04 Dec 2019 04:53) (20 - 27)  SpO2: 94% (04 Dec 2019 04:54) (85% - 94%)        12-03 @ 07:01  -  12-04 @ 07:00  --------------------------------------------------------  IN: 540 mL / OUT: 330 mL / NET: 210 mL      CAPILLARY BLOOD GLUCOSE      PHYSICAL EXAM:  General: awake and alert, nontoxic appearing male sitting up in bed with BiPAP, NAD  HEENT: NC/AT, EOMI b/l, conjunctiva normal, MMM  Neck: supple  Respiratory: coarse breath sounds diffusely, worse in R lower lung field, wheezing heard diffusely, appears comfortable on BiPAP, no conversational dyspnea or accessory muscle use, indwelling pleural catheter present in R lateral chest wall, site appears c/d/i without erythema or tenderness  Cardiovascular: S1 S2 present, tachycardic, regular rhythm, no m/r/g  Abdomen: soft, NT/ND  Extremities: 4+ LE pitting edema b/l, no c/c  Skin: no rashes or lesions noted  Neurological: AAOx3, no focal deficits  Psychiatry: calm, cooperative    LINES:     HOSPITAL MEDICATIONS:  Standing Meds:  albuterol/ipratropium for Nebulization 3 milliLiter(s) Nebulizer every 6 hours  gabapentin 300 milliGRAM(s) Oral at bedtime  gabapentin 100 milliGRAM(s) Oral two times a day  heparin  Injectable 5000 Unit(s) SubCutaneous every 8 hours  piperacillin/tazobactam IVPB.. 3.375 Gram(s) IV Intermittent every 8 hours  tiotropium 18 MICROgram(s) Capsule 1 Capsule(s) Inhalation daily  vancomycin  IVPB 1000 milliGRAM(s) IV Intermittent every 12 hours      PRN Meds:  traMADol 25 milliGRAM(s) Oral every 6 hours PRN      LABS:                        14.6   18.26 )-----------( 84       ( 03 Dec 2019 09:18 )             44.1     Hgb Trend: 14.6<--, 15.2<--  12-03    129<L>  |  90<L>  |  54<H>  ----------------------------<  135<H>  4.8   |  25  |  1.42<H>    Ca    9.5      03 Dec 2019 06:54  Phos  4.3     12-03  Mg     2.8     12-03    TPro  6.4  /  Alb  3.4  /  TBili  0.8  /  DBili  x   /  AST  69<H>  /  ALT  45  /  AlkPhos  251<H>  12-03    Creatinine Trend: 1.42<--, 1.33<--, 0.91<--, 0.97<--, 1.02<--, 1.09<--  PT/INR - ( 03 Dec 2019 08:53 )   PT: 13.1 sec;   INR: 1.15 ratio         PTT - ( 03 Dec 2019 08:53 )  PTT:28.5 sec      Venous Blood Gas:  12-03 @ 00:56  7.41/45/38/28/68  VBG Lactate: 2.5  Venous Blood Gas:  12-02 @ 22:34  7.42/46/24/29/35  VBG Lactate: 3.1  MICROBIOLOGY:       RADIOLOGY:  < from: CT Angio Chest w/ IV Cont (12.02.19 @ 23:25) >  FINDINGS:     PULMONARY VESSELS: No pulmonary embolus. Main pulmonary artery normal in   diameter.    HEART AND VASCULATURE: Normal heart size without pericardial effusion. No   CT evidence of right heart strain.    No aortic aneurysm or dissection.    LUNGS, AIRWAYS, PLEURA: Patent central airways.    Narrowing of the basilar segmental bronchi of right lower lobe. In   comparison with 11/11/2019, interval decrease of masslikeopacity within   the right lower lobe nodule measures approximately 9 x 8 cm, previously   10 x 9 cm. Perihilar opacities within the right upper lobe are also   decreased.    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    A right Pleurx catheter is present enteric the chest wall at the right 8   rib space, the tip is at the medial basilar right lower pleural space.   The right pleural effusion has been resolved. Small layering left pleural   effusion is unchanged. No pneumothorax.    MEDIASTINUM: Extensive soft tissue infiltrates into the bilateral hilum   and mediastinum is again noted, slightly decreased.    Enlarged right axillary lymph nodes are unchanged, the largest one   measures 2 cm short axis.    UPPER ABDOMEN: Extensive enlarged retroperitoneal and mesenteric lymph   nodes    BONES AND SOFT TISSUES: No aggressive osseous lesion. Subcentimeter   subcutaneous nodules within the right upper back projecting over the   scapula (series 5 image 42) are again noted and likely metastatic.    LOWER NECK: Within normal limits.      IMPRESSION:     1.  No pulmonary embolus.    2.  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    3.  In comparison with 11/11/2019, right lower lobe masslike opacity and   perihilar infiltrates within the right upper lobe are slightly decreased.    4.  Extensive soft tissue infiltration to the bilateral hilaand   mediastinum is again noted, slightly decreased.    5.  Right pleural effusion has been resolved. Small layering left pleural   effusion is unchanged.    < end of copied text >    < from: [facility] Duplex Lower Ext Vein Scan, Bilat (12.03.19 @ 21:47) >  INTERPRETATION:  DVT in the right gastrocnemius vein. DVT in the left   popliteal vein with extension into the tibioperoneal trunk, posterior   tibial and peroneal veins.    < end of copied text >    PULMONARY FUNCTION TESTS: none on file    EKG: Interval Events:  Remained on high flow and BiPAP overnight for hypoxemia, oxygenation stable  Found to have b/l LE DVT on Dopplers  Did not sleep well overnight but says breathing feels better  Denies f/c, cough, increased sputum production, chest pain, pleuritic chest pain    14 point ROS negative except as noted above      OBJECTIVE:  ICU Vital Signs Last 24 Hrs  T(C): 36.8 (04 Dec 2019 04:07), Max: 36.9 (03 Dec 2019 20:35)  T(F): 98.2 (04 Dec 2019 04:07), Max: 98.5 (03 Dec 2019 20:35)  HR: 102 (04 Dec 2019 04:54) (102 - 116)  BP: 102/66 (04 Dec 2019 04:07) (102/66 - 120/80)  BP(mean): --  ABP: --  ABP(mean): --  RR: 21 (04 Dec 2019 04:53) (20 - 27)  SpO2: 94% (04 Dec 2019 04:54) (85% - 94%)        12-03 @ 07:01  -  12-04 @ 07:00  --------------------------------------------------------  IN: 540 mL / OUT: 330 mL / NET: 210 mL      CAPILLARY BLOOD GLUCOSE      PHYSICAL EXAM:  General: awake and alert, nontoxic appearing male sitting up in bed with HFNC, NAD  HEENT: NC/AT, EOMI b/l, conjunctiva normal, MMM  Neck: supple  Respiratory: coarse breath sounds diffusely, worse in R lower lung field, wheezing slightly improved, appears comfortable on HFNC, no conversational dyspnea or accessory muscle use, indwelling pleural catheter present in R lateral chest wall, site appears c/d/i without erythema or tenderness  Cardiovascular: S1 S2 present, tachycardic, regular rhythm, no m/r/g  Abdomen: soft, NT/ND  Extremities: 4+ LE pitting edema b/l, no c/c  Skin: no rashes or lesions noted  Neurological: AAOx3, no focal deficits  Psychiatry: calm, cooperative    LINES:     HOSPITAL MEDICATIONS:  Standing Meds:  albuterol/ipratropium for Nebulization 3 milliLiter(s) Nebulizer every 6 hours  gabapentin 300 milliGRAM(s) Oral at bedtime  gabapentin 100 milliGRAM(s) Oral two times a day  heparin  Injectable 5000 Unit(s) SubCutaneous every 8 hours  piperacillin/tazobactam IVPB.. 3.375 Gram(s) IV Intermittent every 8 hours  tiotropium 18 MICROgram(s) Capsule 1 Capsule(s) Inhalation daily  vancomycin  IVPB 1000 milliGRAM(s) IV Intermittent every 12 hours      PRN Meds:  traMADol 25 milliGRAM(s) Oral every 6 hours PRN      LABS:                        14.6   18.26 )-----------( 84       ( 03 Dec 2019 09:18 )             44.1     Hgb Trend: 14.6<--, 15.2<--  12-03    129<L>  |  90<L>  |  54<H>  ----------------------------<  135<H>  4.8   |  25  |  1.42<H>    Ca    9.5      03 Dec 2019 06:54  Phos  4.3     12-03  Mg     2.8     12-03    TPro  6.4  /  Alb  3.4  /  TBili  0.8  /  DBili  x   /  AST  69<H>  /  ALT  45  /  AlkPhos  251<H>  12-03    Creatinine Trend: 1.42<--, 1.33<--, 0.91<--, 0.97<--, 1.02<--, 1.09<--  PT/INR - ( 03 Dec 2019 08:53 )   PT: 13.1 sec;   INR: 1.15 ratio         PTT - ( 03 Dec 2019 08:53 )  PTT:28.5 sec      Venous Blood Gas:  12-03 @ 00:56  7.41/45/38/28/68  VBG Lactate: 2.5  Venous Blood Gas:  12-02 @ 22:34  7.42/46/24/29/35  VBG Lactate: 3.1  MICROBIOLOGY:       RADIOLOGY:  < from: CT Angio Chest w/ IV Cont (12.02.19 @ 23:25) >  FINDINGS:     PULMONARY VESSELS: No pulmonary embolus. Main pulmonary artery normal in   diameter.    HEART AND VASCULATURE: Normal heart size without pericardial effusion. No   CT evidence of right heart strain.    No aortic aneurysm or dissection.    LUNGS, AIRWAYS, PLEURA: Patent central airways.    Narrowing of the basilar segmental bronchi of right lower lobe. In   comparison with 11/11/2019, interval decrease of masslikeopacity within   the right lower lobe nodule measures approximately 9 x 8 cm, previously   10 x 9 cm. Perihilar opacities within the right upper lobe are also   decreased.    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    A right Pleurx catheter is present enteric the chest wall at the right 8   rib space, the tip is at the medial basilar right lower pleural space.   The right pleural effusion has been resolved. Small layering left pleural   effusion is unchanged. No pneumothorax.    MEDIASTINUM: Extensive soft tissue infiltrates into the bilateral hilum   and mediastinum is again noted, slightly decreased.    Enlarged right axillary lymph nodes are unchanged, the largest one   measures 2 cm short axis.    UPPER ABDOMEN: Extensive enlarged retroperitoneal and mesenteric lymph   nodes    BONES AND SOFT TISSUES: No aggressive osseous lesion. Subcentimeter   subcutaneous nodules within the right upper back projecting over the   scapula (series 5 image 42) are again noted and likely metastatic.    LOWER NECK: Within normal limits.      IMPRESSION:     1.  No pulmonary embolus.    2.  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    3.  In comparison with 11/11/2019, right lower lobe masslike opacity and   perihilar infiltrates within the right upper lobe are slightly decreased.    4.  Extensive soft tissue infiltration to the bilateral hilaand   mediastinum is again noted, slightly decreased.    5.  Right pleural effusion has been resolved. Small layering left pleural   effusion is unchanged.    < end of copied text >    < from: [facility] Duplex Lower Ext Vein Scan, Bilat (12.03.19 @ 21:47) >  INTERPRETATION:  DVT in the right gastrocnemius vein. DVT in the left   popliteal vein with extension into the tibioperoneal trunk, posterior   tibial and peroneal veins.    < end of copied text >    PULMONARY FUNCTION TESTS: none on file    EKG:

## 2019-12-04 NOTE — PROGRESS NOTE ADULT - PROBLEM SELECTOR PLAN 1
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 Patient was hypoxic on nonrebreather, satting well on 50% Bipap 10/5. Likely multifactorial from pna and met lung cancer. Now satting at 90% on HFNC.  - continue on HFNC, titrate to SpO2 > 90%.  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 Patient was hypoxic on nonrebreather, satting well on 50% Bipap 10/5. Likely multifactorial from pna and met lung cancer. Now satting in high 80s% on HFNC.  - continue on HFNC, titrate to SpO2 >88%.  - Appreciate Pulm, Onc, and ID recs  - Unlikely pneumonia, definitely not MRSA per ID. Discontinue vancomycin, will continue with Cefepime. 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 Patient was hypoxic on nonrebreather, satting well on 50% Bipap 10/5. Likely multifactorial from pna and met lung cancer. Now satting in high 80s% on HFNC.  - continue on HFNC, titrate to SpO2 >88%.  - Appreciate Pulm, Onc, and ID recs  - Unlikely pneumonia, definitely not MRSA per ID. Discontinue vancomycin, will continue with Cefepime. If Cx remain negative tomorrow and no fevers, will DC cefepime tomorrow  - Patient unable to take prednisone orally d/t dysphagia       - IV methylprednisolone 32 mg daily  - Duonebs q6h  - IV Lasix 40 mg, got dose this morning. Will hold for now, pending am Cr. -Monitor strict I's/O's.

## 2019-12-04 NOTE — PROGRESS NOTE ADULT - PROBLEM SELECTOR PLAN 5
Was on tagrisso but noted progression on imaging last admission.  - Patient was planning to follow with Dr. Deras regarding treatment options for primary cancer

## 2019-12-04 NOTE — PROGRESS NOTE ADULT - PROBLEM SELECTOR PLAN 7
Was on tagrisso but noted progression on imaging last admission.  - [place] [facility] [place] Tried reaching out, will try again. HS trop stable at 84x2. EKG showed sinus tachy. Noted ventricular trigeminy, but likely from hypoxia.  - Low c/f ACS  - Likely from demand and KARSON.   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 But patient has other acute issues at present.

## 2019-12-04 NOTE — PROGRESS NOTE ADULT - SUBJECTIVE AND OBJECTIVE BOX
Patient is a 59y old  Male who presents with a chief complaint of SOB (04 Dec 2019 09:36)    Being followed by ID for dyspnea    Interval history:still with dyspnea though better  was on bipap  No other acute events      ROS:  some cough,some sputum no CP   No N/V/D  No abd pain  No urinary complaints  No HA  No joint or limb pain  No other complaints      Antimicrobials:    cefepime   IVPB 2000 milliGRAM(s) IV Intermittent every 12 hours    vanco Dced    other medications reviewed  MEDICATIONS  (STANDING):  albuterol/ipratropium for Nebulization 3 milliLiter(s) Nebulizer every 6 hours    enoxaparin Injectable 90 milliGRAM(s) SubCutaneous daily  furosemide   Injectable 40 milliGRAM(s) IV Push daily  gabapentin 300 milliGRAM(s) Oral at bedtime  gabapentin 100 milliGRAM(s) Oral two times a day  melatonin 3 milliGRAM(s) Oral at bedtime  methylPREDNISolone sodium succinate Injectable 32 milliGRAM(s) IV Push daily      Vital Signs Last 24 Hrs  T(C): 36.8 (12-04-19 @ 04:07), Max: 36.9 (12-03-19 @ 20:35)  T(F): 98.2 (12-04-19 @ 04:07), Max: 98.5 (12-03-19 @ 20:35)  HR: 102 (12-04-19 @ 10:29) (102 - 116)  BP: 102/66 (12-04-19 @ 04:07) (102/66 - 118/80)  BP(mean): --  RR: 24 (12-04-19 @ 10:29) (20 - 24)  SpO2: 92% (12-04-19 @ 10:29) (85% - 94%)    Physical Exam:    Constitutional well preserved,comfortable,pleasant    HEENT PERRLA EOMI,No pallor or icterus      No oral exudate or erythema    Neck supple no JVD or LN    Chest Good AE,R pleurix bibasilar crackles/rhonchi     CVS RRR S1 S2 WNl No murmur or rub or gallop    Abd soft BS normal No tenderness no masses    Ext No cyanosis clubbing or edema    IV site no erythema tenderness or discharge    Joints no swelling or LOM    CNS AAO X 3 no focal    Lab Data:                          14.6   18.26 )-----------( 84       ( 03 Dec 2019 09:18 )             44.1     WBC Count: 18.26 (12-03-19 @ 09:18)  WBC Count: 15.72 (12-02-19 @ 22:34)    12-03    129<L>  |  90<L>  |  54<H>  ----------------------------<  135<H>  4.8   |  25  |  1.42<H>    Ca    9.5      03 Dec 2019 06:54  Phos  4.3     12-03  Mg     2.8     12-03    TPro  6.4  /  Alb  3.4  /  TBili  0.8  /  DBili  x   /  AST  69<H>  /  ALT  45  /  AlkPhos  251<H>  12-03        Culture - Urine (collected 03 Dec 2019 09:39)  Source: .Urine Clean Catch (Midstream)  Final Report (04 Dec 2019 07:21):    No growth    Culture - Blood (collected 03 Dec 2019 03:05)  Source: .Blood Blood-Peripheral  Preliminary Report (04 Dec 2019 04:01):    No growth to date.    Culture - Blood (collected 03 Dec 2019 03:05)  Source: .Blood Blood-Peripheral  Preliminary Report (04 Dec 2019 04:01):    No growth to date.      < from: CT Angio Chest w/ IV Cont (12.02.19 @ 23:25) >    IMPRESSION:     1.  No pulmonary embolus.    2.  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    3.  In comparison with 11/11/2019, right lower lobe masslike opacity and   perihilar infiltrates within the right upper lobe are slightly decreased.    4.  Extensive soft tissue infiltration to the bilateral hilaand   mediastinum is again noted, slightly decreased.    5.  Right pleural effusion has been resolved. Small layering left pleural   effusion is unchanged.      < end of copied text >      < from: [facility] Duplex Lower Ext Vein Scan, Bilat (12.03.19 @ 21:47) >    IMPRESSION:     Acute above the knee DVT in the left leg involving the calf veins and   popliteal vein.    Right calf DVT confined to the gastrocnemius vein.    Examination findings were conveyed to Dr. Brennan by vascular   technologist Radha at 2145 hours on 12/3/2019 with read back.          < end of copied text >

## 2019-12-04 NOTE — PROGRESS NOTE ADULT - PROBLEM SELECTOR PLAN 6
-s/p lasix 40 IV. Will dose another lasix 40 IV now.  - C/w Lasix 40 mg IV  - BLE US - Cr increasing, 1.65 today, baseline 1.   - Will continue to trend.  - Avoid nephrotoxic meds when possible, but patient does still require diuresis. If does not improve, will need to cut back on Lasix - Cr increasing, 1.65 today, baseline 1.   - Will continue to trend.  - Avoid nephrotoxic meds when possible, but patient did still require diuresis. If does not improve, will need to cut back on Lasix  -Hold lasix for now. Got dose in am. F/u Cr in am.

## 2019-12-04 NOTE — PROGRESS NOTE ADULT - SUBJECTIVE AND OBJECTIVE BOX
Incomplete progress note by Ramón Tinsley MS3    This is HD 1 for our 60 yo M w/PMH of 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    SUBJECTIVE / OVERNIGHT EVENTS: No overnight events. Patient refused 12AM BMP labs and PO prednisone. He requests IV solumedrol.    MEDICATIONS  (STANDING):  albuterol/ipratropium for Nebulization 3 milliLiter(s) Nebulizer every 6 hours  cefepime   IVPB 2000 milliGRAM(s) IV Intermittent every 12 hours  furosemide   Injectable 40 milliGRAM(s) IV Push daily  gabapentin 300 milliGRAM(s) Oral at bedtime  gabapentin 100 milliGRAM(s) Oral two times a day  heparin  Injectable 5000 Unit(s) SubCutaneous every 8 hours  melatonin 3 milliGRAM(s) Oral at bedtime  predniSONE   Tablet 40 milliGRAM(s) Oral daily  vancomycin  IVPB 1000 milliGRAM(s) IV Intermittent every 12 hours    MEDICATIONS  (PRN):  traMADol 25 milliGRAM(s) Oral every 6 hours PRN Severe Pain (7 - 10)      Vital Signs Last 24 Hrs  T(C): 36.8 (12-04-19 @ 04:07)  T(F): 98.2 (12-04-19 @ 04:07), Max: 98.5 (12-03-19 @ 20:35)  HR: 102 (12-04-19 @ 04:54) (102 - 116)  BP: 102/66 (12-04-19 @ 04:07)  RR: 21 (12-04-19 @ 04:53) (20 - 27)  SpO2: 94% (12-04-19 @ 04:54) (85% - 94%)    12-03 @ 07:01  -  12-04 @ 07:00  --------------------------------------------------------  IN: 540 mL / OUT: 330 mL / NET: 210 mL    PHYSICAL EXAM:  GENERAL: NAD, well-developed  HEAD:  Atraumatic, Normocephalic  EYES: EOMI, PERRLA, conjunctiva and sclera clear  NECK: Supple, No JVD  CHEST/LUNG: Clear to auscultation bilaterally; No wheeze  HEART: Regular rate and rhythm; No murmurs, rubs, or gallops  ABDOMEN: Soft, Nontender, Nondistended; Bowel sounds present  EXTREMITIES:  2+ Peripheral Pulses, No clubbing, cyanosis, or edema  PSYCH: AAOx3  NEUROLOGY: non-focal  SKIN: No rashes or lesions    LABS:                        14.6   18.26 )-----------( 84       ( 03 Dec 2019 09:18 )             44.1     12-03    129<L>  |  90<L>  |  54<H>  ----------------------------<  135<H>  4.8   |  25  |  1.42<H>    Ca    9.5      03 Dec 2019 06:54  Phos  4.3     12-03  Mg     2.8     12-03    TPro  6.4  /  Alb  3.4  /  TBili  0.8  /  DBili  x   /  AST  69<H>  /  ALT  45  /  AlkPhos  251<H>  12-03    PT/INR - ( 03 Dec 2019 08:53 )   PT: 13.1 sec;   INR: 1.15 ratio      PTT - ( 03 Dec 2019 08:53 )  PTT:28.5 sec      RADIOLOGY & ADDITIONAL TESTS:  < from: [facility] Duplex Lower Ext Vein Scan, Bilat (12.03.19 @ 21:47) >  INTERPRETATION:  DVT in the right gastrocnemius vein. DVT in the left   popliteal vein with extension into the tibioperoneal trunk, posterior   tibial and peroneal veins.      Findings were discussed with MELA Brennan at 9:45 PM by the vascular   technologist.    < end of copied text > This is HD 1 for our 58 yo M w/PMH of 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    SUBJECTIVE / OVERNIGHT EVENTS: No overnight events. Patient refused 12AM BMP labs because he states that he is a hard-stick and doesn't want to be stuck multiple times; he requested a particular person to draw his blood (Candido). He also refused his PO prednisone. He states that he has had difficulty swallowing food and prednisone. He denies pain on swallowing; he just states that the food or pill is stuck in his throat. He has had this swallowing issue for approximately one month. He requests IV solumedrol. He denies fever, nausea, or shortness of breath. With respect to his legs, he states that he had slight pain this morning when he was standing on the scale. He describes it as tight.    MEDICATIONS  (STANDING):  albuterol/ipratropium for Nebulization 3 milliLiter(s) Nebulizer every 6 hours  cefepime   IVPB 2000 milliGRAM(s) IV Intermittent every 12 hours  furosemide   Injectable 40 milliGRAM(s) IV Push daily  gabapentin 300 milliGRAM(s) Oral at bedtime  gabapentin 100 milliGRAM(s) Oral two times a day  heparin  Injectable 5000 Unit(s) SubCutaneous every 8 hours  melatonin 3 milliGRAM(s) Oral at bedtime  predniSONE   Tablet 40 milliGRAM(s) Oral daily  vancomycin  IVPB 1000 milliGRAM(s) IV Intermittent every 12 hours    MEDICATIONS  (PRN):  traMADol 25 milliGRAM(s) Oral every 6 hours PRN Severe Pain (7 - 10)    Vital Signs Last 24 Hrs  T(C): 36.8 (12-04-19 @ 04:07)  T(F): 98.2 (12-04-19 @ 04:07), Max: 98.5 (12-03-19 @ 20:35)  HR: 102 (12-04-19 @ 04:54) (102 - 116)  BP: 102/66 (12-04-19 @ 04:07)  RR: 21 (12-04-19 @ 04:53) (20 - 27)  SpO2: 94% (12-04-19 @ 04:54) (85% - 94%)    12-03 @ 07:01  -  12-04 @ 07:00  --------------------------------------------------------  IN: 540 mL / OUT: 330 mL / NET: 210 mL    PHYSICAL EXAM:  GENERAL: NAD, well-developed  HEAD:  Atraumatic, Normocephalic  EYES: EOMI, PERRLA, pale conjunctiva and sclera anicteric  NECK: Supple, No appreciable JVD  CHEST/LUNG: rhonchi and wheezing bilaterally but more prominent in the lower right lung  HEART: Regular rate and rhythm; No murmurs, rubs, or gallops; S1S2+  ABDOMEN: Soft, Nontender, Nondistended; Bowel sounds present  EXTREMITIES:  No clubbing or cyanosis. Bilateral LE pitting edema 3+  PSYCH: AAOx3  NEUROLOGY: non-focal  SKIN: No rashes or lesions    LABS:                        14.6   18.26 )-----------( 84       ( 03 Dec 2019 09:18 )             44.1     12-03    129<L>  |  90<L>  |  54<H>  ----------------------------<  135<H>  4.8   |  25  |  1.42<H>    Ca    9.5      03 Dec 2019 06:54  Phos  4.3     12-03  Mg     2.8     12-03    TPro  6.4  /  Alb  3.4  /  TBili  0.8  /  DBili  x   /  AST  69<H>  /  ALT  45  /  AlkPhos  251<H>  12-03    PT/INR - ( 03 Dec 2019 08:53 )   PT: 13.1 sec;   INR: 1.15 ratio      PTT - ( 03 Dec 2019 08:53 )  PTT:28.5 sec      RADIOLOGY & ADDITIONAL TESTS:  < from: VA Duplex Lower Ext Vein Scan, Bilat (12.03.19 @ 21:47) >  INTERPRETATION:  DVT in the right gastrocnemius vein. DVT in the left   popliteal vein with extension into the tibioperoneal trunk, posterior   tibial and peroneal veins.      Findings were discussed with MELA Brennan at 9:45 PM by the vascular   technologist.    < end of copied text >

## 2019-12-04 NOTE — PROGRESS NOTE ADULT - SUBJECTIVE AND OBJECTIVE BOX
***INCOMPLETE NOTE***    Casper Mario MD PGY-1  Internal Medicine  Pager [phone] / [phone] Casper Mario MD PGY-1  Internal Medicine  Pager [phone] / [phone]    Patient is a 59y old  Male who presents with a chief complaint of SOB (04 Dec 2019 11:27)    SUBJECTIVE / OVERNIGHT EVENTS:  No overnight events. Patient refused 12AM BMP labs because he states that he is a hard-stick and doesn't want to be stuck multiple times. He also refused his PO prednisone. He states that he has had difficulty swallowing food and prednisone. He denies pain on swallowing; he just states that the food or pill is stuck in his throat. He has had this swallowing issue for approximately one month. He requests IV solumedrol. He denies fever, nausea, or shortness of breath except after he coughs, which has been happening less. With respect to his legs, he states that he had slight pain this morning when he was standing on the scale. He describes it as tightness.   He complains of having had chest tightness through the night. Wore BiPAP overnight for 2 hours.    MEDICATIONS  (STANDING):  albuterol/ipratropium for Nebulization 3 milliLiter(s) Nebulizer every 6 hours  cefepime   IVPB 2000 milliGRAM(s) IV Intermittent every 12 hours  enoxaparin Injectable 90 milliGRAM(s) SubCutaneous daily  furosemide   Injectable 40 milliGRAM(s) IV Push two times a day  gabapentin 300 milliGRAM(s) Oral at bedtime  gabapentin 100 milliGRAM(s) Oral two times a day  melatonin 3 milliGRAM(s) Oral at bedtime  methylPREDNISolone sodium succinate Injectable 32 milliGRAM(s) IV Push daily    MEDICATIONS  (PRN):  traMADol 25 milliGRAM(s) Oral every 6 hours PRN Severe Pain (7 - 10)      CAPILLARY BLOOD GLUCOSE        I&O's Summary    03 Dec 2019 07:01  -  04 Dec 2019 07:00  --------------------------------------------------------  IN: 540 mL / OUT: 330 mL / NET: 210 mL      PHYSICAL EXAM:  Vital Signs Last 24 Hrs  T(C): 36.4 (12-04-19 @ 11:17)  T(F): 97.5 (12-04-19 @ 11:17), Max: 98.5 (12-03-19 @ 20:35)  HR: 116 (12-04-19 @ 11:17) (102 - 116)  BP: 108/71 (12-04-19 @ 11:17)  BP(mean): --  RR: 22 (12-04-19 @ 11:17) (20 - 24)  SpO2: 92% (12-04-19 @ 11:17) (85% - 94%)  Wt(kg): --    12-03 @ 07:01  -  12-04 @ 07:00  --------------------------------------------------------  IN: 540 mL / OUT: 330 mL / NET: 210 mL      Constitutional: Wearing HFNC, awake and alert. HFNC has been sliding out of nose.  EYES: EOMI  ENT:  Normal Hearing  Respiratory: on HFNC. Diffuse rhonchi and wheezes, increased in R lung field compared to left, slightly improved from yesterday  Cardiovascular: S1 and S2, tachycardic, regular, no Murmurs, gallops or rubs  Gastrointestinal: Bowel Sounds present, soft, nontender, nondistended, no guarding, no rebound  Extremities: 3+ bilateral lower extremity edema, non-tender to palpation, slightly lower down on legs than yesterday  Neurological: No focal deficits, CN II-XII intact bilaterally, sensation to light touch intact in all extremities.  Skin: No rashes  Psych: AAOx3    LABS:  Lactate, Blood (12.04.19 @ 11:29)    Lactate, Blood: 2.3 mmol/L    12-04    132<L>  |  90<L>  |  58<H>  ----------------------------<  121<H>  4.5   |  23  |  1.65<H>    Ca    8.8      04 Dec 2019 11:29  Phos  4.7     12-04  Mg     2.7     12-04    TPro  6.6  /  Alb  3.1<L>  /  TBili  0.8  /  DBili  x   /  AST  86<H>  /  ALT  51<H>  /  AlkPhos  276<H>  12-04      RADIOLOGY & ADDITIONAL TESTS:    Imaging Personally Reviewed:  < from: VA Duplex Lower Ext Vein Scan, Bilat (12.03.19 @ 21:47) >  IMPRESSION:     Acute above the knee DVT in the left leg involving the calf veins and   popliteal vein.    Right calf DVT confined to the gastrocnemius vein.    < end of copied text >    Consultant(s) Notes Reviewed:    Heme/Onc  Pulmonology  Infectious Disease    Care Discussed with Consultants/Other Providers:  Discussed case with Speech and Swallow, to see patient  Spoke with Dr. Marie Deras ([facility]) has not seen him in a while, but per last conversation, she had offered him chemotherapy or a clinical trial. Patient declined chemotherapy but was going to see Dr. Giovana Wright in [place] for clinical trial consideration, which at this point, if he improves and is eligible, would be his only option.

## 2019-12-04 NOTE — PROGRESS NOTE ADULT - PROBLEM SELECTOR PLAN 2
severe sepsis likely 2/2 pna. Here with hypoxia, tachypnea, leukocytosis with lalo on admission  - Will continue vanc. Will transition zosyn to cefepime to avoid nephrotoxicity  - F/u blood cultures  - If patient remains afebrile for 24 more hours, will likely d/c abx per ID and Pulm recs Less likely pneumonia, more likely progression of disease.  - Continue vanc/zosyn  - maintain >88% on HFNC, If desats on HFNC or increased O2 requirements, place on BiPAP, then plan for MICU c/s if still hypoxic  - Appreciate ID and Pulm recs Less likely pneumonia, more likely progression of disease.  - Abx plan as above.   - maintain >88% on HFNC, If desats on HFNC or increased O2 requirements, place on BiPAP, then plan for MICU c/s if still hypoxic  - Appreciate ID and Pulm recs

## 2019-12-04 NOTE — PROGRESS NOTE ADULT - PROBLEM SELECTOR PLAN 4
-s/p lasix 40 IV. Will dose another lasix 40 IV at 6pm.  - Per patient, he was not taking lasix at home.  - Lower extremity vein duplex shows bilateral DVTs (12/4). Note 12/2 CTA revealed no pulmonary embolus -s/p lasix 40 IV. Will dose another lasix 40 IV at 6pm.  - Per patient, he was not taking lasix at home.  - Lower extremity vein duplex shows bilateral DVTs (12/4). Note 12/2 CTA revealed no pulmonary embolus  - Giving lovenox

## 2019-12-04 NOTE — PROGRESS NOTE ADULT - PROBLEM SELECTOR PLAN 8
Patient wants to remain full code. Has not been seen by palliative on previous admissions. May benefit from palliative c/s. Patient grossly volume overloaded, however this is likely contributed to by his bilateral DVTs as well.  - IV Lasix 40 mg BID Patient grossly volume overloaded, however this is likely contributed to by his bilateral DVTs as well.  - IV Lasix 40 mg received in am. -Monitor off lasix for now. F/u am Cr.

## 2019-12-04 NOTE — PROGRESS NOTE ADULT - ASSESSMENT
This is a 60 y/o M with stage IV NSCLC with malignant pleural effusions s/p indwelling pleural catheter, progressing despite immunotherapy, and chronic hypoxemic respiratory failure on 5L O2 at home who presents with SOB and worsening b/l LE edema. Patient has had persistent SOB since admission over 1 month ago. CT chest findings are concerning for worsening lymphangitic spread of known primary NSCLC with known malignant effusion. There is no focal consolidation to suggest pneumonia, however empirically treating for infection until cultures result is not unreasonable. Need to discuss his treatment options with his primary oncologist, Dr. Marie Deras ([phone], [phone]) because his SOB is most likely due to lymphangitic spread of primary tumor, which portends very poor prognosis.    #Chronic hypoxemic respiratory failure  - likely 2/2 lymphangitic spread of known stage IV NSCLC  - can continue empiric antibiotic therapy until cultures result  - supplemental O2, maintain SpO2>88%, can continue BiPAP for comfort if patient desires vs high flow for oxygenation  - restart duonebs -> patient reports he needs nebulizer machine at home  - continue prednisone 40 mg daily for symptom relief  - continue to drain effusion every other day while inpatient  - continue lasix  -   - awaiting call back from Dr. Deras regarding further treatment options for primary cancer    Pulmonary will continue to follow.  --------------------------------------------  Dante Palomino PGY-4  Pulmonary/Critical Care Fellow  Pager: [phone] ([facility]) [phone] (NS)  Pulmonary Spectra #[phone] This is a 60 y/o M with stage IV NSCLC with malignant pleural effusions s/p indwelling pleural catheter, progressing despite immunotherapy, and chronic hypoxemic respiratory failure on 5L O2 at home who presents with SOB and worsening b/l LE edema. Patient has had persistent SOB since admission over 1 month ago. CT chest findings are concerning for worsening lymphangitic spread of known primary NSCLC with known malignant effusion. There is no focal consolidation to suggest pneumonia, however empirically treating for infection until cultures result is not unreasonable. Need to discuss his treatment options with his primary oncologist, Dr. Marie Deras ([phone], [phone]) because his SOB is most likely due to lymphangitic spread of primary tumor, which portends very poor prognosis.    #Chronic hypoxemic respiratory failure  - likely 2/2 lymphangitic spread of known stage IV NSCLC  - can continue empiric antibiotic therapy until cultures result  - supplemental O2, maintain SpO2>88%, can continue BiPAP for comfort if patient desires vs high flow for oxygenation  - continue duonebs  - continue prednisone 40 mg daily for symptom relief  - continue to drain effusion every other day while inpatient  - continue lasix  - start AC for b/l LE DVT  - awaiting call back from Dr. Deras regarding further treatment options for primary cancer    Pulmonary will continue to follow.  --------------------------------------------  Dante Palomino PGY-4  Pulmonary/Critical Care Fellow  Pager: [phone] ([facility]) [phone] (NS)  Pulmonary Spectra #[phone]

## 2019-12-04 NOTE — PROGRESS NOTE ADULT - PROBLEM SELECTOR PLAN 3
Less likely pneumonia, more likely progression of disease.  - Continue vanc/zosyn  - maintain >88% on HFNC, If desats on HFNC or increased O2 requirements, place on BiPAP, then plan for MICU c/s if still hypoxic  - Appreciate ID and Pulm recs Duplex US showing BLE DVTs. CTA on admission 12/3 shows no PE.  - Lovenox 1.5 mg/kg daily (90 mg) Duplex US showing BLE DVTs. CTA on admission 12/3 shows no PE.  - Lovenox 1.5 mg/kg daily (90 mg)  -Monitor platelets closely.

## 2019-12-04 NOTE — PROGRESS NOTE ADULT - ASSESSMENT
This is a 60 yo M with stage IV non-SCLC with R malignant pleural effusion s/p indwelling pleural catheter, progressing despite immunotherapy, and chronic hypoxemic respiratory failure on 5L at home, presenting with persistent and increasing SOB and BLE edema since his last admission over 1 month ago. CT chest findings are concerning for worsening lymphangitic spread of known primary NSCLC.

## 2019-12-04 NOTE — PROGRESS NOTE ADULT - ASSESSMENT
60 yo M with stage IV non-small cell lung ca (dx 2 year s/p radiation, progressing on Tegrisso), R pleural effusion (s/p VATS) and PleurX catheter, on home O2 5L continuous (baseline sat 85-89% on O2) presenting from home with increasing SOB and BLE edema for 2 days, admitted with hypoxic respiratory failure 2/2 pna and met lung cancer. 60 yo M with stage IV non-small cell lung ca (dx 2 year s/p radiation, progressing on Tegrisso), R pleural effusion (s/p VATS) and PleurX catheter, on home O2 5L continuous (baseline sat 85-89% on O2) presenting from home with increasing SOB and BLE edema for 2 days, admitted with hypoxic respiratory failure 2/2 pna and met lung cancer c/b bilateral DVTs.

## 2019-12-04 NOTE — PROVIDER CONTACT NOTE (MEDICATION) - ASSESSMENT
Pt AO4. Pt educated on importance of taking prednisone, pt verbalized understanding, pt still refusing PO prednisone and requesting IV solumedrol.

## 2019-12-04 NOTE — CONSULT NOTE ADULT - SUBJECTIVE AND OBJECTIVE BOX
60 yo M with stage IV non-small cell lung ca (dx 2 year s/p radiation, progressing on Tegrisso), R pleural effusion (s/p VATS) and PleurX catheter, on home O2 5L continuous (baseline sat 85-89% on O2) presenting from home with increasing SOB and BLE edema for 2 days. Patient continues to have chronic cough: no changed from yellow to green, with blood streaked sputum production. Denies fevers at home. This is third hospitalization since 10/2019. He was treated for pna with vanc/zosyn in October. R pleural effusion is drained every other day by nurse at home. Pluerx was drained today, outputting 320 mL. Patient called Dr. Carbajal d/t increased SOB, who advised him to return to ED.    In ED, patient was placed on nonrebreater (15L) but remained tachypneic. He's now satting 90% on 10/5 BiPAP at 50%. He was given lasix 40 IVx1, azithromycin, CTX and vanc. Of note, patient had 10 beats of ventricular trigeminy at 3:30 AM, same time as he removed the BiPAP and desatted to 76-82%.     CTA was negative for PE. LE duplex showed acute DVT    Allergies    No Known Allergies    Intolerances        PAST MEDICAL & SURGICAL HISTORY:  Non-small cell carcinoma of lung  No significant past surgical history      FAMILY HISTORY:  Family history of cervical cancer      Social History:  No EtOH, no tobacco; denies drug use. Worked previously at a bank, denies any hx of chemical or radiation exposures.    REVIEW OF SYSTEMS:    CONSTITUTIONAL: No weakness, fevers or chills  EYES/ENT: No visual changes, no throat pain   RESPIRATORY: No cough, wheezing, hemoptysis; No shortness of breath  CARDIOVASCULAR: No chest pain or palpitations  GASTROINTESTINAL: No abdominal pain, nausea, vomiting, or hematemesis; No diarrhea or constipation. No melena or hematochezia.  GENITOURINARY: No dysuria, frequency or hematuria  MUSCULOSKELETAL: No joint pain.  NEUROLOGICAL: No dizziness, numbness, or weakness  SKIN: No itching, burning, rashes, or lesions   All other review of systems is negative unless indicated above.    VITAL SIGNS:  Vital Signs Last 24 Hrs  T(C): 36.8 (04 Dec 2019 04:07), Max: 36.9 (03 Dec 2019 20:35)  T(F): 98.2 (04 Dec 2019 04:07), Max: 98.5 (03 Dec 2019 20:35)  HR: 102 (04 Dec 2019 04:54) (102 - 116)  BP: 102/66 (04 Dec 2019 04:07) (102/66 - 118/80)  BP(mean): --  RR: 21 (04 Dec 2019 04:53) (20 - 24)  SpO2: 94% (04 Dec 2019 04:54) (85% - 94%)      PHYSICAL EXAM:     GENERAL: no acute distress  HEENT: EOMI, neck supple  RESPIRATORY: LCTAB/L, no rhonchi, rales, or wheezing  CARDIOVASCULAR: RRR, no murmurs, gallops, rubs  ABDOMINAL: soft, non-tender, non-distended, positive bowel sounds   EXTREMITIES: no clubbing, cyanosis, or edema  NEUROLOGICAL: alert and oriented x 3, non-focal  SKIN: no rashes or lesions   MUSCULOSKELETAL: no gross joint deformity                          14.6   18.26 )-----------( 84       ( 03 Dec 2019 09:18 )             44.1     12-03    129<L>  |  90<L>  |  54<H>  ----------------------------<  135<H>  4.8   |  25  |  1.42<H>    Ca    9.5      03 Dec 2019 06:54  Phos  4.3     12-03  Mg     2.8     12-03    TPro  6.4  /  Alb  3.4  /  TBili  0.8  /  DBili  x   /  AST  69<H>  /  ALT  45  /  AlkPhos  251<H>  12-03    MEDICATIONS  (STANDING):  albuterol/ipratropium for Nebulization 3 milliLiter(s) Nebulizer every 6 hours  cefepime   IVPB 2000 milliGRAM(s) IV Intermittent every 12 hours  enoxaparin Injectable 90 milliGRAM(s) SubCutaneous daily  furosemide   Injectable 40 milliGRAM(s) IV Push daily  gabapentin 300 milliGRAM(s) Oral at bedtime  gabapentin 100 milliGRAM(s) Oral two times a day  melatonin 3 milliGRAM(s) Oral at bedtime  methylPREDNISolone sodium succinate Injectable 32 milliGRAM(s) IV Push daily  vancomycin  IVPB 1000 milliGRAM(s) IV Intermittent every 12 hours      < from: CT Angio Chest w/ IV Cont (12.02.19 @ 23:25) >  IMPRESSION:     1.  No pulmonary embolus.    2.  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    3.  In comparison with 11/11/2019, right lower lobe masslike opacity and   perihilar infiltrates within the right upper lobe are slightly decreased.    4.  Extensive soft tissue infiltration to the bilateral hilaand   mediastinum is again noted, slightly decreased.    5.  Right pleural effusion has been resolved. Small layering left pleural   effusion is unchanged.    < end of copied text >    < from: VA Duplex Lower Ext Vein Scan, Bilat (12.03.19 @ 21:47) >  IMPRESSION:     Acute above the knee DVT in the left leg involving the calf veins and   popliteal vein.    Right calf DVT confined to the gastrocnemius vein.    Examination findings were conveyed to Dr. Brennan by vascular   technologist Radha at 2145 hours on 12/3/2019 with read back.    < end of copied text >

## 2019-12-04 NOTE — CHART NOTE - NSCHARTNOTEFT_GEN_A_CORE
Because patient had recent Speech and Swallow evaluation recommending complete NPO, patient was made NPO until repeat swallow evaluation can occur. Discussed with patient the risks of aspiration and he expressed understanding and explained back to me. He would still like to take PO food and liquid. Will re-instate diet along with the patient's wishes.    Casper Mario MD PGY-1  Internal Medicine  Pager [phone] / [phone]

## 2019-12-04 NOTE — PROGRESS NOTE ADULT - ATTENDING COMMENTS
-Patient seen/examined on 12/4/19. Case/plan discussed with the intern and resident as reviewed/edited by me above and in any comments below.  -Discussed with pulmonary fellow, RN, and RT. -C/w high flow for now. -Abx for now pending final cultures.   -Patient had failed last swallow eval. Still having difficulty swallowing.   -Continue draining Pleurex QOD. -S/p IV lasix this am, monitor off for now given rising Cr. -IV steroids for now.   -Therapeutic Lovenox for DVT's. Monitor platelets and for any bleeding.   -Updated patient and his wife at bedside. -Had 20 minute face to face discussion with patient regarding advanced directives. He decided to be DNR/DNI. -MOLST form completed and in chart. -Patient open to meeting with palliative again.

## 2019-12-04 NOTE — CHART NOTE - NSCHARTNOTEFT_GEN_A_CORE
Patient had venous doppler of LE which revealed b/l PE.  Decision made to hold anti-coagulation due to thrombocytopenia.  Will consult heme/onc in AM for further recs

## 2019-12-04 NOTE — CONSULT NOTE ADULT - ASSESSMENT
58 yo M with stage IV non-small cell lung ca (dx 2 year s/p radiation, progressing on Tegrisso), R pleural effusion (s/p VATS) and PleurX catheter, on home O2 5L continuous (baseline sat 85-89% on O2) presenting from home with increasing SOB and BLE edema for 2 days, being treated for possible pna, also found to have acute DVT. 58 yo M with stage IV non-small cell lung ca (dx 2 year s/p radiation, progressing on Tegrisso), R pleural effusion (s/p VATS) and PleurX catheter, on home O2 5L continuous (baseline sat 85-89% on O2) presenting from home with increasing SOB and BLE edema for 2 days, being treated for hypoxic respiratory failure, also found to have acute DVT.      # Hypoxic respiratory failure  -likey multifactorial from pleural effusion, lymphangitic spread of NSCLC  that are worsening seen on CT chest  -Currently on Bipap, duonebs, prednisone  -effusion drained    # Acute DVT  -given underlying metastatic NSCLC, AC with lovenox 1 mg/kg BID indefineitely    # NSCLC  -dx'ed 2 years ago,  -recent RT in Aug 2019  -has PoD on Ossimertinib   -CT chest showed worsening lymphangitic spread of known NSCLC  [place] [facility] [place]      Addy Pinon MD. PGY 5  Heme-Onc fellow  [phone]; [id number] 60 yo M with stage IV non-small cell lung ca (dx 2 year s/p radiation, progressing on Tegrisso), R pleural effusion (s/p VATS) and PleurX catheter, on home O2 5L continuous (baseline sat 85-89% on O2) presenting from home with increasing SOB and BLE edema for 2 days, being treated for hypoxic respiratory failure, also found to have acute DVT.      # Hypoxic respiratory failure  -likey multifactorial from pleural effusion, lymphangitic spread of NSCLC  that are worsening seen on CT chest  -Currently on Bipap, duonebs, prednisone  -effusion drained  -management per pulm    # Acute DVT  -given underlying metastatic NSCLC, AC with lovenox 1 mg/kg BID indefineitely    # NSCLC  -dx'ed 2 years ago, initially on Erlotinib, had PoD about a 1.5 years ago and Osimertinib was started  -recent RT to the neck in Aug 2019  -Discussed with Dr. Marie Deras (oncologist - [facility]), Pt has PoD on Osimertinib - no role to resume Osimertinib. Next option would be chemotherapy which the pt has been refusing. Pt was referred for clinical trial but due to repeated hospitalization recently, he has not been able to follow up  -CT chest showed worsening lymphangitic spread of known NSCLC  -had brief GoC discussion with pt. He is not interested in chemotherapy and unlikely to be eligible for clinical trial given PS. He is leaning towards comfort care.  -Please obtain palliative care consult      Addy Pinon MD. PGY 5  Heme-Onc fellow  [phone]; [phone]

## 2019-12-04 NOTE — PROGRESS NOTE ADULT - PROBLEM SELECTOR PLAN 4
HS trop stable at 84x2. EKG showed sinus tachy. Noted ventricular trigeminy, but likely from hypoxia.  - Low c/f ACS  - Likely from demand and KARSON.   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 But patient has other acute issues at present. Patient complains of dysphagia since last admission, has not been eating at home, has lost weight.   Previous speech and swallow eval at prior admission recommended NPO, but will have them re-evaluate as patient has desire to eat. Patient refuses NGT, but might consider PEG tube placement.   - Have not offered PEG but patient is aware it is a possibility  - Pending Speech and Swallow eval, until then NPO  - Patient is aware of risks of aspiration Patient complains of dysphagia since last admission, has not been eating at home, has lost weight.   Previous speech and swallow eval at prior admission recommended NPO, but will have them re-evaluate as patient has desire to eat. Patient refuses NGT, but might consider PEG tube placement.   - Have not offered PEG but patient is aware it is a possibility  - Pending Speech and Swallow eval, until then NPO  - Patient is aware of risks of aspiration  -Aspiration precautions and elevate HOB.

## 2019-12-04 NOTE — PROGRESS NOTE ADULT - PROBLEM SELECTOR PLAN 10
DVT ppx: Therapeutic Lovenox  NPO until S&S eval DVT Rx/ppx: Therapeutic Lovenox  NPO until S&S eval

## 2019-12-04 NOTE — PROGRESS NOTE ADULT - PROBLEM SELECTOR PLAN 9
DVT ppx: HSQ  Npo while on Bipap. Was on dysphagia diet on prior admission. Patient wishes to be DNR/DNI. MOLST form filled out. Patient also advised that his wife is his HCP, she was present for conversation and indicated understanding.  - Palliative Care consult

## 2019-12-04 NOTE — PROGRESS NOTE ADULT - ASSESSMENT
60 yo M with stage IV non-small cell lung ca (dx 2 year s/p radiation, progressing on Tegrisso), R pleural effusion (s/p VATS) and PleurX catheter, on home O2 5L continuous (baseline sat 85-89% on O2) presenting from home with increasing SOB and BLE edema for 2 days, admitted for hypoxic respiratory failure   No increase in baseline cough or sputum  No fevers  leucocytosis but was on steroids  CTA with no new infiltartes to suggest pna- extensive lymphangitic carcinomatosis though  Given this think more likley his dyspnea is due to progression of his underlying disease  Less likely pneumonia or infectious process  No growth on Cx  can Dc vnco  If no s/s infection would likely Dc cefepime tomorrow  Follow clinically  DVT per primary team  Overall prognosis poor-consider palliative eval  case d/w med team  Will tailor plan for ID issues  per course,results.Will defer to primary team on management of other issues.    Will Follow.  Beeper [phone]-lsvx/afterhours/No response-[id number]

## 2019-12-04 NOTE — PROGRESS NOTE ADULT - PROBLEM SELECTOR PLAN 5
- Cr elevated to 1.3, was previously wnl.  - Will continue to trend.  - Check labs at midnight, will continue with lasix but be mindful of KARSON  - Avoid nephrotoxic meds Was on Tagrisso but noted progression on imaging last admission.  - [place] [facility] [place]. Marie Deras with [facility], who referred him there for clinical trial. At this point that would be his only option.  - PleurX catheter to be drained every other day Was on Tagrisso but noted progression on imaging last admission.  - Patient was planning to follow with [place]. [place] at [facility] [place], has been following Dr. Marie Deras with [facility], who referred him there for clinical trial. At this point that would be his only option.  - PleurX catheter to be drained every other day  -F/u with oncology.

## 2019-12-05 DIAGNOSIS — Z51.5 ENCOUNTER FOR PALLIATIVE CARE: ICD-10-CM

## 2019-12-05 DIAGNOSIS — R13.10 DYSPHAGIA, UNSPECIFIED: ICD-10-CM

## 2019-12-05 DIAGNOSIS — R05 COUGH: ICD-10-CM

## 2019-12-05 DIAGNOSIS — R53.81 OTHER MALAISE: ICD-10-CM

## 2019-12-05 DIAGNOSIS — R06.00 DYSPNEA, UNSPECIFIED: ICD-10-CM

## 2019-12-05 LAB
ALBUMIN SERPL ELPH-MCNC: 3.1 G/DL — LOW (ref 3.3–5)
ALBUMIN SERPL ELPH-MCNC: 3.1 G/DL — LOW (ref 3.3–5)
ALP SERPL-CCNC: 282 U/L — HIGH (ref 40–120)
ALP SERPL-CCNC: 283 U/L — HIGH (ref 40–120)
ALT FLD-CCNC: 61 U/L — HIGH (ref 10–45)
ALT FLD-CCNC: 68 U/L — HIGH (ref 10–45)
ANION GAP SERPL CALC-SCNC: 17 MMOL/L — SIGNIFICANT CHANGE UP (ref 5–17)
ANION GAP SERPL CALC-SCNC: 19 MMOL/L — HIGH (ref 5–17)
AST SERPL-CCNC: 89 U/L — HIGH (ref 10–40)
AST SERPL-CCNC: 94 U/L — HIGH (ref 10–40)
BASOPHILS # BLD AUTO: 0.01 K/UL — SIGNIFICANT CHANGE UP (ref 0–0.2)
BASOPHILS NFR BLD AUTO: 0.1 % — SIGNIFICANT CHANGE UP (ref 0–2)
BILIRUB SERPL-MCNC: 0.8 MG/DL — SIGNIFICANT CHANGE UP (ref 0.2–1.2)
BILIRUB SERPL-MCNC: 0.8 MG/DL — SIGNIFICANT CHANGE UP (ref 0.2–1.2)
BUN SERPL-MCNC: 79 MG/DL — HIGH (ref 7–23)
BUN SERPL-MCNC: 83 MG/DL — HIGH (ref 7–23)
CALCIUM SERPL-MCNC: 8.5 MG/DL — SIGNIFICANT CHANGE UP (ref 8.4–10.5)
CALCIUM SERPL-MCNC: 8.6 MG/DL — SIGNIFICANT CHANGE UP (ref 8.4–10.5)
CHLORIDE SERPL-SCNC: 90 MMOL/L — LOW (ref 96–108)
CHLORIDE SERPL-SCNC: 90 MMOL/L — LOW (ref 96–108)
CO2 SERPL-SCNC: 23 MMOL/L — SIGNIFICANT CHANGE UP (ref 22–31)
CO2 SERPL-SCNC: 23 MMOL/L — SIGNIFICANT CHANGE UP (ref 22–31)
CREAT ?TM UR-MCNC: 175 MG/DL — SIGNIFICANT CHANGE UP
CREAT SERPL-MCNC: 2.09 MG/DL — HIGH (ref 0.5–1.3)
CREAT SERPL-MCNC: 2.1 MG/DL — HIGH (ref 0.5–1.3)
EOSINOPHIL # BLD AUTO: 0 K/UL — SIGNIFICANT CHANGE UP (ref 0–0.5)
EOSINOPHIL NFR BLD AUTO: 0 % — SIGNIFICANT CHANGE UP (ref 0–6)
GLUCOSE SERPL-MCNC: 147 MG/DL — HIGH (ref 70–99)
GLUCOSE SERPL-MCNC: 158 MG/DL — HIGH (ref 70–99)
GRAM STN FLD: SIGNIFICANT CHANGE UP
HCT VFR BLD CALC: 41 % — SIGNIFICANT CHANGE UP (ref 39–50)
HGB BLD-MCNC: 13.6 G/DL — SIGNIFICANT CHANGE UP (ref 13–17)
IMM GRANULOCYTES NFR BLD AUTO: 0.4 % — SIGNIFICANT CHANGE UP (ref 0–1.5)
LYMPHOCYTES # BLD AUTO: 0.15 K/UL — LOW (ref 1–3.3)
LYMPHOCYTES # BLD AUTO: 0.9 % — LOW (ref 13–44)
MAGNESIUM SERPL-MCNC: 2.9 MG/DL — HIGH (ref 1.6–2.6)
MAGNESIUM SERPL-MCNC: 2.9 MG/DL — HIGH (ref 1.6–2.6)
MANUAL SMEAR VERIFICATION: SIGNIFICANT CHANGE UP
MCHC RBC-ENTMCNC: 28.8 PG — SIGNIFICANT CHANGE UP (ref 27–34)
MCHC RBC-ENTMCNC: 33.2 GM/DL — SIGNIFICANT CHANGE UP (ref 32–36)
MCV RBC AUTO: 86.7 FL — SIGNIFICANT CHANGE UP (ref 80–100)
MONOCYTES # BLD AUTO: 0.42 K/UL — SIGNIFICANT CHANGE UP (ref 0–0.9)
MONOCYTES NFR BLD AUTO: 2.7 % — SIGNIFICANT CHANGE UP (ref 2–14)
NEUTROPHILS # BLD AUTO: 15.17 K/UL — HIGH (ref 1.8–7.4)
NEUTROPHILS NFR BLD AUTO: 95.9 % — HIGH (ref 43–77)
OSMOLALITY SERPL: 308 MOSMOL/KG — HIGH (ref 280–301)
PHOSPHATE SERPL-MCNC: 5.8 MG/DL — HIGH (ref 2.5–4.5)
PHOSPHATE SERPL-MCNC: 5.8 MG/DL — HIGH (ref 2.5–4.5)
PLAT MORPH BLD: NORMAL — SIGNIFICANT CHANGE UP
PLATELET # BLD AUTO: 94 K/UL — LOW (ref 150–400)
POTASSIUM SERPL-MCNC: 4.9 MMOL/L — SIGNIFICANT CHANGE UP (ref 3.5–5.3)
POTASSIUM SERPL-MCNC: 5 MMOL/L — SIGNIFICANT CHANGE UP (ref 3.5–5.3)
POTASSIUM SERPL-SCNC: 4.9 MMOL/L — SIGNIFICANT CHANGE UP (ref 3.5–5.3)
POTASSIUM SERPL-SCNC: 5 MMOL/L — SIGNIFICANT CHANGE UP (ref 3.5–5.3)
PROT SERPL-MCNC: 6.5 G/DL — SIGNIFICANT CHANGE UP (ref 6–8.3)
PROT SERPL-MCNC: 6.6 G/DL — SIGNIFICANT CHANGE UP (ref 6–8.3)
RBC # BLD: 4.73 M/UL — SIGNIFICANT CHANGE UP (ref 4.2–5.8)
RBC # FLD: 14.8 % — HIGH (ref 10.3–14.5)
RBC BLD AUTO: NORMAL — SIGNIFICANT CHANGE UP
SODIUM SERPL-SCNC: 130 MMOL/L — LOW (ref 135–145)
SODIUM SERPL-SCNC: 132 MMOL/L — LOW (ref 135–145)
SODIUM UR-SCNC: <35 MMOL/L — SIGNIFICANT CHANGE UP
SPECIMEN SOURCE: SIGNIFICANT CHANGE UP
WBC # BLD: 15.81 K/UL — HIGH (ref 3.8–10.5)
WBC # FLD AUTO: 15.81 K/UL — HIGH (ref 3.8–10.5)

## 2019-12-05 PROCEDURE — 99233 SBSQ HOSP IP/OBS HIGH 50: CPT | Mod: GC

## 2019-12-05 PROCEDURE — 99232 SBSQ HOSP IP/OBS MODERATE 35: CPT

## 2019-12-05 PROCEDURE — 99223 1ST HOSP IP/OBS HIGH 75: CPT

## 2019-12-05 RX ORDER — HYDROMORPHONE HYDROCHLORIDE 2 MG/ML
0.3 INJECTION INTRAMUSCULAR; INTRAVENOUS; SUBCUTANEOUS EVERY 4 HOURS
Refills: 0 | Status: DISCONTINUED | OUTPATIENT
Start: 2019-12-05 | End: 2019-12-06

## 2019-12-05 RX ORDER — HYDROMORPHONE HYDROCHLORIDE 2 MG/ML
0.5 INJECTION INTRAMUSCULAR; INTRAVENOUS; SUBCUTANEOUS EVERY 4 HOURS
Refills: 0 | Status: DISCONTINUED | OUTPATIENT
Start: 2019-12-05 | End: 2019-12-06

## 2019-12-05 RX ORDER — ASCORBIC ACID 60 MG
500 TABLET,CHEWABLE ORAL DAILY
Refills: 0 | Status: DISCONTINUED | OUTPATIENT
Start: 2019-12-05 | End: 2019-12-08

## 2019-12-05 RX ADMIN — Medication 3 MILLILITER(S): at 05:49

## 2019-12-05 RX ADMIN — GABAPENTIN 300 MILLIGRAM(S): 400 CAPSULE ORAL at 22:21

## 2019-12-05 RX ADMIN — CEFEPIME 100 MILLIGRAM(S): 1 INJECTION, POWDER, FOR SOLUTION INTRAMUSCULAR; INTRAVENOUS at 05:51

## 2019-12-05 RX ADMIN — ENOXAPARIN SODIUM 90 MILLIGRAM(S): 100 INJECTION SUBCUTANEOUS at 13:33

## 2019-12-05 RX ADMIN — GABAPENTIN 100 MILLIGRAM(S): 400 CAPSULE ORAL at 05:51

## 2019-12-05 RX ADMIN — Medication 3 MILLIGRAM(S): at 22:21

## 2019-12-05 RX ADMIN — Medication 20 MILLIGRAM(S): at 22:21

## 2019-12-05 RX ADMIN — Medication 32 MILLIGRAM(S): at 05:49

## 2019-12-05 RX ADMIN — Medication 3 MILLILITER(S): at 13:34

## 2019-12-05 RX ADMIN — Medication 500 MILLIGRAM(S): at 17:40

## 2019-12-05 RX ADMIN — GABAPENTIN 100 MILLIGRAM(S): 400 CAPSULE ORAL at 17:39

## 2019-12-05 RX ADMIN — Medication 1 TABLET(S): at 17:39

## 2019-12-05 RX ADMIN — Medication 3 MILLILITER(S): at 23:09

## 2019-12-05 NOTE — DIETITIAN INITIAL EVALUATION ADULT. - PHYSICAL APPEARANCE
other (specify)/underweight/Nutrition focused physical exam conducted with pt verbal consent and results are as follows:  Subcutaneous fat loss: [Moderate] Orbital fat pads region, [Unable, hi-flow O2]Buccal fat region, [Severe]Triceps region,  [Severe]Ribs region.  Muscle wasting: [Moderate]Temples region, [Severe]Clavicle region, [Severe]Shoulder region, [Unable]Scapula region, [Moderate]Interosseous region,  [Unable, edema]thigh region, [Unable, edema]Calf region Height (cm): 170.2 (12-04) Weight (kg): 59.6 (12-05) BMI (kg/m2): 20.6 (12-05)  IBW: 67.3kg +/-10% % IBW: 89%  2+ edema b/l legs  Pressure ulcer: Stage 2 L sacrum, stage 2 rt sacrum

## 2019-12-05 NOTE — DIETITIAN INITIAL EVALUATION ADULT. - ADD RECOMMEND
1. Continue current regular diet 2. Recommend [employer] Organic Vegan Nutrition Shake x3/day (provides 660 kcal, 48 g protein) 3. Review/provide nutrition education as needed 4. Will continue to monitor weight, po intake, and diet tolerance 1. Continue current regular diet 2. Recommend Orgain Organic Vegan Nutrition Shake x3/day (provides 660 kcal, 48 g protein) 3. Recommend Multivitamin and vitamin C supplement 4. Review/provide nutrition education as needed 5. Will continue to monitor weight, po intake, and diet tolerance

## 2019-12-05 NOTE — PROGRESS NOTE ADULT - ASSESSMENT
This is a 58 yo M with stage IV non-SCLC with R malignant pleural effusion s/p indwelling pleural catheter, progressing despite immunotherapy, and chronic hypoxemic respiratory failure on 5L at home, presenting with persistent and increasing SOB and BLE edema since his last admission over 1 month ago. CT chest findings are concerning for worsening lymphangitic spread of known primary NSCLC. This is a 60 yo M with stage IV non-SCLC with R malignant pleural effusion s/p indwelling pleural catheter, progressing despite immunotherapy, and chronic hypoxemic respiratory failure on 5L at home, presenting with persistent and increasing SOB and BLE edema since his last admission over 1 month ago. CT chest findings are concerning for worsening lymphangitic spread of known primary NSCLC. Given progression of disease on immunotherapy, no role for continued immunotherapy. GOC discussion had at bedside - patient is now DNR/DNI. Continue ongoing GOC discussions.

## 2019-12-05 NOTE — CONSULT NOTE ADULT - PROBLEM SELECTOR RECOMMENDATION 7
[place]  Son present  Discussed contemporary and accurate definition of palliative medicine services  Discussed medical decision making structure for the family  Potential for a family meeting soon with him clearly wanting his wife present  ACP >16 time

## 2019-12-05 NOTE — PROGRESS NOTE ADULT - ASSESSMENT
This is a 60 y/o M with stage IV NSCLC with malignant pleural effusions s/p indwelling pleural catheter, progressing despite immunotherapy, and chronic hypoxemic respiratory failure on 5L O2 at home who presents with SOB and worsening b/l LE edema. Patient has had persistent SOB since admission over 1 month ago. CT chest findings are concerning for worsening lymphangitic spread of known primary NSCLC with known malignant effusion. There is no focal consolidation to suggest pneumonia, however empirically treating for infection until cultures result is not unreasonable. Given progression of disease on immunotherapy, no role for continued immunotherapy. GOC discussion had at bedside - patient is now DNR/DNI. Continue ongoing GOC discussions.    #Chronic hypoxemic respiratory failure  - likely 2/2 lymphangitic spread of known stage IV NSCLC  - consider monitoring off antibiotics  - supplemental O2, maintain SpO2>88%, can continue BiPAP for comfort if patient desires vs high flow for oxygenation  - continue duonebs  - continue prednisone 40 mg daily for symptom relief  - continue to drain effusion every other day while inpatient  - continue lasix  - continue AC for b/l LE DVT    Pulmonary will continue to follow.  --------------------------------------------  Dante Palomino PGY-4  Pulmonary/Critical Care Fellow  Pager: [phone] (NILA) [phone] (NS)  Pulmonary Spectra #[id number] This is a 58 y/o M with stage IV NSCLC with malignant pleural effusions s/p indwelling pleural catheter, progressing despite immunotherapy, and chronic hypoxemic respiratory failure on 5L O2 at home who presents with SOB and worsening b/l LE edema. Patient has had persistent SOB since admission over 1 month ago. CT chest findings are concerning for worsening lymphangitic spread of known primary NSCLC with known malignant effusion. There is no focal consolidation to suggest pneumonia, however empirically treating for infection until cultures result is not unreasonable. Given progression of disease on immunotherapy, no role for continued immunotherapy. GOC discussion had at bedside - patient is now DNR/DNI. Continue ongoing GOC discussions.    #Chronic hypoxemic respiratory failure  - likely 2/2 lymphangitic spread of known stage IV NSCLC  - consider monitoring off antibiotics  - supplemental O2, maintain SpO2>88%, continue HFNC for patient comfort  - continue duonebs  - continue prednisone 40 mg daily for symptom relief  - continue to drain effusion every other day while inpatient  - continue lasix  - continue AC for b/l LE DVT  - agree with palliative and hospice consult    Pulmonary will continue to follow.  --------------------------------------------  Dante Palomino PGY-4  Pulmonary/Critical Care Fellow  Pager: [phone] (NILA) [phone] (NS)  Pulmonary Spectra #[id number]

## 2019-12-05 NOTE — PROGRESS NOTE ADULT - ASSESSMENT
60 yo M with stage IV non-small cell lung ca (dx 2 year s/p radiation, progressing on Tegrisso), R pleural effusion (s/p VATS) and PleurX catheter, on home O2 5L continuous (baseline sat 85-89% on O2) presenting from home with increasing SOB and BLE edema for 2 days, admitted with hypoxic respiratory failure 2/2 pna and met lung cancer c/b bilateral DVTs.

## 2019-12-05 NOTE — DIETITIAN INITIAL EVALUATION ADULT. - REASON INDICATOR FOR ASSESSMENT
Pt seen for education consult.  Information obtained from: electronic medical record, Pt (pt with difficulty speaking)

## 2019-12-05 NOTE — PROGRESS NOTE ADULT - PROBLEM SELECTOR PLAN 3
- Cr elevated to 1.3, was previously wnl.  - Will continue to trend.  - Cr is elevated to 1.65 12/5 Baseline Cr is 1. It has been trending upwards  - Cr is elevated to 1.65 today (12/5).  - Will continue to trend.  - Avoid nephrotoxic meds when possible, but patient did still require diuresis. If does not improve, will need to cut back on Lasix

## 2019-12-05 NOTE — DIETITIAN INITIAL EVALUATION ADULT. - ETIOLOGY
Inadequate energy-protein intake in setting of lung cancer, dysphagia Inability to meet increased nutrient needs in setting of lung cancer and pressure injuries secondary to dysphagia

## 2019-12-05 NOTE — PROGRESS NOTE ADULT - SUBJECTIVE AND OBJECTIVE BOX
Patient is a 59y old  Male who presents with a chief complaint of SOB (05 Dec 2019 07:56)    Being followed by ID for dyspnea    Interval history:still with dyspnea  No acute events      ROS:  baseline  cough,SOB,CP  No N/V/D./abd pain  No other complaints      Antimicrobials:  cefepime dced today     Other medications reviewed  MEDICATIONS  (STANDING):  albuterol/ipratropium for Nebulization 3 milliLiter(s) Nebulizer every 6 hours  enoxaparin Injectable 90 milliGRAM(s) SubCutaneous daily  gabapentin 300 milliGRAM(s) Oral at bedtime  gabapentin 100 milliGRAM(s) Oral two times a day  melatonin 3 milliGRAM(s) Oral at bedtime  methylPREDNISolone sodium succinate Injectable 20 milliGRAM(s) IV Push every 8 hours      Vital Signs Last 24 Hrs  T(C): 36.6 (12-05-19 @ 11:55), Max: 36.8 (12-04-19 @ 20:56)  T(F): 97.9 (12-05-19 @ 11:55), Max: 98.2 (12-04-19 @ 20:56)  HR: 112 (12-05-19 @ 11:55) (100 - 116)  BP: 105/72 (12-05-19 @ 11:55) (102/70 - 119/80)  BP(mean): --  RR: 22 (12-05-19 @ 11:55) (20 - 22)  SpO2: 90% (12-05-19 @ 11:55) (90% - 94%)    Physical Exam:    Constitutional well preserved,comfortable,pleasant    HEENT PERRLA EOMI,No pallor or icterus      No oral exudate or erythema    Neck supple no JVD or LN    Chest Good AE,R pleurix bibasilar crackles/rhonchi     CVS RRR S1 S2 WNl No murmur or rub or gallop    Abd soft BS normal No tenderness no masses    Ext No cyanosis clubbing or edema    IV site no erythema tenderness or discharge    Joints no swelling or LOM    CNS AAO X 3 no focal  Lab Data:                          13.6   15.81 )-----------( 94       ( 05 Dec 2019 13:23 )             41.0       12-05    130<L>  |  90<L>  |  79<H>  ----------------------------<  158<H>  4.9   |  23  |  2.10<H>    Ca    8.6      05 Dec 2019 11:39  Phos  5.8     12-05  Mg     2.9     12-05    TPro  6.5  /  Alb  3.1<L>  /  TBili  0.8  /  DBili  x   /  AST  89<H>  /  ALT  61<H>  /  AlkPhos  283<H>  12-05        Culture - Sputum (collected 04 Dec 2019 22:14)  Source: .Sputum Sputum  Gram Stain (05 Dec 2019 07:20):    Few polymorphonuclear leukocytes per low power field    No Squamous epithelial cells per low power field    Moderate Yeast like cells seen per oil power field    Culture - Urine (collected 03 Dec 2019 09:39)  Source: .Urine Clean Catch (Midstream)  Final Report (04 Dec 2019 07:21):    No growth    Culture - Blood (collected 03 Dec 2019 03:05)  Source: .Blood Blood-Peripheral  Preliminary Report (04 Dec 2019 04:01):    No growth to date.    Culture - Blood (collected 03 Dec 2019 03:05)  Source: .Blood Blood-Peripheral  Preliminary Report (04 Dec 2019 04:01):    No growth to date.      < from: CT Angio Chest w/ IV Cont (12.02.19 @ 23:25) >  IMPRESSION:     1.  No pulmonary embolus.    2.  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    3.  In comparison with 11/11/2019, right lower lobe masslike opacity and   perihilar infiltrates within the right upper lobe are slightly decreased.    4.  Extensive soft tissue infiltration to the bilateral hilaand   mediastinum is again noted, slightly decreased.    5.  Right pleural effusion has been resolved. Small layering left pleural   effusion is unchanged.              < end of copied text >

## 2019-12-05 NOTE — PROGRESS NOTE ADULT - PROBLEM SELECTOR PLAN 6
Patient wants to remain full code. Has not been seen by palliative on previous admissions. May benefit from palliative c/s. Problem: Dysphagia.  Plan: Patient complains of dysphagia since last admission, has not been eating at home, has lost weight.   Previous speech and swallow eval at prior admission recommended NPO, but will have them re-evaluate as patient has desire to eat. Patient refuses NGT, but might consider PEG tube placement.   - Have not offered PEG but patient is aware it is a possibility  - Pending Speech and Swallow eval, until then NPO  - Patient is aware of risks of aspiration  -Aspiration precautions and elevate HOB. Problem: Dysphagia.  Plan: Patient complains of dysphagia since last admission, has not been eating at home, has lost weight.   Previous speech and swallow eval at prior admission recommended NPO, but will have them re-evaluate as patient has desire to eat. Patient refuses NGT, but might consider PEG tube placement.   - Have not offered PEG but patient is aware it is a possibility  - Patient is aware of risks of aspiration  -Aspiration precautions and elevate HOB.

## 2019-12-05 NOTE — PROGRESS NOTE ADULT - SUBJECTIVE AND OBJECTIVE BOX
Incomplete note from Ramón Tinsley MS3    This is HD 2 for our 58 yo M w/PMH of 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    SUBJECTIVE / OVERNIGHT EVENTS: No overnight events.     MEDICATIONS  (STANDING):  albuterol/ipratropium for Nebulization 3 milliLiter(s) Nebulizer every 6 hours  cefepime   IVPB 2000 milliGRAM(s) IV Intermittent every 12 hours  enoxaparin Injectable 90 milliGRAM(s) SubCutaneous daily  gabapentin 300 milliGRAM(s) Oral at bedtime  gabapentin 100 milliGRAM(s) Oral two times a day  melatonin 3 milliGRAM(s) Oral at bedtime  methylPREDNISolone sodium succinate Injectable 32 milliGRAM(s) IV Push daily    MEDICATIONS  (PRN):  benzonatate 200 milliGRAM(s) Oral three times a day PRN Cough  HYDROmorphone  Injectable 0.5 milliGRAM(s) IV Push every 4 hours PRN Severe Pain (7 - 10)  LORazepam    IVPB 0.5 milliGRAM(s) IV Intermittent every 6 hours PRN Anxiety    ICU Vital Signs Last 24 Hrs  T(C): 36.5 (05 Dec 2019 04:40), Max: 36.8 (04 Dec 2019 20:56)  T(F): 97.7 (05 Dec 2019 04:40), Max: 98.2 (04 Dec 2019 20:56)  HR: 104 (05 Dec 2019 06:13) (100 - 116)  BP: 102/70 (05 Dec 2019 04:40) (102/70 - 119/80)  RR: 22 (05 Dec 2019 04:40) (21 - 24)  SpO2: 93% (05 Dec 2019 06:13) (90% - 94%)      12-04 @ 07:01  -  12-05 @ 07:00  --------------------------------------------------------  IN: 340 mL / OUT: 550 mL / NET: -210 mL    PHYSICAL EXAM:  GENERAL: NAD, well-developed  HEAD:  Atraumatic, Normocephalic  EYES: EOMI, PERRLA, pale conjunctiva and sclera anicteric  NECK: Supple, No appreciable JVD  CHEST/LUNG: rhonchi and wheezing bilaterally but more prominent in the lower right lung  HEART: Regular rate and rhythm; No murmurs, rubs, or gallops; S1S2+  ABDOMEN: Soft, Nontender, Nondistended; Bowel sounds present  EXTREMITIES:  No clubbing or cyanosis. Bilateral LE pitting edema 3+  PSYCH: AAOx3  NEUROLOGY: non-focal  SKIN: No rashes or lesions    LABS:                        14.0   15.28 )-----------( 90       ( 04 Dec 2019 14:08 )             42.4     12-04    132<L>  |  90<L>  |  58<H>  ----------------------------<  121<H>  4.5   |  23  |  1.65<H>    Ca    8.8      04 Dec 2019 11:29  Phos  4.7     12-04  Mg     2.7     12-04    TPro  6.6  /  Alb  3.1<L>  /  TBili  0.8  /  DBili  x   /  AST  86<H>  /  ALT  51<H>  /  AlkPhos  276<H>  12-04    PT/INR - ( 03 Dec 2019 08:53 )   PT: 13.1 sec;   INR: 1.15 ratio      PTT - ( 03 Dec 2019 08:53 )  PTT:28.5 sec    Lactate Dehydrogenase, Serum: 809 U/L (12.04.19 @ 11:29)  Lactate, Blood: 2.3 mmol/L (12.04.19 @ 11:29)    Gram Stain:   Few polymorphonuclear leukocytes per low power field  No Squamous epithelial cells per low power field  Moderate Yeast like cells seen per oil power field (12.04.19 @ 22:14)    MRSA PCR Result.: NotDetec: (12.04.19 @ 10:06)  Staph Aureus PCR Result: Detected (12.04.19 @ 10:06) Incomplete note from Ramón Tinsley MS3    This is HD 2 for our 58 yo M w/PMH of 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    SUBJECTIVE / OVERNIGHT EVENTS: No overnight events.     MEDICATIONS  (STANDING):  albuterol/ipratropium for Nebulization 3 milliLiter(s) Nebulizer every 6 hours  cefepime   IVPB 2000 milliGRAM(s) IV Intermittent every 12 hours  enoxaparin Injectable 90 milliGRAM(s) SubCutaneous daily  gabapentin 300 milliGRAM(s) Oral at bedtime  gabapentin 100 milliGRAM(s) Oral two times a day  melatonin 3 milliGRAM(s) Oral at bedtime  methylPREDNISolone sodium succinate Injectable 32 milliGRAM(s) IV Push daily    MEDICATIONS  (PRN):  benzonatate 200 milliGRAM(s) Oral three times a day PRN Cough  HYDROmorphone  Injectable 0.5 milliGRAM(s) IV Push every 4 hours PRN Severe Pain (7 - 10)  LORazepam    IVPB 0.5 milliGRAM(s) IV Intermittent every 6 hours PRN Anxiety    ICU Vital Signs Last 24 Hrs  T(C): 36.5 (05 Dec 2019 04:40), Max: 36.8 (04 Dec 2019 20:56)  T(F): 97.7 (05 Dec 2019 04:40), Max: 98.2 (04 Dec 2019 20:56)  HR: 104 (05 Dec 2019 06:13) (100 - 116)  BP: 102/70 (05 Dec 2019 04:40) (102/70 - 119/80)  RR: 22 (05 Dec 2019 04:40) (21 - 24)  SpO2: 93% (05 Dec 2019 06:13) (90% - 94%)    12-04 @ 07:01  -  12-05 @ 07:00  --------------------------------------------------------  IN: 340 mL / OUT: 550 mL / NET: -210 mL    PHYSICAL EXAM:  GENERAL: NAD, well-developed  HEAD:  Atraumatic, Normocephalic  EYES: EOMI, PERRLA, pale conjunctiva and sclera anicteric  NECK: Supple, No appreciable JVD  CHEST/LUNG: rhonchi and wheezing bilaterally but more prominent in the lower right lung  HEART: Regular rate and rhythm; No murmurs, rubs, or gallops; S1S2+  ABDOMEN: Soft, Nontender, Nondistended; Bowel sounds present  EXTREMITIES:  No clubbing or cyanosis. Bilateral LE pitting edema 3+  PSYCH: AAOx3  NEUROLOGY: non-focal  SKIN: No rashes or lesions    LABS:                        14.0   15.28 )-----------( 90       ( 04 Dec 2019 14:08 )             42.4     12-04    132<L>  |  90<L>  |  58<H>  ----------------------------<  121<H>  4.5   |  23  |  1.65<H>    Ca    8.8      04 Dec 2019 11:29  Phos  4.7     12-04  Mg     2.7     12-04    TPro  6.6  /  Alb  3.1<L>  /  TBili  0.8  /  DBili  x   /  AST  86<H>  /  ALT  51<H>  /  AlkPhos  276<H>  12-04    PT/INR - ( 03 Dec 2019 08:53 )   PT: 13.1 sec;   INR: 1.15 ratio      PTT - ( 03 Dec 2019 08:53 )  PTT:28.5 sec    Lactate Dehydrogenase, Serum: 809 U/L (12.04.19 @ 11:29)  Lactate, Blood: 2.3 mmol/L (12.04.19 @ 11:29)    Gram Stain:   Few polymorphonuclear leukocytes per low power field  No Squamous epithelial cells per low power field  Moderate Yeast like cells seen per oil power field (12.04.19 @ 22:14)    MRSA PCR Result.: NotDetec: (12.04.19 @ 10:06)  Staph Aureus PCR Result: Detected (12.04.19 @ 10:06)

## 2019-12-05 NOTE — PROGRESS NOTE ADULT - SUBJECTIVE AND OBJECTIVE BOX
***INCOMPLETE NOTE***  Casper Mario MD PGY-1  Internal Medicine  Pager [phone] / [phone] ***INCOMPLETE NOTE***  Casper Mario MD PGY-1  Internal Medicine  Pager [phone] / [phone]    Patient is a 59y old  Male who presents with a chief complaint of SOB (05 Dec 2019 14:11)    SUBJECTIVE / OVERNIGHT EVENTS:  No acute events overnight. Patient complains of anxiety throughout the night, making it difficult to sleep. Has some chest pain and shortness with coughing. Has been eating without coughing.   Denies nausea, vomiting, constipation, diarrhea, fevers, chills.    MEDICATIONS  (STANDING):  albuterol/ipratropium for Nebulization 3 milliLiter(s) Nebulizer every 6 hours  enoxaparin Injectable 90 milliGRAM(s) SubCutaneous daily  gabapentin 300 milliGRAM(s) Oral at bedtime  gabapentin 100 milliGRAM(s) Oral two times a day  melatonin 3 milliGRAM(s) Oral at bedtime  methylPREDNISolone sodium succinate Injectable 20 milliGRAM(s) IV Push every 8 hours    MEDICATIONS  (PRN):  benzonatate 200 milliGRAM(s) Oral three times a day PRN Cough  HYDROmorphone  Injectable 0.5 milliGRAM(s) IV Push every 4 hours PRN Severe Pain (7 - 10)  HYDROmorphone  Injectable 0.3 milliGRAM(s) IV Push every 4 hours PRN moderate or severe pain  LORazepam    IVPB 0.5 milliGRAM(s) IV Intermittent every 6 hours PRN Anxiety      CAPILLARY BLOOD GLUCOSE        I&O's Summary    04 Dec 2019 07:01  -  05 Dec 2019 07:00  --------------------------------------------------------  IN: 340 mL / OUT: 550 mL / NET: -210 mL    05 Dec 2019 07:01  -  05 Dec 2019 14:27  --------------------------------------------------------  IN: 0 mL / OUT: 0 mL / NET: 0 mL        PHYSICAL EXAM:  Vital Signs Last 24 Hrs  T(C): 36.6 (12-05-19 @ 11:55)  T(F): 97.9 (12-05-19 @ 11:55), Max: 98.2 (12-04-19 @ 20:56)  HR: 112 (12-05-19 @ 11:55) (100 - 116)  BP: 105/72 (12-05-19 @ 11:55)  BP(mean): --  RR: 22 (12-05-19 @ 11:55) (20 - 22)  SpO2: 90% (12-05-19 @ 11:55) (90% - 94%)  Wt(kg): --    12-04 @ 07:01  -  12-05 @ 07:00  --------------------------------------------------------  IN: 340 mL / OUT: 550 mL / NET: -210 mL    12-05 @ 07:01  -  12-05 @ 14:27  --------------------------------------------------------  IN: 0 mL / OUT: 0 mL / NET: 0 mL            LABS:                        13.6   15.81 )-----------( 94       ( 05 Dec 2019 13:23 )             41.0     12-05    130<L>  |  90<L>  |  79<H>  ----------------------------<  158<H>  4.9   |  23  |  2.10<H>    Ca    8.6      05 Dec 2019 11:39  Phos  5.8     12-05  Mg     2.9     12-05    TPro  6.5  /  Alb  3.1<L>  /  TBili  0.8  /  DBili  x   /  AST  89<H>  /  ALT  61<H>  /  AlkPhos  283<H>  12-05              RADIOLOGY & ADDITIONAL TESTS:    Imaging Personally Reviewed:    Consultant(s) Notes Reviewed:      Care Discussed with Consultants/Other Providers: ***INCOMPLETE NOTE***  Casper Mario MD PGY-1  Internal Medicine  Pager [phone] / [phone]    Patient is a 59y old  Male who presents with a chief complaint of SOB (05 Dec 2019 14:11)    SUBJECTIVE / OVERNIGHT EVENTS:  No acute events overnight. Patient complains of anxiety throughout the night, making it difficult to sleep. Has some chest pain and shortness with coughing. Has been eating without coughing. Patient states that he would not do a clinical trial or systemic chemotherapy if offered.  Denies nausea, vomiting, constipation, diarrhea, fevers, chills.    MEDICATIONS  (STANDING):  albuterol/ipratropium for Nebulization 3 milliLiter(s) Nebulizer every 6 hours  enoxaparin Injectable 90 milliGRAM(s) SubCutaneous daily  gabapentin 300 milliGRAM(s) Oral at bedtime  gabapentin 100 milliGRAM(s) Oral two times a day  melatonin 3 milliGRAM(s) Oral at bedtime  methylPREDNISolone sodium succinate Injectable 20 milliGRAM(s) IV Push every 8 hours    MEDICATIONS  (PRN):  benzonatate 200 milliGRAM(s) Oral three times a day PRN Cough  HYDROmorphone  Injectable 0.5 milliGRAM(s) IV Push every 4 hours PRN Severe Pain (7 - 10)  HYDROmorphone  Injectable 0.3 milliGRAM(s) IV Push every 4 hours PRN moderate or severe pain  LORazepam    IVPB 0.5 milliGRAM(s) IV Intermittent every 6 hours PRN Anxiety      CAPILLARY BLOOD GLUCOSE        I&O's Summary    04 Dec 2019 07:01  -  05 Dec 2019 07:00  --------------------------------------------------------  IN: 340 mL / OUT: 550 mL / NET: -210 mL    05 Dec 2019 07:01  -  05 Dec 2019 14:27  --------------------------------------------------------  IN: 0 mL / OUT: 0 mL / NET: 0 mL        PHYSICAL EXAM:  Vital Signs Last 24 Hrs  T(C): 36.6 (12-05-19 @ 11:55)  T(F): 97.9 (12-05-19 @ 11:55), Max: 98.2 (12-04-19 @ 20:56)  HR: 112 (12-05-19 @ 11:55) (100 - 116)  BP: 105/72 (12-05-19 @ 11:55)  BP(mean): --  RR: 22 (12-05-19 @ 11:55) (20 - 22)  SpO2: 90% (12-05-19 @ 11:55) (90% - 94%)  Wt(kg): --    12-04 @ 07:01  -  12-05 @ 07:00  --------------------------------------------------------  IN: 340 mL / OUT: 550 mL / NET: -210 mL    12-05 @ 07:01  -  12-05 @ 14:27  --------------------------------------------------------  IN: 0 mL / OUT: 0 mL / NET: 0 mL    Constitutional: Wearing HFNC, awake and alert.  EYES: EOMI  ENT:  Normal Hearing  Respiratory: on HFNC. Diffuse rhonchi and wheezes, increased in R lung field compared to left, same from yesterday.   Cardiovascular: S1 and S2, tachycardic, regular, no Murmurs, gallops or rubs  Gastrointestinal: Bowel Sounds present, soft, nontender, nondistended, no guarding, no rebound  Extremities: 3+ bilateral lower extremity edema, non-tender to palpation, slightly lower down on legs than yesterday  Neurological: No focal deficits, CN II-XII intact bilaterally, sensation to light touch intact in all extremities.  Skin: No rashes  Psych: AAOx3    LABS:                        13.6   15.81 )-----------( 94       ( 05 Dec 2019 13:23 )             41.0     12-05    130<L>  |  90<L>  |  79<H>  ----------------------------<  158<H>  4.9   |  23  |  2.10<H>    Ca    8.6      05 Dec 2019 11:39  Phos  5.8     12-05  Mg     2.9     12-05    TPro  6.5  /  Alb  3.1<L>  /  TBili  0.8  /  DBili  x   /  AST  89<H>  /  ALT  61<H>  /  AlkPhos  283<H>  12-05    MRSA/MSSA PCR (12.04.19 @ 10:06)    Staph Aureus PCR Result: Detected  MRSA PCR Result.: Claribel (12.04.19 @ 10:06)      RADIOLOGY & ADDITIONAL TESTS:    Imaging Personally Reviewed:    Consultant(s) Notes Reviewed:    Infectious Disease   Pulmonology    Care Discussed with Consultants/Other Providers: Casper Mario MD PGY-1  Internal Medicine  Pager [phone] / [phone]    Patient is a 59y old  Male who presents with a chief complaint of SOB (05 Dec 2019 14:11)    SUBJECTIVE / OVERNIGHT EVENTS:  No acute events overnight. Patient complains of anxiety throughout the night, making it difficult to sleep. Has some chest pain and shortness with coughing. Has been eating without coughing. Patient states that he would not do a clinical trial or systemic chemotherapy if offered.  Denies nausea, vomiting, constipation, diarrhea, fevers, chills.    MEDICATIONS  (STANDING):  albuterol/ipratropium for Nebulization 3 milliLiter(s) Nebulizer every 6 hours  enoxaparin Injectable 90 milliGRAM(s) SubCutaneous daily  gabapentin 300 milliGRAM(s) Oral at bedtime  gabapentin 100 milliGRAM(s) Oral two times a day  melatonin 3 milliGRAM(s) Oral at bedtime  methylPREDNISolone sodium succinate Injectable 20 milliGRAM(s) IV Push every 8 hours    MEDICATIONS  (PRN):  benzonatate 200 milliGRAM(s) Oral three times a day PRN Cough  HYDROmorphone  Injectable 0.5 milliGRAM(s) IV Push every 4 hours PRN Severe Pain (7 - 10)  HYDROmorphone  Injectable 0.3 milliGRAM(s) IV Push every 4 hours PRN moderate or severe pain  LORazepam    IVPB 0.5 milliGRAM(s) IV Intermittent every 6 hours PRN Anxiety      CAPILLARY BLOOD GLUCOSE        I&O's Summary    04 Dec 2019 07:01  -  05 Dec 2019 07:00  --------------------------------------------------------  IN: 340 mL / OUT: 550 mL / NET: -210 mL    05 Dec 2019 07:01  -  05 Dec 2019 14:27  --------------------------------------------------------  IN: 0 mL / OUT: 0 mL / NET: 0 mL        PHYSICAL EXAM:  Vital Signs Last 24 Hrs  T(C): 36.6 (12-05-19 @ 11:55)  T(F): 97.9 (12-05-19 @ 11:55), Max: 98.2 (12-04-19 @ 20:56)  HR: 112 (12-05-19 @ 11:55) (100 - 116)  BP: 105/72 (12-05-19 @ 11:55)  BP(mean): --  RR: 22 (12-05-19 @ 11:55) (20 - 22)  SpO2: 90% (12-05-19 @ 11:55) (90% - 94%)  Wt(kg): --    12-04 @ 07:01  -  12-05 @ 07:00  --------------------------------------------------------  IN: 340 mL / OUT: 550 mL / NET: -210 mL    12-05 @ 07:01  -  12-05 @ 14:27  --------------------------------------------------------  IN: 0 mL / OUT: 0 mL / NET: 0 mL    Constitutional: Wearing HFNC, awake and alert.  EYES: EOMI  ENT:  Normal Hearing  Respiratory: on HFNC. Diffuse rhonchi and wheezes, increased in R lung field compared to left, same from yesterday.   Cardiovascular: S1 and S2, tachycardic, regular, no Murmurs, gallops or rubs  Gastrointestinal: Bowel Sounds present, soft, nontender, nondistended, no guarding, no rebound  Extremities: 3+ bilateral lower extremity edema, non-tender to palpation, slightly lower down on legs than yesterday  Neurological: No focal deficits, CN II-XII intact bilaterally, sensation to light touch intact in all extremities.  Skin: No rashes  Psych: AAOx3    LABS:                        13.6   15.81 )-----------( 94       ( 05 Dec 2019 13:23 )             41.0     12-05    130<L>  |  90<L>  |  79<H>  ----------------------------<  158<H>  4.9   |  23  |  2.10<H>    Ca    8.6      05 Dec 2019 11:39  Phos  5.8     12-05  Mg     2.9     12-05    TPro  6.5  /  Alb  3.1<L>  /  TBili  0.8  /  DBili  x   /  AST  89<H>  /  ALT  61<H>  /  AlkPhos  283<H>  12-05    MRSA/MSSA PCR (12.04.19 @ 10:06)    Staph Aureus PCR Result: Detected  MRSA PCR Result.: Claribel (12.04.19 @ 10:06)      RADIOLOGY & ADDITIONAL TESTS:    Imaging Personally Reviewed:    Consultant(s) Notes Reviewed:    Infectious Disease   Pulmonology  Palliative Care    Care Discussed with Consultants/Other Providers:

## 2019-12-05 NOTE — PROGRESS NOTE ADULT - ASSESSMENT
58 yo M with stage IV non-small cell lung ca (dx 2 year s/p radiation, progressing on Tegrisso), R pleural effusion (s/p VATS) and PleurX catheter, on home O2 5L continuous (baseline sat 85-89% on O2) presenting from home with increasing SOB and BLE edema for 2 days, admitted for hypoxic respiratory failure   No increase in baseline cough or sputum  No fevers  leucocytosis but was on steroids  CTA with no new infiltartes to suggest pna- extensive lymphangitic carcinomatosis though  Given this think more likley his dyspnea is due to progression of his underlying disease  Less likely pneumonia or infectious process  No growth on blood Cx,sputum cx likely is colonization   agree with stopping abx and observing  Follow clinically  DVT per primary team  Overall prognosis poor-consider palliative eval  case d/w med team  Will tailor plan for ID issues  per course,results.Will defer to primary team on management of other issues.    Will Follow.  Beeper [id number]/afterhours/No response-[id number]

## 2019-12-05 NOTE — PROGRESS NOTE ADULT - PROBLEM SELECTOR PLAN 4
Patient complains of dysphagia since last admission, has not been eating at home, has lost weight.   Previous speech and swallow eval at prior admission recommended NPO, but will have them re-evaluate as patient has desire to eat. Patient refuses NGT, but might consider PEG tube placement.   - Have not offered PEG but patient is aware it is a possibility  - Pending Speech and Swallow eval, until then NPO  - Patient is aware of risks of aspiration  -Aspiration precautions and elevate HOB. Patient complains of dysphagia since last admission, has not been eating at home, has lost weight.   Previous speech and swallow eval at prior admission recommended NPO, but will have them re-evaluate as patient has desire to eat. Patient refuses NGT and PEG tube placement.   - Have not offered PEG but patient is aware it is a possibility  - Pending Speech and Swallow eval, until then NPO  - Patient is aware of risks of aspiration  -Aspiration precautions and elevate HOB. Patient complains of dysphagia since last admission, has not been eating at home, has lost weight.   Previous speech and swallow eval at prior admission recommended NPO, but will have them re-evaluate as patient has desire to eat. Patient refuses NGT and PEG tube placement.   - Patient is aware of risks of aspiration and wishes to continue to eat and drink. Seen by Dietitians who made recommendations.  -Aspiration precautions and elevate HOB.

## 2019-12-05 NOTE — PROGRESS NOTE ADULT - PROBLEM SELECTOR PLAN 3
Duplex US showing BLE DVTs. CTA on admission 12/3 shows no PE.  - Lovenox 1.5 mg/kg daily (90 mg)  -Monitor platelets closely. Duplex US showing BLE DVTs. CTA on admission 12/3 shows no PE.  - Lovenox 1.5 mg/kg daily (90 mg)  - Monitor platelets closely. Duplex US showing BLE DVTs. CTA on admission 12/3 shows no PE.  - Lovenox 1.5 mg/kg daily (90 mg)  - Monitor platelets closely.  - ACE wraps for legs, compression stockings do not fit

## 2019-12-05 NOTE — PROVIDER CONTACT NOTE (OTHER) - ASSESSMENT
Patient sitting at edge of bed, Pt feels slightly SOB, no major complaints at this time. On cont p02 on tele ST on tele

## 2019-12-05 NOTE — CONSULT NOTE ADULT - PROBLEM SELECTOR RECOMMENDATION 6
Pt is not interested in DMT  Contemporary and accurate explanation of palliative care provided  He would like palliative care to be involved

## 2019-12-05 NOTE — CONSULT NOTE ADULT - SUBJECTIVE AND OBJECTIVE BOX
HPI:  60 yo M with stage IV non-small cell lung ca (dx 2 year s/p radiation, progressing on Tegrisso), R pleural effusion (s/p VATS) and PleurX catheter, on home O2 5L continuous (baseline sat 85-89% on O2) presenting from home with increasing SOB and BLE edema for 2 days. Patient continues to have chronic cough: no changed from yellow to green, with blood streaked sputum production. Denies fevers at home. This is third hospitalization since 10/2019. He was treated for pna with vanc/zosyn in October. R pleural effusion is drained every other day by nurse at home. Pluerx was drained today, outputting 320 mL. Patient called Dr. Carbajal d/t increased SOB, who advised him to return to ED.    In ED, patient was placed on nonrebreater (15L) but remained tachypneic. He's now satting 90% on 10/5 BiPAP at 50%. He was given lasix 40 IVx1, azithromycin, CTX and vanc. Of note, patient had 10 beats of ventricular trigeminy at 3:30 AM, same time as he removed the BiPAP and desatted to 76-82%. (03 Dec 2019 02:54)      Pt was getting a nebulizer during the first part of the treatment  Content delivered by the son"  Prior to admission, he had worsened cough: minutes at a time, with a high degree of frequency.  son believes the pt had challenges with PO given ough.  His energy level has been low  PT needs help with ambulation  He is weak and has been sleeping more  Post nebs he reports 8/10 dyspnea, 10 of 10 being the best  Fatigue+  No cough.        PERTINENT PM/SXH:   Non-small cell carcinoma of lung  Hypertension  No pertinent past medical history  No pertinent past medical history    No significant past surgical history    FAMILY HISTORY:  Family history of cervical cancer    ITEMS NOT CHECKED ARE NOT PRESENT    SOCIAL HISTORY:   Significant other/partner:  [x ]  Children:  [ ]  Judaism/Spirituality:  Substance hx:  [ ]   Tobacco hx:  [ ]   Alcohol hx: [ ]   Home Opioid hx:  [ ] I-Stop Reference No:  Living Situation: [x ]Home  [ ]Long term care  [ ]Rehab [ ]Other    ADVANCE DIRECTIVES:    DNR  Yes  MOLST  [ ]  Living Will  [ ]   DECISION MAKER(s):  [ ] Health Care Proxy(s)  [ ] Surrogate(s)  [ ] Guardian           Name(s):   Phone Number(s):    BASELINE (I)ADL(s) (prior to admission):  Foster: [ ]Total  [ ] Moderate [ ]Dependent    Allergies    No Known Allergies    Intolerances      MEDICATIONS  (STANDING):  albuterol/ipratropium for Nebulization 3 milliLiter(s) Nebulizer every 6 hours  enoxaparin Injectable 90 milliGRAM(s) SubCutaneous daily  gabapentin 300 milliGRAM(s) Oral at bedtime  gabapentin 100 milliGRAM(s) Oral two times a day  melatonin 3 milliGRAM(s) Oral at bedtime  methylPREDNISolone sodium succinate Injectable 20 milliGRAM(s) IV Push every 8 hours    MEDICATIONS  (PRN):  benzonatate 200 milliGRAM(s) Oral three times a day PRN Cough  HYDROmorphone  Injectable 0.5 milliGRAM(s) IV Push every 4 hours PRN Severe Pain (7 - 10)  HYDROmorphone  Injectable 0.3 milliGRAM(s) IV Push every 4 hours PRN moderate or severe pain  LORazepam    IVPB 0.5 milliGRAM(s) IV Intermittent every 6 hours PRN Anxiety    PRESENT SYMPTOMS: [ ]Unable to obtain due to poor mentation   Source if other than patient:  [ ]Family   [ ]Team  [ ] Staff [ ] Inferred    Pain: [ ] yes [x ] no  QOL impact -   Location -                    Aggravating factors -  Quality -  Radiation -  Timing-  Severity (0-10 scale):  Minimal acceptable level (0-10 scale):       Pain Assessment (scores are out of 10)    O  L  D  C  A  R  T  S    Pain Now:  Pain average over 24 hours:  Pain maximum:  Onset of prn (minutes):  Pain minimum:  Time to pain minimum (minutes):  Personalized pain goal:  Duration of prn (hours):  Ascent of pain:  Pain score at which prn is requested:    Does the pain have a negative impact on the following domains  An "X" denotes yes    General activity                        []  Walking ability                          []  Mood                                          []  Sleep                                           []  Normal Work                            []  Relations with other people  []  Enjoyment of life                     []  Cognitive Tasks                        []      PAIN AD Score: 0    [email or web address] (press ctrl +  left click to view)          Dyspnea:                           [ ]Mild [ x]Moderate [ ]Severe  Anxiety:                             [ ]Mild [ ]Moderate [ ]Severe  Fatigue:                             [ ]Mild [ ]Moderate [ ]Severe  Nausea:                             [ ]Mild [ ]Moderate [ ]Severe  Loss of appetite:              [ ]Mild [ ]Moderate [ ]Severe  Constipation:                    [ ]Mild [ ]Moderate [ ]Severe    Other Symptoms:  [x ]All other review of systems negative     Karnofsky Performance Score/Palliative Performance Status Version 2:      50   %      [email or web address]      PHYSICAL EXAM:  Vital Signs Last 24 Hrs  T(C): 36.6 (05 Dec 2019 11:55), Max: 36.8 (04 Dec 2019 20:56)  T(F): 97.9 (05 Dec 2019 11:55), Max: 98.2 (04 Dec 2019 20:56)  HR: 112 (05 Dec 2019 11:55) (100 - 116)  BP: 105/72 (05 Dec 2019 11:55) (102/70 - 119/80)  BP(mean): --  RR: 22 (05 Dec 2019 11:55) (20 - 22)  SpO2: 90% (05 Dec 2019 11:55) (90% - 94%) I&O's Summary    04 Dec 2019 07:01  -  05 Dec 2019 07:00  --------------------------------------------------------  IN: 340 mL / OUT: 550 mL / NET: -210 mL    05 Dec 2019 07:01  -  05 Dec 2019 14:15  --------------------------------------------------------  IN: 0 mL / OUT: 0 mL / NET: 0 mL      GENERAL:  [ x]Alert  [ ]Oriented x   [ ]Lethargic  [ ]Cachexia  [ ]Unarousable  [ ]Verbal  [ ]Non-Verbal [  x ] No distress   [   ] Gaunt  Behavioral:  Sebastian  [ ] Anxiety  [ ] Delirium [ ] Agitation  [ x  ]  Calm [ ] Other  HEENT:  [x ]Normal   [ ]Dry mouth   [ ]ET Tube/Trach  [ ]Oral lesions  [ ] Temporal Wasting  [ ]  Mucositis [ ]  Grade   PULMONARY:   [  ]Clear [ ]Tachypnea  [ ]Audible excessive secretions   [ ]Rhonchi        [ ]Right [ ]Left [ ]Bilateral  [ ]Crackles        [ ]Right [ ]Left [ ]Bilateral  [x ]Wheezing     [ ]Right [ ]Left [x]Bilateral post nebs  [ ] Diminished  [ ] Right  [  ] Left   [ ] Bilaterally  CARDIOVASCULAR:    [x ]Regular [ ]Irregular [ ]Tachy  [ ]Kirk [ ]Murmur [  ]   Edema  [ ]Other  GASTROINTESTINAL:  [ x]Soft  [ ]Distended   [ ]+BS  [ x]Non tender [ ]Tender  [ ]PEG [ ]OGT/ NGT    Last BM:       GENITOURINARY:  [x ]Normal [ ] Incontinent   [ ]Oliguria/Anuria   [ ]Novak [   ] Suprapubic tenderness  MUSCULOSKELETAL:   [x ]Normal   [ ]Weakness  [ ]Bed/Wheelchair bound [ ]Edema [  ] amputation  [  ] contraction  NEUROLOGIC:   [ x]No focal deficits  [ ] Cognitive impairment  [ ] Dysphagia [ ]Dysarthria [ ] Paresis [  ] Aphasia  [ ]Other   SKIN:   [ x]Normal   [ ]Pressure ulcer(s)  [ ]Rash [   ]  Wound    [  ] hyperpigmentation    CRITICAL CARE:  [ ] Shock Present  [ ]Septic [ ]Cardiogenic [ ]Neurologic [ ]Hypovolemic  [ ]  Vasopressors [ ]  Inotropes   [ ] Respiratory failure present [ ] mechanical ventilation [ ] non-invasive ventilatory support [ ] High flow  [ ] Acute  [ ] Chronic [ ] Hypoxic  [ ] Hypercarbic [ ] Other  [ ] Other organ failure       LABS:                        13.6   15.81 )-----------( 94       ( 05 Dec 2019 13:23 )             41.0   12-05    130<L>  |  90<L>  |  79<H>  ----------------------------<  158<H>  4.9   |  23  |  2.10<H>    Ca    8.6      05 Dec 2019 11:39  Phos  5.8     12-05  Mg     2.9     12-05    TPro  6.5  /  Alb  3.1<L>  /  TBili  0.8  /  DBili  x   /  AST  89<H>  /  ALT  61<H>  /  AlkPhos  283<H>  12-05              Culture - Sputum (collected 12-04-19 @ 22:14)  Source: .Sputum Sputum  Gram Stain (12-05-19 @ 07:20):    Few polymorphonuclear leukocytes per low power field    No Squamous epithelial cells per low power field    Moderate Yeast like cells seen per oil power field        RADIOLOGY & ADDITIONAL STUDIES:    PROTEIN CALORIE MALNUTRITION PRESENT: [ ] Yes [ ] No  [ ] PPSV2 < or = to 30% [ ] significant weight loss  [ ] poor nutritional intake [ ] catabolic state [ ] anasarca     Albumin, Serum: 3.1 g/dL (12-05-19 @ 11:39)  Artificial Nutrition [ ]     REFERRALS:   [ ]Chaplaincy  [ ] Hospice  [ ]Child Life  [ ]Social Work  [ ]Case management [ ]Holistic Therapy     Goals of Care Document:   Care Coordination Assessment [C. Provider] (12-04-19 @ 11:57)   Progress Notes - Care Coordination [C. Provider] (12-04-19 @ 16:00)

## 2019-12-05 NOTE — PROGRESS NOTE ADULT - PROBLEM SELECTOR PLAN 2
Less likely pneumonia, more likely progression of disease.  - Abx plan as above.   - maintain >88% on HFNC, If desats on HFNC or increased O2 requirements, place on BiPAP, then plan for MICU c/s if still hypoxic  - Appreciate ID and Pulm recs - Cr increasing, 2.1 today, baseline 1. Most concerning for pre-renal etiology, though may also be intrarenal given exposure to nephrotoxic antibiotics and IV contrast.  - Will continue to trend, checking tonight at 5pm.   - Avoid nephrotoxic meds when possible, but patient did still require diuresis.   - Holding lasix for now for KARSON - Cr increasing, 2.1 today, baseline 1. Most concerning for pre-renal etiology, though may also be intrarenal given exposure to nephrotoxic antibiotics and IV contrast.  - Will continue to trend, checking tonight at 5pm. Consider fluids if continues to rise  - Avoid nephrotoxic meds when possible   - Holding lasix for now for KARSON

## 2019-12-05 NOTE — DIETITIAN INITIAL EVALUATION ADULT. - OTHER INFO
59yoM with PHMx of stage IV lung cancer, rt pleural effusion, dysphagia admitted with hypoxic respiratory failure secondary to recent pneumonia and "lymphangitic spread of tumor," sepsis, KARSON, acute DVT. Pt observed sitting up in bed, has difficulty speaking but able to nod "yes" or "no," point, and say short sentences.     Therapeutic diet PTA: Pt reports low appetite and difficulty swallowing PTA. Per swallow evaluation (11-15) pt recommended NPO diet but pt and family prefers PO food and liquid intake and refused NGT. Pt drinks Orgain Organic PO supplement x2-3 /day. Pt also taking "[place] Goose Berry" supplement starting today (12-05), recommended pt talk to MD before starting supplement to ensure no drug interactions. Pt reports his son encourages him to follow a vegan diet without meat PTA.   Pt currently with low appetite and difficulty swallowing. Pt reports being able to consume oatmeal, 59yoM with PHMx of stage IV lung cancer, rt pleural effusion, dysphagia admitted with hypoxic respiratory failure secondary to recent pneumonia and "lymphangitic spread of tumor," sepsis, KARSON, acute DVT. Pt observed sitting up in bed on hi-flow O2, has difficulty speaking but able to nod "yes" or "no," point, and say short sentences.     Therapeutic diet PTA: Pt reports low appetite and difficulty swallowing PTA. Per swallow evaluation (11-15) pt recommended NPO diet but pt and family prefers PO food and liquid intake and refused NGT. Pt drinks Orgain Organic PO supplement x2-3 /day. Pt also taking "[place] Goose Berry" supplement starting today (12-05), recommended pt talk to MD before starting supplement to ensure no drug interactions. Pt reports his son encourages him to follow a vegan diet PTA. NKFA.  Pt currently with low appetite and difficulty swallowing. Pt uninterested in soft and mechanical soft diets, prefers current regular consistency diet. Went over menu to obtain foods pt reports being able to swallow at each meal: oatmeal, cream of wheat (maybe), grapes, and scrambled egg white for breakfast, soup, mashed potatoes, water, and ginger ale for lunch and dinner. Pt reports he has difficulty swallowing melon, but enjoys holding melon in mouth for the flavor. Pt request Orgain Organic PO supplement x2-3/day during hospitalization.     UBW obtained from previous RD note: Pt reports a UBW of 199lbs x 2 years ago, which dropped to 176 x 4 months ago. Pt wt during last hospitalization 148.1lbs (11-13). Pt now with wt of 131.3lbs, indicating a 11% (16.8lbs) wt loss x 3 weeks and a 25% (44.7lbs) wt loss x 4 months. Pt currently with 2+ edema of b/l leg which could potentially be masking greater weight loss.    Weight history per chart: 130lbs (12-04), 131.3lbs (12-05).    Nutrition Education Provided: Pt encouraged to continue to drink nutrition supplement for adequate protein. Explained the benefits of adequate PO intake for energy and encouraged pt to consume high-protein foods. 59yoM with PHMx of stage IV lung cancer, rt pleural effusion, dysphagia admitted with hypoxic respiratory failure secondary to recent pneumonia and "lymphangitic spread of tumor," sepsis, KARSON, acute DVT. Pt DNR with MOSLT form. Pt observed sitting up in bed on hi-flow O2, has difficulty speaking but able to nod "yes" or "no," point, and say short sentences.     Therapeutic diet PTA: Pt reports low appetite and difficulty swallowing PTA. Per swallow evaluation (11-15) pt recommended NPO diet but pt and family prefers PO food and liquid intake and refused NGT. Pt drinks Orgain Organic PO supplement x2-3 /day. Pt also taking "[place]izen [place] Goose Berry" supplement starting today (12-05), recommended pt talk to MD before starting supplement to ensure no drug interactions. Pt reports his son encourages him to follow a vegan diet PTA. NKFA.  Pt currently with low appetite and difficulty swallowing. Pt uninterested in soft and mechanical soft diets, prefers current regular consistency diet. Went over menu to obtain foods pt reports being able to swallow at each meal: oatmeal, cream of wheat (maybe), grapes, and scrambled egg white for breakfast, soup, mashed potatoes, water, and ginger ale for lunch and dinner. Pt reports he has difficulty swallowing melon, but enjoys holding melon in mouth for the flavor. Pt request Orgain Organic PO supplement x2-3/day during hospitalization.     UBW obtained from previous RD note: Pt reports a UBW of 199lbs x 2 years ago, which dropped to 176 x 4 months ago. Pt wt during last hospitalization 148.1lbs (11-13). Pt now with wt of 131.3lbs, indicating a 11% (16.8lbs) wt loss x 3 weeks and a 25% (44.7lbs) wt loss x 4 months. Pt currently with 2+ edema of b/l leg which could potentially be masking greater weight loss.    Weight history per chart: 130lbs (12-04), 131.3lbs (12-05).    Nutrition Education Provided: Pt encouraged to continue to drink nutrition supplement for adequate protein. Explained the benefits of adequate PO intake for energy and encouraged pt to consume high-protein foods. 59yoM with PHMx of stage IV lung cancer, rt pleural effusion, dysphagia admitted with hypoxic respiratory failure secondary to recent pneumonia and "lymphangitic spread of tumor," sepsis, KARSON, acute DVT. Pt DNR with MOSLT form. Pt observed sitting up in bed on hi-flow O2, has difficulty speaking but able to nod "yes" or "no," point, and say short sentences.     Therapeutic diet PTA: Pt reports low appetite and difficulty swallowing PTA. Per swallow evaluation (11-15) pt recommended NPO diet but pt and family prefers PO food and liquid intake and refused NGT. Pt drinks Orgain Organic PO supplement x2-3 /day. Pt also taking "[place] Goose Berry" supplement starting today (12-05), recommended pt talk to MD before starting supplement to ensure no drug interactions, brought to attention of team. Pt reports his son encourages him to follow a vegan diet PTA. NKFA.  Pt currently with low appetite and difficulty swallowing. Pt uninterested in soft and mechanical soft diets, prefers current regular consistency diet. Went over menu to obtain foods pt reports being able to swallow at each meal: oatmeal, cream of wheat (maybe), grapes, and scrambled egg white for breakfast, soup, mashed potatoes, water, and ginger ale for lunch and dinner. Pt reports he has difficulty swallowing melon, but enjoys holding melon in mouth for the flavor. Pt request Orgain Organic PO supplement x2-3/day during hospitalization.     UBW obtained from previous RD note: Pt reports a UBW of 199lbs x 2 years ago, which dropped to 176 x 4 months ago. Pt wt during last hospitalization 148.1lbs (11-13). Pt now with wt of 131.3lbs, indicating a 11% (16.8lbs) wt loss x 3 weeks and a 25% (44.7lbs) wt loss x 4 months. Pt currently with 2+ edema of b/l leg which could potentially be masking greater weight loss.    Weight history per chart: 130lbs (12-04), 131.3lbs (12-05).    Nutrition Education Provided: Pt encouraged to continue to drink nutrition supplement for adequate protein. Explained the benefits of adequate PO intake for energy and encouraged pt to consume high-protein foods. 59yoM with PHMx of stage IV lung cancer, rt pleural effusion, dysphagia admitted with hypoxic respiratory failure secondary to recent pneumonia and "lymphangitic spread of tumor," sepsis, KARSON, acute DVT. Pt DNR with MOSLT form. Pt observed sitting up in bed on hi-flow O2, has difficulty speaking but able to nod "yes" or "no," point, and say short sentences.     Therapeutic diet PTA: Pt reports low appetite and difficulty swallowing PTA. Per swallow evaluation (11-15) pt recommended NPO diet but pt and family prefers PO food and liquid intake and refused NGT. Per chart note MD recommended pt to be NPO this admission until evaluation, however pt requested regular diet Pt drinks Orgain Organic PO supplement x2-3 /day. Pt also taking "Thai Goose Berry" supplement starting today (12-05), recommended pt talk to MD before starting supplement to ensure no drug interactions, brought to attention of team. Pt reports his son encourages him to follow a vegan diet PTA. NKFA.  Pt currently with low appetite and difficulty swallowing. Pt uninterested in soft and mechanical soft diets, prefers current regular consistency diet. Went over menu to obtain foods pt reports being able to swallow at each meal: oatmeal, cream of wheat (maybe), grapes, and scrambled egg white for breakfast, soup, mashed potatoes, water, and ginger ale for lunch and dinner. Pt reports he has difficulty swallowing melon, but enjoys holding melon in mouth for the flavor. Pt request Orgain Organic PO supplement x2-3/day during hospitalization.     UBW obtained from previous RD note: Pt reports a UBW of 199lbs x 2 years ago, which dropped to 176 x 4 months ago. Pt wt during last hospitalization 148.1lbs (11-13). Pt now with wt of 131.3lbs, indicating a 11% (16.8lbs) wt loss x 3 weeks and a 25% (44.7lbs) wt loss x 4 months. Pt currently with 2+ edema of b/l leg which could potentially be masking greater weight loss.    Weight history per chart: 130lbs (12-04), 131.3lbs (12-05).    Nutrition Education Provided: Pt encouraged to continue to drink nutrition supplement for adequate protein. Explained the benefits of adequate PO intake for energy and encouraged pt to consume high-protein foods.

## 2019-12-05 NOTE — PROGRESS NOTE ADULT - PROBLEM SELECTOR PLAN 5
Was on tagrisso but noted progression on imaging last admission.  - Patient was planning to follow with Dr. Deras regarding treatment options for primary cancer Was on tagrisso but noted progression on imaging last admission.  - Patient was planning to follow with Dr. Wright at [facility], has been following Dr. Marie Deras with [facility], who referred him there for clinical trial. At this point that would be his only option.  - PleurX catheter to be drained every other day  -F/u with oncology.

## 2019-12-05 NOTE — DIETITIAN INITIAL EVALUATION ADULT. - PROBLEM SELECTOR PLAN 6
-s/p lasix 40 IV. Will dose another lasix 40 IV now.  - Per patient, he was not taking lasix at home.

## 2019-12-05 NOTE — PROGRESS NOTE ADULT - PROBLEM SELECTOR PLAN 1
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 Patient was hypoxic on nonrebreather, satting well on 50% Bipap 10/5. Likely multifactorial from pna and met lung cancer. Now satting in high 80s% on HFNC.  - continue on HFNC, titrate to SpO2 >88%.  - Appreciate Pulm, Onc, and ID recs  - Unlikely pneumonia, definitely not MRSA per ID. Discontinue vancomycin, will continue with Cefepime. If Cx remain negative tomorrow and no fevers, will DC cefepime tomorrow  - Patient unable to take prednisone orally d/t dysphagia       - IV methylprednisolone 32 mg daily  - Duonebs q6h  - IV Lasix 40 mg, got dose this morning. Will hold for now, pending am Cr. -Monitor strict I's/O's. 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 Patient was hypoxic on nonrebreather. Likely multifactorial met lung cancer, possibly from fluid overload though this now seems less likely. Now satting in high 80s% on HFNC.   - continue on HFNC, titrate to SpO2 >88%.  - Appreciate Pulm, Onc, and ID recs  - Unlikely pneumonia, definitely not MRSA per ID. Discontinued vancomycin and cefepime.   - Patient unable to take prednisone orally d/t dysphagia       - Increase IV methylprednisolone to 20 mg q8h  - Duonebs q6h  - Stopped Lasix as creatinine is rising  - Monitor strict I's/O's.

## 2019-12-05 NOTE — PROGRESS NOTE ADULT - PROBLEM SELECTOR PLAN 6
- Cr increasing, 1.65 today, baseline 1.   - Will continue to trend.  - Avoid nephrotoxic meds when possible, but patient did still require diuresis. If does not improve, will need to cut back on Lasix  -Hold lasix for now. Got dose in am. F/u Cr in am. Less likely pneumonia, more likely progression of disease.   - D/c'd abx  - maintain >88% on HFNC, If desats on HFNC or increased O2 requirements, place on BiPAP, then plan for MICU c/s if still hypoxic  - Appreciate ID and Pulm recs

## 2019-12-05 NOTE — DIETITIAN INITIAL EVALUATION ADULT. - PROBLEM SELECTOR PLAN 3
- Continue vanc/zosyn  - maintain >90% on BiPAP. If desats on BiPAP on increased O2 requirements, plan for MICU c/s.  - ID c/s in AM for recurrent pna

## 2019-12-05 NOTE — PROGRESS NOTE ADULT - PROBLEM SELECTOR PLAN 7
HS trop stable at 84x2. EKG showed sinus tachy. Noted ventricular trigeminy, but likely from hypoxia.  - Low c/f ACS  - Likely from demand and KARSON.   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 But patient has other acute issues at present. Patient grossly volume overloaded, however this is likely contributed to by his bilateral DVTs as well.  - IV Lasix 40 mg received in am. -Monitor off lasix for now. F/u am Cr. Likely contributed to by his bilateral DVTs, patient likely not volume overloaded  - Monitor off creatinine Likely contributed to by his bilateral DVTs, patient less likely volume overloaded  - Monitor creatinine off Lasix

## 2019-12-05 NOTE — PROGRESS NOTE ADULT - PROBLEM SELECTOR PLAN 1
Patient was hypoxic on nonrebreather, satting well on 50% Bipap 10/5. Likely 2/2 lymphangitic spread of known stage IV NSCLC.  - Transition from Bipap10/5 to high flow.  - Appreciate pulm recs: restart duonebs (patient needs nebulizer machine at home), restart prednisone, d/c spiriva, c/w effusion drainage, and contact Dr. Deras regarding further primary cancer treatment.  - Urine and blood cultures did not grow any bacteria. Will empiric antibiotics when cultures return neg.  - Maintain spo2>88% Patient was hypoxic on nonrebreather, satting well on 50% Bipap 10/5. Likely 2/2 lymphangitic spread of known stage IV NSCLC.  - Transition from Bipap10/5 to high flow.  - Appreciate pulm recs: restart duonebs (patient needs nebulizer machine at home), restart prednisone, d/c spiriva, c/w effusion drainage, and contact Dr. Deras regarding further primary cancer treatment.  - Urine and blood cultures did not grow any bacteria. Will empiric antibiotics when cultures return neg.  - continue on HFNC, titrate to SpO2 >88%.  - Appreciate Pulm, Onc, and ID recs  - Unlikely pneumonia, definitely not MRSA per ID. Discontinue vancomycin, will continue with Cefepime. If Cx remain negative tomorrow and no fevers, will DC cefepime tomorrow  - Patient unable to take prednisone orally d/t dysphagia       - IV methylprednisolone 32 mg daily  - Duonebs q6h  - IV Lasix 40 mg, got dose this morning. Will hold for now, pending am Cr.   - Monitor strict I's/O's.

## 2019-12-05 NOTE — PROGRESS NOTE ADULT - PROBLEM SELECTOR PLAN 9
Patient wishes to be DNR/DNI. MOLST form filled out. Patient also advised that his wife is his HCP, she was present for conversation and indicated understanding.  - Palliative Care consult DVT Rx/ppx: Therapeutic Lovenox  NPO until S&S eval DVT Rx/ppx: Therapeutic Lovenox  Diet Regular with precautions

## 2019-12-05 NOTE — PROGRESS NOTE ADULT - SUBJECTIVE AND OBJECTIVE BOX
Interval Events:  Dr. Deras contacted yesterday - due to progression of disease on immunotherapy, no role to continue  Patient does not desire chemotherapy  GOC discussion had by primary team - patient opted for DNR/DNI  Oxygenation adequate on current HFNC settings      14 point ROS negative except as noted above      OBJECTIVE:  ICU Vital Signs Last 24 Hrs  T(C): 36.5 (05 Dec 2019 04:40), Max: 36.8 (04 Dec 2019 20:56)  T(F): 97.7 (05 Dec 2019 04:40), Max: 98.2 (04 Dec 2019 20:56)  HR: 104 (05 Dec 2019 06:13) (100 - 116)  BP: 102/70 (05 Dec 2019 04:40) (102/70 - 119/80)  BP(mean): --  ABP: --  ABP(mean): --  RR: 22 (05 Dec 2019 04:40) (21 - 24)  SpO2: 93% (05 Dec 2019 06:13) (90% - 94%)      12-04 @ 07:01  -  12-05 @ 07:00  --------------------------------------------------------  IN: 340 mL / OUT: 550 mL / NET: -210 mL      CAPILLARY BLOOD GLUCOSE      PHYSICAL EXAM:  General: awake and alert, nontoxic appearing male sitting up in bed with HFNC, NAD  HEENT: NC/AT, EOMI b/l, conjunctiva normal, MMM  Neck: supple  Respiratory: coarse breath sounds diffusely, worse in R lower lung field, wheezing slightly improved, appears comfortable on HFNC, no conversational dyspnea or accessory muscle use, indwelling pleural catheter present in R lateral chest wall, site appears c/d/i without erythema or tenderness  Cardiovascular: S1 S2 present, tachycardic, regular rhythm, no m/r/g  Abdomen: soft, NT/ND  Extremities: 4+ LE pitting edema b/l, no c/c  Skin: no rashes or lesions noted  Neurological: AAOx3, no focal deficits  Psychiatry: calm, cooperative    LINES:     HOSPITAL MEDICATIONS:  Standing Meds:  albuterol/ipratropium for Nebulization 3 milliLiter(s) Nebulizer every 6 hours  gabapentin 300 milliGRAM(s) Oral at bedtime  gabapentin 100 milliGRAM(s) Oral two times a day  heparin  Injectable 5000 Unit(s) SubCutaneous every 8 hours  piperacillin/tazobactam IVPB.. 3.375 Gram(s) IV Intermittent every 8 hours  tiotropium 18 MICROgram(s) Capsule 1 Capsule(s) Inhalation daily  vancomycin  IVPB 1000 milliGRAM(s) IV Intermittent every 12 hours      PRN Meds:  traMADol 25 milliGRAM(s) Oral every 6 hours PRN      LABS:                        14.0   15.28 )-----------( 90       ( 04 Dec 2019 14:08 )             42.4     Hgb Trend: 14.0<--, 14.6<--, 15.2<--  12-04    132<L>  |  90<L>  |  58<H>  ----------------------------<  121<H>  4.5   |  23  |  1.65<H>    Ca    8.8      04 Dec 2019 11:29  Phos  4.7     12-04  Mg     2.7     12-04    TPro  6.6  /  Alb  3.1<L>  /  TBili  0.8  /  DBili  x   /  AST  86<H>  /  ALT  51<H>  /  AlkPhos  276<H>  12-04    Creatinine Trend: 1.65<--, 1.42<--, 1.33<--, 0.91<--, 0.97<--, 1.02<--  PT/INR - ( 03 Dec 2019 08:53 )   PT: 13.1 sec;   INR: 1.15 ratio         PTT - ( 03 Dec 2019 08:53 )  PTT:28.5 sec      MICROBIOLOGY:   Culture - Sputum . (12.04.19 @ 22:14)    Gram Stain:   Few polymorphonuclear leukocytes per low power field  No Squamous epithelial cells per low power field  Moderate Yeast like cells seen per oil power field    Specimen Source: .Sputum Sputum    Culture - Urine (12.03.19 @ 09:39)    Specimen Source: .Urine Clean Catch (Midstream)    Culture Results:   No growth    Culture - Blood (12.03.19 @ 03:05)    Specimen Source: .Blood Blood-Peripheral    Culture Results:   No growth to date.    RADIOLOGY:  < from: CT Angio Chest w/ IV Cont (12.02.19 @ 23:25) >  FINDINGS:     PULMONARY VESSELS: No pulmonary embolus. Main pulmonary artery normal in   diameter.    HEART AND VASCULATURE: Normal heart size without pericardial effusion. No   CT evidence of right heart strain.    No aortic aneurysm or dissection.    LUNGS, AIRWAYS, PLEURA: Patent central airways.    Narrowing of the basilar segmental bronchi of right lower lobe. In   comparison with 11/11/2019, interval decrease of masslikeopacity within   the right lower lobe nodule measures approximately 9 x 8 cm, previously   10 x 9 cm. Perihilar opacities within the right upper lobe are also   decreased.    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    A right Pleurx catheter is present enteric the chest wall at the right 8   rib space, the tip is at the medial basilar right lower pleural space.   The right pleural effusion has been resolved. Small layering left pleural   effusion is unchanged. No pneumothorax.    MEDIASTINUM: Extensive soft tissue infiltrates into the bilateral hilum   and mediastinum is again noted, slightly decreased.    Enlarged right axillary lymph nodes are unchanged, the largest one   measures 2 cm short axis.    UPPER ABDOMEN: Extensive enlarged retroperitoneal and mesenteric lymph   nodes    BONES AND SOFT TISSUES: No aggressive osseous lesion. Subcentimeter   subcutaneous nodules within the right upper back projecting over the   scapula (series 5 image 42) are again noted and likely metastatic.    LOWER NECK: Within normal limits.      IMPRESSION:     1.  No pulmonary embolus.    2.  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    3.  In comparison with 11/11/2019, right lower lobe masslike opacity and   perihilar infiltrates within the right upper lobe are slightly decreased.    4.  Extensive soft tissue infiltration to the bilateral hilaand   mediastinum is again noted, slightly decreased.    5.  Right pleural effusion has been resolved. Small layering left pleural   effusion is unchanged.    < end of copied text >    < from: [facility] Duplex Lower Ext Vein Scan, Bilat (12.03.19 @ 21:47) >  INTERPRETATION:  DVT in the right gastrocnemius vein. DVT in the left   popliteal vein with extension into the tibioperoneal trunk, posterior   tibial and peroneal veins.    < end of copied text >    PULMONARY FUNCTION TESTS: none on file    EKG: Interval Events:  Dr. Deras contacted yesterday - due to progression of disease on immunotherapy, no role to continue  Patient does not desire chemotherapy  GOC discussion had by primary team - patient opted for DNR/DNI  Oxygenation adequate on current HFNC settings  Feels SOB today but overall says he feels at about an "8/10"  Deneis f/c, cough, chest pain, pleuritic chest pain, sputum production, hemotysis    14 point ROS negative except as noted above      OBJECTIVE:  ICU Vital Signs Last 24 Hrs  T(C): 36.5 (05 Dec 2019 04:40), Max: 36.8 (04 Dec 2019 20:56)  T(F): 97.7 (05 Dec 2019 04:40), Max: 98.2 (04 Dec 2019 20:56)  HR: 104 (05 Dec 2019 06:13) (100 - 116)  BP: 102/70 (05 Dec 2019 04:40) (102/70 - 119/80)  BP(mean): --  ABP: --  ABP(mean): --  RR: 22 (05 Dec 2019 04:40) (21 - 24)  SpO2: 93% (05 Dec 2019 06:13) (90% - 94%)      12-04 @ 07:01  -  12-05 @ 07:00  --------------------------------------------------------  IN: 340 mL / OUT: 550 mL / NET: -210 mL      CAPILLARY BLOOD GLUCOSE      PHYSICAL EXAM:  General: awake and alert, nontoxic appearing male sitting up in bed with HFNC, NAD  HEENT: NC/AT, EOMI b/l, conjunctiva normal, MMM  Neck: supple  Respiratory: coarse breath sounds diffusely, worse in R lower lung field, wheezing slightly improved, appears comfortable on HFNC, no conversational dyspnea or accessory muscle use, indwelling pleural catheter present in R lateral chest wall, site appears c/d/i without erythema or tenderness  Cardiovascular: S1 S2 present, tachycardic, regular rhythm, no m/r/g  Abdomen: soft, NT/ND  Extremities: 4+ LE pitting edema b/l, no c/c  Skin: no rashes or lesions noted  Neurological: AAOx3, no focal deficits  Psychiatry: calm, cooperative    LINES:     HOSPITAL MEDICATIONS:  Standing Meds:  albuterol/ipratropium for Nebulization 3 milliLiter(s) Nebulizer every 6 hours  gabapentin 300 milliGRAM(s) Oral at bedtime  gabapentin 100 milliGRAM(s) Oral two times a day  heparin  Injectable 5000 Unit(s) SubCutaneous every 8 hours  piperacillin/tazobactam IVPB.. 3.375 Gram(s) IV Intermittent every 8 hours  tiotropium 18 MICROgram(s) Capsule 1 Capsule(s) Inhalation daily  vancomycin  IVPB 1000 milliGRAM(s) IV Intermittent every 12 hours      PRN Meds:  traMADol 25 milliGRAM(s) Oral every 6 hours PRN      LABS:                        14.0   15.28 )-----------( 90       ( 04 Dec 2019 14:08 )             42.4     Hgb Trend: 14.0<--, 14.6<--, 15.2<--  12-04    132<L>  |  90<L>  |  58<H>  ----------------------------<  121<H>  4.5   |  23  |  1.65<H>    Ca    8.8      04 Dec 2019 11:29  Phos  4.7     12-04  Mg     2.7     12-04    TPro  6.6  /  Alb  3.1<L>  /  TBili  0.8  /  DBili  x   /  AST  86<H>  /  ALT  51<H>  /  AlkPhos  276<H>  12-04    Creatinine Trend: 1.65<--, 1.42<--, 1.33<--, 0.91<--, 0.97<--, 1.02<--  PT/INR - ( 03 Dec 2019 08:53 )   PT: 13.1 sec;   INR: 1.15 ratio         PTT - ( 03 Dec 2019 08:53 )  PTT:28.5 sec      MICROBIOLOGY:   Culture - Sputum . (12.04.19 @ 22:14)    Gram Stain:   Few polymorphonuclear leukocytes per low power field  No Squamous epithelial cells per low power field  Moderate Yeast like cells seen per oil power field    Specimen Source: .Sputum Sputum    Culture - Urine (12.03.19 @ 09:39)    Specimen Source: .Urine Clean Catch (Midstream)    Culture Results:   No growth    Culture - Blood (12.03.19 @ 03:05)    Specimen Source: .Blood Blood-Peripheral    Culture Results:   No growth to date.    RADIOLOGY:  < from: CT Angio Chest w/ IV Cont (12.02.19 @ 23:25) >  FINDINGS:     PULMONARY VESSELS: No pulmonary embolus. Main pulmonary artery normal in   diameter.    HEART AND VASCULATURE: Normal heart size without pericardial effusion. No   CT evidence of right heart strain.    No aortic aneurysm or dissection.    LUNGS, AIRWAYS, PLEURA: Patent central airways.    Narrowing of the basilar segmental bronchi of right lower lobe. In   comparison with 11/11/2019, interval decrease of masslikeopacity within   the right lower lobe nodule measures approximately 9 x 8 cm, previously   10 x 9 cm. Perihilar opacities within the right upper lobe are also   decreased.    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    A right Pleurx catheter is present enteric the chest wall at the right 8   rib space, the tip is at the medial basilar right lower pleural space.   The right pleural effusion has been resolved. Small layering left pleural   effusion is unchanged. No pneumothorax.    MEDIASTINUM: Extensive soft tissue infiltrates into the bilateral hilum   and mediastinum is again noted, slightly decreased.    Enlarged right axillary lymph nodes are unchanged, the largest one   measures 2 cm short axis.    UPPER ABDOMEN: Extensive enlarged retroperitoneal and mesenteric lymph   nodes    BONES AND SOFT TISSUES: No aggressive osseous lesion. Subcentimeter   subcutaneous nodules within the right upper back projecting over the   scapula (series 5 image 42) are again noted and likely metastatic.    LOWER NECK: Within normal limits.      IMPRESSION:     1.  No pulmonary embolus.    2.  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    3.  In comparison with 11/11/2019, right lower lobe masslike opacity and   perihilar infiltrates within the right upper lobe are slightly decreased.    4.  Extensive soft tissue infiltration to the bilateral hilaand   mediastinum is again noted, slightly decreased.    5.  Right pleural effusion has been resolved. Small layering left pleural   effusion is unchanged.    < end of copied text >    < from: [facility] Duplex Lower Ext Vein Scan, Bilat (12.03.19 @ 21:47) >  INTERPRETATION:  DVT in the right gastrocnemius vein. DVT in the left   popliteal vein with extension into the tibioperoneal trunk, posterior   tibial and peroneal veins.    < end of copied text >    PULMONARY FUNCTION TESTS: none on file    EKG:

## 2019-12-05 NOTE — DIETITIAN INITIAL EVALUATION ADULT. - PROBLEM SELECTOR PLAN 1
Patient was hypoxic on nonrebreather, satting well on 50% Bipap 10/5. Likely multifactorial from pna and met lung cancer.  - continue on Bipap 10/5.  - Maintain spo2>90%,  - Will email pulm

## 2019-12-05 NOTE — PROGRESS NOTE ADULT - PROBLEM SELECTOR PLAN 7
DVT ppx: HSQ  Npo while on Bipap. Was on dysphagia diet on prior admission. Problem: Goals of care, counseling/discussion.  Plan: Patient wishes to be DNR/DNI. MOLST form filled out. Patient also advised that his wife is his HCP, she was present for conversation and indicated understanding.  - Palliative Care consult.

## 2019-12-05 NOTE — PROGRESS NOTE ADULT - PROBLEM SELECTOR PLAN 5
Was on Tagrisso but noted progression on imaging last admission.  - Patient was planning to follow with Dr. Wright at [facility], has been following Dr. Marie Deras with [facility], who referred him there for clinical trial. At this point that would be his only option.  - PleurX catheter to be drained every other day  -F/u with oncology. Was on Tagrisso but noted progression on imaging last admission.  - Patient was planning to follow with Dr. Wright at [facility], has been following Dr. Marie Deras with [facility], who referred him there for clinical trial. At this point that would be his only option. Patient declining these interventions now.  - PleurX catheter to be drained every other day  - Palliative care following, appreciate recs

## 2019-12-05 NOTE — PROGRESS NOTE ADULT - PROBLEM SELECTOR PLAN 8
Patient grossly volume overloaded, however this is likely contributed to by his bilateral DVTs as well.  - IV Lasix 40 mg received in am. -Monitor off lasix for now. F/u am Cr. Patient wishes to be DNR/DNI. MOLST form filled out. Patient also advised that his wife is his HCP, she was present for conversation and indicated understanding.  - Palliative Care consult Patient wishes to be DNR/DNI. MOLST form filled out. Patient also advised that his wife is his HCP, she was present for conversation and indicated understanding.  - Palliative Care consulted

## 2019-12-05 NOTE — PROGRESS NOTE ADULT - ATTENDING COMMENTS
-Patient seen/examined on 12/5/19. Case/plan discussed with the intern and resident as reviewed/edited by me above and in any comments below.  -Updated patient and his son at bedside.   -Discussed with Dr. Ureña. Palliative recs appreciated.   -KARSON noted. Likely multifactorial due to limited fluid intake, Lasix, Vanco/zosyn combo, IV contrast for CT, etc. -Will check bladder scan and US renal to rule out any obstruction/retention, though less likely. -Hold Lasix for now. -F/u urine studies.   -Elevate LE's and compression stockings for LE edema.   -Discussed with Dr. Solis; hold abx for now.   -Increase Solumedrol to 20mg IV Q8H given wheezing on exam.   -Patient would like to continue regular diet despite aspiration risk, per his wishes.

## 2019-12-05 NOTE — PROGRESS NOTE ADULT - PROBLEM SELECTOR PLAN 4
-s/p lasix 40 IV. Will dose another lasix 40 IV at 6pm.  - Per patient, he was not taking lasix at home.  - Lower extremity vein duplex shows bilateral DVTs (12/4). Note 12/2 CTA revealed no pulmonary embolus  - Giving lovenox -s/p lasix 40 IV. Will dose another lasix 40 IV at 6pm.  - Per patient, he was not taking lasix at home.  - Lower extremity vein duplex shows bilateral DVTs (12/4). Note 12/2 CTA revealed no pulmonary embolus  - Lovenox 1.5 mg/kg daily (90 mg)  -Monitor platelets closely.

## 2019-12-05 NOTE — CHART NOTE - NSCHARTNOTEFT_GEN_A_CORE
Upon Nutritional Assessment by the Registered Dietitian your patient was determined to meet criteria / has evidence of the following diagnosis/diagnoses:          [ ]  Mild Protein Calorie Malnutrition        [ ]  Moderate Protein Calorie Malnutrition        [x] Severe Protein Calorie Malnutrition        [ ] Unspecified Protein Calorie Malnutrition        [ ] Underweight / BMI <19        [ ] Morbid Obesity / BMI > 40      Findings as based on:  [x] Comprehensive nutrition assessment   [x] Nutrition Focused Physical Exam  [x] Other:   Etiology: Inability to meet increased nutrient needs in setting of lung cancer and pressure injuries secondary to dysphagia.    Signs/Symptoms: Physical findings: severe fat loss and muscle wasting, 25% wt loss x 4 months.         Nutrition Plan/Recommendations:      1. Continue current regular diet   2. Recommend [employer] Organic Vegan Nutrition Shake x3/day (provides 660 kcal, 48 g protein)   3. Recommend Multivitamin and vitamin C supplement   4. Review/provide nutrition education as needed   5. Will continue to monitor weight, po intake, and diet tolerance    Sofy Bonilla RD. Pager: [phone]     PROVIDER Section:     By signing this assessment you are acknowledging and agree with the diagnosis/diagnoses assigned by the Registered Dietitian    Comments:

## 2019-12-06 LAB
ALBUMIN SERPL ELPH-MCNC: 3.2 G/DL — LOW (ref 3.3–5)
ALP SERPL-CCNC: 290 U/L — HIGH (ref 40–120)
ALT FLD-CCNC: 62 U/L — HIGH (ref 10–45)
ANION GAP SERPL CALC-SCNC: 18 MMOL/L — HIGH (ref 5–17)
ANION GAP SERPL CALC-SCNC: 19 MMOL/L — HIGH (ref 5–17)
APPEARANCE UR: ABNORMAL
AST SERPL-CCNC: 107 U/L — HIGH (ref 10–40)
BASOPHILS # BLD AUTO: 0.03 K/UL — SIGNIFICANT CHANGE UP (ref 0–0.2)
BASOPHILS NFR BLD AUTO: 0.2 % — SIGNIFICANT CHANGE UP (ref 0–2)
BILIRUB SERPL-MCNC: 0.8 MG/DL — SIGNIFICANT CHANGE UP (ref 0.2–1.2)
BILIRUB UR-MCNC: NEGATIVE — SIGNIFICANT CHANGE UP
BUN SERPL-MCNC: 94 MG/DL — HIGH (ref 7–23)
BUN SERPL-MCNC: 95 MG/DL — HIGH (ref 7–23)
CALCIUM SERPL-MCNC: 8.2 MG/DL — LOW (ref 8.4–10.5)
CALCIUM SERPL-MCNC: 8.9 MG/DL — SIGNIFICANT CHANGE UP (ref 8.4–10.5)
CHLORIDE SERPL-SCNC: 87 MMOL/L — LOW (ref 96–108)
CHLORIDE SERPL-SCNC: 89 MMOL/L — LOW (ref 96–108)
CO2 SERPL-SCNC: 20 MMOL/L — LOW (ref 22–31)
CO2 SERPL-SCNC: 24 MMOL/L — SIGNIFICANT CHANGE UP (ref 22–31)
COLOR SPEC: YELLOW — SIGNIFICANT CHANGE UP
CREAT SERPL-MCNC: 2.24 MG/DL — HIGH (ref 0.5–1.3)
CREAT SERPL-MCNC: 2.3 MG/DL — HIGH (ref 0.5–1.3)
CULTURE RESULTS: SIGNIFICANT CHANGE UP
DIFF PNL FLD: ABNORMAL
EOSINOPHIL # BLD AUTO: 0 K/UL — SIGNIFICANT CHANGE UP (ref 0–0.5)
EOSINOPHIL NFR BLD AUTO: 0 % — SIGNIFICANT CHANGE UP (ref 0–6)
GLUCOSE SERPL-MCNC: 133 MG/DL — HIGH (ref 70–99)
GLUCOSE SERPL-MCNC: 135 MG/DL — HIGH (ref 70–99)
GLUCOSE UR QL: NEGATIVE — SIGNIFICANT CHANGE UP
HCT VFR BLD CALC: 41.3 % — SIGNIFICANT CHANGE UP (ref 39–50)
HGB BLD-MCNC: 13.5 G/DL — SIGNIFICANT CHANGE UP (ref 13–17)
IMM GRANULOCYTES NFR BLD AUTO: 0.7 % — SIGNIFICANT CHANGE UP (ref 0–1.5)
KETONES UR-MCNC: ABNORMAL
LEUKOCYTE ESTERASE UR-ACNC: NEGATIVE — SIGNIFICANT CHANGE UP
LYMPHOCYTES # BLD AUTO: 0.15 K/UL — LOW (ref 1–3.3)
LYMPHOCYTES # BLD AUTO: 0.8 % — LOW (ref 13–44)
MAGNESIUM SERPL-MCNC: 3 MG/DL — HIGH (ref 1.6–2.6)
MCHC RBC-ENTMCNC: 28.8 PG — SIGNIFICANT CHANGE UP (ref 27–34)
MCHC RBC-ENTMCNC: 32.7 GM/DL — SIGNIFICANT CHANGE UP (ref 32–36)
MCV RBC AUTO: 88.1 FL — SIGNIFICANT CHANGE UP (ref 80–100)
MONOCYTES # BLD AUTO: 0.48 K/UL — SIGNIFICANT CHANGE UP (ref 0–0.9)
MONOCYTES NFR BLD AUTO: 2.7 % — SIGNIFICANT CHANGE UP (ref 2–14)
NEUTROPHILS # BLD AUTO: 16.87 K/UL — HIGH (ref 1.8–7.4)
NEUTROPHILS NFR BLD AUTO: 95.6 % — HIGH (ref 43–77)
NITRITE UR-MCNC: NEGATIVE — SIGNIFICANT CHANGE UP
NT-PROBNP SERPL-SCNC: 1720 PG/ML — HIGH (ref 0–300)
OSMOLALITY UR: 607 MOSM/KG — SIGNIFICANT CHANGE UP (ref 50–1200)
PH UR: 5.5 — SIGNIFICANT CHANGE UP (ref 5–8)
PHOSPHATE SERPL-MCNC: 6 MG/DL — HIGH (ref 2.5–4.5)
PLATELET # BLD AUTO: 94 K/UL — LOW (ref 150–400)
POTASSIUM SERPL-MCNC: 5.4 MMOL/L — HIGH (ref 3.5–5.3)
POTASSIUM SERPL-MCNC: 5.5 MMOL/L — HIGH (ref 3.5–5.3)
POTASSIUM SERPL-SCNC: 5.4 MMOL/L — HIGH (ref 3.5–5.3)
POTASSIUM SERPL-SCNC: 5.5 MMOL/L — HIGH (ref 3.5–5.3)
PROT SERPL-MCNC: 6.4 G/DL — SIGNIFICANT CHANGE UP (ref 6–8.3)
PROT UR-MCNC: ABNORMAL
RBC # BLD: 4.69 M/UL — SIGNIFICANT CHANGE UP (ref 4.2–5.8)
RBC # FLD: 15.1 % — HIGH (ref 10.3–14.5)
SODIUM SERPL-SCNC: 128 MMOL/L — LOW (ref 135–145)
SODIUM SERPL-SCNC: 129 MMOL/L — LOW (ref 135–145)
SP GR SPEC: 1.03 — HIGH (ref 1.01–1.02)
SPECIMEN SOURCE: SIGNIFICANT CHANGE UP
UROBILINOGEN FLD QL: SIGNIFICANT CHANGE UP
UUN UR-MCNC: 848 MG/DL — SIGNIFICANT CHANGE UP
WBC # BLD: 17.66 K/UL — HIGH (ref 3.8–10.5)
WBC # FLD AUTO: 17.66 K/UL — HIGH (ref 3.8–10.5)

## 2019-12-06 PROCEDURE — 99233 SBSQ HOSP IP/OBS HIGH 50: CPT | Mod: GC

## 2019-12-06 PROCEDURE — 99233 SBSQ HOSP IP/OBS HIGH 50: CPT

## 2019-12-06 PROCEDURE — 99232 SBSQ HOSP IP/OBS MODERATE 35: CPT

## 2019-12-06 PROCEDURE — 93010 ELECTROCARDIOGRAM REPORT: CPT

## 2019-12-06 RX ORDER — HYDROMORPHONE HYDROCHLORIDE 2 MG/ML
0.3 INJECTION INTRAMUSCULAR; INTRAVENOUS; SUBCUTANEOUS EVERY 4 HOURS
Refills: 0 | Status: DISCONTINUED | OUTPATIENT
Start: 2019-12-06 | End: 2019-12-08

## 2019-12-06 RX ORDER — SODIUM CHLORIDE 9 MG/ML
500 INJECTION INTRAMUSCULAR; INTRAVENOUS; SUBCUTANEOUS ONCE
Refills: 0 | Status: COMPLETED | OUTPATIENT
Start: 2019-12-06 | End: 2019-12-06

## 2019-12-06 RX ORDER — DEXTROSE 50 % IN WATER 50 %
50 SYRINGE (ML) INTRAVENOUS ONCE
Refills: 0 | Status: COMPLETED | OUTPATIENT
Start: 2019-12-06 | End: 2019-12-06

## 2019-12-06 RX ORDER — INSULIN HUMAN 100 [IU]/ML
5 INJECTION, SOLUTION SUBCUTANEOUS ONCE
Refills: 0 | Status: COMPLETED | OUTPATIENT
Start: 2019-12-06 | End: 2019-12-06

## 2019-12-06 RX ORDER — INSULIN HUMAN 100 [IU]/ML
10 INJECTION, SOLUTION SUBCUTANEOUS ONCE
Refills: 0 | Status: COMPLETED | OUTPATIENT
Start: 2019-12-06 | End: 2019-12-06

## 2019-12-06 RX ORDER — ALBUTEROL 90 UG/1
10 AEROSOL, METERED ORAL ONCE
Refills: 0 | Status: COMPLETED | OUTPATIENT
Start: 2019-12-06 | End: 2019-12-06

## 2019-12-06 RX ORDER — HYDROMORPHONE HYDROCHLORIDE 2 MG/ML
0.5 INJECTION INTRAMUSCULAR; INTRAVENOUS; SUBCUTANEOUS EVERY 4 HOURS
Refills: 0 | Status: DISCONTINUED | OUTPATIENT
Start: 2019-12-06 | End: 2019-12-08

## 2019-12-06 RX ORDER — CALCIUM GLUCONATE 100 MG/ML
1 VIAL (ML) INTRAVENOUS ONCE
Refills: 0 | Status: COMPLETED | OUTPATIENT
Start: 2019-12-06 | End: 2019-12-06

## 2019-12-06 RX ADMIN — Medication 3 MILLILITER(S): at 06:07

## 2019-12-06 RX ADMIN — Medication 3 MILLILITER(S): at 11:27

## 2019-12-06 RX ADMIN — HYDROMORPHONE HYDROCHLORIDE 0.3 MILLIGRAM(S): 2 INJECTION INTRAMUSCULAR; INTRAVENOUS; SUBCUTANEOUS at 22:27

## 2019-12-06 RX ADMIN — Medication 100 GRAM(S): at 17:49

## 2019-12-06 RX ADMIN — GABAPENTIN 100 MILLIGRAM(S): 400 CAPSULE ORAL at 06:07

## 2019-12-06 RX ADMIN — SODIUM CHLORIDE 100 MILLILITER(S): 9 INJECTION INTRAMUSCULAR; INTRAVENOUS; SUBCUTANEOUS at 23:00

## 2019-12-06 RX ADMIN — Medication 3 MILLILITER(S): at 22:28

## 2019-12-06 RX ADMIN — GABAPENTIN 300 MILLIGRAM(S): 400 CAPSULE ORAL at 22:28

## 2019-12-06 RX ADMIN — Medication 20 MILLIGRAM(S): at 14:21

## 2019-12-06 RX ADMIN — ENOXAPARIN SODIUM 90 MILLIGRAM(S): 100 INJECTION SUBCUTANEOUS at 11:26

## 2019-12-06 RX ADMIN — Medication 3 MILLIGRAM(S): at 22:28

## 2019-12-06 RX ADMIN — ALBUTEROL 10 MILLIGRAM(S): 90 AEROSOL, METERED ORAL at 14:21

## 2019-12-06 RX ADMIN — Medication 3 MILLILITER(S): at 17:50

## 2019-12-06 RX ADMIN — INSULIN HUMAN 5 UNIT(S): 100 INJECTION, SOLUTION SUBCUTANEOUS at 14:49

## 2019-12-06 RX ADMIN — Medication 50 MILLILITER(S): at 20:07

## 2019-12-06 RX ADMIN — Medication 20 MILLIGRAM(S): at 06:07

## 2019-12-06 RX ADMIN — INSULIN HUMAN 10 UNIT(S): 100 INJECTION, SOLUTION SUBCUTANEOUS at 20:07

## 2019-12-06 RX ADMIN — Medication 20 MILLIGRAM(S): at 22:27

## 2019-12-06 RX ADMIN — SODIUM CHLORIDE 100 MILLILITER(S): 9 INJECTION INTRAMUSCULAR; INTRAVENOUS; SUBCUTANEOUS at 12:37

## 2019-12-06 RX ADMIN — Medication 50 MILLILITER(S): at 14:49

## 2019-12-06 NOTE — PROGRESS NOTE ADULT - PROBLEM SELECTOR PLAN 1
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 Patient was hypoxic on nonrebreather. Likely multifactorial met lung cancer, possibly from fluid overload though this now seems less likely. Now satting in high 80s% on HFNC.   - continue on HFNC, titrate to SpO2 >88%.  - Appreciate Pulm, Onc, and ID recs  - Unlikely pneumonia, definitely not MRSA per ID. Discontinued vancomycin and cefepime.   - Patient unable to take prednisone orally d/t dysphagia       - Increase IV methylprednisolone to 20 mg q8h  - Duonebs q6h  - Stopped Lasix as creatinine is rising  - Monitor strict I's/O's. 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 Patient was hypoxic on nonrebreather. Likely multifactorial met lung cancer, possibly from fluid overload though this now seems less likely. Now satting in high 80s% on HFNC.   - continue on HFNC, titrate to SpO2 >88%.  - Appreciate Pulm, Onc, and ID recs  - Unlikely pneumonia, definitely not MRSA per ID. Discontinued vancomycin and cefepime.   - Patient unable to take prednisone orally d/t dysphagia       - Increased IV methylprednisolone to 20 mg q8h  - Duonebs q6h  - Stopped Lasix as creatinine is rising  - Monitor strict I's/O's.

## 2019-12-06 NOTE — PROGRESS NOTE ADULT - PROBLEM SELECTOR PLAN 6
Less likely pneumonia, more likely progression of disease.   - D/c'd abx  - maintain >88% on HFNC, If desats on HFNC or increased O2 requirements, place on BiPAP, then plan for MICU c/s if still hypoxic  - Appreciate ID and Pulm recs

## 2019-12-06 NOTE — PROGRESS NOTE ADULT - SUBJECTIVE AND OBJECTIVE BOX
***INCOMPLETE NOTE***  Casper Mario MD PGY-1  Internal Medicine  Pager [phone] / [phone] Casper Mario MD PGY-1  Internal Medicine  Pager [phone] / [phone]    Patient is a 59y old  Male who presents with a chief complaint of SOB (06 Dec 2019 13:17)    SUBJECTIVE / OVERNIGHT EVENTS:  Overnight patient was hypoxic and required BiPAP. Has leg pain from wraps  Denies nausea, vomiting, constipation, diarrhea, fevers, chills, chest pain, SOB.     MEDICATIONS  (STANDING):  albuterol/ipratropium for Nebulization 3 milliLiter(s) Nebulizer every 6 hours  ascorbic acid 500 milliGRAM(s) Oral daily  enoxaparin Injectable 90 milliGRAM(s) SubCutaneous daily  gabapentin 300 milliGRAM(s) Oral at bedtime  gabapentin 100 milliGRAM(s) Oral two times a day  melatonin 3 milliGRAM(s) Oral at bedtime  methylPREDNISolone sodium succinate Injectable 20 milliGRAM(s) IV Push every 8 hours  multivitamin 1 Tablet(s) Oral daily    MEDICATIONS  (PRN):  benzonatate 200 milliGRAM(s) Oral three times a day PRN Cough  HYDROmorphone  Injectable 0.5 milliGRAM(s) IV Push every 4 hours PRN Severe Pain (7 - 10)  HYDROmorphone  Injectable 0.3 milliGRAM(s) IV Push every 4 hours PRN moderate or severe pain  LORazepam   Injectable 0.5 milliGRAM(s) IV Push every 6 hours PRN Anxiety      CAPILLARY BLOOD GLUCOSE      I&O's Summary    05 Dec 2019 07:  -  06 Dec 2019 07:00  --------------------------------------------------------  IN: 240 mL / OUT: 100 mL / NET: 140 mL    06 Dec 2019 07:  -  06 Dec 2019 22:09  --------------------------------------------------------  IN: 240 mL / OUT: 400 mL / NET: -160 mL        PHYSICAL EXAM:  Vital Signs Last 24 Hrs  T(C): 36.6 (19 @ 22:06)  T(F): 97.8 (19 @ 22:06), Max: 97.8 (19 @ 11:50)  HR: 109 (19 @ 22:06) (109 - 116)  BP: 110/72 (19 @ 22:06)  BP(mean): --  RR: 30 (19 @ 22:06) (20 - 30)  SpO2: 92% (19 @ 22:06) (89% - 92%)  Wt(kg): --     @ 07:01  -   @ 07:00  --------------------------------------------------------  IN: 240 mL / OUT: 100 mL / NET: 140 mL     @ 07:01  -   @ 22:09  --------------------------------------------------------  IN: 240 mL / OUT: 400 mL / NET: -160 mL    Constitutional: Wearing HFNC, awake and alert.  EYES: EOMI  ENT:  Normal Hearing  Respiratory: on HFNC. Diffuse rhonchi and wheezes, increased in R lung field compared to left, same from yesterday.   Cardiovascular: S1 and S2, tachycardic, regular, no Murmurs, gallops or rubs  Gastrointestinal: Bowel Sounds present, soft, nontender, nondistended, no guarding, no rebound  Extremities: 3+ bilateral lower extremity edema, non-tender to palpation, slightly lower down on legs than yesterday  Neurological: No focal deficits, CN II-XII intact bilaterally, sensation to light touch intact in all extremities.  Skin: No rashes  Psych: AAOx3        LABS:                        13.5   17.66 )-----------( 94       ( 06 Dec 2019 13:35 )             41.3         128<L>  |  89<L>  |  95<H>  ----------------------------<  135<H>  5.4<H>   |  20<L>  |  2.30<H>    Ca    8.9      06 Dec 2019 19:03  Phos  6.0       Mg     3.0         TPro  6.4  /  Alb  3.2<L>  /  TBili  0.8  /  DBili  x   /  AST  107<H>  /  ALT  62<H>  /  AlkPhos  290<H>            Urinalysis Basic - ( 05 Dec 2019 22:12 )    Color: Yellow / Appearance: Slightly Turbid / S.035 / pH: x  Gluc: x / Ketone: Small  / Bili: Negative / Urobili: <2 mg/dL   Blood: x / Protein: 30 mg/dL / Nitrite: Negative   Leuk Esterase: Negative / RBC: 8 /HPF / WBC 5 /HPF   Sq Epi: x / Non Sq Epi: 6 /HPF / Bacteria: Few        RADIOLOGY & ADDITIONAL TESTS:    Imaging Personally Reviewed:    Consultant(s) Notes Reviewed:    Pulmonology  Palliative Care  ID    Care Discussed with Consultants/Other Providers:

## 2019-12-06 NOTE — PROGRESS NOTE ADULT - PROBLEM SELECTOR PLAN 2
- Cr increasing, 2.1 today, baseline 1. Most concerning for pre-renal etiology, though may also be intrarenal given exposure to nephrotoxic antibiotics and IV contrast.  - Will continue to trend, checking tonight at 5pm. Consider fluids if continues to rise  - Avoid nephrotoxic meds when possible   - Holding lasix for now for KARSON - Cr increasing, 2.3 today, baseline 1. Most concerning for pre-renal etiology, though may also be intrarenal given exposure to nephrotoxic antibiotics and IV contrast. Bladder scan was 0, not post-renal  - Will continue to trend, checking tonight at 5pm. Consider fluids if continues to rise  - Avoid nephrotoxic meds when possible   - Holding lasix for now for KARSON  - Was hyperkalemic, s/p albuterol, IVF, and insulin/D5, will repeat for continued hyperkalemia  - 500cc bolus IVF tonight - Cr increasing, 2.3 today, baseline 1. Most concerning for pre-renal etiology, though may also be intrarenal given exposure to nephrotoxic antibiotics and IV contrast. Bladder scan was 0, not post-renal  - Will continue to trend, since Cr was increasing, gave 500cc fluids x 1 in daytime.   - Avoid nephrotoxic meds when possible   - Holding lasix for now for KARSON  - Was hyperkalemic, s/p albuterol, IVF, and insulin/D5, will repeat for continued hyperkalemia  - 500cc bolus IVF tonight  -Monitor UO closely.

## 2019-12-06 NOTE — PROGRESS NOTE ADULT - SUBJECTIVE AND OBJECTIVE BOX
This is HD 3 for our 60 yo M w/PMH of 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    SUBJECTIVE / OVERNIGHT EVENTS: Patient was SOB overnight. He requested more O2. His high-flow was increased from 50L/min to 55L/min but he still reported SOB so he was placed on bipap FiO2 80%.    Patient is a 59y old  Male who presents with a chief complaint of SOB (06 Dec 2019 07:01)      SUBJECTIVE / OVERNIGHT EVENTS:    MEDICATIONS  (STANDING):  albuterol/ipratropium for Nebulization 3 milliLiter(s) Nebulizer every 6 hours  ascorbic acid 500 milliGRAM(s) Oral daily  enoxaparin Injectable 90 milliGRAM(s) SubCutaneous daily  gabapentin 300 milliGRAM(s) Oral at bedtime  gabapentin 100 milliGRAM(s) Oral two times a day  melatonin 3 milliGRAM(s) Oral at bedtime  methylPREDNISolone sodium succinate Injectable 20 milliGRAM(s) IV Push every 8 hours  multivitamin 1 Tablet(s) Oral daily    MEDICATIONS  (PRN):  benzonatate 200 milliGRAM(s) Oral three times a day PRN Cough  HYDROmorphone  Injectable 0.5 milliGRAM(s) IV Push every 4 hours PRN Severe Pain (7 - 10)  HYDROmorphone  Injectable 0.3 milliGRAM(s) IV Push every 4 hours PRN moderate or severe pain  LORazepam    IVPB 0.5 milliGRAM(s) IV Intermittent every 6 hours PRN Anxiety      Vital Signs Last 24 Hrs  T(C): 36.4 (19 @ 04:18)  T(F): 97.6 (19 @ 04:18), Max: 98.3 (19 @ 21:33)  HR: 112 (19 @ 07:08) (109 - 116)  BP: 117/79 (19 @ 04:18)  BP(mean): --  RR: 20 (19 @ 06:54) (20 - 22)  SpO2: 90% (12-06-19 @ 07:08) (90% - 90%)  Wt(kg): --     @ 07:01  -   @ 07:00  --------------------------------------------------------  IN: 240 mL / OUT: 100 mL / NET: 140 mL      PHYSICAL EXAM:  GENERAL: Working to breath, sitting in the chair bent over the bed  HEAD:  Atraumatic, Normocephalic  EYES: EOMI, PERRLA, pale conjunctiva and sclera anicteric  NECK: Supple, No appreciable JVD  CHEST/LUNG: rhonchi and wheezing bilaterally but more prominent in the lower right lung  HEART: Regular rate and rhythm; No murmurs, rubs, or gallops; S1S2+  ABDOMEN: Soft, Nontender, Nondistended; Bowel sounds present  EXTREMITIES:  No clubbing or cyanosis. Bilateral LE pitting edema 3+  PSYCH: AAOx3  NEUROLOGY: non-focal  SKIN: No rashes or lesions      LABS:                        13.6   15.81 )-----------( 94       ( 05 Dec 2019 13:23 )             41.0     12-05    132<L>  |  90<L>  |  83<H>  ----------------------------<  147<H>  5.0   |  23  |  2.09<H>    Ca    8.5      05 Dec 2019 18:00  Phos  5.8     12-05  Mg     2.9     12-05    TPro  6.6  /  Alb  3.1<L>  /  TBili  0.8  /  DBili  x   /  AST  94<H>  /  ALT  68<H>  /  AlkPhos  282<H>  12-05          Urinalysis Basic - ( 05 Dec 2019 22:12 )    Color: Yellow / Appearance: Slightly Turbid / S.035 / pH: x  Gluc: x / Ketone: Small  / Bili: Negative / Urobili: <2 mg/dL   Blood: x / Protein: 30 mg/dL / Nitrite: Negative   Leuk Esterase: Negative / RBC: 8 /HPF / WBC 5 /HPF   Sq Epi: x / Non Sq Epi: 6 /HPF / Bacteria: Few        RADIOLOGY & ADDITIONAL TESTS:    Imaging Personally Reviewed:    Consultant(s) Notes Reviewed:      Care Discussed with Consultants/Other Providers:    Contact: , MD This is HD 3 for our 58 yo M w/PMH of 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    SUBJECTIVE / OVERNIGHT EVENTS: Patient was SOB overnight. He requested more O2. His high-flow was increased from 50L/min to 55L/min but he still reported SOB so he was placed on bipap FiO2 80%.    MEDICATIONS  (STANDING):  albuterol/ipratropium for Nebulization 3 milliLiter(s) Nebulizer every 6 hours  ascorbic acid 500 milliGRAM(s) Oral daily  enoxaparin Injectable 90 milliGRAM(s) SubCutaneous daily  gabapentin 300 milliGRAM(s) Oral at bedtime  gabapentin 100 milliGRAM(s) Oral two times a day  melatonin 3 milliGRAM(s) Oral at bedtime  methylPREDNISolone sodium succinate Injectable 20 milliGRAM(s) IV Push every 8 hours  multivitamin 1 Tablet(s) Oral daily    MEDICATIONS  (PRN):  benzonatate 200 milliGRAM(s) Oral three times a day PRN Cough  HYDROmorphone  Injectable 0.5 milliGRAM(s) IV Push every 4 hours PRN Severe Pain (7 - 10)  HYDROmorphone  Injectable 0.3 milliGRAM(s) IV Push every 4 hours PRN moderate or severe pain  LORazepam    IVPB 0.5 milliGRAM(s) IV Intermittent every 6 hours PRN Anxiety    ICU Vital Signs Last 24 Hrs  T(C): 36.4 (06 Dec 2019 04:18), Max: 36.8 (05 Dec 2019 21:33)  T(F): 97.6 (06 Dec 2019 04:18), Max: 98.3 (05 Dec 2019 21:33)  HR: 112 (06 Dec 2019 07:08) (109 - 116)  BP: 117/79 (06 Dec 2019 04:18) (102/66 - 117/79)  RR: 20 (06 Dec 2019 06:54) (20 - 22)  SpO2: 90% (06 Dec 2019 07:08) (90% - 90%)    - @ 07:01  -   @ 07:00  --------------------------------------------------------  IN: 240 mL / OUT: 100 mL / NET: 140 mL    PHYSICAL EXAM:  GENERAL: Working to breath, sitting in the chair bent over the bed  HEAD:  Atraumatic, Normocephalic  EYES: EOMI, PERRLA, pale conjunctiva and sclera anicteric  NECK: Supple, No appreciable JVD  CHEST/LUNG: rhonchi bilaterally but more prominent in the lower right lung  HEART: Regular rate and rhythm; No murmurs, rubs, or gallops; S1S2+  ABDOMEN: Soft, Nontender, Nondistended; Bowel sounds present  EXTREMITIES:  No clubbing or cyanosis. Bilateral LE pitting edema 3+  PSYCH: AAOx3  NEUROLOGY: non-focal  SKIN: No rashes or lesions    LABS:                        13.6   15.81 )-----------( 94       ( 05 Dec 2019 13:23 )             41.0     12-    132<L>  |  90<L>  |  83<H>  ----------------------------<  147<H>  5.0   |  23  |  2.09<H>    Ca    8.5      05 Dec 2019 18:00  Phos  5.8     -  Mg     2.9         TPro  6.6  /  Alb  3.1<L>  /  TBili  0.8  /  DBili  x   /  AST  94<H>  /  ALT  68<H>  /  AlkPhos  282<H>      Osmolality, Random Urine: 607 mosm/Kg (19 @ 22:16)  Sodium, Random Urine: <35 (19 @ 17:52)  Creatinine, Random Urine: 175 (19 @ 17:52)  FeNa: 0.3%, suggesting a prerenal etiology  Fractional Excretion of Sodium (FENa), % = 100 × (SCr × Evelyn ) / (SNa × UCr)  Osmolality, Serum: 308: (19 @ 18:00)  Urea Nitrogen,  Random Urine: 848 mg/dL (19 @ 22:15)    Urinalysis Basic - ( 05 Dec 2019 22:12 )  Color: Yellow / Appearance: Slightly Turbid / S.035 / pH: x  Gluc: x / Ketone: Small  / Bili: Negative / Urobili: <2 mg/dL   Blood: x / Protein: 30 mg/dL / Nitrite: Negative   Leuk Esterase: Negative / RBC: 8 /HPF / WBC 5 /HPF   Sq Epi: x / Non Sq Epi: 6 /HPF / Bacteria: Few Incomplete note by Ramón Tinsley MS3  This is HD 3 for our 60 yo M w/PMH of 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    SUBJECTIVE / OVERNIGHT EVENTS: Patient was SOB overnight. He requested more O2. His high-flow was increased from 50L/min to 55L/min but he still reported SOB so he was placed on bipap FiO2 80%.    MEDICATIONS  (STANDING):  albuterol/ipratropium for Nebulization 3 milliLiter(s) Nebulizer every 6 hours  ascorbic acid 500 milliGRAM(s) Oral daily  enoxaparin Injectable 90 milliGRAM(s) SubCutaneous daily  gabapentin 300 milliGRAM(s) Oral at bedtime  gabapentin 100 milliGRAM(s) Oral two times a day  melatonin 3 milliGRAM(s) Oral at bedtime  methylPREDNISolone sodium succinate Injectable 20 milliGRAM(s) IV Push every 8 hours  multivitamin 1 Tablet(s) Oral daily    MEDICATIONS  (PRN):  benzonatate 200 milliGRAM(s) Oral three times a day PRN Cough  HYDROmorphone  Injectable 0.5 milliGRAM(s) IV Push every 4 hours PRN Severe Pain (7 - 10)  HYDROmorphone  Injectable 0.3 milliGRAM(s) IV Push every 4 hours PRN moderate or severe pain  LORazepam    IVPB 0.5 milliGRAM(s) IV Intermittent every 6 hours PRN Anxiety    ICU Vital Signs Last 24 Hrs  T(C): 36.4 (06 Dec 2019 04:18), Max: 36.8 (05 Dec 2019 21:33)  T(F): 97.6 (06 Dec 2019 04:18), Max: 98.3 (05 Dec 2019 21:33)  HR: 112 (06 Dec 2019 07:08) (109 - 116)  BP: 117/79 (06 Dec 2019 04:18) (102/66 - 117/79)  RR: 20 (06 Dec 2019 06:54) (20 - 22)  SpO2: 90% (06 Dec 2019 07:08) (90% - 90%)    - @ 07:01  -   @ 07:00  --------------------------------------------------------  IN: 240 mL / OUT: 100 mL / NET: 140 mL    PHYSICAL EXAM:  GENERAL: Working to breath, sitting in the chair bent over the bed  HEAD:  Atraumatic, Normocephalic  EYES: EOMI, PERRLA, pale conjunctiva and sclera anicteric  NECK: Supple, No appreciable JVD  CHEST/LUNG: rhonchi bilaterally but more prominent in the lower right lung  HEART: Regular rate and rhythm; No murmurs, rubs, or gallops; S1S2+  ABDOMEN: Soft, Nontender, Nondistended; Bowel sounds present  EXTREMITIES:  No clubbing or cyanosis. Bilateral LE pitting edema 3+  PSYCH: AAOx3  NEUROLOGY: non-focal  SKIN: No rashes or lesions    LABS:                        13.6   15.81 )-----------( 94       ( 05 Dec 2019 13:23 )             41.0         132<L>  |  90<L>  |  83<H>  ----------------------------<  147<H>  5.0   |  23  |  2.09<H>    Ca    8.5      05 Dec 2019 18:00  Phos  5.8       Mg     2.9         TPro  6.6  /  Alb  3.1<L>  /  TBili  0.8  /  DBili  x   /  AST  94<H>  /  ALT  68<H>  /  AlkPhos  282<H>      Osmolality, Random Urine: 607 mosm/Kg (19 @ 22:16)  Sodium, Random Urine: <35 (19 @ 17:52)  Creatinine, Random Urine: 175 (19 @ 17:52)  FeNa: 0.3%, suggesting a prerenal etiology  Fractional Excretion of Sodium (FENa), % = 100 × (SCr × Evelyn ) / (SNa × UCr)  Osmolality, Serum: 308: (19 @ 18:00)  Urea Nitrogen,  Random Urine: 848 mg/dL (19 @ 22:15)    Urinalysis Basic - ( 05 Dec 2019 22:12 )  Color: Yellow / Appearance: Slightly Turbid / S.035 / pH: x  Gluc: x / Ketone: Small  / Bili: Negative / Urobili: <2 mg/dL   Blood: x / Protein: 30 mg/dL / Nitrite: Negative   Leuk Esterase: Negative / RBC: 8 /HPF / WBC 5 /HPF   Sq Epi: x / Non Sq Epi: 6 /HPF / Bacteria: Few Incomplete note by Ramón Tinsley MS3  This is HD 3 for our 60 yo M w/PMH of 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    SUBJECTIVE / OVERNIGHT EVENTS: Patient was SOB overnight. He requested more O2. His high-flow was increased from 50L/min to 55L/min but he still reported SOB so he was placed on bipap FiO2 80%.    MEDICATIONS  (STANDING):  albuterol/ipratropium for Nebulization 3 milliLiter(s) Nebulizer every 6 hours  ascorbic acid 500 milliGRAM(s) Oral daily  enoxaparin Injectable 90 milliGRAM(s) SubCutaneous daily  gabapentin 300 milliGRAM(s) Oral at bedtime  gabapentin 100 milliGRAM(s) Oral two times a day  melatonin 3 milliGRAM(s) Oral at bedtime  methylPREDNISolone sodium succinate Injectable 20 milliGRAM(s) IV Push every 8 hours  multivitamin 1 Tablet(s) Oral daily    MEDICATIONS  (PRN):  benzonatate 200 milliGRAM(s) Oral three times a day PRN Cough  HYDROmorphone  Injectable 0.5 milliGRAM(s) IV Push every 4 hours PRN Severe Pain (7 - 10)  HYDROmorphone  Injectable 0.3 milliGRAM(s) IV Push every 4 hours PRN moderate or severe pain  LORazepam    IVPB 0.5 milliGRAM(s) IV Intermittent every 6 hours PRN Anxiety    ICU Vital Signs Last 24 Hrs  T(C): 36.4 (06 Dec 2019 04:18), Max: 36.8 (05 Dec 2019 21:33)  T(F): 97.6 (06 Dec 2019 04:18), Max: 98.3 (05 Dec 2019 21:33)  HR: 112 (06 Dec 2019 07:08) (109 - 116)  BP: 117/79 (06 Dec 2019 04:18) (102/66 - 117/79)  RR: 20 (06 Dec 2019 06:54) (20 - 22)  SpO2: 90% (06 Dec 2019 07:08) (90% - 90%)    - @ 07:01  -   @ 07:00  --------------------------------------------------------  IN: 240 mL / OUT: 100 mL / NET: 140 mL    PHYSICAL EXAM:  GENERAL: Working to breath, sitting in the chair bent over the bed  HEAD:  Atraumatic, Normocephalic  EYES: EOMI, PERRLA, pale conjunctiva and sclera anicteric  NECK: Supple, No appreciable JVD  CHEST/LUNG: rhonchi bilaterally but more prominent in the lower right lung  HEART: Regular rate and rhythm; No murmurs, rubs, or gallops; S1S2+  ABDOMEN: Soft, Nontender, Nondistended; Bowel sounds present  EXTREMITIES:  No clubbing or cyanosis. Bilateral LE pitting edema 3+  PSYCH: AAOx3  NEUROLOGY: non-focal  SKIN: No rashes or lesions    LABS:                        13.6   15.81 )-----------( 94       ( 05 Dec 2019 13:23 )             41.0     Hgb Trend: 13.6<--, 14.0<--, 14.6<--, 15.2<--    12    132<L>  |  90<L>  |  83<H>  ----------------------------<  147<H>  5.0   |  23  |  2.09<H>    Creatinine Trend: 2.09<--, 2.10<--, 1.65<--, 1.42<--, 1.33<--, 0.91<--    Ca    8.5      05 Dec 2019 18:00  Phos  5.8       Mg     2.9         TPro  6.6  /  Alb  3.1<L>  /  TBili  0.8  /  DBili  x   /  AST  94<H>  /  ALT  68<H>  /  AlkPhos  282<H>      Osmolality, Random Urine: 607 mosm/Kg (19 @ 22:16)  Sodium, Random Urine: <35 (19 @ 17:52)  Creatinine, Random Urine: 175 (19 @ 17:52)  FeNa: 0.3%, suggesting a prerenal etiology  Fractional Excretion of Sodium (FENa), % = 100 × (SCr × Evelyn ) / (SNa × UCr)  Osmolality, Serum: 308: (19 @ 18:00)  Urea Nitrogen,  Random Urine: 848 mg/dL (19 @ 22:15)    Urinalysis Basic - ( 05 Dec 2019 22:12 )  Color: Yellow / Appearance: Slightly Turbid / S.035 / pH: x  Gluc: x / Ketone: Small  / Bili: Negative / Urobili: <2 mg/dL   Blood: x / Protein: 30 mg/dL / Nitrite: Negative   Leuk Esterase: Negative / RBC: 8 /HPF / WBC 5 /HPF   Sq Epi: x / Non Sq Epi: 6 /HPF / Bacteria: Few This is HD 3 for our 60 yo M w/PMH of 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     SUBJECTIVE / OVERNIGHT EVENTS: Patient was SOB overnight. He requested more O2. His high-flow was increased from 50L/min to 55L/min but he still reported SOB so he was placed on bipap FiO2 80%. He was uncomfortable with the ace wrap so he asked them to be removed.    MEDICATIONS  (STANDING):  albuterol/ipratropium for Nebulization 3 milliLiter(s) Nebulizer every 6 hours  ascorbic acid 500 milliGRAM(s) Oral daily  enoxaparin Injectable 90 milliGRAM(s) SubCutaneous daily  gabapentin 300 milliGRAM(s) Oral at bedtime  gabapentin 100 milliGRAM(s) Oral two times a day  melatonin 3 milliGRAM(s) Oral at bedtime  methylPREDNISolone sodium succinate Injectable 20 milliGRAM(s) IV Push every 8 hours  multivitamin 1 Tablet(s) Oral daily    MEDICATIONS  (PRN):  benzonatate 200 milliGRAM(s) Oral three times a day PRN Cough  HYDROmorphone  Injectable 0.5 milliGRAM(s) IV Push every 4 hours PRN Severe Pain (7 - 10)  HYDROmorphone  Injectable 0.3 milliGRAM(s) IV Push every 4 hours PRN moderate or severe pain  LORazepam    IVPB 0.5 milliGRAM(s) IV Intermittent every 6 hours PRN Anxiety    ICU Vital Signs Last 24 Hrs  T(C): 36.4 (06 Dec 2019 04:18), Max: 36.8 (05 Dec 2019 21:33)  T(F): 97.6 (06 Dec 2019 04:18), Max: 98.3 (05 Dec 2019 21:33)  HR: 112 (06 Dec 2019 07:08) (109 - 116)  BP: 117/79 (06 Dec 2019 04:18) (102/66 - 117/79)  RR: 20 (06 Dec 2019 06:54) (20 - 22)  SpO2: 90% (06 Dec 2019 07:08) (90% - 90%)     @ 07:01  -  12-06 @ 07:00  --------------------------------------------------------  IN: 240 mL / OUT: 100 mL / NET: 140 mL    PHYSICAL EXAM:  GENERAL: Working to breath  HEAD:  Atraumatic, Normocephalic  EYES: EOMI, PERRLA, pale conjunctiva and sclera anicteric  NECK: Supple, No appreciable JVD  CHEST/LUNG: rhonchi bilaterally but more prominent in the lower right lung  HEART: Regular rate and rhythm; No murmurs, rubs, or gallops; S1S2+  ABDOMEN: Soft, Nontender, Nondistended; Bowel sounds present  EXTREMITIES:  No clubbing or cyanosis. Bilateral LE pitting edema 2+ improved from ACE wrap  PSYCH: AAOx3  NEUROLOGY: non-focal  SKIN: No rashes or lesions    LABS:                        13.6   15.81 )-----------( 94       ( 05 Dec 2019 13:23 )             41.0     Hgb Trend: 13.6<--, 14.0<--, 14.6<--, 15.2<--        132<L>  |  90<L>  |  83<H>  ----------------------------<  147<H>  5.0   |  23  |  2.09<H>    Creatinine Trend: 2.09<--, 2.10<--, 1.65<--, 1.42<--, 1.33<--, 0.91<--    Ca    8.5      05 Dec 2019 18:00  Phos  5.8       Mg     2.9         TPro  6.6  /  Alb  3.1<L>  /  TBili  0.8  /  DBili  x   /  AST  94<H>  /  ALT  68<H>  /  AlkPhos  282<H>      Osmolality, Random Urine: 607 mosm/Kg (19 @ 22:16)  Sodium, Random Urine: <35 (19 @ 17:52)  Creatinine, Random Urine: 175 (19 @ 17:52)  FeNa: 0.3%, suggesting a prerenal etiology  Fractional Excretion of Sodium (FENa), % = 100 × (SCr × Evelyn ) / (SNa × UCr)  Osmolality, Serum: 308: (19 @ 18:00)  Urea Nitrogen,  Random Urine: 848 mg/dL (19 @ 22:15)    Urinalysis Basic - ( 05 Dec 2019 22:12 )  Color: Yellow / Appearance: Slightly Turbid / S.035 / pH: x  Gluc: x / Ketone: Small  / Bili: Negative / Urobili: <2 mg/dL   Blood: x / Protein: 30 mg/dL / Nitrite: Negative   Leuk Esterase: Negative / RBC: 8 /HPF / WBC 5 /HPF   Sq Epi: x / Non Sq Epi: 6 /HPF / Bacteria: Few

## 2019-12-06 NOTE — PROGRESS NOTE ADULT - PROBLEM SELECTOR PLAN 2
HS trop stable at 84x2. EKG showed sinus tachy. Noted ventricular trigeminy, but likely from hypoxia.  - Low c/f ACS  - Likely from demand and KARSON.   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 But patient has other acute issues at present. Baseline Cr is 1. It has been trending upwards  - Cr is elevated to 2.1 from 1.3 over his hospital course. FeNa of 0.3% suggests a prerenal etiology. It is likely 2/2 hypovolemia (malignant pleural effusion, poor PO, lasix). Lasix has been held and will recommend gently adding IVF.  - Will continue to trend.

## 2019-12-06 NOTE — PROGRESS NOTE ADULT - SUBJECTIVE AND OBJECTIVE BOX
Patient is a 59y old  Male who presents with a chief complaint of SOB (06 Dec 2019 11:24)    Being followed by ID for lung ca,dyspnea    Interval history:still with dyspnea  No acute events      ROS:  some  cough, + SOB,no CP  No N/V/D./abd pain  No other complaints      Antimicrobials:      Other medications reviewed    Vital Signs Last 24 Hrs  T(C): 36.6 (12-06-19 @ 11:50), Max: 36.8 (12-05-19 @ 21:33)  T(F): 97.8 (12-06-19 @ 11:50), Max: 98.3 (12-05-19 @ 21:33)  HR: 116 (12-06-19 @ 11:50) (110 - 116)  BP: 114/77 (12-06-19 @ 11:50) (102/66 - 117/79)  BP(mean): --  RR: 20 (12-06-19 @ 11:50) (20 - 24)  SpO2: 90% (12-06-19 @ 11:50) (90% - 90%)    Physical Exam:      Constitutional well preserved,comfortable,pleasant    HEENT PERRLA EOMI,No pallor or icterus      No oral exudate or erythema    Neck supple no JVD or LN    Chest Good AE,R pleurix bibasilar crackles/rhonchi     CVS RRR S1 S2 WNl No murmur or rub or gallop    Abd soft BS normal No tenderness no masses    Ext No cyanosis clubbing or edema    IV site no erythema tenderness or discharge    Joints no swelling or LOM    CNS AAO X 3 no focal  Lab Data:                          13.6   15.81 )-----------( 94       ( 05 Dec 2019 13:23 )             41.0       12-06    129<L>  |  87<L>  |  94<H>  ----------------------------<  133<H>  5.5<H>   |  24  |  2.24<H>    Ca    8.2<L>      06 Dec 2019 10:35  Phos  6.0     12-06  Mg     3.0     12-06    TPro  6.4  /  Alb  3.2<L>  /  TBili  0.8  /  DBili  x   /  AST  107<H>  /  ALT  62<H>  /  AlkPhos  290<H>  12-06        Culture - Sputum (collected 04 Dec 2019 22:14)  Source: .Sputum Sputum  Gram Stain (05 Dec 2019 07:20):    Few polymorphonuclear leukocytes per low power field    No Squamous epithelial cells per low power field    Moderate Yeast like cells seen per oil power field  Preliminary Report (05 Dec 2019 19:14):    Normal Respiratory Kaitlin present    Culture - Urine (collected 03 Dec 2019 09:39)  Source: .Urine Clean Catch (Midstream)  Final Report (04 Dec 2019 07:21):    No growth    Culture - Blood (collected 03 Dec 2019 03:05)  Source: .Blood Blood-Peripheral  Preliminary Report (04 Dec 2019 04:01):    No growth to date.    Culture - Blood (collected 03 Dec 2019 03:05)  Source: .Blood Blood-Peripheral  Preliminary Report (04 Dec 2019 04:01):    No growth to date.

## 2019-12-06 NOTE — PROGRESS NOTE ADULT - PROBLEM SELECTOR PLAN 1
Minimal dyspnea  Comfortable on high flow  Consider multimodal therapy/strategies  Attempt to wean HiFlo if not needed, may minimize discharge options

## 2019-12-06 NOTE — PROGRESS NOTE ADULT - PROBLEM SELECTOR PLAN 7
Problem: Goals of care, counseling/discussion.  Plan: Patient wishes to be DNR/DNI. MOLST form filled out. Patient also advised that his wife is his HCP, she was present for conversation and indicated understanding.  - Palliative Care consult. HS trop stable at 84x2. EKG showed sinus tachy. Noted ventricular trigeminy, but likely from hypoxia.  - Low c/f ACS  - Likely from demand and KARSON.   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 But patient has other acute issues at present.

## 2019-12-06 NOTE — SWALLOW BEDSIDE ASSESSMENT ADULT - SLP PERTINENT HISTORY OF CURRENT PROBLEM
60 yo M with stage IV non-small cell lung ca (dx 2 year s/p radiation, progressing on Tegrisso), R pleural effusion (s/p VATS) and PleurX catheter, on home O2 5L continuous (baseline sat 85-89% on O2) presenting from home with increasing SOB and BLE edema for 2 days. Patient continues to have chronic cough: no changed from yellow to green, with blood streaked sputum production. Denies fevers at home. This is third hospitalization since 10/2019. He was treated for pna with vanc/zosyn in October. R pleural effusion is drained every other day by nurse at home. Pluerx was drained today, outputting 320 mL. Patient called Dr. Carbajal d/t increased SOB, who advised him to return to ED. In ED, patient was placed on nonrebreater (15L) but remained tachypneic. He's now satting 90% on 10/5 BiPAP at 50%. He was given lasix 40 IVx1, azithromycin, CTX and vanc. Of note, patient had 10 beats of ventricular trigeminy at 3:30 AM, same time as he removed the BiPAP and desatted to 76-82%.

## 2019-12-06 NOTE — PROGRESS NOTE ADULT - PROBLEM SELECTOR PLAN 4
Patient complains of dysphagia since last admission, has not been eating at home, has lost weight.   Previous speech and swallow eval at prior admission recommended NPO, but will have them re-evaluate as patient has desire to eat. Patient refuses NGT and PEG tube placement.   - Patient is aware of risks of aspiration and wishes to continue to eat and drink. Seen by Dietitians who made recommendations.  -Aspiration precautions and elevate HOB. Patient complains of dysphagia since last admission, has not been eating at home, has lost weight.   Previous speech and swallow eval at prior admission recommended NPO, but will have them re-evaluate as patient has desire to eat. Patient refuses NGT and PEG tube placement.   - Patient is aware of risks of aspiration and wishes to continue to eat and drink. Seen by Dietitians who made recommendations.  -Aspiration precautions and elevate HOB.  - Speech and Swallow will not be able to perform study until HFNC below 40LPM

## 2019-12-06 NOTE — PROGRESS NOTE ADULT - SUBJECTIVE AND OBJECTIVE BOX
Interval Events:  No overnight events  Remains on HFNC      14 point ROS negative except as noted above      OBJECTIVE:  ICU Vital Signs Last 24 Hrs  T(C): 36.4 (06 Dec 2019 04:18), Max: 36.8 (05 Dec 2019 21:33)  T(F): 97.6 (06 Dec 2019 04:18), Max: 98.3 (05 Dec 2019 21:33)  HR: 112 (06 Dec 2019 07:08) (109 - 116)  BP: 117/79 (06 Dec 2019 04:18) (102/66 - 117/79)  BP(mean): --  ABP: --  ABP(mean): --  RR: 20 (06 Dec 2019 06:54) (20 - 22)  SpO2: 90% (06 Dec 2019 07:08) (90% - 90%)       @ 07:01  -   @ 07:00  --------------------------------------------------------  IN: 240 mL / OUT: 100 mL / NET: 140 mL      CAPILLARY BLOOD GLUCOSE      PHYSICAL EXAM:  General: awake and alert, nontoxic appearing male sitting up in bed with HFNC, NAD  HEENT: NC/AT, EOMI b/l, conjunctiva normal, MMM  Neck: supple  Respiratory: coarse breath sounds diffusely, worse in R lower lung field, wheezing slightly improved, appears comfortable on HFNC, no conversational dyspnea or accessory muscle use, indwelling pleural catheter present in R lateral chest wall, site appears c/d/i without erythema or tenderness  Cardiovascular: S1 S2 present, tachycardic, regular rhythm, no m/r/g  Abdomen: soft, NT/ND  Extremities: 4+ LE pitting edema b/l, no c/c  Skin: no rashes or lesions noted  Neurological: AAOx3, no focal deficits  Psychiatry: calm, cooperative    LINES:     HOSPITAL MEDICATIONS:  Standing Meds:  albuterol/ipratropium for Nebulization 3 milliLiter(s) Nebulizer every 6 hours  gabapentin 300 milliGRAM(s) Oral at bedtime  gabapentin 100 milliGRAM(s) Oral two times a day  heparin  Injectable 5000 Unit(s) SubCutaneous every 8 hours  piperacillin/tazobactam IVPB.. 3.375 Gram(s) IV Intermittent every 8 hours  tiotropium 18 MICROgram(s) Capsule 1 Capsule(s) Inhalation daily  vancomycin  IVPB 1000 milliGRAM(s) IV Intermittent every 12 hours      PRN Meds:  traMADol 25 milliGRAM(s) Oral every 6 hours PRN      LABS:                        13.6   15.81 )-----------( 94       ( 05 Dec 2019 13:23 )             41.0     Hgb Trend: 13.6<--, 14.0<--, 14.6<--, 15.2<--  12    132<L>  |  90<L>  |  83<H>  ----------------------------<  147<H>  5.0   |  23  |  2.09<H>    Ca    8.5      05 Dec 2019 18:00  Phos  5.8       Mg     2.9         TPro  6.6  /  Alb  3.1<L>  /  TBili  0.8  /  DBili  x   /  AST  94<H>  /  ALT  68<H>  /  AlkPhos  282<H>      Creatinine Trend: 2.09<--, 2.10<--, 1.65<--, 1.42<--, 1.33<--, 0.91<--    Urinalysis Basic - ( 05 Dec 2019 22:12 )    Color: Yellow / Appearance: Slightly Turbid / S.035 / pH: x  Gluc: x / Ketone: Small  / Bili: Negative / Urobili: <2 mg/dL   Blood: x / Protein: 30 mg/dL / Nitrite: Negative   Leuk Esterase: Negative / RBC: 8 /HPF / WBC 5 /HPF   Sq Epi: x / Non Sq Epi: 6 /HPF / Bacteria: Few    MICROBIOLOGY:   Culture - Sputum . (19 @ 22:14)    Gram Stain:   Few polymorphonuclear leukocytes per low power field  No Squamous epithelial cells per low power field  Moderate Yeast like cells seen per oil power field    Specimen Source: .Sputum Sputum    Culture - Urine (19 @ 09:39)    Specimen Source: .Urine Clean Catch (Midstream)    Culture Results:   No growth    Culture - Blood (19 @ 03:05)    Specimen Source: .Blood Blood-Peripheral    Culture Results:   No growth to date.    RADIOLOGY:  < from: CT Angio Chest w/ IV Cont (19 @ 23:25) >  FINDINGS:     PULMONARY VESSELS: No pulmonary embolus. Main pulmonary artery normal in   diameter.    HEART AND VASCULATURE: Normal heart size without pericardial effusion. No   CT evidence of right heart strain.    No aortic aneurysm or dissection.    LUNGS, AIRWAYS, PLEURA: Patent central airways.    Narrowing of the basilar segmental bronchi of right lower lobe. In   comparison with 2019, interval decrease of masslikeopacity within   the right lower lobe nodule measures approximately 9 x 8 cm, previously   10 x 9 cm. Perihilar opacities within the right upper lobe are also   decreased.    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    A right Pleurx catheter is present enteric the chest wall at the right 8   rib space, the tip is at the medial basilar right lower pleural space.   The right pleural effusion has been resolved. Small layering left pleural   effusion is unchanged. No pneumothorax.    MEDIASTINUM: Extensive soft tissue infiltrates into the bilateral hilum   and mediastinum is again noted, slightly decreased.    Enlarged right axillary lymph nodes are unchanged, the largest one   measures 2 cm short axis.    UPPER ABDOMEN: Extensive enlarged retroperitoneal and mesenteric lymph   nodes    BONES AND SOFT TISSUES: No aggressive osseous lesion. Subcentimeter   subcutaneous nodules within the right upper back projecting over the   scapula (series 5 image 42) are again noted and likely metastatic.    LOWER NECK: Within normal limits.      IMPRESSION:     1.  No pulmonary embolus.    2.  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    3.  In comparison with 2019, right lower lobe masslike opacity and   perihilar infiltrates within the right upper lobe are slightly decreased.    4.  Extensive soft tissue infiltration to the bilateral hilaand   mediastinum is again noted, slightly decreased.    5.  Right pleural effusion has been resolved. Small layering left pleural   effusion is unchanged.    < end of copied text >    < from: [facility] Duplex Lower Ext Vein Scan, Bilat (19 @ 21:47) >  INTERPRETATION:  DVT in the right gastrocnemius vein. DVT in the left   popliteal vein with extension into the tibioperoneal trunk, posterior   tibial and peroneal veins.    < end of copied text >    PULMONARY FUNCTION TESTS: none on file    EKG: Interval Events:  No overnight events  Remains on HFNC and BiPAP, saturations stable and reports breathing is comfortable  Feels about a "7/10" overall today  Denies f/c, cough, chest pain, pleuritic chest pain, sputum production    14 point ROS negative except as noted above      OBJECTIVE:  ICU Vital Signs Last 24 Hrs  T(C): 36.4 (06 Dec 2019 04:18), Max: 36.8 (05 Dec 2019 21:33)  T(F): 97.6 (06 Dec 2019 04:18), Max: 98.3 (05 Dec 2019 21:33)  HR: 112 (06 Dec 2019 07:08) (109 - 116)  BP: 117/79 (06 Dec 2019 04:18) (102/66 - 117/79)  BP(mean): --  ABP: --  ABP(mean): --  RR: 20 (06 Dec 2019 06:54) (20 - 22)  SpO2: 90% (06 Dec 2019 07:08) (90% - 90%)      -05 @ 07:01  -  12- @ 07:00  --------------------------------------------------------  IN: 240 mL / OUT: 100 mL / NET: 140 mL      CAPILLARY BLOOD GLUCOSE      PHYSICAL EXAM:  General: awake and alert, ill and tired appearing male sitting up in chair with BiPAP, NAD  HEENT: NC/AT, EOMI b/l, conjunctiva normal, MMM  Neck: supple  Respiratory: coarse breath sounds diffusely, worse in R lower lung field, wheezing diffusely b/l, appears comfortable on BiPAP, no conversational dyspnea or accessory muscle use, indwelling pleural catheter present in R lateral chest wall, site appears c/d/i without erythema or tenderness  Cardiovascular: S1 S2 present, tachycardic, regular rhythm, no m/r/g  Abdomen: soft, NT/ND  Extremities: 2+ LE pitting edema b/l with 4+ pitting ankle edema, no c/c  Skin: no rashes or lesions noted  Neurological: AAOx3, no focal deficits  Psychiatry: calm, cooperative    LINES:     HOSPITAL MEDICATIONS:  Standing Meds:  albuterol/ipratropium for Nebulization 3 milliLiter(s) Nebulizer every 6 hours  gabapentin 300 milliGRAM(s) Oral at bedtime  gabapentin 100 milliGRAM(s) Oral two times a day  heparin  Injectable 5000 Unit(s) SubCutaneous every 8 hours  piperacillin/tazobactam IVPB.. 3.375 Gram(s) IV Intermittent every 8 hours  tiotropium 18 MICROgram(s) Capsule 1 Capsule(s) Inhalation daily  vancomycin  IVPB 1000 milliGRAM(s) IV Intermittent every 12 hours      PRN Meds:  traMADol 25 milliGRAM(s) Oral every 6 hours PRN      LABS:                        13.6   15.81 )-----------( 94       ( 05 Dec 2019 13:23 )             41.0     Hgb Trend: 13.6<--, 14.0<--, 14.6<--, 15.2<--  12-05    132<L>  |  90<L>  |  83<H>  ----------------------------<  147<H>  5.0   |  23  |  2.09<H>    Ca    8.5      05 Dec 2019 18:00  Phos  5.8     12-05  Mg     2.9     12-05    TPro  6.6  /  Alb  3.1<L>  /  TBili  0.8  /  DBili  x   /  AST  94<H>  /  ALT  68<H>  /  AlkPhos  282<H>  12-05    Creatinine Trend: 2.09<--, 2.10<--, 1.65<--, 1.42<--, 1.33<--, 0.91<--    Urinalysis Basic - ( 05 Dec 2019 22:12 )    Color: Yellow / Appearance: Slightly Turbid / S.035 / pH: x  Gluc: x / Ketone: Small  / Bili: Negative / Urobili: <2 mg/dL   Blood: x / Protein: 30 mg/dL / Nitrite: Negative   Leuk Esterase: Negative / RBC: 8 /HPF / WBC 5 /HPF   Sq Epi: x / Non Sq Epi: 6 /HPF / Bacteria: Few    MICROBIOLOGY:   Culture - Sputum . (19 @ 22:14)    Gram Stain:   Few polymorphonuclear leukocytes per low power field  No Squamous epithelial cells per low power field  Moderate Yeast like cells seen per oil power field    Specimen Source: .Sputum Sputum    Culture - Urine (19 @ 09:39)    Specimen Source: .Urine Clean Catch (Midstream)    Culture Results:   No growth    Culture - Blood (19 @ 03:05)    Specimen Source: .Blood Blood-Peripheral    Culture Results:   No growth to date.    RADIOLOGY:  < from: CT Angio Chest w/ IV Cont (19 @ 23:25) >  FINDINGS:     PULMONARY VESSELS: No pulmonary embolus. Main pulmonary artery normal in   diameter.    HEART AND VASCULATURE: Normal heart size without pericardial effusion. No   CT evidence of right heart strain.    No aortic aneurysm or dissection.    LUNGS, AIRWAYS, PLEURA: Patent central airways.    Narrowing of the basilar segmental bronchi of right lower lobe. In   comparison with 2019, interval decrease of masslikeopacity within   the right lower lobe nodule measures approximately 9 x 8 cm, previously   10 x 9 cm. Perihilar opacities within the right upper lobe are also   decreased.    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    A right Pleurx catheter is present enteric the chest wall at the right 8   rib space, the tip is at the medial basilar right lower pleural space.   The right pleural effusion has been resolved. Small layering left pleural   effusion is unchanged. No pneumothorax.    MEDIASTINUM: Extensive soft tissue infiltrates into the bilateral hilum   and mediastinum is again noted, slightly decreased.    Enlarged right axillary lymph nodes are unchanged, the largest one   measures 2 cm short axis.    UPPER ABDOMEN: Extensive enlarged retroperitoneal and mesenteric lymph   nodes    BONES AND SOFT TISSUES: No aggressive osseous lesion. Subcentimeter   subcutaneous nodules within the right upper back projecting over the   scapula (series 5 image 42) are again noted and likely metastatic.    LOWER NECK: Within normal limits.      IMPRESSION:     1.  No pulmonary embolus.    2.  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    3.  In comparison with 2019, right lower lobe masslike opacity and   perihilar infiltrates within the right upper lobe are slightly decreased.    4.  Extensive soft tissue infiltration to the bilateral hilaand   mediastinum is again noted, slightly decreased.    5.  Right pleural effusion has been resolved. Small layering left pleural   effusion is unchanged.    < end of copied text >    < from: [facility] Duplex Lower Ext Vein Scan, Bilat (19 @ 21:47) >  INTERPRETATION:  DVT in the right gastrocnemius vein. DVT in the left   popliteal vein with extension into the tibioperoneal trunk, posterior   tibial and peroneal veins.    < end of copied text >    PULMONARY FUNCTION TESTS: none on file    EKG:

## 2019-12-06 NOTE — SWALLOW BEDSIDE ASSESSMENT ADULT - SLP GENERAL OBSERVATIONS
Pt received OOB in chair with head down on bed. +HFNC. Just prior to clinician entrance, pt requested to be placed on BiPap. Awaiting respiratory therapist per RN. Pt with discoordination between respiration and deglutition.

## 2019-12-06 NOTE — PROGRESS NOTE ADULT - PROBLEM SELECTOR PLAN 7
Likely contributed to by his bilateral DVTs, patient less likely volume overloaded  - Monitor creatinine off Lasix

## 2019-12-06 NOTE — SWALLOW BEDSIDE ASSESSMENT ADULT - SWALLOW EVAL: RECOMMENDED DIET
NPO, with non-oral nutrition, hydration, medications is recommended however would defer to MD for dietary recommendations in keeping with patient/family wishes in this patient with advanced illness.

## 2019-12-06 NOTE — PROGRESS NOTE ADULT - ASSESSMENT
This is a 58 yo M with stage IV non-SCLC with R malignant pleural effusion s/p indwelling pleural catheter, progressing despite immunotherapy, and chronic hypoxemic respiratory failure on 5L at home, presenting with persistent and increasing SOB and BLE edema since his last admission over 1 month ago. CT chest findings are concerning for worsening lymphangitic spread of known primary NSCLC. Given progression of disease on immunotherapy, no role for continued immunotherapy. GOC discussion had at bedside - patient is now DNR/DNI. Continue ongoing GOC discussions. This is a 58 yo M with stage IV non-SCLC with R malignant pleural effusion s/p indwelling pleural catheter, progressing despite immunotherapy, and chronic hypoxemic respiratory failure on 5L at home, presenting with persistent and increasing SOB and BLE edema since his last admission over 1 month ago. CT chest findings are concerning for worsening lymphangitic spread of known primary NSCLC. Given progression of disease on immunotherapy, no role for continued immunotherapy. GOC discussion had at bedside - patient is now DNR/DNI. Continue ongoing GOC discussions. Patient is currently requiring BIPAP 80% 10/5 and likely has a prerenal KARSON 2/2 hypovolemia. This is a 60 yo M with stage IV non-SCLC with R malignant pleural effusion s/p indwelling pleural catheter, progressing despite immunotherapy, and chronic hypoxemic respiratory failure on 5L at home, presenting with persistent and increasing SOB and BLE edema since his last admission over 1 month ago. CT chest findings are concerning for worsening lymphangitic spread of known primary NSCLC. Given progression of disease on immunotherapy, no role for continued immunotherapy. GOC discussion had at bedside - patient is now DNR/DNI. Continue ongoing GOC discussions. Patient is currently requiring BIPAP 80% 10/5 and likely has a prerenal KARSON 2/2 hypovolemia.

## 2019-12-06 NOTE — SWALLOW BEDSIDE ASSESSMENT ADULT - CONSISTENCIES ADMINISTERED
PO trials contraindicated I will STOP taking the medications listed below when I get home from the hospital:  None

## 2019-12-06 NOTE — PROVIDER CONTACT NOTE (OTHER) - SITUATION
Pt reports shortness of breath, spO2 sating at 88-91% on high flow nasal cannula at 50L. Pt requests increase in O2 concentration.

## 2019-12-06 NOTE — SWALLOW BEDSIDE ASSESSMENT ADULT - SWALLOW EVAL: DIAGNOSIS
PO trials contraindicated at this time 2/2 reduced respiratory status requiring high flow O2 50-55 L/min 80% and intermittent BiPap. Extensive discussion had with MD Mario and pt re: rationale for NPO recommendation and risk of aspiration. Pt expressed to this service that he wishes to continue an oral diet despite the risks of aspiration. Pt reported he wishes to continue with "soft food." Pt with known h/o dysphagia s/p recent bedside swallow evaluation 11/15/19 with recommendation for NPO, with non-oral nutrition/hydration/medications. At the time pt was receiving  high flow O2 35 L/min 39%. PO trials contraindicated at this time 2/2 reduced respiratory status requiring high flow O2 50-55 L/min 80% and intermittent BiPap. Extensive discussion had with MD Mario and pt re: rationale for NPO recommendation and risk of aspiration. Pt expressed to this service that he wishes to continue an oral diet despite the risks of aspiration. Pt reported he wishes to continue with "soft food."

## 2019-12-06 NOTE — PROGRESS NOTE ADULT - PROBLEM SELECTOR PLAN 4
-s/p lasix 40 IV. Will dose another lasix 40 IV at 6pm.  - Per patient, he was not taking lasix at home.  - Lower extremity vein duplex shows bilateral DVTs (12/4). Note 12/2 CTA revealed no pulmonary embolus  - Lovenox 1.5 mg/kg daily (90 mg)  -Monitor platelets closely. Problem: Dysphagia.  Plan: Patient complains of dysphagia since last admission, has not been eating at home, has lost weight.   Previous speech and swallow eval at prior admission recommended NPO, but will have them re-evaluate as patient has desire to eat. Patient refuses NGT and PEG tube placement.   - Patient is aware of risks of aspiration and wishes to continue to try to eat  -Aspiration precautions and elevate HOB.

## 2019-12-06 NOTE — PROGRESS NOTE ADULT - PROBLEM SELECTOR PLAN 1
Patient was hypoxic on nonrebreather, satting well on 50% Bipap 10/5. Likely 2/2 lymphangitic spread of known stage IV NSCLC.  - Transition from Bipap10/5 to high flow.  - Appreciate pulm recs: restart duonebs (patient needs nebulizer machine at home), restart prednisone, d/c spiriva, c/w effusion drainage, and contact Dr. Deras regarding further primary cancer treatment.  - Urine and blood cultures did not grow any bacteria. Will empiric antibiotics when cultures return neg.  - continue on HFNC, titrate to SpO2 >88%.  - Appreciate Pulm, Onc, and ID recs  - Unlikely pneumonia, definitely not MRSA per ID. Discontinue vancomycin, will continue with Cefepime. If Cx remain negative tomorrow and no fevers, will DC cefepime tomorrow  - Patient unable to take prednisone orally d/t dysphagia       - IV methylprednisolone 32 mg daily  - Duonebs q6h  - IV Lasix 40 mg, got dose this morning. Will hold for now, pending am Cr.   - Monitor strict I's/O's. Patient was hypoxic on nonrebreather, satting well on 50% Bipap 10/5. Likely 2/2 lymphangitic spread of known stage IV NSCLC.  - Transition from Bipap10/5 to high flow.  - Appreciate pulm recs: restart duonebs (patient needs nebulizer machine at home), restart prednisone, d/c spiriva, c/w effusion drainage, and contact Dr. Deras regarding further primary cancer treatment.  - Urine and blood cultures did not grow any bacteria. Will empiric antibiotics when cultures return neg.  - continue on HFNC, titrate to SpO2 >88%.  - Appreciate Pulm, Onc, and ID recs  - Unlikely pneumonia, definitely not MRSA per ID. Discontinue vancomycin, will continue with Cefepime. If Cx remain negative tomorrow and no fevers, will DC cefepime tomorrow  - Patient unable to take prednisone orally d/t dysphagia       - IV methylprednisolone 32 mg daily  - Duonebs q6h  - Pt required more O2 overnight so high-flow was increased to 55L/min but was still insufficient so he was switched to BIPAP 80% 10/5.  - Monitor strict I's/O's.

## 2019-12-06 NOTE — PROGRESS NOTE ADULT - PROBLEM SELECTOR PLAN 6
Problem: Dysphagia.  Plan: Patient complains of dysphagia since last admission, has not been eating at home, has lost weight.   Previous speech and swallow eval at prior admission recommended NPO, but will have them re-evaluate as patient has desire to eat. Patient refuses NGT, but might consider PEG tube placement.   - Have not offered PEG but patient is aware it is a possibility  - Patient is aware of risks of aspiration  -Aspiration precautions and elevate HOB. Was on tagrisso but noted progression on imaging last admission.  - Patient was planning to follow with Dr. Wright at [facility], has been following Dr. Marie Deras with [facility], who referred him there for clinical trial. At this point that would be his only option.  - PleurX catheter to be drained every other day  - No further treatments recommended by Dr. Deras

## 2019-12-06 NOTE — PROGRESS NOTE ADULT - PROBLEM SELECTOR PLAN 3
Duplex US showing BLE DVTs. CTA on admission 12/3 shows no PE.  - Lovenox 1.5 mg/kg daily (90 mg)  - Monitor platelets closely.  - ACE wraps for legs, compression stockings do not fit Duplex US showing BLE DVTs. CTA on admission 12/3 shows no PE.  - Lovenox 1.5 mg/kg daily (90 mg)  - Monitor platelets closely.  - Remove ACE wraps per patient's request

## 2019-12-06 NOTE — SWALLOW BEDSIDE ASSESSMENT ADULT - ADDITIONAL RECOMMENDATIONS
Maintain good oral hygiene.  As per MD Mario, team plans to titrate oxygenation at some point, this service to follow up next week pending respiratory status. MD requested a liquid consistency for comfort measures. MD reported pt with limited PO intake of primarily fruit and wishes to remain on solid texture diet. MD made aware that this service cannot condone oral feeding and that pt remains at risk for aspiration with PO intake but that a conservative option may be nectar thick liquid.

## 2019-12-06 NOTE — SWALLOW BEDSIDE ASSESSMENT ADULT - SWALLOW EVAL: PROGNOSIS
Patient refuses NGT and PEG tube placement. Patient wishes to be DNR/DNI. MOLST form filled out. SWALLOW HX: Seen for bedside swallow evaluation 11/15/19: Presented with pharyngeal dysphagia characterized by 1) s/s suggestive of pharyngeal stasis and s/s of laryngeal penetration/aspiration with conservative textures (coughing with puree and single sips of honey thick liquids). Rx: NPO, with non-oral nutrition, hydration, medications is recommended based upon the results of this examination; however, would suggest Palliative Care consult to determine the GOC with re: to provision of nutrition in this patient with advanced illness.

## 2019-12-06 NOTE — CHART NOTE - NSCHARTNOTEFT_GEN_A_CORE
Nutrition Follow Up Note  Patient seen for severe malnutrition follow-up. Per chart, 58 y/o male presenting with persistent and increasing SOB and BLE edema since his last admission over 1 month ago. COncern for worsening lymphangitic spread of NSCLC, no continued immunotherapy, ongoing GOC discussions. Despite swallow evaluation recommendations for NPO during previous admission, pt would like to eat regular foods, no PEG.   PMH: stage IV lung cancer, rt pleural effusion, dysphagia    Source: EMR, pt    Diet: Regular, Orgain x3 daily in provider to RN    Pt states he drank one orgain supplement yesterday and chinese tea. This morning, observed pt did not eat egg whites, fruit or oatmeal yet. RD called kitchen for 3 Orgain to be sent to patient. Pt endorses he can take liquids better than foods, and plans to have some grapes later in the day. RD offered to obtain further food preferences for pt, however he states the preferences obtained yesterday are the only foods he can have primarily. Pt endorses BM yesterday, denies nausea/vomiting and GI distress.     PO intake : poor <25%    Source for PO intake: pt    Daily Weight in k.3 (), Weight in k.6 (-), Weight in k ()  % Weight Change    Pertinent Medications: MEDICATIONS  (STANDING):  albuterol/ipratropium for Nebulization 3 milliLiter(s) Nebulizer every 6 hours  ascorbic acid 500 milliGRAM(s) Oral daily  enoxaparin Injectable 90 milliGRAM(s) SubCutaneous daily  gabapentin 300 milliGRAM(s) Oral at bedtime  gabapentin 100 milliGRAM(s) Oral two times a day  melatonin 3 milliGRAM(s) Oral at bedtime  methylPREDNISolone sodium succinate Injectable 20 milliGRAM(s) IV Push every 8 hours  multivitamin 1 Tablet(s) Oral daily    MEDICATIONS  (PRN):  benzonatate 200 milliGRAM(s) Oral three times a day PRN Cough  HYDROmorphone  Injectable 0.5 milliGRAM(s) IV Push every 4 hours PRN Severe Pain (7 - 10)  HYDROmorphone  Injectable 0.3 milliGRAM(s) IV Push every 4 hours PRN moderate or severe pain  LORazepam   Injectable 0.5 milliGRAM(s) IV Push every 6 hours PRN Anxiety    Pertinent Labs: 12-05 @ 18:00: Na 132<L>, BUN 83<H>, Cr 2.09<H>, <H>, K+ 5.0, Phos 5.8<H>, Mg 2.9<H>, Alk Phos 282<H>, ALT/SGPT 68<H>, AST/SGOT 94<H>, HbA1c --  12-05 @ 11:39: Na 130<L>, BUN 79<H>, Cr 2.10<H>, <H>, K+ 4.9, Phos 5.8<H>, Mg 2.9<H>, Alk Phos 283<H>, ALT/SGPT 61<H>, AST/SGOT 89<H>, HbA1c --      Skin per nursing documentation: stage II L sacrum, R sacrum  Edema: 2+ B/L leg    Estimated Needs:   [x] no change since previous assessment:   Estimated Energy Needs (30-35 calories/kg): 2019- 2355 kcal/d  Estimated Protein Needs (1.2-1.4 gm/kg): 80.76- 94.22 g/d  [ ] recalculated:     Nutrition care plan in progress.    Previous Nutrition Diagnosis: severe malnutrition  Nutrition Diagnosis is: ongoing, addressed by obtaining food preferences and Orgain x3 daily    New Nutrition Diagnosis: none    Recommend  1) Continue diet as pt's wish, continue orgain and honoring food preferences  2) Continue Multivitamin and Vitamin C for pressure injury healing   3) Pt aware RD remains available for any concerns, questions or diet preferences      Monitoring and Evaluation:   Continue to monitor Nutritional intake, Tolerance to diet prescription, weights, labs, skin integrity    Sofy Bonilla RD  Pager [phone]  RD remains available upon request and will follow up per protocol

## 2019-12-06 NOTE — PROGRESS NOTE ADULT - PROBLEM SELECTOR PLAN 3
Baseline Cr is 1. It has been trending upwards  - Cr is elevated to 1.65 today (12/5).  - Will continue to trend.  - Avoid nephrotoxic meds when possible, but patient did still require diuresis. If does not improve, will need to cut back on Lasix -s/p lasix 40 IV. Will dose another lasix 40 IV at 6pm.  - Per patient, he was not taking lasix at home.  - Lower extremity vein duplex shows bilateral DVTs (12/4). Note 12/2 CTA revealed no pulmonary embolus  - Lovenox 1.5 mg/kg daily (90 mg)  - Monitor platelets closely. Platelets still stable  - Ordered ACE wraps for his legs

## 2019-12-06 NOTE — PROGRESS NOTE ADULT - PROBLEM SELECTOR PLAN 5
Was on Tagrisso but noted progression on imaging last admission.  - Patient was planning to follow with Dr. Wright at [facility], has been following Dr. Marie Deras with [facility], who referred him there for clinical trial. At this point that would be his only option. Patient declining these interventions now.  - PleurX catheter to be drained every other day  - Palliative care following, appreciate recs

## 2019-12-06 NOTE — PROGRESS NOTE ADULT - ASSESSMENT
60 yo M with stage IV non-small cell lung ca (dx 2 year s/p radiation, progressing on Tegrisso), R pleural effusion (s/p VATS) and PleurX catheter, on home O2 5L continuous (baseline sat 85-89% on O2) presenting from home with increasing SOB and BLE edema for 2 days, admitted for hypoxic respiratory failure   No increase in baseline cough or sputum  No fevers  leucocytosis but was on steroids  CTA with no new infiltartes to suggest pna- extensive lymphangitic carcinomatosis though  Given this think more likley his dyspnea is due to progression of his underlying disease  Less likely pneumonia or infectious process  No growth on blood Cx,sputum cx likely is colonization   stable off abx  Follow clinically  DVT per primary team  Overall prognosis poor-consider palliative eval  Infectious Diseases Service will cover over weekend.  Please call [phone] if issues

## 2019-12-06 NOTE — PROGRESS NOTE ADULT - ASSESSMENT
This is a 58 y/o M with stage IV NSCLC with malignant pleural effusions s/p indwelling pleural catheter, progressing despite immunotherapy, and chronic hypoxemic respiratory failure on 5L O2 at home who presents with SOB and worsening b/l LE edema. Patient has had persistent SOB since admission over 1 month ago. CT chest findings are concerning for worsening lymphangitic spread of known primary NSCLC with known malignant effusion. There is no focal consolidation to suggest pneumonia, however empirically treating for infection until cultures result is not unreasonable. Given progression of disease on immunotherapy, no role for continued immunotherapy. GOC discussion had at bedside - patient is now DNR/DNI. Continue ongoing GOC discussions.    #Chronic hypoxemic respiratory failure  - likely 2/2 lymphangitic spread of known stage IV NSCLC  - supplemental O2, maintain SpO2>88%, continue HFNC for patient comfort  - continue duonebs  - continue prednisone 40 mg daily for symptom relief  - continue to drain effusion every other day while inpatient  - continue lasix  - continue AC for b/l LE DVT    Pulmonary will continue to follow.  --------------------------------------------  Dante Palomino PGY-4  Pulmonary/Critical Care Fellow  Pager: [phone] (NILA) [phone] (NS)  Pulmonary Spectra #[id number] This is a 58 y/o M with stage IV NSCLC with malignant pleural effusions s/p indwelling pleural catheter, progressing despite immunotherapy, and chronic hypoxemic respiratory failure on 5L O2 at home who presents with SOB and worsening b/l LE edema. Patient has had persistent SOB since admission over 1 month ago. CT chest findings are concerning for worsening lymphangitic spread of known primary NSCLC with known malignant effusion. There is no focal consolidation to suggest pneumonia, however empirically treating for infection until cultures result is not unreasonable. Given progression of disease on immunotherapy, no role for continued immunotherapy. GOC discussion had at bedside - patient is now DNR/DNI. Continue ongoing GOC discussions.    #Chronic hypoxemic respiratory failure  - likely 2/2 lymphangitic spread of known stage IV NSCLC  - supplemental O2, maintain SpO2>88%, continue HFNC vs BiPAP for patient comfort  - duonebs  - steroids for symptom relief  - continue to drain effusion every other day while inpatient  - continue lasix  - continue AC for b/l LE DVT    Pulmonary will continue to follow.  --------------------------------------------  Dante Palomino PGY-4  Pulmonary/Critical Care Fellow  Pager: [phone] (LICAROLINE) [phone] (NS)  Pulmonary Spectra #[id number]

## 2019-12-06 NOTE — PROGRESS NOTE ADULT - ATTENDING COMMENTS
-Patient seen/examined on 12/6/19. Case/plan discussed with the intern and resident as reviewed/edited by me above and in any comments below. -Patient seen/examined on 12/6/19. Case/plan discussed with the intern and resident as reviewed/edited by me above and in any comments below.  -Patient SOB, but declines Dilaudid or Ativan PRN at this time.   -Prognosis guarded, would try to focus on comfort.   -IVF's for hyperkalemia and KARSON.   -Discussed with RN.

## 2019-12-06 NOTE — PROGRESS NOTE ADULT - PROBLEM SELECTOR PLAN 8
Patient wishes to be DNR/DNI. MOLST form filled out. Patient also advised that his wife is his HCP, she was present for conversation and indicated understanding.  - Palliative Care consulted

## 2019-12-06 NOTE — PROGRESS NOTE ADULT - ASSESSMENT
60 yo M with stage IV non-small cell lung ca (dx 2 year s/p radiation, progressing on Tegrisso), R pleural effusion (s/p VATS) and PleurX catheter, on home O2 5L continuous (baseline sat 85-89% on O2) presenting from home with increasing SOB and BLE edema for 2 days, admitted with hypoxic respiratory failure 2/2 pna and met lung cancer c/b bilateral DVTs. 58 yo M with stage IV non-small cell lung ca (dx 2 year s/p radiation, progressing on Tegrisso), R pleural effusion (s/p VATS) and PleurX catheter, on home O2 5L continuous (baseline sat 85-89% on O2) presenting from home with increasing SOB and BLE edema for 2 days, admitted with hypoxic respiratory failure 2/2 pna and met lung cancer c/b bilateral DVTs and acute renal failure

## 2019-12-06 NOTE — PROGRESS NOTE ADULT - SUBJECTIVE AND OBJECTIVE BOX
Comfortable  Sitting upright  HiFlo  We spoke about having his wife come in for a meeting, he feels like  would work best      RECENT VITALS/LABS/MEDICATIONS/ASSAYS     19 @ 11:26    T(C): 36.4 (19 @ 04:18), Max: 36.8 (19 @ 21:33)  HR: 112 (19 @ 09:56) (110 - 116)  BP: 117/79 (19 @ 04:18) (102/66 - 117/79)  RR: 24 (19 @ 09:56) (20 - 24)  SpO2: 90% (19 @ 09:56) (90% - 90%)  Wt(kg): --      05 Dec 2019 07:  -  06 Dec 2019 07:00  --------------------------------------------------------  IN:    Oral Fluid: 240 mL  Total IN: 240 mL    OUT:    Voided: 100 mL  Total OUT: 100 mL    Total NET: 140 mL           @ 07:01  -   @ 07:00  --------------------------------------------------------  IN: 240 mL / OUT: 100 mL / NET: 140 mL      CAPILLARY BLOOD GLUCOSE            albuterol/ipratropium for Nebulization 3 milliLiter(s) Nebulizer every 6 hours  ascorbic acid 500 milliGRAM(s) Oral daily  benzonatate 200 milliGRAM(s) Oral three times a day PRN  enoxaparin Injectable 90 milliGRAM(s) SubCutaneous daily  gabapentin 300 milliGRAM(s) Oral at bedtime  gabapentin 100 milliGRAM(s) Oral two times a day  HYDROmorphone  Injectable 0.5 milliGRAM(s) IV Push every 4 hours PRN  HYDROmorphone  Injectable 0.3 milliGRAM(s) IV Push every 4 hours PRN  LORazepam   Injectable 0.5 milliGRAM(s) IV Push every 6 hours PRN  melatonin 3 milliGRAM(s) Oral at bedtime  methylPREDNISolone sodium succinate Injectable 20 milliGRAM(s) IV Push every 8 hours  multivitamin 1 Tablet(s) Oral daily              132<L>  |  90<L>  |  83<H>  ----------------------------<  147<H>  5.0   |  23  |  2.09<H>    Ca    8.5      05 Dec 2019 18:00  Phos  5.8       Mg     2.9         TPro  6.6  /  Alb  3.1<L>  /  TBili  0.8  /  DBili  x   /  AST  94<H>  /  ALT  68<H>  /  AlkPhos  282<H>        ProcalcProcalcitonin, Serum: 0.63 ng/mL (19 @ 11:29)    BNPSerum Pro-Brain Natriuretic Peptide: 805 pg/mL (19 @ 22:34)    ABG                          13.6   15.81 )-----------( 94       ( 05 Dec 2019 13:23 )             41.0         Urinalysis Basic - ( 05 Dec 2019 22:12 )    Color: Yellow / Appearance: Slightly Turbid / S.035 / pH: x  Gluc: x / Ketone: Small  / Bili: Negative / Urobili: <2 mg/dL   Blood: x / Protein: 30 mg/dL / Nitrite: Negative   Leuk Esterase: Negative / RBC: 8 /HPF / WBC 5 /HPF   Sq Epi: x / Non Sq Epi: 6 /HPF / Bacteria: Few      blood and urine cultures            PERTINENT PM/SXH:   Non-small cell carcinoma of lung  Hypertension  No pertinent past medical history  No pertinent past medical history    No significant past surgical history    FAMILY HISTORY:  Family history of cervical cancer    ITEMS NOT CHECKED ARE NOT PRESENT    SOCIAL HISTORY:   Significant other/partner:  [x ]  Children:  [ ]  Gnosticism/Spirituality:  Substance hx:  [ ]   Tobacco hx:  [ ]   Alcohol hx: [ ]   Home Opioid hx:  [ ] I-Stop Reference No:  Living Situation: [x ]Home  [ ]Long term care  [ ]Rehab [ ]Other    ADVANCE DIRECTIVES:    DNR  Yes  MOLST  [ ]  Living Will  [ ]   DECISION MAKER(s):  [ ] Health Care Proxy(s)  [ ] Surrogate(s)  [ ] Guardian           Name(s):   Phone Number(s):    BASELINE (I)ADL(s) (prior to admission):  [place]: [ ]Total  [ ] Moderate [ ]Dependent    Allergies    No Known Allergies    Intolerances      MEDICATIONS  (STANDING):  albuterol/ipratropium for Nebulization 3 milliLiter(s) Nebulizer every 6 hours  enoxaparin Injectable 90 milliGRAM(s) SubCutaneous daily  gabapentin 300 milliGRAM(s) Oral at bedtime  gabapentin 100 milliGRAM(s) Oral two times a day  melatonin 3 milliGRAM(s) Oral at bedtime  methylPREDNISolone sodium succinate Injectable 20 milliGRAM(s) IV Push every 8 hours    MEDICATIONS  (PRN):  benzonatate 200 milliGRAM(s) Oral three times a day PRN Cough  HYDROmorphone  Injectable 0.5 milliGRAM(s) IV Push every 4 hours PRN Severe Pain (7 - 10)  HYDROmorphone  Injectable 0.3 milliGRAM(s) IV Push every 4 hours PRN moderate or severe pain  LORazepam    IVPB 0.5 milliGRAM(s) IV Intermittent every 6 hours PRN Anxiety    PRESENT SYMPTOMS: [ ]Unable to obtain due to poor mentation   Source if other than patient:  [ ]Family   [ ]Team  [ ] Staff [ ] Inferred    Pain: [ ] yes [x ] no  QOL impact -   Location -                    Aggravating factors -  Quality -  Radiation -  Timing-  Severity (0-10 scale):  Minimal acceptable level (0-10 scale):       Pain Assessment (scores are out of 10)    O  L  D  C  A  R  T  S    Pain Now:  Pain average over 24 hours:  Pain maximum:  Onset of prn (minutes):  Pain minimum:  Time to pain minimum (minutes):  Personalized pain goal:  Duration of prn (hours):  Ascent of pain:  Pain score at which prn is requested:    Does the pain have a negative impact on the following domains  An "X" denotes yes    General activity                        []  Walking ability                          []  Mood                                          []  Sleep                                           []  Normal Work                            []  Relations with other people  []  Enjoyment of life                     []  Cognitive Tasks                        []      PAIN AD Score: 0    [email or web address]/[email or web address] (press ctrl +  left click to view)          Dyspnea:                           [ ]Mild [ x]Moderate [ ]Severe  Anxiety:                             [ ]Mild [ ]Moderate [ ]Severe  Fatigue:                             [ ]Mild [ ]Moderate [ ]Severe  Nausea:                             [ ]Mild [ ]Moderate [ ]Severe  Loss of appetite:              [ ]Mild [ ]Moderate [ ]Severe  Constipation:                    [ ]Mild [ ]Moderate [ ]Severe    Other Symptoms:  [x ]All other review of systems negative     Karnofsky Performance Score/Palliative Performance Status Version 2:      50   %      [email or web address]_[email or web address]      PHYSICAL EXAM:  Vital Signs Last 24 Hrs  T(C): 36.6 (05 Dec 2019 11:55), Max: 36.8 (04 Dec 2019 20:56)  T(F): 97.9 (05 Dec 2019 11:55), Max: 98.2 (04 Dec 2019 20:56)  HR: 112 (05 Dec 2019 11:55) (100 - 116)  BP: 105/72 (05 Dec 2019 11:55) (102/70 - 119/80)  BP(mean): --  RR: 22 (05 Dec 2019 11:55) (20 - 22)  SpO2: 90% (05 Dec 2019 11:55) (90% - 94%) I&O's Summary    04 Dec 2019 07:01  -  05 Dec 2019 07:00  --------------------------------------------------------  IN: 340 mL / OUT: 550 mL / NET: -210 mL    05 Dec 2019 07:01  -  05 Dec 2019 14:15  --------------------------------------------------------  IN: 0 mL / OUT: 0 mL / NET: 0 mL      GENERAL:  [ x]Alert  [ ]Oriented x   [ ]Lethargic  [ ]Cachexia  [ ]Unarousable  [ ]Verbal  [ ]Non-Verbal [  x ] No distress   [   ] Gaunt  Behavioral:  Sebastian  [ ] Anxiety  [ ] Delirium [ ] Agitation  [ x  ]  Calm [ ] Other  HEENT:  [x ]Normal   [ ]Dry mouth   [ ]ET Tube/Trach  [ ]Oral lesions  [ ] Temporal Wasting  [ ]  Mucositis [ ]  Grade   PULMONARY:   [  ]Clear [ ]Tachypnea  [ ]Audible excessive secretions   [ ]Rhonchi        [ ]Right [ ]Left [ ]Bilateral  [ ]Crackles        [ ]Right [ ]Left [ ]Bilateral  [x ]Wheezing     [ ]Right [ ]Left [x]Bilateral post nebs  [ ] Diminished  [ ] Right  [  ] Left   [ ] Bilaterally  CARDIOVASCULAR:    [x ]Regular [ ]Irregular [ ]Tachy  [ ]Kirk [ ]Murmur [  ]   Edema  [ ]Other  GASTROINTESTINAL:  [ x]Soft  [ ]Distended   [ ]+BS  [ x]Non tender [ ]Tender  [ ]PEG [ ]OGT/ NGT    Last BM:       GENITOURINARY:  [x ]Normal [ ] Incontinent   [ ]Oliguria/Anuria   [ ]Novak [   ] Suprapubic tenderness  MUSCULOSKELETAL:   [x ]Normal   [ ]Weakness  [ ]Bed/Wheelchair bound [ ]Edema [  ] amputation  [  ] contraction  NEUROLOGIC:   [ x]No focal deficits  [ ] Cognitive impairment  [ ] Dysphagia [ ]Dysarthria [ ] Paresis [  ] Aphasia  [ ]Other   SKIN:   [ x]Normal   [ ]Pressure ulcer(s)  [ ]Rash [   ]  Wound    [  ] hyperpigmentation    CRITICAL CARE:  [ ] Shock Present  [ ]Septic [ ]Cardiogenic [ ]Neurologic [ ]Hypovolemic  [ ]  Vasopressors [ ]  Inotropes   [ ] Respiratory failure present [ ] mechanical ventilation [ ] non-invasive ventilatory support [ ] High flow  [ ] Acute  [ ] Chronic [ ] Hypoxic  [ ] Hypercarbic [ ] Other  [ ] Other organ failure       LABS:                        13.6   15.81 )-----------( 94       ( 05 Dec 2019 13:23 )             41.0   12-    130<L>  |  90<L>  |  79<H>  ----------------------------<  158<H>  4.9   |  23  |  2.10<H>    Ca    8.6      05 Dec 2019 11:39  Phos  5.8     12-  Mg     2.9     12-    TPro  6.5  /  Alb  3.1<L>  /  TBili  0.8  /  DBili  x   /  AST  89<H>  /  ALT  61<H>  /  AlkPhos  283<H>  12-05              Culture - Sputum (collected 12-04-19 @ 22:14)  Source: .Sputum Sputum  Gram Stain (19 @ 07:20):    Few polymorphonuclear leukocytes per low power field    No Squamous epithelial cells per low power field    Moderate Yeast like cells seen per oil power field        RADIOLOGY & ADDITIONAL STUDIES:    PROTEIN CALORIE MALNUTRITION PRESENT: [ ] Yes [ ] No  [ ] PPSV2 < or = to 30% [ ] significant weight loss  [ ] poor nutritional intake [ ] catabolic state [ ] anasarca     Albumin, Serum: 3.1 g/dL (19 @ 11:39)  Artificial Nutrition [ ]     REFERRALS:   [ ]Chaplaincy  [ ] Hospice  [ ]Child Life  [ ]Social Work  [ ]Case management [ ]Holistic Therapy     Goals of Care Document:   Care Coordination Assessment [C. Provider] (19 @ 11:57)   Progress Notes - Care Coordination [C. Provider] (19 @ 16:00)

## 2019-12-06 NOTE — PROGRESS NOTE ADULT - PROBLEM SELECTOR PLAN 5
Was on tagrisso but noted progression on imaging last admission.  - Patient was planning to follow with Dr. Wright at [facility], has been following Dr. Marie Deras with [facility], who referred him there for clinical trial. At this point that would be his only option.  - PleurX catheter to be drained every other day  -F/u with oncology. Problem: Goals of care, counseling/discussion.  Plan: Patient wishes to be DNR/DNI. MOLST form filled out. Patient also advised that his wife is his HCP, she was present for conversation and indicated understanding.  - Palliative Care consult.

## 2019-12-06 NOTE — SWALLOW BEDSIDE ASSESSMENT ADULT - COMMENTS
Hx cont: Admitted with hypoxic respiratory failure 2/2 pna and met lung cancer. Severe sepsis 2/2 pna with hypoxia, tachypnea, leukocytosis with lalo on admission.  Per H&P, + IV abx with broad spec coverage (vanco/zosyn).  Increased O2 req compared to home, now on high flow as pt did not tolerate bipap, with improvement in resp rate, a bit more comfortable on exam. It appears pt has metastatic spread of cancer to multiple lymph nodes and liver, which pt is prev unaware of.  Pt also with evidence of fluid overload/pulm edema for which he has received PRN lasix, and with noted trigem on tele in setting of hypoxia. Pulm: CT chest findings are concerning for worsening lymphangitic spread of known primary NSCLC with known malignant effusion. There is no focal consolidation to suggest pneumonia, however empirically treating for infection until cultures result is not unreasonable. SOB is most likely due to lymphangitic spread of primary tumor, which portends very poor prognosis. ID:  clinically, patient does not appear to have pneumonia based on lack of symptoms and evidence on imaging. No growth on Cx. Found to have b/l LE DVT on Dopplers->start AC. 12/4 Medicine: He states that he has had difficulty swallowing food and prednisone. He denies pain on swallowing; he just states that the food or pill is stuck in his throat. He has had this swallowing issue for approximately one month. Heme/onc: Pt is not interested in chemotherapy and unlikely to be eligible for clinical trial given PS. He is leaning towards comfort care->Palliative care consult. Pt on HFNC and BiPap throughout hospital course. 12/4 MD note "Because patient had recent Speech and Swallow evaluation recommending complete NPO, patient was made NPO until repeat swallow evaluation can occur. Discussed with patient the risks of aspiration and he expressed understanding and explained back to me. He would still like to take PO food and liquid. Will re-instate diet along with the patient's wishes." Hx cont: Hypoxic respiratory failure 2/2 pna and met lung cancer. Severe sepsis 2/2 pna with hypoxia, tachypnea, leukocytosis with lalo on admission. Per H&P,+ IV abx (vanco/zosyn). Increased O2 req compared to home, now on HFNC as pt did not tolerate bipap, with improvement in resp rate, a bit more comfortable on exam. It appears pt has metastatic spread of cancer to multiple lymph nodes and liver, which pt is prev unaware of.  Pt also with evidence of fluid overload/pulm edema for which he has received PRN lasix, and with noted trigem on tele in setting of hypoxia. Pulm: CT chest findings are concerning for worsening lymphangitic spread of known primary NSCLC with known malignant effusion. There is no focal consolidation to suggest pneumonia, however empirically treating for infection until cultures result is not unreasonable. SOB is most likely due to lymphangitic spread of primary tumor, which portends very poor prognosis. ID: clinically, patient does not appear to have pneumonia based on lack of symptoms and evidence on imaging. No growth on Cx. Found to have b/l LE DVT on Dopplers->start AC. 12/4 Medicine: He states that he has had difficulty swallowing food and prednisone. He states that the food or pill is stuck in his throat. He has had this swallowing issue for approximately one month. Heme/onc: Pt is not interested in chemotherapy and unlikely to be eligible for clinical trial given PS. He is leaning towards comfort care->Palliative care consult. Pt on HFNC and BiPap throughout hospital course. 12/4 MD note "Because patient had recent Speech and Swallow evaluation recommending complete NPO, patient was made NPO until repeat swallow evaluation can occur. Discussed with patient the risks of aspiration and he expressed understanding and explained back to me. He would still like to take PO food and liquid. Will re-instate diet along with the patient's wishes." Patient refuses NGT and PEG tube placement. Patient wishes to be DNR/DNI. MOLST form filled out.

## 2019-12-07 DIAGNOSIS — E87.1 HYPO-OSMOLALITY AND HYPONATREMIA: ICD-10-CM

## 2019-12-07 DIAGNOSIS — E87.5 HYPERKALEMIA: ICD-10-CM

## 2019-12-07 LAB
ANION GAP SERPL CALC-SCNC: 17 MMOL/L — SIGNIFICANT CHANGE UP (ref 5–17)
ANION GAP SERPL CALC-SCNC: 19 MMOL/L — HIGH (ref 5–17)
BASOPHILS # BLD AUTO: 0.04 K/UL — SIGNIFICANT CHANGE UP (ref 0–0.2)
BASOPHILS NFR BLD AUTO: 0.2 % — SIGNIFICANT CHANGE UP (ref 0–2)
BUN SERPL-MCNC: 101 MG/DL — HIGH (ref 7–23)
BUN SERPL-MCNC: 104 MG/DL — HIGH (ref 7–23)
CALCIUM SERPL-MCNC: 8.2 MG/DL — LOW (ref 8.4–10.5)
CALCIUM SERPL-MCNC: 8.3 MG/DL — LOW (ref 8.4–10.5)
CHLORIDE SERPL-SCNC: 90 MMOL/L — LOW (ref 96–108)
CHLORIDE SERPL-SCNC: 91 MMOL/L — LOW (ref 96–108)
CO2 SERPL-SCNC: 21 MMOL/L — LOW (ref 22–31)
CO2 SERPL-SCNC: 22 MMOL/L — SIGNIFICANT CHANGE UP (ref 22–31)
CREAT SERPL-MCNC: 2.41 MG/DL — HIGH (ref 0.5–1.3)
CREAT SERPL-MCNC: 2.46 MG/DL — HIGH (ref 0.5–1.3)
EOSINOPHIL # BLD AUTO: 0 K/UL — SIGNIFICANT CHANGE UP (ref 0–0.5)
EOSINOPHIL NFR BLD AUTO: 0 % — SIGNIFICANT CHANGE UP (ref 0–6)
GLUCOSE SERPL-MCNC: 148 MG/DL — HIGH (ref 70–99)
GLUCOSE SERPL-MCNC: 153 MG/DL — HIGH (ref 70–99)
HCT VFR BLD CALC: 39.8 % — SIGNIFICANT CHANGE UP (ref 39–50)
HGB BLD-MCNC: 12.9 G/DL — LOW (ref 13–17)
IMM GRANULOCYTES NFR BLD AUTO: 0.9 % — SIGNIFICANT CHANGE UP (ref 0–1.5)
LYMPHOCYTES # BLD AUTO: 0.16 K/UL — LOW (ref 1–3.3)
LYMPHOCYTES # BLD AUTO: 0.8 % — LOW (ref 13–44)
MAGNESIUM SERPL-MCNC: 3 MG/DL — HIGH (ref 1.6–2.6)
MCHC RBC-ENTMCNC: 28.7 PG — SIGNIFICANT CHANGE UP (ref 27–34)
MCHC RBC-ENTMCNC: 32.4 GM/DL — SIGNIFICANT CHANGE UP (ref 32–36)
MCV RBC AUTO: 88.4 FL — SIGNIFICANT CHANGE UP (ref 80–100)
MONOCYTES # BLD AUTO: 0.73 K/UL — SIGNIFICANT CHANGE UP (ref 0–0.9)
MONOCYTES NFR BLD AUTO: 3.6 % — SIGNIFICANT CHANGE UP (ref 2–14)
NEUTROPHILS # BLD AUTO: 19.33 K/UL — HIGH (ref 1.8–7.4)
NEUTROPHILS NFR BLD AUTO: 94.5 % — HIGH (ref 43–77)
PHOSPHATE SERPL-MCNC: 5.9 MG/DL — HIGH (ref 2.5–4.5)
PLATELET # BLD AUTO: 85 K/UL — LOW (ref 150–400)
POTASSIUM SERPL-MCNC: 5 MMOL/L — SIGNIFICANT CHANGE UP (ref 3.5–5.3)
POTASSIUM SERPL-MCNC: 5.2 MMOL/L — SIGNIFICANT CHANGE UP (ref 3.5–5.3)
POTASSIUM SERPL-SCNC: 5 MMOL/L — SIGNIFICANT CHANGE UP (ref 3.5–5.3)
POTASSIUM SERPL-SCNC: 5.2 MMOL/L — SIGNIFICANT CHANGE UP (ref 3.5–5.3)
RBC # BLD: 4.5 M/UL — SIGNIFICANT CHANGE UP (ref 4.2–5.8)
RBC # FLD: 14.9 % — HIGH (ref 10.3–14.5)
SODIUM SERPL-SCNC: 128 MMOL/L — LOW (ref 135–145)
SODIUM SERPL-SCNC: 132 MMOL/L — LOW (ref 135–145)
WBC # BLD: 20.44 K/UL — HIGH (ref 3.8–10.5)
WBC # FLD AUTO: 20.44 K/UL — HIGH (ref 3.8–10.5)

## 2019-12-07 PROCEDURE — 99233 SBSQ HOSP IP/OBS HIGH 50: CPT

## 2019-12-07 PROCEDURE — 99222 1ST HOSP IP/OBS MODERATE 55: CPT | Mod: GC

## 2019-12-07 PROCEDURE — 71045 X-RAY EXAM CHEST 1 VIEW: CPT | Mod: 26

## 2019-12-07 RX ORDER — FUROSEMIDE 40 MG
20 TABLET ORAL ONCE
Refills: 0 | Status: COMPLETED | OUTPATIENT
Start: 2019-12-07 | End: 2019-12-07

## 2019-12-07 RX ORDER — SODIUM ZIRCONIUM CYCLOSILICATE 10 G/10G
10 POWDER, FOR SUSPENSION ORAL THREE TIMES A DAY
Refills: 0 | Status: COMPLETED | OUTPATIENT
Start: 2019-12-07 | End: 2019-12-08

## 2019-12-07 RX ORDER — FUROSEMIDE 40 MG
80 TABLET ORAL ONCE
Refills: 0 | Status: COMPLETED | OUTPATIENT
Start: 2019-12-07 | End: 2019-12-07

## 2019-12-07 RX ORDER — SODIUM ZIRCONIUM CYCLOSILICATE 10 G/10G
10 POWDER, FOR SUSPENSION ORAL DAILY
Refills: 0 | Status: DISCONTINUED | OUTPATIENT
Start: 2019-12-08 | End: 2019-12-08

## 2019-12-07 RX ORDER — SODIUM CHLORIDE 9 MG/ML
500 INJECTION INTRAMUSCULAR; INTRAVENOUS; SUBCUTANEOUS ONCE
Refills: 0 | Status: COMPLETED | OUTPATIENT
Start: 2019-12-07 | End: 2019-12-07

## 2019-12-07 RX ADMIN — HYDROMORPHONE HYDROCHLORIDE 0.5 MILLIGRAM(S): 2 INJECTION INTRAMUSCULAR; INTRAVENOUS; SUBCUTANEOUS at 17:38

## 2019-12-07 RX ADMIN — HYDROMORPHONE HYDROCHLORIDE 0.3 MILLIGRAM(S): 2 INJECTION INTRAMUSCULAR; INTRAVENOUS; SUBCUTANEOUS at 02:50

## 2019-12-07 RX ADMIN — Medication 20 MILLIGRAM(S): at 22:24

## 2019-12-07 RX ADMIN — HYDROMORPHONE HYDROCHLORIDE 0.3 MILLIGRAM(S): 2 INJECTION INTRAMUSCULAR; INTRAVENOUS; SUBCUTANEOUS at 20:26

## 2019-12-07 RX ADMIN — HYDROMORPHONE HYDROCHLORIDE 0.3 MILLIGRAM(S): 2 INJECTION INTRAMUSCULAR; INTRAVENOUS; SUBCUTANEOUS at 05:44

## 2019-12-07 RX ADMIN — Medication 20 MILLIGRAM(S): at 14:04

## 2019-12-07 RX ADMIN — Medication 3 MILLIGRAM(S): at 22:25

## 2019-12-07 RX ADMIN — HYDROMORPHONE HYDROCHLORIDE 0.5 MILLIGRAM(S): 2 INJECTION INTRAMUSCULAR; INTRAVENOUS; SUBCUTANEOUS at 06:47

## 2019-12-07 RX ADMIN — HYDROMORPHONE HYDROCHLORIDE 0.5 MILLIGRAM(S): 2 INJECTION INTRAMUSCULAR; INTRAVENOUS; SUBCUTANEOUS at 17:52

## 2019-12-07 RX ADMIN — SODIUM ZIRCONIUM CYCLOSILICATE 10 GRAM(S): 10 POWDER, FOR SUSPENSION ORAL at 23:38

## 2019-12-07 RX ADMIN — Medication 3 MILLILITER(S): at 14:03

## 2019-12-07 RX ADMIN — HYDROMORPHONE HYDROCHLORIDE 0.5 MILLIGRAM(S): 2 INJECTION INTRAMUSCULAR; INTRAVENOUS; SUBCUTANEOUS at 00:45

## 2019-12-07 RX ADMIN — Medication 80 MILLIGRAM(S): at 14:00

## 2019-12-07 RX ADMIN — HYDROMORPHONE HYDROCHLORIDE 0.5 MILLIGRAM(S): 2 INJECTION INTRAMUSCULAR; INTRAVENOUS; SUBCUTANEOUS at 22:51

## 2019-12-07 RX ADMIN — GABAPENTIN 100 MILLIGRAM(S): 400 CAPSULE ORAL at 08:05

## 2019-12-07 RX ADMIN — Medication 0.5 MILLIGRAM(S): at 04:11

## 2019-12-07 RX ADMIN — ENOXAPARIN SODIUM 90 MILLIGRAM(S): 100 INJECTION SUBCUTANEOUS at 14:07

## 2019-12-07 RX ADMIN — GABAPENTIN 300 MILLIGRAM(S): 400 CAPSULE ORAL at 22:25

## 2019-12-07 RX ADMIN — Medication 3 MILLILITER(S): at 06:46

## 2019-12-07 RX ADMIN — SODIUM CHLORIDE 250 MILLILITER(S): 9 INJECTION INTRAMUSCULAR; INTRAVENOUS; SUBCUTANEOUS at 11:00

## 2019-12-07 RX ADMIN — Medication 3 MILLILITER(S): at 17:42

## 2019-12-07 RX ADMIN — HYDROMORPHONE HYDROCHLORIDE 0.5 MILLIGRAM(S): 2 INJECTION INTRAMUSCULAR; INTRAVENOUS; SUBCUTANEOUS at 22:21

## 2019-12-07 RX ADMIN — Medication 20 MILLIGRAM(S): at 06:46

## 2019-12-07 RX ADMIN — Medication 20 MILLIGRAM(S): at 04:11

## 2019-12-07 RX ADMIN — GABAPENTIN 100 MILLIGRAM(S): 400 CAPSULE ORAL at 17:44

## 2019-12-07 NOTE — CONSULT NOTE ADULT - PROBLEM SELECTOR RECOMMENDATION 9
Lung cancer  Disease progression  Nebs and corticosteroids  PRN IV dilaudid 0.3 q4H prn  Trial for 24 hours
Pt with KARSON in the setting of poor oral intake, low BP, and contrast administration on 12/02/19 and diuretic use.  Pt with normal scr baseline of 0.9 on admission was 1.3mg/ld now 2.4 and oliguric. . UA with low urine Na and concentrated, FeUrea 11%. KARSON most likely due to ATN from volume depletion, contrast and diuretic use. Suggest to obtain renal sono. Place aranda give 500 cc of IV fluids and Lasix one dose to keep pt non oliguric,  Monitor labs and urine output. Avoid NSAIDs, ACEI/ARBS, RCA and nephrotoxins. Dose medications as per eGFR.    Pt has overall poor prognosis, would not benefit for HD, consider GOC today. Would not offer HD for now, but pt seems to want HD if become eminent, we discussed in detail would not tolerate it.

## 2019-12-07 NOTE — PROGRESS NOTE ADULT - PROBLEM SELECTOR PLAN 7
HS trop stable at 84x2. EKG showed sinus tachy. Noted ventricular trigeminy, but likely from hypoxia.  - Low c/f ACS  - Likely from demand and KARSON.   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 But patient has other acute issues at present. Was on tagrisso but noted progression on imaging last admission.  - Patient was planning to follow with Dr. Wright at [facility] [place], [place], has been following Dr. Marie Deras with [facility], who referred him there for clinical trial. At this point that would be his only option.  - PleurX catheter to be drained every other day  - No further treatments recommended by Dr. Deras

## 2019-12-07 NOTE — CHART NOTE - NSCHARTNOTEFT_GEN_A_CORE
Had a family meeting with the patient's wife and HCP, Kari Burr. The patient's nephew, Nithin, and his brother-in-law,  were also present. Discussed the patient's condition and asked their understanding. Kari is aware that the prognosis is not good, and that the patient is nearing the end of life, but there is no way to know how long he may have left, but it is a possibility that he could pass at any time. Discussed that Alpesh may require Dilaudid for pain and air hunger, or Ativan for anxiety or agitation. They understand and are amenable to this. Also discussed that his kidney failure is worsening. They expressed understanding of these aspects of his care.  Also discussed that it is important to the patient and his family that his daughter, Casi, comes to see the patient before he dies. She is currently teaching in [place].    Casper Mario MD PGY-1  Internal Medicine  Pager [phone] / [phone] Had a family meeting with the patient's wife and HCP, Kari Burr. The patient's nephew, Nithin, and his brother-in-law were also present. Discussed the patient's condition and asked their understanding. Kari is aware that the prognosis is not good, and that the patient is nearing the end of life, but there is no way to know how long he may have left, but it is a possibility that he could pass at any time. Discussed that Alpesh may require Dilaudid for pain and air hunger, or Ativan for anxiety or agitation. They understand and are amenable to this. Also discussed that his kidney failure is worsening. They expressed understanding of these aspects of his care.  Also discussed that it is important to the patient and his family that his daughter, Casi, comes to see the patient before he dies. She is currently teaching in [place]. The family is trying to get her to come back to see her father.    Casper Mario MD PGY-1  Internal Medicine  Pager [phone] / [phone]

## 2019-12-07 NOTE — PROGRESS NOTE ADULT - PROBLEM SELECTOR PLAN 8
Patient wishes to be DNR/DNI. MOLST form filled out. Patient also advised that his wife is his HCP, she was present for conversation and indicated understanding.  - Palliative Care consulted DVT Rx/ppx: Therapeutic Lovenox  Diet Regular with precautions

## 2019-12-07 NOTE — PROGRESS NOTE ADULT - PROBLEM SELECTOR PLAN 4
Patient complains of dysphagia since last admission, has not been eating at home, has lost weight.   Previous speech and swallow eval at prior admission recommended NPO, but will have them re-evaluate as patient has desire to eat. Patient refuses NGT and PEG tube placement.   - Patient is aware of risks of aspiration and wishes to continue to eat and drink. Seen by Dietitians who made recommendations.  -Aspiration precautions and elevate HOB.  - Speech and Swallow will not be able to perform study until HFNC below 40LPM

## 2019-12-07 NOTE — CONSULT NOTE ADULT - PROBLEM SELECTOR RECOMMENDATION 3
Continue to monitor  opioids may help antitussive nature
K today normal, oliguric, suggest to give lokelma 10g TID today followed by 10 g daily.   Monitor K.

## 2019-12-07 NOTE — CONSULT NOTE ADULT - ATTENDING COMMENTS
58 yo M with stage IV non-small cell lung ca (dx 2 year s/p radiation, progressing on Tegrisso), R pleural effusion (s/p VATS) and PleurX catheter, on home O2 5L continuous (baseline sat 85-89% on O2) presenting from home with increasing SOB and BLE edema for 2 days, being treated for hypoxic respiratory failure, also found to have acute DVT. Hypoxic respiratory failure is likey multifactorial from pleural effusion, lymphangitic spread of NSCLC  that are worsening seen on CT chest. Currently on Bipap, duonebs, prednisone. effusion drained. Discussed with Dr. Marie Deras (oncologist - [facility]), Pt has PoD on Osimertinib - no role to resume Osimertinib. Next option would be chemotherapy which the pt has been refusing. Pt was referred for clinical trial but due to repeated hospitalization recently, he has not been able to follow up. He is not interested in chemotherapy and unlikely to be eligible for clinical trial given PS. He is leaning towards comfort care. Rec palliative care consult. He has acute DVT. Given underlying metastatic NSCLC, AC with lovenox 1 mg/kg BID indefineitely
Patient seen and examined  Above verified  Agree with above unless as noted below.  60 yo M with stage IV non-small cell lung ca (dx 2 year s/p radiation, progressing on Tegrisso), R pleural effusion (s/p VATS) and PleurX catheter, on home O2 5L continuous (baseline sat 85-89% on O2) presenting from home with increasing SOB and BLE edema for 2 days, admitted for hypoxic respiratory failure   No increase in baseline cough or sputum  No fevers  leucocytosis but was on steroids  CTA with no new infiltartes to suggest pna- extensive lymphangitic carcinomatosis though  Given this think more likley his dyspnea is due to progression of his underlying disease  Less likely pneumonia or infectious process  However given current status think reasonable to continue empuiric coverage with vanco x 1 and cefepime while awaiting cultures.  Overall prognosis is poor  Will tailor plan for ID issues  per course,results.Will defer to primary team on management of other issues.  case d/w primary team  Will Follow.  Beeper [id number]-[id number]/afterhours/No response-[id number]
agree with above.  Patient seen and examined. Chart reviewed.    59 year old man with stage IV lung CA Pleurex catheter in place for malignant pleural effusion, appearance of lymphangitic spread     - diuresis as BP tolerates  - steroids for symptomatic relief  - supplemental oxygen to keep saturation over 88%    will follow
I have seen this patient with the fellow and agree with their assessment and plan. In addition, KARSON in a patient with lung cancer failing all types of therapy including targeted treatment and most recent radiation. The KARSON is multifactorial. Can place aranda and do a trial of fluids as the SOB and lung findings are not true volume overload perhaps given lung cancer and spread.    Given this patient's severe comorbid conditions, poor prognosis and a "surprise question" response to not likely to survive past 6 months, he is not a good renal replacement therapy candidate. Would use conservative measures to treat all his renal complications such as pain meds, diuretics, anti nausea meds and other supportive measures. A palliative consult might be warranted    Abbie Sylvester MD  Cell   Pager   Office

## 2019-12-07 NOTE — PROGRESS NOTE ADULT - PROBLEM SELECTOR PLAN 2
- Cr increasing, 2.3 today, baseline 1. Most concerning for pre-renal etiology, though may also be intrarenal given exposure to nephrotoxic antibiotics and IV contrast. Bladder scan was 0, not post-renal  - Will continue to trend, since Cr was increasing, gave 500cc fluids x 1 in daytime.   - Avoid nephrotoxic meds when possible   - Holding lasix for now for KARSON  - Was hyperkalemic, s/p albuterol, IVF, and insulin/D5, will repeat for continued hyperkalemia  - 500cc bolus IVF tonight  -Monitor UO closely. - Cr increasing, 2.4 today, baseline 1. Most concerning for pre-renal etiology, though may also be intrarenal given exposure to nephrotoxic antibiotics and IV contrast. Bladder scan was 0, not post-renal  - Nephrology consulted, recommended 500cc bolus IVF then 80mg IV lasix and Novak placement.   - Recommend Lokelma for hyperkalemia  - Avoid nephrotoxic meds when possible   - Holding lasix for now for KARSON  - Was hyperkalemic, s/p albuterol, IVF, and insulin/D5, will repeat for continued hyperkalemia  -Monitor UO closely.

## 2019-12-07 NOTE — DISCHARGE NOTE PROVIDER - HOSPITAL COURSE
HPI:    60 yo M with stage IV non-small cell lung ca (dx 2 year s/p radiation, progressing on Tegrisso), R pleural effusion (s/p VATS) and PleurX catheter, on home O2 5L continuous (baseline sat 85-89% on O2) presenting from home with increasing SOB and BLE edema for 2 days. Patient continues to have chronic cough: no changed from yellow to green, with blood streaked sputum production. Denies fevers at home. This is third hospitalization since 10/2019. He was treated for pna with vanc/zosyn in October. R pleural effusion is drained every other day by nurse at home. Pluerx was drained today, outputting 320 mL. Patient called Dr. Carbajal d/t increased SOB, who advised him to return to ED.        In ED, patient was placed on nonrebreather (15L) but remained tachypneic. He's now satting 90% on 10/5 BiPAP at 50%. He was given lasix 40 IVx1, azithromycin, CTX and vanc. Of note, patient had 10 beats of ventricular trigeminy at 3:30 AM, same time as he removed the BiPAP and desatted to 76-82%. (03 Dec 2019 02:54)        Patient was admitted with acute hypoxic respiratory failure in the setting of metastatic disease. Initially, there was concern for pneumonia, though as the patient remained afebrile and had no leukocytosis, and cultures remained negative, this was determined not to be the case. Antibiotics were withdrawn and signs and symptoms of infection did not emerge. The patient was transitioned from BiPAP to HFNC. He did remain tachypneic and exhibited rhonchi and wheezes on lung auscultation. O2 saturation was maintained between 85-90%. Pulmonology was consulted and recommended steroids, IV solumedrol 20 mg q8h.         On previous admission, Speech and Swallow evaluation recommended complete NPO as there was concern for aspiration, patient was made NPO until repeat swallow evaluation could occur however this was not possible on as high of flow rates as the patient was requiring on HFNC. The provider discussed with patient the risks of aspiration and he expressed understanding and explained back to the provider. He decided that would still like to take PO food and liquid despite the risk of aspiration. For this reason, his diet was reinstated.         On admission, the patient had KARSON, with creatinine 1.33. This was initially thought to be due to volume overload in the setting of recent evidence of constrictive pericarditis, given the patient did have lower extremity edema. The patient was initially diuresed, but creatinine and BUN continued to rise to over 2.4 and 100, respectively. At this time, lower extremity DVT study revealed bilateral DVTs and the patient was started on therapeutic dose Lovenox (1.5 mg/kg daily). As creatinine was rising and urine output was decreasing, and bladder scan showed that he was not retaining urine, Nephrology was consulted, who recommended placing a Novak catheter and administering IV fluids and Lasix. CT was ordered to assess the kidneys, as ultrasound was unable to be performed at the time.         Oncology assessed the patient and spoke with his outpatient oncologist from [facility], Dr. Deras. In the past, the patient had been recommended either systemic chemotherapy or a clinical trial, however he declined both of these. This was discussed with the patient again and he confirmed that he did not want to pursue further treatment. Code status was discussed with the patient, and he decided not to be intubated or resuscitated, should his condition decompensate. Palliative care was consulted, and made recommendations for comfort, as well as met with the patient's family.

## 2019-12-07 NOTE — PROGRESS NOTE ADULT - PROBLEM SELECTOR PLAN 7
Likely contributed to by his bilateral DVTs, patient less likely volume overloaded  - Monitor creatinine off Lasix Patient wishes to be DNR/DNI. MOLST form filled out. Patient also advised that his wife is his HCP, she was present for conversation and indicated understanding.  - Palliative Care consulted  - Will call patient's wife today to give update that respiratory status is poor.

## 2019-12-07 NOTE — PROGRESS NOTE ADULT - PROBLEM SELECTOR PLAN 2
Baseline Cr is 1. It has been trending upwards  - Cr is elevated to 2.1 from 1.3 over his hospital course. FeNa of 0.3% suggests a prerenal etiology. It is likely 2/2 hypovolemia (malignant pleural effusion, poor PO, lasix). Lasix has been held and will recommend gently adding IVF.  - Will continue to trend. Baseline Cr is 1. It has been trending upwards  - Cr is elevated to 2.1 from 1.3 over his hospital course. FeNa of 0.3% suggests a prerenal etiology. It is likely 2/2 hypovolemia (malignant pleural effusion, poor PO, lasix). Lasix has been held and will recommend gently adding IVF.  -Was given 500 CCs of IVF last evening  - Will continue to trend. Baseline Cr is 1. It has been trending upwards  - Cr is elevated to 2.4 from 1.3 over his hospital course. FeNa of 0.3% suggests a prerenal etiology. It is likely 2/2 hypovolemia (malignant pleural effusion, poor PO, lasix). Lasix has been held and will recommend gently adding IVF.  -Was given 500 CCs of IVF last evening  - Pt's hypoxia didn't seem correctable to the nurse and overnight team so they ordered an xray and Lasix 20 IV, worrying for fluid overload  - Will continue to trend. Baseline Cr is 1. It has been trending upwards  - Cr is elevated to 2.4 from 1.3 over his hospital course. FeNa of 0.3% suggests a prerenal etiology. It is likely 2/2 hypovolemia (malignant pleural effusion, poor PO, lasix). Lasix has been held and will recommend gently adding IVF.  -The overnight team gave 500 CCs of IVF last evening, and then the pt's hypoxia didn't seem correctable to the nurse and overnight team so they ordered an xray and Lasix 20 IV, worrying for fluid overload.  - 150cc was measured on last evening's bladder scan and pt states that he produces a little bit of urine.  - Monitor strict I's/O's  - Will continue to trend. Baseline Cr is 1. It has been trending upwards  - Cr is elevated to 2.4 from 1.3 over his hospital course. FeNa of 0.3% suggests a prerenal etiology. It is likely 2/2 hypovolemia (malignant pleural effusion, poor PO, lasix). Lasix has been held and will recommend gently adding IVF.  -The overnight team gave 500 CCs of IVF last evening, and then the pt's hypoxia didn't seem correctable to the nurse and overnight team so they ordered an xray and Lasix 20 IV, worrying for fluid overload.  - 150cc was measured on last evening's bladder scan and pt states that he produces a little bit of urine.  - Plan to place a aranda catheter and perhaps give IVF and lasix  - Monitor strict I's/O's  - Will continue to trend.

## 2019-12-07 NOTE — CONSULT NOTE ADULT - ASSESSMENT
60 yo M with stage IV non-small cell lung ca (dx 2 year s/p radiation, progressing on Tegrisso), R pleural effusion (s/p VATS) and PleurX catheter, on home O2 5L continuous (baseline sat 85-89% on O2) presenting from home with increasing SOB and BLE edema for 2 days, now with progressive disease, complicated with KARSON.

## 2019-12-07 NOTE — PROGRESS NOTE ADULT - PROBLEM SELECTOR PLAN 8
Problem: Prophylactic measure. Plan; DVT Rx/ppx: Therapeutic Lovenox  NPO until S&S eval. HS trop stable at 84x2. EKG showed sinus tachy. Noted ventricular trigeminy, but likely from hypoxia.  - Low c/f ACS  - Likely from demand and KARSON.   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 But patient has other acute issues at present.

## 2019-12-07 NOTE — PROGRESS NOTE ADULT - PROBLEM SELECTOR PLAN 5
Was on Tagrisso but noted progression on imaging last admission.  - Patient was planning to follow with Dr. Wright at [facility], has been following Dr. Marie Deras with [facility], who referred him there for clinical trial. At this point that would be his only option. Patient declining these interventions now.  - PleurX catheter to be drained every other day  - Palliative care following, appreciate recs

## 2019-12-07 NOTE — PROGRESS NOTE ADULT - PROBLEM SELECTOR PLAN 6
Was on tagrisso but noted progression on imaging last admission.  - Patient was planning to follow with Dr. Wright at [facility] [place], has been following Dr. Marie Deras with [facility], who referred him there for clinical trial. At this point that would be his only option.  - PleurX catheter to be drained every other day  - No further treatments recommended by Dr. Deras Problem: Goals of care, counseling/discussion.  Plan: Patient wishes to be DNR/DNI. MOLST form filled out. Patient also advised that his wife is his HCP, she was present for conversation and indicated understanding.  - Palliative Care consult.

## 2019-12-07 NOTE — PROGRESS NOTE ADULT - PROBLEM SELECTOR PLAN 6
Less likely pneumonia, more likely progression of disease.   - D/c'd abx  - maintain >88% on HFNC, If desats on HFNC or increased O2 requirements, place on BiPAP, then plan for MICU c/s if still hypoxic  - Appreciate ID and Pulm recs Likely contributed to by his bilateral DVTs, patient less likely volume overloaded  - Monitor creatinine off Lasix

## 2019-12-07 NOTE — PROGRESS NOTE ADULT - ASSESSMENT
60 yo M with stage IV non-small cell lung ca (dx 2 year s/p radiation, progressing on Tegrisso), R pleural effusion (s/p VATS) and PleurX catheter, on home O2 5L continuous (baseline sat 85-89% on O2) presenting from home with increasing SOB and BLE edema for 2 days, admitted with hypoxic respiratory failure 2/2 pna and met lung cancer c/b bilateral DVTs and acute renal failure

## 2019-12-07 NOTE — DISCHARGE NOTE PROVIDER - NSDCMRMEDTOKEN_GEN_ALL_CORE_FT
gabapentin 300 mg oral capsule: 1 cap(s) orally once a day (at bedtime)  guaiFENesin 100 mg/5 mL oral liquid: 5 milliliter(s) orally every 6 hours  ipratropium-albuterol 0.5 mg-2.5 mg/3 mLinhalation solution: 3 milliliter(s) inhaled every 6 hours, As needed, Shortness of Breath and/or Wheezing  Neurontin 100 mg oral capsule: 1 cap(s) orally 2 times a day at 8 am and 2 pm  tiotropium 18 mcg inhalation capsule: 1 cap(s) inhaled once a day  traMADol 50 mg oral tablet: 0.5 tab(s) orally every 6 hours, As needed, Severe Pain (7 - 10) MDD:2 tabs

## 2019-12-07 NOTE — PROGRESS NOTE ADULT - PROBLEM SELECTOR PLAN 5
Problem: Goals of care, counseling/discussion.  Plan: Patient wishes to be DNR/DNI. MOLST form filled out. Patient also advised that his wife is his HCP, she was present for conversation and indicated understanding.  - Palliative Care consult. Problem: Dysphagia.  Plan: Patient complains of dysphagia since last admission, has not been eating at home, has lost weight.   Previous speech and swallow eval at prior admission recommended NPO, but will have them re-evaluate as patient has desire to eat. Patient refuses NGT and PEG tube placement.   - Patient is aware of risks of aspiration and wishes to continue to try to eat  -Aspiration precautions and elevate HOB.  - Speech and Swallow will not be able to perform study until HFNC below 40LPM.

## 2019-12-07 NOTE — CONSULT NOTE ADULT - PROBLEM SELECTOR RECOMMENDATION 2
Eating a regular diet  SLP to assist with risk mitigation strategy
In the setting of KARSON and probably SIADH. Suggest to obtain serum osmo.   Monitor Na.

## 2019-12-07 NOTE — CONSULT NOTE ADULT - SUBJECTIVE AND OBJECTIVE BOX
[facility] DIVISION OF KIDNEY DISEASES AND HYPERTENSION -- INITIAL CONSULT NOTE  --------------------------------------------------------------------------------  HPI:    58 yo M with stage IV non-small cell lung ca (dx 2 year s/p radiation, progressing on Tegrisso), R pleural effusion (s/p VATS) and PleurX catheter, on home O2 5L continuous (baseline sat 85-89% on O2) presenting from home with increasing SOB and BLE edema for 2 days.  In ED, patient was placed on nonrebreater (15L) but remained tachypneic. He's now satting 90% on 10/5 BiPAP at 50%. He was given lasix 40 IVx1, azithromycin, CTX and vanc. Of note, patient had 10 beats of ventricular trigeminy at 3:30 AM, same time as he removed the BiPAP and desatted to 76-82%. Admitted on 12/03/19 for hypoxic respiratory failure. Pt was started on empiric abx only for 1 day ceftriaxone, vancomycin and azithromycin, pt found to have LE DVT started on ac, underwent CT angio on 12/02/19 negative for PE, but showed worsening cancer infiltration, in addition found to have worsening renal failure and oliguria, given sob on 12/13/19 due to sob received 120 mg of lasix, then held due to worsening scr nephrology consulted. As per pt last dose of Tegrisso 3 months ago currently only receiving radiation, and as per oncology note pt declined chemotherapy and not candidate for trial, and suggested, palliative consult. Pt claims never had kidney disease on review of labs in [facility]/[place] pt with normal scr as baseline, within 0.9-1 mg/dl, most recent scr before admission 11/20/19 was at 0.9, on admission was at 1.3 mg/dl, since then has been rising now at 2.46 mg/dl, pt is oliguric as per medicine team but no uop reported.     During examination pt was on bipap, has LE, claims is nauseous, has poor appetite, metallic taste, feels sometimes confused, and fatigue. Denies cp/fever, chills, abdominal pain, dysuria or hematuria or foamy urine. Claims yesterday urinated but not today.       PAST HISTORY  --------------------------------------------------------------------------------  PAST MEDICAL & SURGICAL HISTORY:  Non-small cell carcinoma of lung  No significant past surgical history    FAMILY HISTORY:  Family history of cervical cancer    PAST SOCIAL HISTORY:    ALLERGIES & MEDICATIONS  --------------------------------------------------------------------------------  Allergies    No Known Allergies    Intolerances      Standing Inpatient Medications  albuterol/ipratropium for Nebulization 3 milliLiter(s) Nebulizer every 6 hours  ascorbic acid 500 milliGRAM(s) Oral daily  enoxaparin Injectable 90 milliGRAM(s) SubCutaneous daily  furosemide   Injectable 80 milliGRAM(s) IV Push once  gabapentin 300 milliGRAM(s) Oral at bedtime  gabapentin 100 milliGRAM(s) Oral two times a day  melatonin 3 milliGRAM(s) Oral at bedtime  methylPREDNISolone sodium succinate Injectable 20 milliGRAM(s) IV Push every 8 hours  multivitamin 1 Tablet(s) Oral daily    PRN Inpatient Medications  benzonatate 200 milliGRAM(s) Oral three times a day PRN  HYDROmorphone  Injectable 0.5 milliGRAM(s) IV Push every 4 hours PRN  HYDROmorphone  Injectable 0.3 milliGRAM(s) IV Push every 4 hours PRN  LORazepam   Injectable 0.5 milliGRAM(s) IV Push every 6 hours PRN      REVIEW OF SYSTEMS  --------------------------------------------------------------------------------  Gen: no  lethargy, + fatigue  Respiratory: ++dyspnea  CV: No chest pain  GI: No abdominal pain  MSK: + LE edema  Neuro: No dizziness  Heme: No bleeding    All other systems were reviewed and are negative, except as noted.      All other systems were reviewed and are negative, except as noted.    VITALS/PHYSICAL EXAM  --------------------------------------------------------------------------------  T(C): 36.4 (12-07-19 @ 03:58), Max: 36.6 (12-06-19 @ 11:50)  HR: 109 (12-07-19 @ 10:36) (109 - 118)  BP: 102/69 (12-07-19 @ 06:51) (95/55 - 114/77)  RR: 35 (12-07-19 @ 06:51) (20 - 35)  SpO2: 90% (12-07-19 @ 10:36) (89% - 93%)  Wt(kg): --        12-06-19 @ 07:01  -  12-07-19 @ 07:00  --------------------------------------------------------  IN: 1040 mL / OUT: 400 mL / NET: 640 mL      Physical Exam:    	Gen: ill appearing. Cachectic  	HEENT: on bipap  	Pulm: decrease air entry, wheezes, and crackles.   	CV: RRR, S1S2; no rub  	Abd: +BS, soft, nontender/nondistended  	: No suprapubic tenderness  	UE:  no edema; + asterixis  	LE: ++ edema  	Neuro: No focal deficits,    LABS/STUDIES  --------------------------------------------------------------------------------              12.9   20.44 >-----------<  85       [12-07-19 @ 09:57]              39.8     128  |  90  |  101  ----------------------------<  148      [12-07-19 @ 07:05]  5.0   |  21  |  2.46        Ca     8.3     [12-07-19 @ 07:05]      Mg     3.0     [12-07-19 @ 07:05]      Phos  5.9     [12-07-19 @ 07:05]    TPro  6.4  /  Alb  3.2  /  TBili  0.8  /  DBili  x   /  AST  107  /  ALT  62  /  AlkPhos  290  [12-06-19 @ 10:35]        Serum Osmolality 308      [12-05-19 @ 18:00]    Creatinine Trend:  SCr 2.46 [12-07 @ 07:05]  SCr 2.41 [12-07 @ 00:35]  SCr 2.30 [12-06 @ 19:03]  SCr 2.24 [12-06 @ 10:35]  SCr 2.09 [12-05 @ 18:00]    Urinalysis - [12-05-19 @ 22:12]      Color Yellow / Appearance Slightly Turbid / SG 1.035 / pH 5.5      Gluc Negative / Ketone Small  / Bili Negative / Urobili <2 mg/dL       Blood Moderate / Protein 30 mg/dL / Leuk Est Negative / Nitrite Negative      RBC 8 / WBC 5 / Hyaline 0 / Gran  / Sq Epi  / Non Sq Epi 6 / Bacteria Few    Urine Creatinine 175      [12-05-19 @ 17:52]  Urine Sodium <35      [12-05-19 @ 17:52]  Urine Urea Nitrogen 848      [12-05-19 @ 22:15]  Urine Osmolality 607      [12-05-19 @ 22:16]    Iron 62, TIBC 201, %sat 31      [11-14-19 @ 09:16]  Ferritin 1216      [11-14-19 @ 08:14]    HCV 0.32, Nonreact      [10-27-19 @ 09:46] [facility] DIVISION OF KIDNEY DISEASES AND HYPERTENSION -- INITIAL CONSULT NOTE  --------------------------------------------------------------------------------  HPI:    58 yo M with stage IV non-small cell lung ca (dx 2 year s/p radiation, progressing on Tegrisso), R pleural effusion (s/p VATS) and PleurX catheter, on home O2 5L continuous (baseline sat 85-89% on O2) presenting from home with increasing SOB and BLE edema for 2 days.  In ED, patient was placed on nonrebreater (15L) but remained tachypneic. He's now satting 90% on 10/5 BiPAP at 50%. He was given lasix 40 IVx1, azithromycin, CTX and vanc. Of note, patient had 10 beats of ventricular trigeminy at 3:30 AM, same time as he removed the BiPAP and desatted to 76-82%. Admitted on 12/03/19 for hypoxic respiratory failure. Pt was started on empiric abx only for 1 day ceftriaxone, vancomycin and azithromycin, pt found to have LE DVT started on ac, underwent CT angio on 12/02/19 negative for PE, but showed worsening cancer infiltration, in addition found to have worsening renal failure and oliguria, given sob on 12/13/19 due to sob received 120 mg of lasix, then held due to worsening scr nephrology consulted. As per pt last dose of Tegrisso(TKI) 3 months ago currently only receiving radiation, and as per oncology note pt declined chemotherapy and not candidate for trial, and suggested, palliative consult. Pt claims never had kidney disease on review of labs in [facility]/[place] pt with normal scr as baseline, within 0.9-1 mg/dl, most recent scr before admission 11/20/19 was at 0.9, on admission was at 1.3 mg/dl, since then has been rising now at 2.46 mg/dl, pt is oliguric as per medicine team but no uop reported.     During examination pt was on bipap, has LE, claims is nauseous, has poor appetite, metallic taste, feels sometimes confused, and fatigue. Denies cp/fever, chills, abdominal pain, dysuria or hematuria or foamy urine. Claims yesterday urinated but not today.       PAST HISTORY  --------------------------------------------------------------------------------  PAST MEDICAL & SURGICAL HISTORY:  Non-small cell carcinoma of lung  No significant past surgical history    FAMILY HISTORY:  Family history of cervical cancer    PAST SOCIAL HISTORY:    ALLERGIES & MEDICATIONS  --------------------------------------------------------------------------------  Allergies    No Known Allergies    Intolerances      Standing Inpatient Medications  albuterol/ipratropium for Nebulization 3 milliLiter(s) Nebulizer every 6 hours  ascorbic acid 500 milliGRAM(s) Oral daily  enoxaparin Injectable 90 milliGRAM(s) SubCutaneous daily  furosemide   Injectable 80 milliGRAM(s) IV Push once  gabapentin 300 milliGRAM(s) Oral at bedtime  gabapentin 100 milliGRAM(s) Oral two times a day  melatonin 3 milliGRAM(s) Oral at bedtime  methylPREDNISolone sodium succinate Injectable 20 milliGRAM(s) IV Push every 8 hours  multivitamin 1 Tablet(s) Oral daily    PRN Inpatient Medications  benzonatate 200 milliGRAM(s) Oral three times a day PRN  HYDROmorphone  Injectable 0.5 milliGRAM(s) IV Push every 4 hours PRN  HYDROmorphone  Injectable 0.3 milliGRAM(s) IV Push every 4 hours PRN  LORazepam   Injectable 0.5 milliGRAM(s) IV Push every 6 hours PRN      REVIEW OF SYSTEMS  --------------------------------------------------------------------------------  Gen: no  lethargy, + fatigue  Respiratory: ++dyspnea  CV: No chest pain  GI: No abdominal pain  MSK: + LE edema  Neuro: No dizziness  Heme: No bleeding    All other systems were reviewed and are negative, except as noted.      All other systems were reviewed and are negative, except as noted.    VITALS/PHYSICAL EXAM  --------------------------------------------------------------------------------  T(C): 36.4 (12-07-19 @ 03:58), Max: 36.6 (12-06-19 @ 11:50)  HR: 109 (12-07-19 @ 10:36) (109 - 118)  BP: 102/69 (12-07-19 @ 06:51) (95/55 - 114/77)  RR: 35 (12-07-19 @ 06:51) (20 - 35)  SpO2: 90% (12-07-19 @ 10:36) (89% - 93%)  Wt(kg): --        12-06-19 @ 07:01  -  12-07-19 @ 07:00  --------------------------------------------------------  IN: 1040 mL / OUT: 400 mL / NET: 640 mL      Physical Exam:    	Gen: ill appearing. Cachectic  	HEENT: on bipap  	Pulm: decrease air entry, wheezes, and crackles.   	CV: RRR, S1S2; no rub  	Abd: +BS, soft, nontender/nondistended  	: No suprapubic tenderness  	UE:  no edema; + asterixis  	LE: ++ edema  	Neuro: No focal deficits,    LABS/STUDIES  --------------------------------------------------------------------------------              12.9   20.44 >-----------<  85       [12-07-19 @ 09:57]              39.8     128  |  90  |  101  ----------------------------<  148      [12-07-19 @ 07:05]  5.0   |  21  |  2.46        Ca     8.3     [12-07-19 @ 07:05]      Mg     3.0     [12-07-19 @ 07:05]      Phos  5.9     [12-07-19 @ 07:05]    TPro  6.4  /  Alb  3.2  /  TBili  0.8  /  DBili  x   /  AST  107  /  ALT  62  /  AlkPhos  290  [12-06-19 @ 10:35]        Serum Osmolality 308      [12-05-19 @ 18:00]    Creatinine Trend:  SCr 2.46 [12-07 @ 07:05]  SCr 2.41 [12-07 @ 00:35]  SCr 2.30 [12-06 @ 19:03]  SCr 2.24 [12-06 @ 10:35]  SCr 2.09 [12-05 @ 18:00]    Urinalysis - [12-05-19 @ 22:12]      Color Yellow / Appearance Slightly Turbid / SG 1.035 / pH 5.5      Gluc Negative / Ketone Small  / Bili Negative / Urobili <2 mg/dL       Blood Moderate / Protein 30 mg/dL / Leuk Est Negative / Nitrite Negative      RBC 8 / WBC 5 / Hyaline 0 / Gran  / Sq Epi  / Non Sq Epi 6 / Bacteria Few    Urine Creatinine 175      [12-05-19 @ 17:52]  Urine Sodium <35      [12-05-19 @ 17:52]  Urine Urea Nitrogen 848      [12-05-19 @ 22:15]  Urine Osmolality 607      [12-05-19 @ 22:16]    Iron 62, TIBC 201, %sat 31      [11-14-19 @ 09:16]  Ferritin 1216      [11-14-19 @ 08:14]    HCV 0.32, Nonreact      [10-27-19 @ 09:46]

## 2019-12-07 NOTE — PROGRESS NOTE ADULT - ATTENDING COMMENTS
Patient continues to be hypoxic, requiring BiPAP. Would continue dilaudid and ativan for symptom control. Being diuresed today as per renal. Continue with aranda and monitor output.   Overall, very poor prognosis.  Pt remains DNR/DNI.

## 2019-12-07 NOTE — PROGRESS NOTE ADULT - SUBJECTIVE AND OBJECTIVE BOX
This is HD 4 for our 58 yo M w/PMH of 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    SUBJECTIVE / OVERNIGHT EVENTS:     MEDICATIONS  (STANDING):  albuterol/ipratropium for Nebulization 3 milliLiter(s) Nebulizer every 6 hours  ascorbic acid 500 milliGRAM(s) Oral daily  enoxaparin Injectable 90 milliGRAM(s) SubCutaneous daily  gabapentin 300 milliGRAM(s) Oral at bedtime  gabapentin 100 milliGRAM(s) Oral two times a day  melatonin 3 milliGRAM(s) Oral at bedtime  methylPREDNISolone sodium succinate Injectable 20 milliGRAM(s) IV Push every 8 hours  multivitamin 1 Tablet(s) Oral daily    MEDICATIONS  (PRN):  benzonatate 200 milliGRAM(s) Oral three times a day PRN Cough  HYDROmorphone  Injectable 0.5 milliGRAM(s) IV Push every 4 hours PRN Severe Pain (7 - 10)  HYDROmorphone  Injectable 0.3 milliGRAM(s) IV Push every 4 hours PRN moderate or severe pain  LORazepam   Injectable 0.5 milliGRAM(s) IV Push every 6 hours PRN Anxiety      Vital Signs Last 24 Hrs  T(C): 36.4 (19 @ 03:58)  T(F): 97.6 (19 @ 03:58), Max: 97.8 (19 @ 11:50)  HR: 114 (19 @ 06:47) (109 - 118)  BP: 102/69 (19 @ 06:51)  BP(mean): --  RR: 35 (19 @ 06:51) (20 - 35)  SpO2: 90% (19 @ 06:51) (89% - 93%)  Wt(kg): --     @ 07:01  -   07:00  --------------------------------------------------------  IN: 1040 mL / OUT: 400 mL / NET: 640 mL      CAPILLARY BLOOD GLUCOSE    PHYSICAL EXAM:  GENERAL:   HEAD:  Atraumatic, Normocephalic  EYES: EOMI, PERRLA, pale conjunctiva and sclera anicteric  NECK: Supple, No appreciable JVD  CHEST/LUNG: rhonchi bilaterally but more prominent in the lower right lung  HEART: Regular rate and rhythm; No murmurs, rubs, or gallops; S1S2+  ABDOMEN: Soft, Nontender, Nondistended; Bowel sounds present  EXTREMITIES:  No clubbing or cyanosis. Bilateral LE pitting edema 3+  PSYCH: AAOx3  NEUROLOGY: non-focal  SKIN: No rashes or lesions      LABS:                        13.5   17.66 )-----------( 94       ( 06 Dec 2019 13:35 )             41.3     12-    132<L>  |  91<L>  |  104<H>  ----------------------------<  153<H>  5.2   |  22  |  2.41<H>    Ca    8.2<L>      07 Dec 2019 00:35  Phos  6.0     12-  Mg     3.0     12-    TPro  6.4  /  Alb  3.2<L>  /  TBili  0.8  /  DBili  x   /  AST  107<H>  /  ALT  62<H>  /  AlkPhos  290<H>  12-    Urinalysis Basic - ( 05 Dec 2019 22:12 )    Color: Yellow / Appearance: Slightly Turbid / S.035 / pH: x  Gluc: x / Ketone: Small  / Bili: Negative / Urobili: <2 mg/dL   Blood: x / Protein: 30 mg/dL / Nitrite: Negative   Leuk Esterase: Negative / RBC: 8 /HPF / WBC 5 /HPF   Sq Epi: x / Non Sq Epi: 6 /HPF / Bacteria: Few      RADIOLOGY & ADDITIONAL TESTS: This is HD 4 for our 60 yo M w/PMH of 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    SUBJECTIVE / OVERNIGHT EVENTS:     MEDICATIONS  (STANDING):  albuterol/ipratropium for Nebulization 3 milliLiter(s) Nebulizer every 6 hours  ascorbic acid 500 milliGRAM(s) Oral daily  enoxaparin Injectable 90 milliGRAM(s) SubCutaneous daily  gabapentin 300 milliGRAM(s) Oral at bedtime  gabapentin 100 milliGRAM(s) Oral two times a day  melatonin 3 milliGRAM(s) Oral at bedtime  methylPREDNISolone sodium succinate Injectable 20 milliGRAM(s) IV Push every 8 hours  multivitamin 1 Tablet(s) Oral daily    MEDICATIONS  (PRN):  benzonatate 200 milliGRAM(s) Oral three times a day PRN Cough  HYDROmorphone  Injectable 0.5 milliGRAM(s) IV Push every 4 hours PRN Severe Pain (7 - 10)  HYDROmorphone  Injectable 0.3 milliGRAM(s) IV Push every 4 hours PRN moderate or severe pain  LORazepam   Injectable 0.5 milliGRAM(s) IV Push every 6 hours PRN Anxiety    ICU Vital Signs Last 24 Hrs  T(C): 36.4 (07 Dec 2019 03:58), Max: 36.6 (06 Dec 2019 11:50)  T(F): 97.6 (07 Dec 2019 03:58), Max: 97.8 (06 Dec 2019 11:50)  HR: 114 (07 Dec 2019 06:47) (109 - 118)  BP: 102/69 (07 Dec 2019 06:51) (95/55 - 114/77)  RR: 35 (07 Dec 2019 06:51) (20 - 35)  SpO2: 90% (07 Dec 2019 06:51) (89% - 93%)     @ 07:01  -   @ 07:00  --------------------------------------------------------  IN: 1040 mL / OUT: 400 mL / NET: 640 mL      CAPILLARY BLOOD GLUCOSE    PHYSICAL EXAM:  GENERAL:   HEAD:  Atraumatic, Normocephalic  EYES: EOMI, PERRLA, pale conjunctiva and sclera anicteric  NECK: Supple, No appreciable JVD  CHEST/LUNG: rhonchi bilaterally but more prominent in the lower right lung  HEART: Regular rate and rhythm; No murmurs, rubs, or gallops; S1S2+  ABDOMEN: Soft, Nontender, Nondistended; Bowel sounds present  EXTREMITIES:  No clubbing or cyanosis. Bilateral LE pitting edema 3+  PSYCH: AAOx3  NEUROLOGY: non-focal  SKIN: No rashes or lesions      LABS:                        13.5   17.66 )-----------( 94       ( 06 Dec 2019 13:35 )             41.3     12-    132<L>  |  91<L>  |  104<H>  ----------------------------<  153<H>  5.2   |  22  |  2.41<H>    Ca    8.2<L>      07 Dec 2019 00:35  Phos  6.0     12-  Mg     3.0     12-    TPro  6.4  /  Alb  3.2<L>  /  TBili  0.8  /  DBili  x   /  AST  107<H>  /  ALT  62<H>  /  AlkPhos  290<H>  12-    Urinalysis Basic - ( 05 Dec 2019 22:12 )    Color: Yellow / Appearance: Slightly Turbid / S.035 / pH: x  Gluc: x / Ketone: Small  / Bili: Negative / Urobili: <2 mg/dL   Blood: x / Protein: 30 mg/dL / Nitrite: Negative   Leuk Esterase: Negative / RBC: 8 /HPF / WBC 5 /HPF   Sq Epi: x / Non Sq Epi: 6 /HPF / Bacteria: Few      RADIOLOGY & ADDITIONAL TESTS:    Consultant(s) Notes Reviewed:    Pulmonology  Palliative Care  ID This is HD 4 for our 60 yo M w/PMH of 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    SUBJECTIVE / OVERNIGHT EVENTS: The patient said that he was anxious last night so he requested dilaudid. This morning, the patient was found not on oxygen and confused AOx1, likely 2/2 agitation and taking off the oxygen himself. He was placed on BIPAP, and was seen by me at the bed on BIPAP. He was tired but AOx3. He states that he makes little urine. Denies any pain. He was pleasant.    MEDICATIONS  (STANDING):  albuterol/ipratropium for Nebulization 3 milliLiter(s) Nebulizer every 6 hours  ascorbic acid 500 milliGRAM(s) Oral daily  enoxaparin Injectable 90 milliGRAM(s) SubCutaneous daily  gabapentin 300 milliGRAM(s) Oral at bedtime  gabapentin 100 milliGRAM(s) Oral two times a day  melatonin 3 milliGRAM(s) Oral at bedtime  methylPREDNISolone sodium succinate Injectable 20 milliGRAM(s) IV Push every 8 hours  multivitamin 1 Tablet(s) Oral daily    MEDICATIONS  (PRN):  benzonatate 200 milliGRAM(s) Oral three times a day PRN Cough  HYDROmorphone  Injectable 0.5 milliGRAM(s) IV Push every 4 hours PRN Severe Pain (7 - 10)  HYDROmorphone  Injectable 0.3 milliGRAM(s) IV Push every 4 hours PRN moderate or severe pain  LORazepam   Injectable 0.5 milliGRAM(s) IV Push every 6 hours PRN Anxiety    ICU Vital Signs Last 24 Hrs  T(C): 36.4 (07 Dec 2019 03:58), Max: 36.6 (06 Dec 2019 11:50)  T(F): 97.6 (07 Dec 2019 03:58), Max: 97.8 (06 Dec 2019 11:50)  HR: 114 (07 Dec 2019 06:47) (109 - 118)  BP: 102/69 (07 Dec 2019 06:51) (95/55 - 114/77)  RR: 35 (07 Dec 2019 06:51) (20 - 35)  SpO2: 90% (07 Dec 2019 06:51) (89% - 93%)     @ 07:01  -   @ 07:00  --------------------------------------------------------  IN: 1040 mL / OUT: 400 mL / NET: 640 mL      CAPILLARY BLOOD GLUCOSE    PHYSICAL EXAM:  GENERAL:   HEAD:  Atraumatic, Normocephalic  EYES: EOMI, PERRLA, pale conjunctiva and sclera anicteric  NECK: Supple, No appreciable JVD  CHEST/LUNG: rhonchi more prominent in the right lung  HEART: Regular rate and rhythm; No murmurs, rubs, or gallops; S1S2+  ABDOMEN: Soft, Nontender, Nondistended; Bowel sounds present  EXTREMITIES:  No clubbing or cyanosis. Bilateral LE pitting edema 3+  PSYCH: AAOx3  NEUROLOGY: non-focal  SKIN: No rashes or lesions      LABS:                        13.5   17.66 )-----------( 94       ( 06 Dec 2019 13:35 )             41.3     12-    132<L>  |  91<L>  |  104<H>  ----------------------------<  153<H>  5.2   |  22  |  2.41<H>    Ca    8.2<L>      07 Dec 2019 00:35  Phos  6.0       Mg     3.0         TPro  6.4  /  Alb  3.2<L>  /  TBili  0.8  /  DBili  x   /  AST  107<H>  /  ALT  62<H>  /  AlkPhos  290<H>      Urinalysis Basic - ( 05 Dec 2019 22:12 )    Color: Yellow / Appearance: Slightly Turbid / S.035 / pH: x  Gluc: x / Ketone: Small  / Bili: Negative / Urobili: <2 mg/dL   Blood: x / Protein: 30 mg/dL / Nitrite: Negative   Leuk Esterase: Negative / RBC: 8 /HPF / WBC 5 /HPF   Sq Epi: x / Non Sq Epi: 6 /HPF / Bacteria: Few      RADIOLOGY & ADDITIONAL TESTS:    Consultant(s) Notes Reviewed:    Pulmonology  Palliative Care  ID This is HD 4 for our 58 yo M w/PMH of 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    SUBJECTIVE / OVERNIGHT EVENTS: The patient said that he was anxious last night so he requested dilaudid. This morning, the patient was found not on oxygen and confused AOx1, likely 2/2 agitation and taking off the oxygen himself. He was placed on BIPAP, and was seen by me at the bed on BIPAP. He was tired but AOx3. He states that he makes little urine. Denies any pain. He was pleasant. He was also amenable to aranda catheter placement.    MEDICATIONS  (STANDING):  albuterol/ipratropium for Nebulization 3 milliLiter(s) Nebulizer every 6 hours  ascorbic acid 500 milliGRAM(s) Oral daily  enoxaparin Injectable 90 milliGRAM(s) SubCutaneous daily  gabapentin 300 milliGRAM(s) Oral at bedtime  gabapentin 100 milliGRAM(s) Oral two times a day  melatonin 3 milliGRAM(s) Oral at bedtime  methylPREDNISolone sodium succinate Injectable 20 milliGRAM(s) IV Push every 8 hours  multivitamin 1 Tablet(s) Oral daily    MEDICATIONS  (PRN):  benzonatate 200 milliGRAM(s) Oral three times a day PRN Cough  HYDROmorphone  Injectable 0.5 milliGRAM(s) IV Push every 4 hours PRN Severe Pain (7 - 10)  HYDROmorphone  Injectable 0.3 milliGRAM(s) IV Push every 4 hours PRN moderate or severe pain  LORazepam   Injectable 0.5 milliGRAM(s) IV Push every 6 hours PRN Anxiety    ICU Vital Signs Last 24 Hrs  T(C): 36.4 (07 Dec 2019 03:58), Max: 36.6 (06 Dec 2019 11:50)  T(F): 97.6 (07 Dec 2019 03:58), Max: 97.8 (06 Dec 2019 11:50)  HR: 114 (07 Dec 2019 06:47) (109 - 118)  BP: 102/69 (07 Dec 2019 06:51) (95/55 - 114/77)  RR: 35 (07 Dec 2019 06:51) (20 - 35)  SpO2: 90% (07 Dec 2019 06:51) (89% - 93%)     @ 07:01  -   @ 07:00  --------------------------------------------------------  IN: 1040 mL / OUT: 400 mL / NET: 640 mL      CAPILLARY BLOOD GLUCOSE    PHYSICAL EXAM:  GENERAL:   HEAD:  Atraumatic, Normocephalic  EYES: EOMI, PERRLA, pale conjunctiva and sclera anicteric  NECK: Supple, No appreciable JVD  CHEST/LUNG: rhonchi more prominent in the right lung  HEART: Regular rate and rhythm; No murmurs, rubs, or gallops; S1S2+  ABDOMEN: Soft, Nontender, Nondistended; Bowel sounds present  EXTREMITIES:  No clubbing or cyanosis. Bilateral LE pitting edema 3+  PSYCH: AAOx3  NEUROLOGY: non-focal  SKIN: No rashes or lesions      LABS:               12.9   20.44 )-----------( 85       ( 07 Dec 2019 09:57 )             39.8         128<L>  |  90<L>  |  101<H>  ----------------------------<  148<H>  5.0   |  21<L>  |  2.46<H>    Ca    8.3<L>      07 Dec 2019 07:05  Phos  5.9       Mg     3.0         TPro  6.4  /  Alb  3.2<L>  /  TBili  0.8  /  DBili  x   /  AST  107<H>  /  ALT  62<H>  /  AlkPhos  290<H>    _____    132<L>  |  91<L>  |  104<H>  ----------------------------<  153<H>  5.2   |  22  |  2.41<H>    Ca    8.2<L>      07 Dec 2019 00:35  Phos  6.0     12-  Mg     3.0     12-    TPro  6.4  /  Alb  3.2<L>  /  TBili  0.8  /  DBili  x   /  AST  107<H>  /  ALT  62<H>  /  AlkPhos  290<H>  12-    Urinalysis Basic - ( 05 Dec 2019 22:12 )    Color: Yellow / Appearance: Slightly Turbid / S.035 / pH: x  Gluc: x / Ketone: Small  / Bili: Negative / Urobili: <2 mg/dL   Blood: x / Protein: 30 mg/dL / Nitrite: Negative   Leuk Esterase: Negative / RBC: 8 /HPF / WBC 5 /HPF   Sq Epi: x / Non Sq Epi: 6 /HPF / Bacteria: Few      RADIOLOGY & ADDITIONAL TESTS:    Consultant(s) Notes Reviewed:    Nephrology  Pulmonology  Palliative Care  ID

## 2019-12-07 NOTE — PROGRESS NOTE ADULT - PROBLEM SELECTOR PLAN 3
-s/p lasix 40 IV. Will dose another lasix 40 IV at 6pm.  - Per patient, he was not taking lasix at home.  - Lower extremity vein duplex shows bilateral DVTs (12/4). Note 12/2 CTA revealed no pulmonary embolus  - Lovenox 1.5 mg/kg daily (90 mg)  - Monitor platelets closely. Platelets still stable  - Ordered ACE wraps for his legs Mild hyperkalemia with no EKG changes (compare with 12/2).   -Given calcium gluconate and albuterol

## 2019-12-07 NOTE — PROGRESS NOTE ADULT - ASSESSMENT
This is a 58 yo M with stage IV non-SCLC with R malignant pleural effusion s/p indwelling pleural catheter, progressing despite immunotherapy, and chronic hypoxemic respiratory failure on 5L at home, presenting with persistent and increasing SOB and BLE edema since his last admission over 1 month ago. CT chest findings are concerning for worsening lymphangitic spread of known primary NSCLC. Given progression of disease on immunotherapy, no role for continued immunotherapy. GOC discussion had at bedside - patient is now DNR/DNI. Continue ongoing GOC discussions. Patient is currently requiring BIPAP 80% 10/5 and likely has a prerenal KARSON 2/2 hypovolemia. This is a 60 yo M with stage IV non-SCLC with R malignant pleural effusion s/p indwelling pleural catheter, progressing despite immunotherapy, and chronic hypoxemic respiratory failure on 5L at home, presenting with persistent and increasing SOB and BLE edema since his last admission over 1 month ago. CT chest findings are concerning for worsening lymphangitic spread of known primary NSCLC. Given progression of disease on immunotherapy, no role for continued immunotherapy. GOC discussion had at bedside - patient is now DNR/DNI. Continue ongoing GOC discussions.     The patient was found not on oxygen and confused this morning, and now the patient has been placed on BIPAP 80% 10/5. He is currently no longer confused AOx3 and breathing comfortably. This is a 58 yo M with stage IV non-SCLC with R malignant pleural effusion s/p indwelling pleural catheter, progressing despite immunotherapy, and chronic hypoxemic respiratory failure on 5L at home, presenting with persistent and increasing SOB and BLE edema since his last admission over 1 month ago. CT chest findings are concerning for worsening lymphangitic spread of known primary NSCLC. Given progression of disease on immunotherapy, no role for continued immunotherapy. GOC discussion had at bedside - patient is now DNR/DNI. Continue ongoing GOC discussions.     The patient was found not on oxygen and confused this morning, and now the patient has been placed on BIPAP 80% 10/5. He is currently no longer confused AOx3 and breathing comfortably. His BUN and Cr have been steadily increasing, and he has not had adequate urine output.

## 2019-12-07 NOTE — PROGRESS NOTE ADULT - PROBLEM SELECTOR PLAN 1
Patient was hypoxic on nonrebreather, satting well on 50% Bipap 10/5. Likely 2/2 lymphangitic spread of known stage IV NSCLC.  - Transition from Bipap10/5 to high flow.  - Appreciate pulm recs: restart duonebs (patient needs nebulizer machine at home), restart prednisone, d/c spiriva, c/w effusion drainage, and contact Dr. Deras regarding further primary cancer treatment.  - Urine and blood cultures did not grow any bacteria. Will empiric antibiotics when cultures return neg.  - continue on HFNC, titrate to SpO2 >88%.  - Appreciate Pulm, Onc, and ID recs  - Unlikely pneumonia, definitely not MRSA per ID. Discontinue vancomycin, will continue with Cefepime. If Cx remain negative tomorrow and no fevers, will DC cefepime tomorrow  - Patient unable to take prednisone orally d/t dysphagia       - IV methylprednisolone 32 mg daily  - Duonebs q6h  - Pt required more O2 overnight so high-flow was increased to 55L/min but was still insufficient so he was switched to BIPAP 80% 10/5.  - Monitor strict I's/O's. Patient was hypoxic on nonrebreather, satting okay on 80% 50LPM. Likely 2/2 lymphangitic spread of known stage IV NSCLC.  - Urine and blood cultures did not grow any bacteria. D/c empiric antibiotics  - continue on HFNC, titrate to SpO2 >88%.  - Appreciate Pulm, Onc, and ID recs  - Patient unable to take prednisone orally d/t dysphagia       - IV methylprednisolone 32 mg daily  - Duonebs q6h  - Pt appeared hypoxic overnight so nurse and overnight team ordered an xray and Lasix 20 IV, worrying for fluid overload  - Monitor strict I's/O's. Patient was hypoxic on nonrebreather, satting okay on 80% 50LPM. Likely 2/2 lymphangitic spread of known stage IV NSCLC.  - Urine and blood cultures did not grow any bacteria. D/c empiric antibiotics  - continue on HFNC, titrate to SpO2 >88%.  - Appreciate Pulm, Onc, and ID recs  - Patient unable to take prednisone orally d/t dysphagia       - IV methylprednisolone 32 mg daily  - Duonebs q6h  - Pt appeared hypoxic overnight so nurse and overnight team ordered an xray and Lasix 20 IV, worrying for fluid overload.  -BNP was 1720 last evening 12/6 from 805 on 12/2, suggesting increased myocardial stretch perhaps 2/2 fluids  - Monitor strict I's/O's. Patient was hypoxic on nonrebreather, satting okay on 80% 50LPM. Likely 2/2 lymphangitic spread of known stage IV NSCLC.  - Urine and blood cultures did not grow any bacteria. D/c empiric antibiotics  - continue on HFNC, titrate to SpO2 >88%.  - Appreciate Pulm, Onc, and ID recs  - Patient unable to take prednisone orally d/t dysphagia       - IV methylprednisolone 32 mg daily  - Duonebs q6h  - The overnight team gave 500cc bolus. Later the pt appeared hypoxic overnight so nurse and overnight team ordered an xray and Lasix 20 IV, worrying for fluid overload. BNP was 1720 last evening 12/6 from 805 on 12/2, suggesting increased myocardial stretch perhaps 2/2 fluid overload.  - Monitor strict I's/O's.  - Counseled the pt regarding asking for dilaudid or ativan when he gets anxious or has difficulty breathing

## 2019-12-07 NOTE — PROGRESS NOTE ADULT - PROBLEM SELECTOR PLAN 3
Duplex US showing BLE DVTs. CTA on admission 12/3 shows no PE.  - Lovenox 1.5 mg/kg daily (90 mg)  - Monitor platelets closely.  - Remove ACE wraps per patient's request

## 2019-12-07 NOTE — PROGRESS NOTE ADULT - PROBLEM SELECTOR PLAN 4
Problem: Dysphagia.  Plan: Patient complains of dysphagia since last admission, has not been eating at home, has lost weight.   Previous speech and swallow eval at prior admission recommended NPO, but will have them re-evaluate as patient has desire to eat. Patient refuses NGT and PEG tube placement.   - Patient is aware of risks of aspiration and wishes to continue to try to eat  -Aspiration precautions and elevate HOB. -s/p lasix 40 IV. Will dose another lasix 40 IV at 6pm.  - Per patient, he was not taking lasix at home.  - Lower extremity vein duplex shows bilateral DVTs (12/4). Note 12/2 CTA revealed no pulmonary embolus  - Lovenox 1.5 mg/kg daily (90 mg)  - Monitor platelets closely. Platelets still stable  - Ordered ACE wraps for his legs but were removed because the pt found them uncomfortable

## 2019-12-07 NOTE — PROGRESS NOTE ADULT - SUBJECTIVE AND OBJECTIVE BOX
***INCOMPLETE NOTE***  Casper Mario MD PGY-1  Internal Medicine  Pager [phone] / [phone] Casper Mario MD PGY-1  Internal Medicine  Pager [phone] / [phone]    Patient is a 59y old  Male who presents with a chief complaint of SOB (07 Dec 2019 11:14)    SUBJECTIVE / OVERNIGHT EVENTS:  Overnight, patient was very agitated and anxious. Received 20 mg IV Lasix. Removed his HFNC this morning on his own and desaturated. Patient became confused after this and was initially not sure why he was in the hospital. Later regained orientation. Patient reports difficulty breathing. Denies chest pain, abdominal pain.  Patient has not been urinating.     MEDICATIONS  (STANDING):  albuterol/ipratropium for Nebulization 3 milliLiter(s) Nebulizer every 6 hours  ascorbic acid 500 milliGRAM(s) Oral daily  enoxaparin Injectable 90 milliGRAM(s) SubCutaneous daily  furosemide   Injectable 80 milliGRAM(s) IV Push once  gabapentin 300 milliGRAM(s) Oral at bedtime  gabapentin 100 milliGRAM(s) Oral two times a day  melatonin 3 milliGRAM(s) Oral at bedtime  methylPREDNISolone sodium succinate Injectable 20 milliGRAM(s) IV Push every 8 hours  multivitamin 1 Tablet(s) Oral daily    MEDICATIONS  (PRN):  benzonatate 200 milliGRAM(s) Oral three times a day PRN Cough  HYDROmorphone  Injectable 0.5 milliGRAM(s) IV Push every 4 hours PRN Severe Pain (7 - 10)  HYDROmorphone  Injectable 0.3 milliGRAM(s) IV Push every 4 hours PRN moderate or severe pain  LORazepam   Injectable 0.5 milliGRAM(s) IV Push every 6 hours PRN Anxiety      CAPILLARY BLOOD GLUCOSE        I&O's Summary    06 Dec 2019 07:01  -  07 Dec 2019 07:00  --------------------------------------------------------  IN: 1040 mL / OUT: 400 mL / NET: 640 mL        PHYSICAL EXAM:  Vital Signs Last 24 Hrs  T(C): 36.4 (19 @ 03:58)  T(F): 97.6 (19 @ 03:58), Max: 97.8 (19 @ 22:06)  HR: 112 (19 @ 13:28) (109 - 118)  BP: 102/69 (19 @ 06:51)  BP(mean): --  RR: 31 (19 @ 13:28) (25 - 35)  SpO2: 89% (19 @ 13:28) (89% - 93%)  Wt(kg): --     @ 07:01  -   @ 07:00  --------------------------------------------------------  IN: 1040 mL / OUT: 400 mL / NET: 640 mL    Constitutional: Wearing HFNC, awake and alert.  EYES: EOMI  ENT:  Normal Hearing  Respiratory: on HFNC. Diffuse rhonchi and wheezes, increased in R lung field compared to left.   Cardiovascular: S1 and S2, tachycardic, regular, no Murmurs, gallops or rubs  Gastrointestinal: Bowel Sounds present, soft, nontender, nondistended, no guarding, no rebound  Extremities: 3+ bilateral lower extremity edema, non-tender to palpation, slightly lower down on legs than yesterday  Neurological: Patient confused, appears panicked.   Skin: No rashes  Psych: A&Ox2, later A&Ox3    LABS:                        12.9   20.44 )-----------( 85       ( 07 Dec 2019 09:57 )             39.8         128<L>  |  90<L>  |  101<H>  ----------------------------<  148<H>  5.0   |  21<L>  |  2.46<H>    Ca    8.3<L>      07 Dec 2019 07:05  Phos  5.9       Mg     3.0         TPro  6.4  /  Alb  3.2<L>  /  TBili  0.8  /  DBili  x   /  AST  107<H>  /  ALT  62<H>  /  AlkPhos  290<H>        Urinalysis Basic - ( 05 Dec 2019 22:12 )    Color: Yellow / Appearance: Slightly Turbid / S.035 / pH: x  Gluc: x / Ketone: Small  / Bili: Negative / Urobili: <2 mg/dL   Blood: x / Protein: 30 mg/dL / Nitrite: Negative   Leuk Esterase: Negative / RBC: 8 /HPF / WBC 5 /HPF   Sq Epi: x / Non Sq Epi: 6 /HPF / Bacteria: Few    RADIOLOGY & ADDITIONAL TESTS:    Imaging Personally Reviewed:    Consultant(s) Notes Reviewed:    Nephrology    Care Discussed with Consultants/Other Providers:  Palliative care - Offering help with symptom management

## 2019-12-07 NOTE — PROGRESS NOTE ADULT - PROBLEM SELECTOR PLAN 1
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 Patient was hypoxic on nonrebreather. Likely multifactorial met lung cancer, possibly from fluid overload though this now seems less likely. Now satting in high 80s% on HFNC.   - continue on HFNC, titrate to SpO2 >88%.  - Appreciate Pulm, Onc, and ID recs  - Unlikely pneumonia, definitely not MRSA per ID. Discontinued vancomycin and cefepime.   - Patient unable to take prednisone orally d/t dysphagia       - Increased IV methylprednisolone to 20 mg q8h  - Duonebs q6h  - Stopped Lasix as creatinine is rising  - Monitor strict I's/O's. 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 Patient was hypoxic on nonrebreather. Likely multifactorial met lung cancer, possibly from fluid overload though this now seems less likely. Now satting in high 80s% on HFNC.   - continue on HFNC, titrate to SpO2 >88%.  - Appreciate Pulm, Onc, and ID recs  - Patient unable to take prednisone orally d/t dysphagia       - c/w IV methylprednisolone to 20 mg q8h  - Duonebs q6h  - Stopped Lasix as creatinine is rising  - Monitor strict I's/O's.

## 2019-12-08 VITALS — OXYGEN SATURATION: 91 % | HEART RATE: 98 BPM

## 2019-12-08 DIAGNOSIS — N17.9 ACUTE KIDNEY FAILURE, UNSPECIFIED: ICD-10-CM

## 2019-12-08 LAB
ALBUMIN SERPL ELPH-MCNC: 2.9 G/DL — LOW (ref 3.3–5)
ALBUMIN SERPL ELPH-MCNC: 3.1 G/DL — LOW (ref 3.3–5)
ALP SERPL-CCNC: 405 U/L — HIGH (ref 40–120)
ALP SERPL-CCNC: 437 U/L — HIGH (ref 40–120)
ALT FLD-CCNC: 213 U/L — HIGH (ref 10–45)
ALT FLD-CCNC: 239 U/L — HIGH (ref 10–45)
ANION GAP SERPL CALC-SCNC: 19 MMOL/L — HIGH (ref 5–17)
ANION GAP SERPL CALC-SCNC: 19 MMOL/L — HIGH (ref 5–17)
AST SERPL-CCNC: 316 U/L — HIGH (ref 10–40)
AST SERPL-CCNC: 331 U/L — HIGH (ref 10–40)
BASOPHILS # BLD AUTO: 0.03 K/UL — SIGNIFICANT CHANGE UP (ref 0–0.2)
BASOPHILS NFR BLD AUTO: 0.1 % — SIGNIFICANT CHANGE UP (ref 0–2)
BILIRUB SERPL-MCNC: 0.6 MG/DL — SIGNIFICANT CHANGE UP (ref 0.2–1.2)
BILIRUB SERPL-MCNC: 0.7 MG/DL — SIGNIFICANT CHANGE UP (ref 0.2–1.2)
BUN SERPL-MCNC: 117 MG/DL — HIGH (ref 7–23)
BUN SERPL-MCNC: 124 MG/DL — HIGH (ref 7–23)
CALCIUM SERPL-MCNC: 7.2 MG/DL — LOW (ref 8.4–10.5)
CALCIUM SERPL-MCNC: 8 MG/DL — LOW (ref 8.4–10.5)
CHLORIDE SERPL-SCNC: 87 MMOL/L — LOW (ref 96–108)
CHLORIDE SERPL-SCNC: 89 MMOL/L — LOW (ref 96–108)
CO2 SERPL-SCNC: 20 MMOL/L — LOW (ref 22–31)
CO2 SERPL-SCNC: 22 MMOL/L — SIGNIFICANT CHANGE UP (ref 22–31)
CREAT SERPL-MCNC: 2.86 MG/DL — HIGH (ref 0.5–1.3)
CREAT SERPL-MCNC: 3.02 MG/DL — HIGH (ref 0.5–1.3)
CULTURE RESULTS: SIGNIFICANT CHANGE UP
CULTURE RESULTS: SIGNIFICANT CHANGE UP
EOSINOPHIL # BLD AUTO: 0 K/UL — SIGNIFICANT CHANGE UP (ref 0–0.5)
EOSINOPHIL NFR BLD AUTO: 0 % — SIGNIFICANT CHANGE UP (ref 0–6)
GLUCOSE BLDC GLUCOMTR-MCNC: 232 MG/DL — HIGH (ref 70–99)
GLUCOSE SERPL-MCNC: 153 MG/DL — HIGH (ref 70–99)
GLUCOSE SERPL-MCNC: 187 MG/DL — HIGH (ref 70–99)
HCT VFR BLD CALC: 40.2 % — SIGNIFICANT CHANGE UP (ref 39–50)
HGB BLD-MCNC: 13 G/DL — SIGNIFICANT CHANGE UP (ref 13–17)
IMM GRANULOCYTES NFR BLD AUTO: 1.1 % — SIGNIFICANT CHANGE UP (ref 0–1.5)
LYMPHOCYTES # BLD AUTO: 0.15 K/UL — LOW (ref 1–3.3)
LYMPHOCYTES # BLD AUTO: 0.7 % — LOW (ref 13–44)
MAGNESIUM SERPL-MCNC: 3.1 MG/DL — HIGH (ref 1.6–2.6)
MANUAL SMEAR VERIFICATION: SIGNIFICANT CHANGE UP
MCHC RBC-ENTMCNC: 28.8 PG — SIGNIFICANT CHANGE UP (ref 27–34)
MCHC RBC-ENTMCNC: 32.3 GM/DL — SIGNIFICANT CHANGE UP (ref 32–36)
MCV RBC AUTO: 88.9 FL — SIGNIFICANT CHANGE UP (ref 80–100)
MONOCYTES # BLD AUTO: 0.86 K/UL — SIGNIFICANT CHANGE UP (ref 0–0.9)
MONOCYTES NFR BLD AUTO: 4.1 % — SIGNIFICANT CHANGE UP (ref 2–14)
NEUTROPHILS # BLD AUTO: 19.6 K/UL — HIGH (ref 1.8–7.4)
NEUTROPHILS NFR BLD AUTO: 94 % — HIGH (ref 43–77)
OSMOLALITY SERPL: 320 MOSMOL/KG — HIGH (ref 280–301)
PHOSPHATE SERPL-MCNC: 7.5 MG/DL — HIGH (ref 2.5–4.5)
PLAT MORPH BLD: NORMAL — SIGNIFICANT CHANGE UP
PLATELET # BLD AUTO: 74 K/UL — LOW (ref 150–400)
POTASSIUM SERPL-MCNC: 5.4 MMOL/L — HIGH (ref 3.5–5.3)
POTASSIUM SERPL-MCNC: 5.8 MMOL/L — HIGH (ref 3.5–5.3)
POTASSIUM SERPL-SCNC: 5.4 MMOL/L — HIGH (ref 3.5–5.3)
POTASSIUM SERPL-SCNC: 5.8 MMOL/L — HIGH (ref 3.5–5.3)
PROT SERPL-MCNC: 5.9 G/DL — LOW (ref 6–8.3)
PROT SERPL-MCNC: 6.1 G/DL — SIGNIFICANT CHANGE UP (ref 6–8.3)
RBC # BLD: 4.52 M/UL — SIGNIFICANT CHANGE UP (ref 4.2–5.8)
RBC # FLD: 15.1 % — HIGH (ref 10.3–14.5)
RBC BLD AUTO: NORMAL — SIGNIFICANT CHANGE UP
SODIUM SERPL-SCNC: 128 MMOL/L — LOW (ref 135–145)
SODIUM SERPL-SCNC: 128 MMOL/L — LOW (ref 135–145)
SPECIMEN SOURCE: SIGNIFICANT CHANGE UP
SPECIMEN SOURCE: SIGNIFICANT CHANGE UP
WBC # BLD: 20.86 K/UL — HIGH (ref 3.8–10.5)
WBC # FLD AUTO: 20.86 K/UL — HIGH (ref 3.8–10.5)

## 2019-12-08 PROCEDURE — 83935 ASSAY OF URINE OSMOLALITY: CPT

## 2019-12-08 PROCEDURE — 87633 RESP VIRUS 12-25 TARGETS: CPT

## 2019-12-08 PROCEDURE — 82962 GLUCOSE BLOOD TEST: CPT

## 2019-12-08 PROCEDURE — 82947 ASSAY GLUCOSE BLOOD QUANT: CPT

## 2019-12-08 PROCEDURE — 83615 LACTATE (LD) (LDH) ENZYME: CPT

## 2019-12-08 PROCEDURE — 96374 THER/PROPH/DIAG INJ IV PUSH: CPT | Mod: XU

## 2019-12-08 PROCEDURE — 96375 TX/PRO/DX INJ NEW DRUG ADDON: CPT | Mod: XU

## 2019-12-08 PROCEDURE — 82330 ASSAY OF CALCIUM: CPT

## 2019-12-08 PROCEDURE — 87486 CHLMYD PNEUM DNA AMP PROBE: CPT

## 2019-12-08 PROCEDURE — 82570 ASSAY OF URINE CREATININE: CPT

## 2019-12-08 PROCEDURE — 81001 URINALYSIS AUTO W/SCOPE: CPT

## 2019-12-08 PROCEDURE — 87086 URINE CULTURE/COLONY COUNT: CPT

## 2019-12-08 PROCEDURE — 85014 HEMATOCRIT: CPT

## 2019-12-08 PROCEDURE — 84540 ASSAY OF URINE/UREA-N: CPT

## 2019-12-08 PROCEDURE — 84295 ASSAY OF SERUM SODIUM: CPT

## 2019-12-08 PROCEDURE — 84300 ASSAY OF URINE SODIUM: CPT

## 2019-12-08 PROCEDURE — 87641 MR-STAPH DNA AMP PROBE: CPT

## 2019-12-08 PROCEDURE — 83605 ASSAY OF LACTIC ACID: CPT

## 2019-12-08 PROCEDURE — 71275 CT ANGIOGRAPHY CHEST: CPT

## 2019-12-08 PROCEDURE — 84132 ASSAY OF SERUM POTASSIUM: CPT

## 2019-12-08 PROCEDURE — 94660 CPAP INITIATION&MGMT: CPT

## 2019-12-08 PROCEDURE — 93970 EXTREMITY STUDY: CPT

## 2019-12-08 PROCEDURE — 84100 ASSAY OF PHOSPHORUS: CPT

## 2019-12-08 PROCEDURE — 87070 CULTURE OTHR SPECIMN AEROBIC: CPT

## 2019-12-08 PROCEDURE — 93005 ELECTROCARDIOGRAM TRACING: CPT

## 2019-12-08 PROCEDURE — 84145 PROCALCITONIN (PCT): CPT

## 2019-12-08 PROCEDURE — 82565 ASSAY OF CREATININE: CPT

## 2019-12-08 PROCEDURE — 87798 DETECT AGENT NOS DNA AMP: CPT

## 2019-12-08 PROCEDURE — 80053 COMPREHEN METABOLIC PANEL: CPT

## 2019-12-08 PROCEDURE — 85610 PROTHROMBIN TIME: CPT

## 2019-12-08 PROCEDURE — 71045 X-RAY EXAM CHEST 1 VIEW: CPT

## 2019-12-08 PROCEDURE — 87640 STAPH A DNA AMP PROBE: CPT

## 2019-12-08 PROCEDURE — 99232 SBSQ HOSP IP/OBS MODERATE 35: CPT | Mod: GC

## 2019-12-08 PROCEDURE — 94640 AIRWAY INHALATION TREATMENT: CPT

## 2019-12-08 PROCEDURE — 87581 M.PNEUMON DNA AMP PROBE: CPT

## 2019-12-08 PROCEDURE — 83735 ASSAY OF MAGNESIUM: CPT

## 2019-12-08 PROCEDURE — 83880 ASSAY OF NATRIURETIC PEPTIDE: CPT

## 2019-12-08 PROCEDURE — 99291 CRITICAL CARE FIRST HOUR: CPT | Mod: 25

## 2019-12-08 PROCEDURE — 85730 THROMBOPLASTIN TIME PARTIAL: CPT

## 2019-12-08 PROCEDURE — 99233 SBSQ HOSP IP/OBS HIGH 50: CPT

## 2019-12-08 PROCEDURE — 83930 ASSAY OF BLOOD OSMOLALITY: CPT

## 2019-12-08 PROCEDURE — 82435 ASSAY OF BLOOD CHLORIDE: CPT

## 2019-12-08 PROCEDURE — 84484 ASSAY OF TROPONIN QUANT: CPT

## 2019-12-08 PROCEDURE — 82803 BLOOD GASES ANY COMBINATION: CPT

## 2019-12-08 PROCEDURE — 87040 BLOOD CULTURE FOR BACTERIA: CPT

## 2019-12-08 PROCEDURE — 80048 BASIC METABOLIC PNL TOTAL CA: CPT

## 2019-12-08 PROCEDURE — 85027 COMPLETE CBC AUTOMATED: CPT

## 2019-12-08 RX ORDER — SODIUM CHLORIDE 9 MG/ML
1000 INJECTION INTRAMUSCULAR; INTRAVENOUS; SUBCUTANEOUS
Refills: 0 | Status: DISCONTINUED | OUTPATIENT
Start: 2019-12-08 | End: 2019-12-08

## 2019-12-08 RX ORDER — HYDROMORPHONE HYDROCHLORIDE 2 MG/ML
0.5 INJECTION INTRAMUSCULAR; INTRAVENOUS; SUBCUTANEOUS
Qty: 100 | Refills: 0 | Status: DISCONTINUED | OUTPATIENT
Start: 2019-12-08 | End: 2019-12-08

## 2019-12-08 RX ORDER — LIDOCAINE 4 G/100G
1 CREAM TOPICAL DAILY
Refills: 0 | Status: DISCONTINUED | OUTPATIENT
Start: 2019-12-08 | End: 2019-12-08

## 2019-12-08 RX ORDER — SODIUM ZIRCONIUM CYCLOSILICATE 10 G/10G
5 POWDER, FOR SUSPENSION ORAL ONCE
Refills: 0 | Status: COMPLETED | OUTPATIENT
Start: 2019-12-08 | End: 2019-12-08

## 2019-12-08 RX ORDER — ENOXAPARIN SODIUM 100 MG/ML
60 INJECTION SUBCUTANEOUS DAILY
Refills: 0 | Status: DISCONTINUED | OUTPATIENT
Start: 2019-12-08 | End: 2019-12-08

## 2019-12-08 RX ORDER — SODIUM CHLORIDE 9 MG/ML
500 INJECTION INTRAMUSCULAR; INTRAVENOUS; SUBCUTANEOUS ONCE
Refills: 0 | Status: COMPLETED | OUTPATIENT
Start: 2019-12-08 | End: 2019-12-08

## 2019-12-08 RX ORDER — INSULIN HUMAN 100 [IU]/ML
5 INJECTION, SOLUTION SUBCUTANEOUS ONCE
Refills: 0 | Status: COMPLETED | OUTPATIENT
Start: 2019-12-08 | End: 2019-12-08

## 2019-12-08 RX ORDER — DEXTROSE 50 % IN WATER 50 %
50 SYRINGE (ML) INTRAVENOUS ONCE
Refills: 0 | Status: COMPLETED | OUTPATIENT
Start: 2019-12-08 | End: 2019-12-08

## 2019-12-08 RX ADMIN — HYDROMORPHONE HYDROCHLORIDE 0.5 MG/HR: 2 INJECTION INTRAMUSCULAR; INTRAVENOUS; SUBCUTANEOUS at 11:50

## 2019-12-08 RX ADMIN — Medication 3 MILLILITER(S): at 11:49

## 2019-12-08 RX ADMIN — SODIUM ZIRCONIUM CYCLOSILICATE 5 GRAM(S): 10 POWDER, FOR SUSPENSION ORAL at 03:08

## 2019-12-08 RX ADMIN — LIDOCAINE 1 APPLICATION(S): 4 CREAM TOPICAL at 03:08

## 2019-12-08 RX ADMIN — Medication 3 MILLILITER(S): at 17:56

## 2019-12-08 RX ADMIN — HYDROMORPHONE HYDROCHLORIDE 0.3 MILLIGRAM(S): 2 INJECTION INTRAMUSCULAR; INTRAVENOUS; SUBCUTANEOUS at 05:05

## 2019-12-08 RX ADMIN — HYDROMORPHONE HYDROCHLORIDE 0.3 MILLIGRAM(S): 2 INJECTION INTRAMUSCULAR; INTRAVENOUS; SUBCUTANEOUS at 01:05

## 2019-12-08 RX ADMIN — SODIUM CHLORIDE 10 MILLILITER(S): 9 INJECTION INTRAMUSCULAR; INTRAVENOUS; SUBCUTANEOUS at 11:44

## 2019-12-08 RX ADMIN — HYDROMORPHONE HYDROCHLORIDE 0.5 MILLIGRAM(S): 2 INJECTION INTRAMUSCULAR; INTRAVENOUS; SUBCUTANEOUS at 08:13

## 2019-12-08 RX ADMIN — HYDROMORPHONE HYDROCHLORIDE 0.5 MILLIGRAM(S): 2 INJECTION INTRAMUSCULAR; INTRAVENOUS; SUBCUTANEOUS at 02:45

## 2019-12-08 RX ADMIN — Medication 3 MILLILITER(S): at 00:00

## 2019-12-08 RX ADMIN — Medication 50 MILLILITER(S): at 09:14

## 2019-12-08 RX ADMIN — SODIUM CHLORIDE 250 MILLILITER(S): 9 INJECTION INTRAMUSCULAR; INTRAVENOUS; SUBCUTANEOUS at 07:08

## 2019-12-08 RX ADMIN — Medication 3 MILLILITER(S): at 07:08

## 2019-12-08 RX ADMIN — Medication 20 MILLIGRAM(S): at 07:07

## 2019-12-08 RX ADMIN — ENOXAPARIN SODIUM 60 MILLIGRAM(S): 100 INJECTION SUBCUTANEOUS at 13:31

## 2019-12-08 RX ADMIN — HYDROMORPHONE HYDROCHLORIDE 0.3 MILLIGRAM(S): 2 INJECTION INTRAMUSCULAR; INTRAVENOUS; SUBCUTANEOUS at 01:35

## 2019-12-08 RX ADMIN — HYDROMORPHONE HYDROCHLORIDE 0.5 MILLIGRAM(S): 2 INJECTION INTRAMUSCULAR; INTRAVENOUS; SUBCUTANEOUS at 03:15

## 2019-12-08 RX ADMIN — Medication 20 MILLIGRAM(S): at 14:09

## 2019-12-08 RX ADMIN — Medication 0.5 MILLIGRAM(S): at 11:49

## 2019-12-08 RX ADMIN — HYDROMORPHONE HYDROCHLORIDE 0.5 MG/HR: 2 INJECTION INTRAMUSCULAR; INTRAVENOUS; SUBCUTANEOUS at 11:35

## 2019-12-08 RX ADMIN — HYDROMORPHONE HYDROCHLORIDE 0.5 MILLIGRAM(S): 2 INJECTION INTRAMUSCULAR; INTRAVENOUS; SUBCUTANEOUS at 07:08

## 2019-12-08 RX ADMIN — HYDROMORPHONE HYDROCHLORIDE 0.3 MILLIGRAM(S): 2 INJECTION INTRAMUSCULAR; INTRAVENOUS; SUBCUTANEOUS at 04:33

## 2019-12-08 RX ADMIN — INSULIN HUMAN 5 UNIT(S): 100 INJECTION, SOLUTION SUBCUTANEOUS at 09:13

## 2019-12-08 RX ADMIN — GABAPENTIN 100 MILLIGRAM(S): 400 CAPSULE ORAL at 07:08

## 2019-12-08 NOTE — PROGRESS NOTE ADULT - PROBLEM SELECTOR PLAN 6
Likely contributed to by his bilateral DVTs, patient less likely volume overloaded  - Monitor creatinine off Lasix Likely contributed to by his bilateral DVTs, patient less likely volume overloaded

## 2019-12-08 NOTE — PROGRESS NOTE ADULT - PROBLEM SELECTOR PLAN 1
Pt with KARSON in the setting of poor oral intake, low BP, and contrast administration on 12/02/19 and diuretic use.  Pt with normal scr baseline of 0.9 on admission was 1.3mg/ld now 3 and oliguric. . UA with low urine Na and concentrated, FeUrea 11%. KARSON most likely due to ATN from volume depletion, contrast and diuretic use.   Family and Medicine Team at bed side, long discussion held about current condition. Family made aware pt is not a good candidate for HD and agree would not like dialysis, at this point family would like comfort measures.   We will no offer HD, given multiorgan failure pt good candidate for inpatient Hospice consider palliative care follow up.   Monitor labs and urine output. Avoid NSAIDs, ACEI/ARBS, RCA and nephrotoxins. Dose medications as per eGFR.    Will sign off re consult if needed. Pt with KARSON in the setting of poor oral intake, low BP, and contrast administration on 12/02/19 and diuretic use.  Pt with normal scr baseline of 0.9 on admission was 1.3mg/ld now 3 and oliguric. . UA with low urine Na and concentrated, FeUrea 11%. KARSON most likely due to ATN from volume depletion, contrast and diuretic use.   Family and Medicine Team at bed side, long discussion held about current condition. Family made aware pt is not a good candidate for HD and agree would not like dialysis, at this point family would like comfort measures.   We will no offer HD, given multiorgan failure pt good candidate for inpatient Hospice consider palliative care follow up.   Monitor labs and urine output. Avoid NSAIDs, ACEI/ARBS, RCA and nephrotoxins. Dose medications as per eGFR.

## 2019-12-08 NOTE — PROGRESS NOTE ADULT - ATTENDING COMMENTS
I have seen this patient with the fellow and agree with their assessment and plan. In addition,   Given this patient has severe comorbid conditions, poor prognosis and a "surprise question" response to not likely to survive past 6 months, he is not a good renal replacement therapy candidate. Would use conservative measures to treat all his renal complications such as pain meds, diuretics, anti nausea meds and other supportive measures.     Re consult as needed    Abbie Sylvester MD  Cell   Pager   Office

## 2019-12-08 NOTE — PROGRESS NOTE ADULT - ATTENDING COMMENTS
Comfort measures as per family discussion, started on dilaudid gtt. Overall prognosis remains very poor. Palliative recs in place.

## 2019-12-08 NOTE — PROVIDER CONTACT NOTE (OTHER) - ACTION/TREATMENT ORDERED:
Lokelma administered.
OK for patient to be transported to the floor with current O2 sat/tele monitoring on hi flow at this time.
MD aware, continue to monitor, ensure pt can tolerate nonrebreather and if pt can tolerate ok to send off tele and on nonrebreather
MD aware, continue to monitor, ok to draw lab work later in AM
MD aware, per MD notify respiratory, respiratory notified and at bedside, high flow increased 55LPM pt stills reports sob pt placed on bipap FiO2 80%. VoB618%. Pt comfortable on bipap. cont monitoring
MD will come bedside to discuss with patient,
no new interventions, Ok for patient to be 02 sat 85% only notify provider if under 85% sustaining

## 2019-12-08 NOTE — PROGRESS NOTE ADULT - SUBJECTIVE AND OBJECTIVE BOX
[facility] DIVISION OF KIDNEY DISEASES AND HYPERTENSION -- FOLLOW UP NOTE  --------------------------------------------------------------------------------  Chief Complaint:  SOB    24 hour events/subjective:  Pt examined at bed side, obtunded, family at bed side, on BIPAP, sating 80S no able to provide ROS. Remains oliguric.     PAST HISTORY  --------------------------------------------------------------------------------  No significant changes to PMH, PSH, FHx, SHx, unless otherwise noted    ALLERGIES & MEDICATIONS  --------------------------------------------------------------------------------  Allergies    No Known Allergies    Intolerances      Standing Inpatient Medications  albuterol/ipratropium for Nebulization 3 milliLiter(s) Nebulizer every 6 hours  ascorbic acid 500 milliGRAM(s) Oral daily  enoxaparin Injectable 60 milliGRAM(s) SubCutaneous daily  gabapentin 300 milliGRAM(s) Oral at bedtime  gabapentin 100 milliGRAM(s) Oral two times a day  HYDROmorphone Infusion 0.5 mG/Hr IV Continuous <Continuous>  melatonin 3 milliGRAM(s) Oral at bedtime  methylPREDNISolone sodium succinate Injectable 20 milliGRAM(s) IV Push every 8 hours  multivitamin 1 Tablet(s) Oral daily  sodium zirconium cyclosilicate 10 Gram(s) Oral three times a day  sodium zirconium cyclosilicate 10 Gram(s) Oral daily    PRN Inpatient Medications  benzonatate 200 milliGRAM(s) Oral three times a day PRN  HYDROmorphone  Injectable 0.5 milliGRAM(s) IV Push every 4 hours PRN  HYDROmorphone  Injectable 0.3 milliGRAM(s) IV Push every 4 hours PRN  lidocaine 2% Gel 1 Application(s) Topical daily PRN  LORazepam   Injectable 0.5 milliGRAM(s) IV Push every 6 hours PRN      REVIEW OF SYSTEMS  --------------------------------------------------------------------------------  not able to obtain.     VITALS/PHYSICAL EXAM  --------------------------------------------------------------------------------  T(C): 36.4 (12-08-19 @ 04:26), Max: 36.8 (12-07-19 @ 14:21)  HR: 110 (12-08-19 @ 07:13) (103 - 112)  BP: 101/63 (12-08-19 @ 04:26) (101/63 - 107/69)  RR: 20 (12-08-19 @ 04:26) (20 - 31)  SpO2: 90% (12-08-19 @ 07:13) (85% - 94%)  Wt(kg): --        12-07-19 @ 07:01  -  12-08-19 @ 07:00  --------------------------------------------------------  IN: 2065 mL / OUT: 525 mL / NET: 1540 mL    12-08-19 @ 07:01  -  12-08-19 @ 10:12  --------------------------------------------------------  IN: 620 mL / OUT: 350 mL / NET: 270 mL      Physical Exam:    	Gen: ill appearing. Cachectic  	HEENT: on bipap  	Pulm: decrease air entry, wheezes, and crackles.   	CV: RRR, S1S2; no rub  	Abd: +BS, soft, nontender/nondistended  	: No suprapubic tenderness  	UE:  no edema; + asterixis  	LE: ++ edema  	Neuro: Obtunded.     LABS/STUDIES  --------------------------------------------------------------------------------              13.0   20.86 >-----------<  74       [12-08-19 @ 09:26]              40.2     128  |  87  |  124  ----------------------------<  153      [12-08-19 @ 06:52]  5.8   |  22  |  3.02        Ca     7.2     [12-08-19 @ 06:52]      Mg     3.1     [12-08-19 @ 06:52]      Phos  7.5     [12-08-19 @ 06:52]    TPro  5.9  /  Alb  3.1  /  TBili  0.7  /  DBili  x   /  AST  331  /  ALT  239  /  AlkPhos  437  [12-08-19 @ 06:52]          Creatinine Trend:  SCr 3.02 [12-08 @ 06:52]  SCr 2.86 [12-08 @ 00:34]  SCr 2.46 [12-07 @ 07:05]  SCr 2.41 [12-07 @ 00:35]  SCr 2.30 [12-06 @ 19:03]    Urinalysis - [12-05-19 @ 22:12]      Color Yellow / Appearance Slightly Turbid / SG 1.035 / pH 5.5      Gluc Negative / Ketone Small  / Bili Negative / Urobili <2 mg/dL       Blood Moderate / Protein 30 mg/dL / Leuk Est Negative / Nitrite Negative      RBC 8 / WBC 5 / Hyaline 0 / Gran  / Sq Epi  / Non Sq Epi 6 / Bacteria Few    Urine Creatinine 175      [12-05-19 @ 17:52]  Urine Sodium <35      [12-05-19 @ 17:52]  Urine Urea Nitrogen 848      [12-05-19 @ 22:15]  Urine Osmolality 607      [12-05-19 @ 22:16]    Iron 62, TIBC 201, %sat 31      [11-14-19 @ 09:16]  Ferritin 1216      [11-14-19 @ 08:14]

## 2019-12-08 NOTE — PROGRESS NOTE ADULT - PROBLEM SELECTOR PLAN 5
Was on Tagrisso but noted progression on imaging last admission.  - Patient was planning to follow with Dr. Wright at [facility], has been following Dr. Marie Deras with [facility], who referred him there for clinical trial. At this point that would be his only option. Patient declining these interventions now.  - PleurX catheter to be drained every other day  - Palliative care following, appreciate recs Was on Tagrisso but noted progression on imaging last admission.  - Patient was planning to follow with Dr. Wright at [facility], has been following Dr. Marie Deras with [facility], who referred him there for clinical trial. At this point that would be his only option. Patient declining these interventions now.  - PleurX catheter to be drained every other day. 350 cc out 12/8  - Palliative care following, appreciate recs

## 2019-12-08 NOTE — DISCHARGE NOTE FOR THE EXPIRED PATIENT - HOSPITAL COURSE
58 yo M with stage IV non-small cell lung ca (dx 2 year s/p radiation, progressing on Tegrisso), R pleural effusion (s/p VATS) and PleurX catheter, on home O2 5L continuous (baseline sat 85-89% on O2) presenting from home with increasing SOB and BLE edema for 2 days. Patient continues to have chronic cough: no changed from yellow to green, with blood streaked sputum production. Denies fevers at home. This is third hospitalization since 10/2019. He was treated for pna with vanc/zosyn in October. R pleural effusion is drained every other day by nurse at home. Pluerx was drained today, outputting 320 mL. Patient called Dr. Carbajal d/t increased SOB, who advised him to return to ED.    In ED, patient was placed on nonrebreather (15L) but remained tachypneic. He's now satting 90% on 10/5 BiPAP at 50%. He was given lasix 40 IVx1, azithromycin, CTX and vanc. Of note, patient had 10 beats of ventricular trigeminy at 3:30 AM, same time as he removed the BiPAP and desatted to 76-82%. (03 Dec 2019 02:54)    Patient was admitted with acute hypoxic respiratory failure in the setting of metastatic disease. Initially, there was concern for pneumonia, though as the patient remained afebrile and had no leukocytosis, and cultures remained negative, this was determined not to be the case. Antibiotics were withdrawn and signs and symptoms of infection did not emerge. The patient was transitioned from BiPAP to HFNC. He did remain tachypneic and exhibited rhonchi and wheezes on lung auscultation. O2 saturation was maintained between 85-90%. Pulmonology was consulted and recommended steroids, IV solumedrol 20 mg q8h.     On previous admission, Speech and Swallow evaluation recommended complete NPO as there was concern for aspiration, patient was made NPO until repeat swallow evaluation could occur however this was not possible on as high of flow rates as the patient was requiring on HFNC. The provider discussed with patient the risks of aspiration and he expressed understanding and explained back to the provider. He decided that would still like to take PO food and liquid despite the risk of aspiration. For this reason, his diet was reinstated.     On admission, the patient had KARSON, with creatinine 1.33. This was initially thought to be due to volume overload in the setting of recent evidence of constrictive pericarditis, given the patient did have lower extremity edema. The patient was initially diuresed, but creatinine and BUN continued to rise to over 2.4 and 100, respectively. At this time, lower extremity DVT study revealed bilateral DVTs and the patient was started on therapeutic dose Lovenox (1.5 mg/kg daily). As creatinine was rising and urine output was decreasing, and bladder scan showed that he was not retaining urine, Nephrology was consulted, who recommended placing a Novak catheter and administering IV fluids and Lasix. CT was ordered to assess the kidneys, as ultrasound was unable to be performed at the time.     Oncology assessed the patient and spoke with his outpatient oncologist from [facility], Dr. Deras. In the past, the patient had been recommended either systemic chemotherapy or a clinical trial, however he declined both of these. This was discussed with the patient again and he confirmed that he did not want to pursue further treatment. Code status was discussed with the patient, and he decided not to be intubated or resuscitated, should his condition decompensate. Palliative care was consulted, and made recommendations for comfort, as well as met with the patient's family.    Patient  on 2019 @ 6:15 as noticed to be in asystole on telemetry monitoring; examined at bedside and pronounced dead via cardio-pulmonary criteria.

## 2019-12-08 NOTE — PROGRESS NOTE ADULT - SUBJECTIVE AND OBJECTIVE BOX
Arianna Vega MD PGY2   Pager [phone]/ [phone]    PROGRESS NOTE:     Patient is a 59y old  Male who presents with a chief complaint of SOB (07 Dec 2019 19:10)    SUBJECTIVE / OVERNIGHT EVENTS: Cr continued to uptrend overnight, pt received 500 cc NS bolus.     ADDITIONAL REVIEW OF SYSTEMS:    MEDICATIONS  (STANDING):  albuterol/ipratropium for Nebulization 3 milliLiter(s) Nebulizer every 6 hours  ascorbic acid 500 milliGRAM(s) Oral daily  enoxaparin Injectable 90 milliGRAM(s) SubCutaneous daily  gabapentin 300 milliGRAM(s) Oral at bedtime  gabapentin 100 milliGRAM(s) Oral two times a day  melatonin 3 milliGRAM(s) Oral at bedtime  methylPREDNISolone sodium succinate Injectable 20 milliGRAM(s) IV Push every 8 hours  multivitamin 1 Tablet(s) Oral daily  sodium zirconium cyclosilicate 10 Gram(s) Oral three times a day  sodium zirconium cyclosilicate 10 Gram(s) Oral daily    MEDICATIONS  (PRN):  benzonatate 200 milliGRAM(s) Oral three times a day PRN Cough  HYDROmorphone  Injectable 0.5 milliGRAM(s) IV Push every 4 hours PRN Severe Pain (7 - 10)  HYDROmorphone  Injectable 0.3 milliGRAM(s) IV Push every 4 hours PRN moderate or severe pain  lidocaine 2% Gel 1 Application(s) Topical daily PRN penile pain  LORazepam   Injectable 0.5 milliGRAM(s) IV Push every 6 hours PRN Anxiety      CAPILLARY BLOOD GLUCOSE        I&O's Summary    07 Dec 2019 07:01  -  08 Dec 2019 07:00  --------------------------------------------------------  IN: 1565 mL / OUT: 275 mL / NET: 1290 mL        PHYSICAL EXAM:  Vital Signs Last 24 Hrs  T(C): 36.4 (08 Dec 2019 04:26), Max: 36.8 (07 Dec 2019 14:21)  T(F): 97.5 (08 Dec 2019 04:26), Max: 98.2 (07 Dec 2019 14:21)  HR: 110 (08 Dec 2019 07:13) (103 - 112)  BP: 101/63 (08 Dec 2019 04:26) (101/63 - 107/69)  BP(mean): --  RR: 20 (08 Dec 2019 04:26) (20 - 31)  SpO2: 90% (08 Dec 2019 07:13) (85% - 94%)    CONSTITUTIONAL: NAD, well-developed  RESPIRATORY: Normal respiratory effort; lungs are clear to auscultation bilaterally  CARDIOVASCULAR: Regular rate and rhythm, normal S1 and S2, no murmur/rub/gallop; No lower extremity edema; Peripheral pulses are 2+ bilaterally  ABDOMEN: Nontender to palpation, normoactive bowel sounds, no rebound/guarding; No hepatosplenomegaly  MUSCLOSKELETAL: no clubbing or cyanosis of digits; no joint swelling or tenderness to palpation  PSYCH: A+O to person, place, and time; affect appropriate    LABS:                        12.9   20.44 )-----------( 85       ( 07 Dec 2019 09:57 )             39.8     12-08    128<L>  |  89<L>  |  117<H>  ----------------------------<  187<H>  5.4<H>   |  20<L>  |  2.86<H>    Ca    8.0<L>      08 Dec 2019 00:34  Phos  5.9     12-07  Mg     3.0     12-07    TPro  6.1  /  Alb  2.9<L>  /  TBili  0.6  /  DBili  x   /  AST  316<H>  /  ALT  213<H>  /  AlkPhos  405<H>  12-08                RADIOLOGY & ADDITIONAL TESTS:  Results Reviewed:   Imaging Personally Reviewed:  Electrocardiogram Personally Reviewed:    COORDINATION OF CARE:  Care Discussed with Consultants/Other Providers [Y/N]:  Prior or Outpatient Records Reviewed [Y/N]: Arianna Vega MD PGY2   Pager [phone]/ [phone]    PROGRESS NOTE:     Patient is a 59y old  Male who presents with a chief complaint of SOB (07 Dec 2019 19:10)    SUBJECTIVE / OVERNIGHT EVENTS: Cr continued to uptrend overnight, pt received 500 cc NS bolus. This am, pt w/ spO2 in mid 70s on HFNC 80% FiO2.     ADDITIONAL REVIEW OF SYSTEMS:  CONSTITUTIONAL: No weakness, fevers or chills  EYES/ENT: No visual changes;  No vertigo or throat pain   NECK: No pain or stiffness  RESPIRATORY: No cough, wheezing, hemoptysis; No shortness of breath  CARDIOVASCULAR: No chest pain or palpitations  GASTROINTESTINAL: No abdominal or epigastric pain. No nausea, vomiting, or hematemesis; No diarrhea or constipation. No melena or hematochezia.  GENITOURINARY: No dysuria, frequency or hematuria  NEUROLOGICAL: No numbness or weakness  SKIN: No itching, rashes      MEDICATIONS  (STANDING):  albuterol/ipratropium for Nebulization 3 milliLiter(s) Nebulizer every 6 hours  ascorbic acid 500 milliGRAM(s) Oral daily  enoxaparin Injectable 90 milliGRAM(s) SubCutaneous daily  gabapentin 300 milliGRAM(s) Oral at bedtime  gabapentin 100 milliGRAM(s) Oral two times a day  melatonin 3 milliGRAM(s) Oral at bedtime  methylPREDNISolone sodium succinate Injectable 20 milliGRAM(s) IV Push every 8 hours  multivitamin 1 Tablet(s) Oral daily  sodium zirconium cyclosilicate 10 Gram(s) Oral three times a day  sodium zirconium cyclosilicate 10 Gram(s) Oral daily    MEDICATIONS  (PRN):  benzonatate 200 milliGRAM(s) Oral three times a day PRN Cough  HYDROmorphone  Injectable 0.5 milliGRAM(s) IV Push every 4 hours PRN Severe Pain (7 - 10)  HYDROmorphone  Injectable 0.3 milliGRAM(s) IV Push every 4 hours PRN moderate or severe pain  lidocaine 2% Gel 1 Application(s) Topical daily PRN penile pain  LORazepam   Injectable 0.5 milliGRAM(s) IV Push every 6 hours PRN Anxiety      CAPILLARY BLOOD GLUCOSE        I&O's Summary    07 Dec 2019 07:01  -  08 Dec 2019 07:00  --------------------------------------------------------  IN: 1565 mL / OUT: 275 mL / NET: 1290 mL        PHYSICAL EXAM:  Vital Signs Last 24 Hrs  T(C): 36.4 (08 Dec 2019 04:26), Max: 36.8 (07 Dec 2019 14:21)  T(F): 97.5 (08 Dec 2019 04:26), Max: 98.2 (07 Dec 2019 14:21)  HR: 110 (08 Dec 2019 07:13) (103 - 112)  BP: 101/63 (08 Dec 2019 04:26) (101/63 - 107/69)  BP(mean): --  RR: 20 (08 Dec 2019 04:26) (20 - 31)  SpO2: 90% (08 Dec 2019 07:13) (85% - 94%)    CONSTITUTIONAL: NAD, well-developed  RESPIRATORY: Normal respiratory effort; lungs are clear to auscultation bilaterally  CARDIOVASCULAR: Regular rate and rhythm, normal S1 and S2, no murmur/rub/gallop; No lower extremity edema; Peripheral pulses are 2+ bilaterally  ABDOMEN: Nontender to palpation, normoactive bowel sounds, no rebound/guarding; No hepatosplenomegaly  MUSCLOSKELETAL: no clubbing or cyanosis of digits; no joint swelling or tenderness to palpation  PSYCH: A+O to person, place, and time; affect appropriate    LABS:                        12.9   20.44 )-----------( 85       ( 07 Dec 2019 09:57 )             39.8     12-08    128<L>  |  89<L>  |  117<H>  ----------------------------<  187<H>  5.4<H>   |  20<L>  |  2.86<H>    Ca    8.0<L>      08 Dec 2019 00:34  Phos  5.9     12-07  Mg     3.0     12-07    TPro  6.1  /  Alb  2.9<L>  /  TBili  0.6  /  DBili  x   /  AST  316<H>  /  ALT  213<H>  /  AlkPhos  405<H>  12-08                RADIOLOGY & ADDITIONAL TESTS:  Results Reviewed:   Imaging Personally Reviewed:  Electrocardiogram Personally Reviewed:    COORDINATION OF CARE:  Care Discussed with Consultants/Other Providers [Y/N]:  Prior or Outpatient Records Reviewed [Y/N]: Arianna Vega MD PGY2   Pager [phone]/ [phone]    PROGRESS NOTE:     Patient is a 59y old  Male who presents with a chief complaint of SOB (07 Dec 2019 19:10)    SUBJECTIVE / OVERNIGHT EVENTS: Cr continued to uptrend overnight, pt received 500 cc NS bolus. This am, pt w/ O2 sat in mid 70s on HFNC 80% FiO2. Pt placed on bipap with O2 sat improving to 85%. R pleurx catheter drained with 350 cc out. Pt states he feels short of breath and has chest tightness.     ADDITIONAL REVIEW OF SYSTEMS:  CONSTITUTIONAL: No weakness, fevers or chills  EYES/ENT: No visual changes;  No vertigo or throat pain   NECK: No pain or stiffness  RESPIRATORY: No cough, wheezing, hemoptysis; No shortness of breath  CARDIOVASCULAR: No chest pain or palpitations  GASTROINTESTINAL: No abdominal or epigastric pain. No nausea, vomiting, or hematemesis; No diarrhea or constipation. No melena or hematochezia.  GENITOURINARY: No dysuria, frequency or hematuria  NEUROLOGICAL: No numbness or weakness  SKIN: No itching, rashes    MEDICATIONS  (STANDING):  albuterol/ipratropium for Nebulization 3 milliLiter(s) Nebulizer every 6 hours  ascorbic acid 500 milliGRAM(s) Oral daily  enoxaparin Injectable 90 milliGRAM(s) SubCutaneous daily  gabapentin 300 milliGRAM(s) Oral at bedtime  gabapentin 100 milliGRAM(s) Oral two times a day  melatonin 3 milliGRAM(s) Oral at bedtime  methylPREDNISolone sodium succinate Injectable 20 milliGRAM(s) IV Push every 8 hours  multivitamin 1 Tablet(s) Oral daily  sodium zirconium cyclosilicate 10 Gram(s) Oral three times a day  sodium zirconium cyclosilicate 10 Gram(s) Oral daily    MEDICATIONS  (PRN):  benzonatate 200 milliGRAM(s) Oral three times a day PRN Cough  HYDROmorphone  Injectable 0.5 milliGRAM(s) IV Push every 4 hours PRN Severe Pain (7 - 10)  HYDROmorphone  Injectable 0.3 milliGRAM(s) IV Push every 4 hours PRN moderate or severe pain  lidocaine 2% Gel 1 Application(s) Topical daily PRN penile pain  LORazepam   Injectable 0.5 milliGRAM(s) IV Push every 6 hours PRN Anxiety      CAPILLARY BLOOD GLUCOSE        I&O's Summary    07 Dec 2019 07:01  -  08 Dec 2019 07:00  --------------------------------------------------------  IN: 1565 mL / OUT: 275 mL / NET: 1290 mL        PHYSICAL EXAM:  Vital Signs Last 24 Hrs  T(C): 36.4 (08 Dec 2019 04:26), Max: 36.8 (07 Dec 2019 14:21)  T(F): 97.5 (08 Dec 2019 04:26), Max: 98.2 (07 Dec 2019 14:21)  HR: 110 (08 Dec 2019 07:13) (103 - 112)  BP: 101/63 (08 Dec 2019 04:26) (101/63 - 107/69)  BP(mean): --  RR: 20 (08 Dec 2019 04:26) (20 - 31)  SpO2: 90% (08 Dec 2019 07:13) (85% - 94%)    CONSTITUTIONAL: increased WOB, on HFNC  RESPIRATORY: increased WOB, course breath sounds b/l  CARDIOVASCULAR: tachycardic  ABDOMEN: Nontender to palpation, normoactive bowel sounds, no rebound/guarding; No hepatosplenomegaly  MUSCLOSKELETAL: no clubbing or cyanosis of digits; no joint swelling or tenderness to palpation  PSYCH: A+O to person, place, and time; affect appropriate    LABS:                        12.9   20.44 )-----------( 85       ( 07 Dec 2019 09:57 )             39.8     12-08    128<L>  |  89<L>  |  117<H>  ----------------------------<  187<H>  5.4<H>   |  20<L>  |  2.86<H>    Ca    8.0<L>      08 Dec 2019 00:34  Phos  5.9     12-07  Mg     3.0     12-07    TPro  6.1  /  Alb  2.9<L>  /  TBili  0.6  /  DBili  x   /  AST  316<H>  /  ALT  213<H>  /  AlkPhos  405<H>  12-08                RADIOLOGY & ADDITIONAL TESTS:  Results Reviewed: y  Imaging Personally Reviewed: y  Electrocardiogram Personally Reviewed: y    COORDINATION OF CARE:  Care Discussed with Consultants/Other Providers [Y/N]:y   Prior or Outpatient Records Reviewed [Y/N]:y

## 2019-12-08 NOTE — PROGRESS NOTE ADULT - ASSESSMENT
58 yo M with stage IV non-small cell lung ca (dx 2 year s/p radiation, progressing on Tegrisso), R pleural effusion (s/p VATS) and PleurX catheter, on home O2 5L continuous (baseline sat 85-89% on O2) presenting from home with increasing SOB and BLE edema for 2 days, now with progressive disease, complicated with KARSON.

## 2019-12-08 NOTE — PROGRESS NOTE ADULT - PROBLEM SELECTOR PLAN 1
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 Patient was hypoxic on nonrebreather. Likely multifactorial met lung cancer, possibly from fluid overload though this now seems less likely. Now satting in high 80s% on HFNC.   - continue on HFNC, titrate to SpO2 >88%.  - Appreciate Pulm, Onc, and ID recs  - Patient unable to take prednisone orally d/t dysphagia       - c/w IV methylprednisolone to 20 mg q8h  - Duonebs q6h  - Stopped Lasix as creatinine is rising  - Monitor strict I's/O's. 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 Significantly worse today, satting in mid 70s on HFNC. now satting in mid 80s on bipap  - confirmed DNR/DNI status. Pt and family want to focus on comfort  - dilaudid gtt started for increased WOB  - f/u palliative recs  - Appreciate Pulm, Onc, and ID recs  - c/w IV methylprednisolone to 20 mg q8h  - Duonebs q6h  - Stopped Lasix as creatinine is rising and not responsive  - Monitor strict I's/O's.

## 2019-12-08 NOTE — PROGRESS NOTE ADULT - PROBLEM SELECTOR PLAN 2
- Cr increasing, 2.4 today, baseline 1. Most concerning for pre-renal etiology, though may also be intrarenal given exposure to nephrotoxic antibiotics and IV contrast. Bladder scan was 0, not post-renal  - Nephrology consulted, recommended 500cc bolus IVF then 80mg IV lasix and Novak placement.   - Recommend Lokelma for hyperkalemia  - Avoid nephrotoxic meds when possible   - Holding lasix for now for KARSON  - Was hyperkalemic, s/p albuterol, IVF, and insulin/D5, will repeat for continued hyperkalemia  -Monitor UO closely. Cr increasing. Prerenal vs ATN  - Nephrology consulted, pt is not HD candidate due to worsening clinical status, would not be able to tolerate  - cannot take Lokelma for hyperK as currently dependent on bipap  - temporizing measures for hyperK with insulin/D50/albuterol  - Avoid nephrotoxic meds when possible   - Holding lasix for now for KARSON  -Monitor UO closely.

## 2019-12-08 NOTE — PROGRESS NOTE ADULT - ASSESSMENT
60 yo M with stage IV non-small cell lung ca (dx 2 year s/p radiation, progressing on Tegrisso), R pleural effusion (s/p VATS) and PleurX catheter, on home O2 5L continuous (baseline sat 85-89% on O2) presenting from home with increasing SOB and BLE edema for 2 days, admitted with hypoxic respiratory failure 2/2 pna and met lung cancer c/b bilateral DVTs and acute renal failure 58 yo M with stage IV non-small cell lung ca (dx 2 year s/p radiation, progressing on Tegrisso), R pleural effusion (s/p VATS) and PleurX catheter, on home O2 5L continuous (baseline sat 85-89% on O2) presenting from home with increasing SOB and BLE edema for 2 days, admitted with hypoxic respiratory failure 2/2 pna and met lung cancer c/b bilateral DVTs and acute renal failure. Pt w/ worsening respiratory status and multiorgan failure

## 2019-12-08 NOTE — PROGRESS NOTE ADULT - PROBLEM SELECTOR PLAN 3
Duplex US showing BLE DVTs. CTA on admission 12/3 shows no PE.  - Lovenox 1.5 mg/kg daily (90 mg)  - Monitor platelets closely.  - Remove ACE wraps per patient's request Duplex US showing BLE DVTs. CTA on admission 12/3 shows no PE.  - Lovenox 60 mg on account of worsening renal fxn  - Monitor platelets closely.  - Remove ACE wraps per patient's request

## 2019-12-08 NOTE — PROGRESS NOTE ADULT - PROBLEM SELECTOR PLAN 7
Patient wishes to be DNR/DNI. MOLST form filled out. Patient also advised that his wife is his HCP, she was present for conversation and indicated understanding.  - Palliative Care consulted  - Will call patient's wife today to give update that respiratory status is poor. Patient wishes to be DNR/DNI. MOLST form filled out. Patient also advised that his wife is his HCP, she was present for conversation and indicated understanding.  - 12/8: respiratory status worsening, severely hypoxic. Dilaudid gtt started for worsening WOB since pt requiring frequent prns. Family agrees to dilaudid for breathing and informed that this may cause sedation, decrease respiratory drive. Wife expresses she wants his daughter to come from [place] before he passes, but family informed his passing this may occur soon. Left message for daughter explaining his current status. Explained with renal at bedside that he is not HD candidate, and the electrolyte derangements can also cause death.   - Palliative Care consulted. No beds currently available in PCU

## 2019-12-08 NOTE — PROGRESS NOTE ADULT - PROBLEM SELECTOR PROBLEM 1
Dyspnea, unspecified type
KARSON (acute kidney injury)
Respiratory failure with hypoxia

## 2020-02-04 NOTE — DISCHARGE NOTE PROVIDER - NSDCMRMEDTOKEN_GEN_ALL_CORE_FT
Care Everywhere: updated  Immunization: updated  Health Maintenance: updated  Media Review: n/a  Legacy Review: n/a  Order placed: n/a  Upcoming appts:n/a         home oxygen: 4 liter(s) continuous with portables via nasal cannula  SpO2 81% room air at rest  Diagnosis bilateral pleural effusions (ICD 10 J91.8)  BEV 99 mo  Height 170 cm  Weight 69 kg  Tagrisso 80 mg oral tablet: 1 tab(s) orally once a day guaiFENesin 100 mg/5 mL oral liquid: 5 milliliter(s) orally every 6 hours  home oxygen: 4 liter(s) continuous with portables via nasal cannula  SpO2 81% room air at rest  Diagnosis bilateral pleural effusions (ICD 10 J91.8)  BEV 99 mo  Height 170 cm  Weight 69 kg  Tagrisso 80 mg oral tablet: 1 tab(s) orally once a day gabapentin 300 mg oral capsule: 1 cap(s) orally once a day (at bedtime)  guaiFENesin 100 mg/5 mL oral liquid: 5 milliliter(s) orally every 6 hours  home oxygen: 4 liter(s) continuous with portables via nasal cannula  SpO2 81% room air at rest  Diagnosis bilateral pleural effusions (ICD 10 J91.8)  BEV 99 mo  Height 170 cm  Weight 69 kg  Hospital Bed: 1 Hospital Bed  ipratropium-albuterol 0.5 mg-2.5 mg/3 mLinhalation solution: 3 milliliter(s) inhaled every 6 hours, As needed, Shortness of Breath and/or Wheezing  Neurontin 100 mg oral capsule: 1 cap(s) orally 2 times a day at 8 am and 2 pm  predniSONE 50 mg oral tablet: 1 tab(s) orally once a day  tiotropium 18 mcg inhalation capsule: 1 cap(s) inhaled once a day  traMADol 50 mg oral tablet: 0.5 tab(s) orally every 6 hours, As needed, Severe Pain (7 - 10) MDD:2 tabs

## 2020-05-11 NOTE — H&P ADULT - PROBLEM SELECTOR PROBLEM 2
Pneumonia due to infectious organism, unspecified laterality, unspecified part of lung Acute respiratory failure with hypoxia Acitretin Counseling:  I discussed with the patient the risks of acitretin including but not limited to hair loss, dry lips/skin/eyes, liver damage, hyperlipidemia, depression/suicidal ideation, photosensitivity.  Serious rare side effects can include but are not limited to pancreatitis, pseudotumor cerebri, bony changes, clot formation/stroke/heart attack.  Patient understands that alcohol is contraindicated since it can result in liver toxicity and significantly prolong the elimination of the drug by many years.

## 2020-07-02 NOTE — DIETITIAN INITIAL EVALUATION ADULT. - PERTINENT LABORATORY DATA
12-04 Na132 mmol/L<L> Glu 121 mg/dL<H> K+ 4.5 mmol/L Cr  1.65 mg/dL<H> BUN 58 mg/dL<H> 12-04 Phos 4.7 mg/dL<H> 12-04 Alb 3.1 g/dL<L>
none

## 2020-10-19 NOTE — ED PROVIDER NOTE - NSCAREINITIATED _GEN_ER
I called Kassi though reached voicemail. Patient does have an appointment on Monday to discuss home care referral. I am unclear if an additional referral for social work is needed.    I left Kassi a message requesting callback to further discuss.     I will also reach out to social work regarding potential county resources.    Alexandra (Cathleen) ADALGISA Brannon  
Per social work, Critical access hospital services typically do not require a referral. A physician signed form may be required, though this is typically provided by the Critical access hospital.    Will await callback for further clarification, otherwise patient has an appointment today.    Alexandra Brannon RN (Brasch)      
[facility] Call Center    Phone Message    May a detailed message be left on voicemail: yes     Reason for Call: Order(s): Other: pt needs help in the home and the wife needs an order from Dr Slater for [facility]  Reason for requested: Pt wife calling for this and he county told her she needs to call the clinic   Date needed: ASAP  Provider name:  Dr Slater      Action Taken: Message routed to:  [facility] P[facility]    Travel Screening: Not Applicable                                                                        
Yuriy Laws(Attending)

## 2020-11-19 NOTE — PATIENT PROFILE ADULT - DO YOU WANT TO COMPLETE THE HCP AND NAME A HEALTH CARE AGENT
Reason for call:  Patient reporting a symptom    Symptom or request: Rash/ shingles for 2 days on the side of stomach and lower back, just bumps and crusty no discharge. No pain, a little itchy. Wife put essential oils oregano on it.     Duration (how long have symptoms been present): 2 days    Have you been treated for this before? No    Phone Number patient can be reached at:  Home number on file [phone] (home)    Best Time:  any    Can we leave a detailed message on this number:  YES    Call taken on 11/19/2020 at 12:06 PM by Adelia Ramirez     no

## 2020-12-08 NOTE — ED ADULT TRIAGE NOTE - O2 DELIVERY METHOD
Prescription approved per [facility] Refill Protocol.    Alyssa Falk RN on 12/8/2020 at 10:33 AM     nonrebreather mask

## 2021-04-14 NOTE — ED PROVIDER NOTE - CHILD ABUSE FACILITY
[facility]
Render Risk Assessment In Note?: no
Detail Level: Zone
Additional Notes: An I&D was performed on right mid-upper back. Consent was obtained and risks were reviewed including but not limited to delayed wound healing, infection, need for multiple I and D's, and pain.The area was prepped in the usual clean fashion. The lesion was incised with an 11 blade, and pressure was applied to the wound to drain the underlying contents.

## 2021-04-29 NOTE — DIETITIAN INITIAL EVALUATION ADULT. - RD TO REMAIN AVAILABLE
yes Drysol Counseling:  I discussed with the patient the risks of drysol/aluminum chloride including but not limited to skin rash, itching, irritation, burning.

## 2021-12-15 NOTE — PROGRESS NOTE ADULT - ATTENDING COMMENTS
Subjective   Patient ID: Pippa Franco is a 74year old female. Chief Complaint   Patient presents with   â¢ Abdominal Pain     intermittent abdominal pain for few years, no pain since she made the appt last week. no pain today. pt would just like to discuss how she felt. HPI     Patient is complaining of this strange intermittent right middle to lower quadrant abdominal pain. She has had this for many years, it used to happen very sporadically maybe once every 3 months. Now she has noticed more recently that its been happening every day for a couple weeks. That started a couple weeks ago. Now it has not happened in the last couple days. She states that she gets this really sharp pain in the right lower quadrant area and that will be sharp and then she will lift her right leg up and within about 5 seconds then the pain will go away. She feels like when she has the pain it does radiate down her thigh and the medial aspect of the thigh to the knee. And then when she does that lifting of her leg it will go away quickly. She has not noticed any swelling, she has not noticed any issues with bowel movements. She just recently had a colonoscopy about a month ago and that she had no polyps the appendical cecal opening looked okay. And her colonoscopy did reveal some diverticulosis but that was more in the sigmoid region. She denies any fever chills or sweats, she does not have much in the way of nausea. Is just very odd. She has not noticed this after she has been doing more activity. It is pretty uncomfortable when she gets this. She still has her appendix, she still has all her pelvic organs. She has never had any abdominal or pelvic surgeries. Also she is here so I can recheck her blood pressure. She has not checked her blood pressure much at home since we increased her lisinopril up to 40 mg. She also has been on some generic Lipitor 20 mg which she seems to be tolerating okay.     Patient's "medications, allergies, past medical, surgical, social and family histories were reviewed and updated as appropriate. Review of Systems   Constitutional: Negative for activity change, appetite change, chills, diaphoresis and fever. Gastrointestinal: Positive for abdominal pain. Negative for anal bleeding, blood in stool, constipation, diarrhea, nausea, rectal pain and vomiting. Objective    BP (!) 140/80 (BP Location: LUE - Left upper extremity, Patient Position: Sitting)   Pulse 88   Temp 97.1 Â°F (36.2 Â°C) (Temporal)   Resp 14   Ht 5' 4"" (1.626 m)   Wt 65.8 kg (145 lb)   BMI 24.89 kg/mÂ²   BSA 1.71 mÂ²   Physical Exam  Vitals and nursing note reviewed. Constitutional:       General: She is not in acute distress. Appearance: Normal appearance. She is not ill-appearing or diaphoretic. HENT:      Head: Normocephalic and atraumatic. Cardiovascular:      Rate and Rhythm: Normal rate and regular rhythm. Heart sounds: Normal heart sounds. No murmur heard. No friction rub. No gallop. Pulmonary:      Effort: Pulmonary effort is normal. No respiratory distress. Breath sounds: Normal breath sounds. No stridor. No wheezing, rhonchi or rales. Abdominal:      General: Abdomen is flat. Bowel sounds are normal. There is no distension. Palpations: Abdomen is soft. There is no mass. Tenderness: There is no abdominal tenderness. There is no guarding or rebound. Skin:     General: Skin is warm and dry. Neurological:      General: No focal deficit present. Mental Status: She is alert and oriented to person, place, and time. Psychiatric:         Mood and Affect: Mood normal.         Behavior: Behavior normal.       Assessment   Diagnoses and all orders for this visit:  Pelvic pain in female  -     US PELVIS NON-OB TRANSABDOMINAL AND TRANSVAGINAL;  Future  RLQ abdominal pain  -     US ABDOMEN COMPLETE; Future  Hyperlipidemia, unspecified hyperlipidemia type  -     COMPREHENSIVE " METABOLIC PANEL  -     LIPID PANEL WITH REFLEX    She has no tenderness on her abdominal exam.  I am wondering if this could be a muscle issue she does not feel like she gets a spasm, but I am wondering if this could be a muscle or like a nerve thing. It does not sound suspicious for an appendicitis especially with how this is going, also I do not think this is an intra-abdominal or pelvic pathology but we could do an ultrasound just to rule out if it is some kind of an ovarian issue maybe. So I would like for her to get an abdominal and a pelvic ultrasound just to rule out any abnormality. I talked to her about treatment if it is a muscular thing unfortunately we could try something like a muscle relaxant but since it comes on so quick I would not want her doing it regularly and since she has a way that it makes it better she can just continue to do that. I want her to continue hydrating. And will see if this starts happening more often. If we do not get much improvement with this and do not find much answers we could try maybe something like gabapentin. I would like to check some labs tomorrow for the lipid and check a CMP for the blood pressure medicine. So we will do those today. Total time spent on encounter is 30 minutes, between talking to the patient, examining the patient, ordering testing, documenting no, educating the patient, coordinating care.     Electronically signed by: Aurelio Shell PA-C agree with above.  Patient seen and examined. Chart reviewed.    59 year old man with stage IV lung CA Pleurex catheter in place for malignant pleural effusion, appearance of lymphangitic spread   poor prognosis    will follow

## 2022-02-25 NOTE — PROGRESS NOTE ADULT - PROBLEM SELECTOR PLAN 4
CHIEF COMPLAINT:   had concerns including Establish Care.      Reason for change of provider:   Switching because pcp retired    HISTORY OF PRESENT ILLNESS:   Mckenna Jimenez is a 65 year old female presenting  To establish care       Style explained  Preferred method of communication live well Ok to leave detailed message on phone if needed yes      Fasting today: no     Concerns: none     1. Essential hypertension, benign  Controlled, taking medicine as prescribedPalpitations no  Shortness of breath no  Chest pain no  Dizziness no     BP cuff: does not have one    2. Type 2 diabetes mellitus with stage 3a chronic kidney disease, without long-term current use of insulin (CMS/[facility])  Uncontrolled, taking medicine as prescribed working on diet seeing diabetic educator    7. Bilateral lower extremity edema  Improved control just started on medication fall of 21  Lasix frequency: daily with potassium over-the-counter  Reason: for water retention, LE edema., Dr Montano suggested and dr Rodriguez prescribed it for her. Now she feels better, she drinks at least 64oz of water a day     7. Stage 3a chronic kidney disease (CMS/HCC)  Uncontrolled new diagnosis for patient she notes she has been taking a lot more NSAIDs in the last 6 months due to a knee injury but has now stopped that prior was not      8. Type 2 diabetes mellitus with morbid obesity (CMS/HCC)  Uncontrolled but has lost about 10 lb since she was diagnosed, working on some diet changes hard to get exercise started per patient      I have reviewed the medications and allergies listed in the medical record as obtained by my nursing staff and support staff and agree with their documentation.    Social history: Patient is a nonsmoker.     REVIEW OF SYSTEMS:      Please refer to hpi for pertinent negative and positive review of systems.        PHYSICAL EXAMINATION:   Blood pressure 120/72, pulse 70, height 5' 6.14\" (1.68 m), weight 107 kg (235 lb 14.3 oz), last  menstrual period 01/25/2002, SpO2 96 %.    General:  Alert and oriented x3, in no acute distress, pleasant and well-groomed, cooperative, conversive.  Cardiovascular:  Regular rate and rhythm with , no murmur noted.  Positive S1-S2, no S3-S4, no carotid bruits noted, no JVD.   Respiratory:  Normal respiratory effort.  Clear to auscultation bilaterally, symmetric expansion bilateral.  No wheezes, rales or rhonchi.  Psychiatric:  Cooperative.  Appropriate affect.  Slightly anxious.        Records reviewed in [facility] and care everywhere and summary in hpi and ap today    LAB RESULTS:    Previous  And current Lab results reviewed and can be found in the EHR.     ASSESSMENT and PLAN:   Patient is a pleasant 65 year old female presenting for multiple    1. Essential hypertension, benign  Controlled continue current medication   Last dose change per patient has been on it for years without dose change oddly has not had blood work done for years until this fall  - lisinopril-hydroCHLOROthiazide (ZESTORETIC) 10-12.5 MG per tablet; Take 1 tablet by mouth daily.  Dispense: 90 tablet; Refill: 3    2. Type 2 diabetes mellitus with stage 3a chronic kidney disease, without long-term current use of insulin (CMS/[facility])  Uncontrolled, reinforced continue working on good diet reduced carbs,  Need to discuss work on trying to exercise at least 3 days a week,   Check blood sugars daily and bring information to every appointment  Start and continue medication as prescribed, new medication Last dose change 2/22 started Trulicity,  and ramp up metformin   discussed the goal of less than 7 but ideally less than 6.5 4 A1c especially in light of diagnosis less than 10 years,   Follow-up q.3 months until controlled , sooner if worsening labs or symptoms    Diagnosed with diabetes   11/2021  Dietitian last seen   Prescription given 2021         Diabetic educator last seen     Prescription given 2021            Flu shot     Q year fall, last given   advise patient will get a fall                  Pneumonia shot     #23     advised patient will get next visit               #13             Urine microalbumin        biannually                          Cholesterol statin      likely needs 1 has not had cholesterol checked in years will check next visit                  Foot exam annually      at [facility]    need          ACE inhibitor         UTD          Ophthalmology  annually    in  need          ASCVD score/[place] FOR ASPIRIN          need               3. Need for hepatitis C screening test    - HEPATITIS C ANTIBODY WITH REFLEX; Future    4. Vitamin D deficiency    - VITAMIN D -25 HYDROXY; Future    5. Screening for thyroid disorder    - THYROID STIMULATING HORMONE REFLEX; Future    6. Bilateral lower extremity edema  Controlled, patient came to me on this, consider trying to cut the dose in half in the fall which will be a year from when she started it see if we can wean her off of it  - furosemide (LASIX) 20 MG tablet; Take 1 tablet by mouth daily.  Dispense: 90 tablet; Refill: 3     7. Stage 3a chronic kidney disease (CMS/HCC)  Uncontrolled although no way to know how long it has been going on because she has not had blood work in years, check urine microalbumin and discuss next visit ways to prevent worsening besides avoiding NSAIDs  - lisinopril-hydroCHLOROthiazide (ZESTORETIC) 10-12.5 MG per tablet; Take 1 tablet by mouth daily.  Dispense: 90 tablet; Refill: 3       8. Type 2 diabetes mellitus with morbid obesity (CMS/HCC)  Uncontrolled continue working on diet and exercise continue evaluating every visit continue working with diabetic educator making small changes    The patient indicated and verbalized understanding of the diagnosis and agreed with the plan of care.  Will return to clinic for any new or worsening symptoms otherwise at regularly scheduled appointment.     59 spent caring for patient, a portion of this time was spent preparing for the  appointment and documenting the visit after the appointment  (32 in room with patient, 6 min prep before and 21 min after documenting the note, adding future orders and updating plan and after visit summary, reviewing specialist notes)      Return in about 3 months (around 5/25/2022) for fast lab/chol, dm.     DVT: heparin subq  GOC: currently full code, will discuss w/onc and pt further

## 2022-05-26 NOTE — DISCHARGE NOTE NURSING/CASE MANAGEMENT/SOCIAL WORK - PATIENT PORTAL LINK FT
You can access the FollowMyHealth Patient Portal offered by [facility] by registering at the following website: [email or web address]. By joining [facility]’s FollowMyHealth portal, you will also be able to view your health information using other applications (apps) compatible with our system. Statement Selected

## 2022-06-04 NOTE — ED ADULT NURSE NOTE - OBJECTIVE STATEMENT
Per pt report, pt has been having right neck and shoulder pain F84.0 Per pt report, pt has been having right neck and shoulder pain with swelling to area.  Pt states this has been going on for a week.  Pt denies any difficulty swallowing or breathing, cp, sob, dizziness, lightheadedness, fever or chills.  Swollen area is not red, warm, or tender, no crepitus.

## 2023-05-25 NOTE — PATIENT PROFILE ADULT - COMPLETE THE FOLLOWING IF THE PATIENT REFUSES THE INFLUENZA VACCINE:
53 yrs old M, previous heavy alcohol drinker and smoker, pshx of s/p  bilateral varicose veins surgery and Rt leg plate after fx, presented with increased post-prandial bloating. Pt is admitted for portal vein thrombosis and suspected hepatic malignancy. CT abd and pelvis w IV contrast: noted - Extensive thrombosis of the portal veins, portal confluence, SMV, upper IVM, and the distal splenic vein. Initially on heparin gtt, then switched to lovenox 100mg BID, full dose. Pt was initially placed on a clear liquid diet. Surgery and Heme/onc following. Pending MRI to further evaluate for hepatic malignancy. Upon receiving results for the imaging, will then advance diet likely tomorrow, since there is no indication for surgery at this time, and low clinical suspicion for mesenteric ischemia. Labs sent for hepatic malignancy like ca 19-9, CEA, came back negative, B-glycoprotein negative, anti-cardiolipin negative. MPN panel are pending. Ordered Flow Cyt to r/o [facility]. His diet was advanced to soft and bite sized today. MRI was done yesterday, no concern for HCC. Will likely start DOAC upon d/c, likely tomorrow.    Risks/benefits discussed with patient/surrogate

## 2023-08-17 NOTE — PATIENT PROFILE ADULT - DO YOU FEEL UNSAFE AT SCHOOL?
Refill request for Phentermine    LOV 2/13/2023  FUV 2/14/2024  Last filled 7/25/2023 #30    To PCP   no

## 2025-02-04 NOTE — COUNSELING
[Weight management counseling provided] : Weight management [Healthy eating counseling provided] : healthy eating [Activity counseling provided] : activity Denies pain at rest to bilat lower extremity/PAIN SCALE 0 OF 10.

## 2025-03-08 NOTE — ED ADULT TRIAGE NOTE - MODE OF ARRIVAL
Received call from primary team that patient GOC changed and patient is now an organ donor. CRRT reordered.     If you have any questions, please feel free to contact me:  Corrie Mohr MD PGY-4  Nephrology Fellow  [employer] (Preferred) Walk in

## 2025-05-12 NOTE — PROCEDURE NOTE - ESTIMATED BLOOD LOSS
Genaro Duenas (:  1953) is a 72 y.o. male,Established patient, here for evaluation of the following chief complaint(s):  Cough (Nasal congestion)         Assessment & Plan  Upper respiratory tract infection, unspecified type  URI and cough x 2 days  Lungs clear w/o wheeze  Negative covid/flu  Start symptomatic trx  Use tessalon, mucinex  Hydrate well, rest  F/u if no improvement or worsening         Close exposure to COVID-19 virus   Negative covid testing    Orders:    AMB POC COVID-19 COV    Cough, unspecified type      Orders:    AMB POC COVID-19 COV      No follow-ups on file.       Subjective   Patient is here for URI and cough. He started with cough on Saturday. He got allergy shot last week. He is always occasionally sneezing/coughing wth his allergies but this is more of a cough that started to get bad cough Saturday. He has sinus congesiton, headache. He has watery nasal discharge and watery eyes. He has dry cough.    Cough  This is a new problem. The current episode started in the past 7 days. The cough is Non-productive. Associated symptoms include chills (occasional chill), headaches, nasal congestion and postnasal drip. Pertinent negatives include no fever, hemoptysis, myalgias, sore throat, shortness of breath, sweats or wheezing. His past medical history is significant for environmental allergies.       Review of Systems   Constitutional:  Positive for chills (occasional chill). Negative for fever.   HENT:  Positive for postnasal drip. Negative for sore throat.    Respiratory:  Positive for cough. Negative for hemoptysis, shortness of breath and wheezing.    Musculoskeletal:  Negative for myalgias.   Allergic/Immunologic: Positive for environmental allergies.   Neurological:  Positive for headaches.          Objective   Physical Exam  Vitals reviewed.   Constitutional:       Appearance: Normal appearance.   HENT:      Right Ear: Tympanic membrane normal.      Left Ear: Tympanic  None

## 2025-05-15 NOTE — PROGRESS NOTE ADULT - PROBLEM/PLAN-8
Protocol For Photochemotherapy: Triamcinolone Ointment And Nbuvb: The patient received Photochemotherapy: Triamcinolone and NBUVB (triamcinolone ointment applied to all lesions prior to phototherapy). Protocol For Photochemotherapy: Mineral Oil And Broad Band Uvb: The patient received Photochemotherapy: Mineral Oil and Broad Band UVB. Comments On Previous Treatment: Has goggles, no burning. Last tx was intense so we took dosage down. Protocol For Photochemotherapy For Severe Photoresponsive Dermatoses: Puva: The patient received Photochemotherapy for severe photoresponsive dermatoses: PUVA requiring at least 4 to 8 hours of care under direct physician supervision. Protocol For Uva: The patient received UVA. Protocol For Nbuvb: Hands/Feet: The patient received NBUVB. Protocol For Photochemotherapy For Severe Photoresponsive Dermatoses: Tar And Broad Band Uvb (Goeckerman Treatment): The patient received Photochemotherapy for severe photoresponsive dermatoses: Tar and Broad Band UVB (Goeckerman treatment) requiring at least 4 to 8 hours of care under direct physician supervision. Protocol: NBUVB Changes In Treatment Protocol: Start at 225mj, increase 25mj each tx until 375 2-3 x weekly per Kathleen.\\nPt asked to stay at 325mj today 5/6/25 pt asked to bump up to 350mj 5/8/25 pt asked to stay at 350mj 5/15/25 Protocol For Photochemotherapy For Severe Photoresponsive Dermatoses: Tar And Nbuvb (Goeckerman Treatment): The patient received Photochemotherapy for severe photoresponsive dermatoses: Tar and NBUVB (Goeckerman treatment) requiring at least 4 to 8 hours of care under direct physician supervision. Protocol For Broad Band Uvb: The patient received Broad Band UVB. Protocol For Uva1: The patient received UVA1. Protocol For Photochemotherapy: Tar And Nbuvb (Goeckerman Treatment): The patient received Photochemotherapy: Tar and NBUVB (Goeckerman treatment). DISPLAY PLAN FREE TEXT Detail Level: Zone Protocol For Bath Puva: The patient received Bath PUVA. Post-Care Instructions: I reviewed with the patient in detail post-care instructions. Patient is to wear sun protection. Patients may expect sunburn like redness, discomfort and scabbing. Protocol For Photochemotherapy For Severe Photoresponsive Dermatoses: Petrolatum And Nbuvb: The patient received Photochemotherapy for severe photoresponsive dermatoses: Petrolatum and NBUVB requiring at least 4 to 8 hours of care under direct physician supervision. Protocol For Photochemotherapy: Baby Oil And Nbuvb: The patient received Photochemotherapy: Baby Oil and NBUVB (baby oil applied to all lesions prior to phototherapy). Protocol For Photochemotherapy: Petrolatum And Nbuvb: The patient received Photochemotherapy: Petrolatum and NBUVB (petrolatum applied to all lesions prior to phototherapy). Protocol For Protocol For Photochemotherapy For Severe Photoresponsive Dermatoses: Bath Puva: The patient received Photochemotherapy for severe photoresponsive dermatoses: Bath PUVA requiring at least 4 to 8 hours of care under direct physician supervision. Protocol For Photochemotherapy For Severe Photoresponsive Dermatoses: Petrolatum And Broad Band Uvb: The patient received Photochemotherapyfor severe photoresponsive dermatoses: Petrolatum and Broad Band UVB requiring at least 4 to 8 hours of care under direct physician supervision. Protocol For Photochemotherapy: Mineral Oil And Nbuvb: The patient received Photochemotherapy: Mineral Oil and NBUVB (mineral oil applied to all lesions prior to phototherapy). Render Post-Care In The Note: no Protocol For Photochemotherapy: Petrolatum And Broad Band Uvb: The patient received Photochemotherapy: Petrolatum and Broad Band UVB. Consent: Written consent obtained.  The risks were reviewed with the patient including but not limited to: burn, pigmentary changes, pain, blistering, scabbing, redness, increased risk of skin cancers, and the remote possibility of scarring. Total Body Energy: 350mj Protocol For Photochemotherapy: Tar And Broad Band Uvb (Goeckerman Treatment): The patient received Photochemotherapy: Tar and Broad Band UVB (Goeckerman treatment). Name Of Supervising Technician: MA Protocol For Puva: The patient received PUVA. Protocol For Nb Uva: The patient received NB UVA.